# Patient Record
Sex: FEMALE | Race: WHITE | HISPANIC OR LATINO | Employment: FULL TIME | URBAN - METROPOLITAN AREA
[De-identification: names, ages, dates, MRNs, and addresses within clinical notes are randomized per-mention and may not be internally consistent; named-entity substitution may affect disease eponyms.]

---

## 2017-03-06 ENCOUNTER — THERAPY VISIT (OUTPATIENT)
Dept: PHYSICAL THERAPY | Facility: CLINIC | Age: 52
End: 2017-03-06
Payer: COMMERCIAL

## 2017-03-06 DIAGNOSIS — M54.50 ACUTE BILATERAL LOW BACK PAIN WITHOUT SCIATICA: Primary | ICD-10-CM

## 2017-03-06 PROCEDURE — 97110 THERAPEUTIC EXERCISES: CPT | Mod: GP | Performed by: PHYSICAL THERAPIST

## 2017-03-06 PROCEDURE — 97161 PT EVAL LOW COMPLEX 20 MIN: CPT | Mod: GP | Performed by: PHYSICAL THERAPIST

## 2017-03-06 PROCEDURE — 97014 ELECTRIC STIMULATION THERAPY: CPT | Mod: GP | Performed by: PHYSICAL THERAPIST

## 2017-03-06 PROCEDURE — 97010 HOT OR COLD PACKS THERAPY: CPT | Mod: GP | Performed by: PHYSICAL THERAPIST
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
## 2017-03-07 NOTE — PROGRESS NOTES
Subjective:                                       Pertinent medical history includes:  High blood pressure, depression and migraines.  Medical allergies: yes.  Other surgeries include:  None reported.  Current medications:  Hormone replacement therapy, pain medication, muscle relaxants and high blood pressure medication.  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  Primary job tasks include:  Prolonged standing.            Oswestry Score: 48 %                 Objective:    System    Physical Exam    General     ROS    Assessment/Plan:

## 2017-03-09 ENCOUNTER — THERAPY VISIT (OUTPATIENT)
Dept: PHYSICAL THERAPY | Facility: CLINIC | Age: 52
End: 2017-03-09
Payer: COMMERCIAL

## 2017-03-09 DIAGNOSIS — M54.2 CERVICALGIA: Primary | ICD-10-CM

## 2017-03-09 DIAGNOSIS — M54.50 ACUTE BILATERAL LOW BACK PAIN WITHOUT SCIATICA: ICD-10-CM

## 2017-03-09 PROCEDURE — 97014 ELECTRIC STIMULATION THERAPY: CPT | Mod: GP | Performed by: PHYSICAL THERAPIST

## 2017-03-09 PROCEDURE — 97161 PT EVAL LOW COMPLEX 20 MIN: CPT | Mod: GP | Performed by: PHYSICAL THERAPIST

## 2017-03-09 PROCEDURE — 97140 MANUAL THERAPY 1/> REGIONS: CPT | Mod: GP | Performed by: PHYSICAL THERAPIST

## 2017-03-09 PROCEDURE — 97110 THERAPEUTIC EXERCISES: CPT | Mod: GP | Performed by: PHYSICAL THERAPIST

## 2017-03-09 PROCEDURE — 97010 HOT OR COLD PACKS THERAPY: CPT | Mod: GP | Performed by: PHYSICAL THERAPIST
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
## 2017-03-14 ENCOUNTER — THERAPY VISIT (OUTPATIENT)
Dept: PHYSICAL THERAPY | Facility: CLINIC | Age: 52
End: 2017-03-14
Payer: COMMERCIAL

## 2017-03-14 DIAGNOSIS — M54.50 ACUTE BILATERAL LOW BACK PAIN WITHOUT SCIATICA: ICD-10-CM

## 2017-03-14 DIAGNOSIS — M54.2 CERVICALGIA: ICD-10-CM

## 2017-03-14 PROCEDURE — 97110 THERAPEUTIC EXERCISES: CPT | Mod: GP | Performed by: PHYSICAL THERAPIST

## 2017-03-14 PROCEDURE — 97140 MANUAL THERAPY 1/> REGIONS: CPT | Mod: GP | Performed by: PHYSICAL THERAPIST

## 2017-03-14 PROCEDURE — 97014 ELECTRIC STIMULATION THERAPY: CPT | Mod: GP | Performed by: PHYSICAL THERAPIST

## 2017-03-14 PROCEDURE — 97010 HOT OR COLD PACKS THERAPY: CPT | Mod: GP | Performed by: PHYSICAL THERAPIST

## 2017-03-14 PROCEDURE — 97035 APP MDLTY 1+ULTRASOUND EA 15: CPT | Mod: GP | Performed by: PHYSICAL THERAPIST

## 2017-03-18 ENCOUNTER — THERAPY VISIT (OUTPATIENT)
Dept: PHYSICAL THERAPY | Facility: CLINIC | Age: 52
End: 2017-03-18
Payer: COMMERCIAL

## 2017-03-18 DIAGNOSIS — M54.2 CERVICALGIA: ICD-10-CM

## 2017-03-18 DIAGNOSIS — M54.50 ACUTE BILATERAL LOW BACK PAIN WITHOUT SCIATICA: ICD-10-CM

## 2017-03-18 PROCEDURE — 97112 NEUROMUSCULAR REEDUCATION: CPT | Mod: GP | Performed by: PHYSICAL THERAPIST

## 2017-03-18 PROCEDURE — 97010 HOT OR COLD PACKS THERAPY: CPT | Mod: GP | Performed by: PHYSICAL THERAPIST

## 2017-03-18 PROCEDURE — 97140 MANUAL THERAPY 1/> REGIONS: CPT | Mod: GP | Performed by: PHYSICAL THERAPIST

## 2017-03-18 PROCEDURE — 97014 ELECTRIC STIMULATION THERAPY: CPT | Mod: GP | Performed by: PHYSICAL THERAPIST

## 2017-03-18 PROCEDURE — 97035 APP MDLTY 1+ULTRASOUND EA 15: CPT | Mod: GP | Performed by: PHYSICAL THERAPIST

## 2017-03-20 ENCOUNTER — THERAPY VISIT (OUTPATIENT)
Dept: PHYSICAL THERAPY | Facility: CLINIC | Age: 52
End: 2017-03-20
Payer: COMMERCIAL

## 2017-03-20 DIAGNOSIS — M54.50 ACUTE BILATERAL LOW BACK PAIN WITHOUT SCIATICA: ICD-10-CM

## 2017-03-20 DIAGNOSIS — M54.2 CERVICALGIA: ICD-10-CM

## 2017-03-20 PROCEDURE — 97112 NEUROMUSCULAR REEDUCATION: CPT | Mod: GP | Performed by: PHYSICAL THERAPIST

## 2017-03-20 PROCEDURE — 97010 HOT OR COLD PACKS THERAPY: CPT | Mod: GP | Performed by: PHYSICAL THERAPIST

## 2017-03-20 PROCEDURE — 97014 ELECTRIC STIMULATION THERAPY: CPT | Mod: GP | Performed by: PHYSICAL THERAPIST

## 2017-03-20 PROCEDURE — 97140 MANUAL THERAPY 1/> REGIONS: CPT | Mod: GP | Performed by: PHYSICAL THERAPIST

## 2017-03-20 PROCEDURE — 97035 APP MDLTY 1+ULTRASOUND EA 15: CPT | Mod: GP | Performed by: PHYSICAL THERAPIST

## 2017-04-01 ENCOUNTER — THERAPY VISIT (OUTPATIENT)
Dept: PHYSICAL THERAPY | Facility: CLINIC | Age: 52
End: 2017-04-01
Payer: COMMERCIAL

## 2017-04-01 DIAGNOSIS — M54.50 ACUTE BILATERAL LOW BACK PAIN WITHOUT SCIATICA: ICD-10-CM

## 2017-04-01 DIAGNOSIS — M54.2 CERVICALGIA: ICD-10-CM

## 2017-04-01 PROCEDURE — 97112 NEUROMUSCULAR REEDUCATION: CPT | Mod: GP | Performed by: PHYSICAL THERAPIST

## 2017-04-01 PROCEDURE — 97035 APP MDLTY 1+ULTRASOUND EA 15: CPT | Mod: GP | Performed by: PHYSICAL THERAPIST

## 2017-04-01 PROCEDURE — 97010 HOT OR COLD PACKS THERAPY: CPT | Mod: GP | Performed by: PHYSICAL THERAPIST

## 2017-04-01 PROCEDURE — 97014 ELECTRIC STIMULATION THERAPY: CPT | Mod: GP | Performed by: PHYSICAL THERAPIST

## 2017-04-01 PROCEDURE — 97140 MANUAL THERAPY 1/> REGIONS: CPT | Mod: GP | Performed by: PHYSICAL THERAPIST

## 2017-04-03 ENCOUNTER — THERAPY VISIT (OUTPATIENT)
Dept: PHYSICAL THERAPY | Facility: CLINIC | Age: 52
End: 2017-04-03
Payer: COMMERCIAL

## 2017-04-03 DIAGNOSIS — M54.2 CERVICALGIA: ICD-10-CM

## 2017-04-03 DIAGNOSIS — M54.50 ACUTE BILATERAL LOW BACK PAIN WITHOUT SCIATICA: ICD-10-CM

## 2017-04-03 PROCEDURE — 97035 APP MDLTY 1+ULTRASOUND EA 15: CPT | Mod: GP | Performed by: PHYSICAL THERAPIST

## 2017-04-03 PROCEDURE — 97014 ELECTRIC STIMULATION THERAPY: CPT | Mod: GP | Performed by: PHYSICAL THERAPIST

## 2017-04-03 PROCEDURE — 97140 MANUAL THERAPY 1/> REGIONS: CPT | Mod: GP | Performed by: PHYSICAL THERAPIST

## 2017-04-03 PROCEDURE — 97010 HOT OR COLD PACKS THERAPY: CPT | Mod: GP | Performed by: PHYSICAL THERAPIST

## 2017-04-03 PROCEDURE — 97110 THERAPEUTIC EXERCISES: CPT | Mod: GP | Performed by: PHYSICAL THERAPIST

## 2017-04-06 ENCOUNTER — THERAPY VISIT (OUTPATIENT)
Dept: PHYSICAL THERAPY | Facility: CLINIC | Age: 52
End: 2017-04-06
Payer: COMMERCIAL

## 2017-04-06 DIAGNOSIS — M54.50 ACUTE BILATERAL LOW BACK PAIN WITHOUT SCIATICA: ICD-10-CM

## 2017-04-06 DIAGNOSIS — M54.2 CERVICALGIA: ICD-10-CM

## 2017-04-06 PROCEDURE — 97035 APP MDLTY 1+ULTRASOUND EA 15: CPT | Mod: GP | Performed by: PHYSICAL THERAPIST

## 2017-04-06 PROCEDURE — 97010 HOT OR COLD PACKS THERAPY: CPT | Mod: GP | Performed by: PHYSICAL THERAPIST

## 2017-04-06 PROCEDURE — 97140 MANUAL THERAPY 1/> REGIONS: CPT | Mod: GP | Performed by: PHYSICAL THERAPIST

## 2017-04-06 PROCEDURE — 97110 THERAPEUTIC EXERCISES: CPT | Mod: GP | Performed by: PHYSICAL THERAPIST

## 2017-04-06 PROCEDURE — 97014 ELECTRIC STIMULATION THERAPY: CPT | Mod: GP | Performed by: PHYSICAL THERAPIST

## 2017-04-10 ENCOUNTER — THERAPY VISIT (OUTPATIENT)
Dept: PHYSICAL THERAPY | Facility: CLINIC | Age: 52
End: 2017-04-10
Payer: COMMERCIAL

## 2017-04-10 DIAGNOSIS — M54.50 ACUTE BILATERAL LOW BACK PAIN WITHOUT SCIATICA: ICD-10-CM

## 2017-04-10 DIAGNOSIS — M54.2 CERVICALGIA: ICD-10-CM

## 2017-04-10 PROCEDURE — 97110 THERAPEUTIC EXERCISES: CPT | Mod: GP | Performed by: PHYSICAL THERAPIST

## 2017-04-10 PROCEDURE — 97140 MANUAL THERAPY 1/> REGIONS: CPT | Mod: GP | Performed by: PHYSICAL THERAPIST

## 2017-04-10 PROCEDURE — 97035 APP MDLTY 1+ULTRASOUND EA 15: CPT | Mod: GP | Performed by: PHYSICAL THERAPIST

## 2017-04-10 PROCEDURE — 97014 ELECTRIC STIMULATION THERAPY: CPT | Mod: GP | Performed by: PHYSICAL THERAPIST

## 2017-04-10 PROCEDURE — 97010 HOT OR COLD PACKS THERAPY: CPT | Mod: GP | Performed by: PHYSICAL THERAPIST

## 2017-04-13 ENCOUNTER — THERAPY VISIT (OUTPATIENT)
Dept: PHYSICAL THERAPY | Facility: CLINIC | Age: 52
End: 2017-04-13
Payer: COMMERCIAL

## 2017-04-13 DIAGNOSIS — M54.2 CERVICALGIA: ICD-10-CM

## 2017-04-13 DIAGNOSIS — M54.50 ACUTE BILATERAL LOW BACK PAIN WITHOUT SCIATICA: ICD-10-CM

## 2017-04-13 PROCEDURE — 97530 THERAPEUTIC ACTIVITIES: CPT | Mod: GP | Performed by: PHYSICAL THERAPIST

## 2017-04-13 PROCEDURE — 97014 ELECTRIC STIMULATION THERAPY: CPT | Mod: GP | Performed by: PHYSICAL THERAPIST

## 2017-04-13 PROCEDURE — 97035 APP MDLTY 1+ULTRASOUND EA 15: CPT | Mod: GP | Performed by: PHYSICAL THERAPIST

## 2017-04-13 PROCEDURE — 97010 HOT OR COLD PACKS THERAPY: CPT | Mod: GP | Performed by: PHYSICAL THERAPIST

## 2017-04-13 PROCEDURE — 97112 NEUROMUSCULAR REEDUCATION: CPT | Mod: GP | Performed by: PHYSICAL THERAPIST

## 2017-04-15 ENCOUNTER — THERAPY VISIT (OUTPATIENT)
Dept: PHYSICAL THERAPY | Facility: CLINIC | Age: 52
End: 2017-04-15
Payer: COMMERCIAL

## 2017-04-15 DIAGNOSIS — M54.2 CERVICALGIA: ICD-10-CM

## 2017-04-15 DIAGNOSIS — M54.50 ACUTE BILATERAL LOW BACK PAIN WITHOUT SCIATICA: ICD-10-CM

## 2017-04-15 PROCEDURE — 97112 NEUROMUSCULAR REEDUCATION: CPT | Mod: GP | Performed by: PHYSICAL THERAPIST

## 2017-04-15 PROCEDURE — 97035 APP MDLTY 1+ULTRASOUND EA 15: CPT | Mod: GP | Performed by: PHYSICAL THERAPIST

## 2017-04-15 PROCEDURE — 97014 ELECTRIC STIMULATION THERAPY: CPT | Mod: GP | Performed by: PHYSICAL THERAPIST

## 2017-04-15 PROCEDURE — 97010 HOT OR COLD PACKS THERAPY: CPT | Mod: GP | Performed by: PHYSICAL THERAPIST

## 2017-04-15 PROCEDURE — 97530 THERAPEUTIC ACTIVITIES: CPT | Mod: GP | Performed by: PHYSICAL THERAPIST

## 2017-04-18 ENCOUNTER — THERAPY VISIT (OUTPATIENT)
Dept: PHYSICAL THERAPY | Facility: CLINIC | Age: 52
End: 2017-04-18
Payer: COMMERCIAL

## 2017-04-18 DIAGNOSIS — M54.50 ACUTE BILATERAL LOW BACK PAIN WITHOUT SCIATICA: ICD-10-CM

## 2017-04-18 DIAGNOSIS — M54.2 CERVICALGIA: ICD-10-CM

## 2017-04-18 PROCEDURE — 97010 HOT OR COLD PACKS THERAPY: CPT | Mod: GP | Performed by: PHYSICAL THERAPIST

## 2017-04-18 PROCEDURE — 97014 ELECTRIC STIMULATION THERAPY: CPT | Mod: GP | Performed by: PHYSICAL THERAPIST

## 2017-04-18 PROCEDURE — 97035 APP MDLTY 1+ULTRASOUND EA 15: CPT | Mod: GP | Performed by: PHYSICAL THERAPIST

## 2017-04-18 PROCEDURE — 97140 MANUAL THERAPY 1/> REGIONS: CPT | Mod: GP | Performed by: PHYSICAL THERAPIST
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
## 2017-04-20 ENCOUNTER — THERAPY VISIT (OUTPATIENT)
Dept: PHYSICAL THERAPY | Facility: CLINIC | Age: 52
End: 2017-04-20
Payer: COMMERCIAL

## 2017-04-20 DIAGNOSIS — M54.50 ACUTE BILATERAL LOW BACK PAIN WITHOUT SCIATICA: ICD-10-CM

## 2017-04-20 DIAGNOSIS — M54.2 CERVICALGIA: ICD-10-CM

## 2017-04-20 PROCEDURE — 97530 THERAPEUTIC ACTIVITIES: CPT | Mod: GP | Performed by: PHYSICAL THERAPIST

## 2017-04-20 PROCEDURE — 97035 APP MDLTY 1+ULTRASOUND EA 15: CPT | Mod: GP | Performed by: PHYSICAL THERAPIST

## 2017-04-20 PROCEDURE — 97014 ELECTRIC STIMULATION THERAPY: CPT | Mod: GP | Performed by: PHYSICAL THERAPIST

## 2017-04-20 PROCEDURE — 97010 HOT OR COLD PACKS THERAPY: CPT | Mod: GP | Performed by: PHYSICAL THERAPIST

## 2017-04-20 PROCEDURE — 97140 MANUAL THERAPY 1/> REGIONS: CPT | Mod: GP | Performed by: PHYSICAL THERAPIST

## 2017-04-25 ENCOUNTER — THERAPY VISIT (OUTPATIENT)
Dept: PHYSICAL THERAPY | Facility: CLINIC | Age: 52
End: 2017-04-25
Payer: COMMERCIAL

## 2017-04-25 DIAGNOSIS — M54.2 CERVICALGIA: ICD-10-CM

## 2017-04-25 DIAGNOSIS — M54.50 ACUTE BILATERAL LOW BACK PAIN WITHOUT SCIATICA: ICD-10-CM

## 2017-04-25 PROCEDURE — 97530 THERAPEUTIC ACTIVITIES: CPT | Mod: GP | Performed by: PHYSICAL THERAPIST

## 2017-04-25 PROCEDURE — 97112 NEUROMUSCULAR REEDUCATION: CPT | Mod: GP | Performed by: PHYSICAL THERAPIST

## 2017-04-25 PROCEDURE — 97014 ELECTRIC STIMULATION THERAPY: CPT | Mod: GP | Performed by: PHYSICAL THERAPIST

## 2017-04-25 PROCEDURE — 97010 HOT OR COLD PACKS THERAPY: CPT | Mod: GP | Performed by: PHYSICAL THERAPIST

## 2017-04-25 PROCEDURE — 97012 MECHANICAL TRACTION THERAPY: CPT | Mod: GP | Performed by: PHYSICAL THERAPIST

## 2017-04-27 ENCOUNTER — THERAPY VISIT (OUTPATIENT)
Dept: PHYSICAL THERAPY | Facility: CLINIC | Age: 52
End: 2017-04-27
Payer: COMMERCIAL

## 2017-04-27 DIAGNOSIS — M54.2 CERVICALGIA: ICD-10-CM

## 2017-04-27 DIAGNOSIS — M54.50 ACUTE BILATERAL LOW BACK PAIN WITHOUT SCIATICA: ICD-10-CM

## 2017-04-27 PROCEDURE — 97140 MANUAL THERAPY 1/> REGIONS: CPT | Mod: GP | Performed by: PHYSICAL THERAPIST

## 2017-04-27 PROCEDURE — 97014 ELECTRIC STIMULATION THERAPY: CPT | Mod: GP | Performed by: PHYSICAL THERAPIST

## 2017-04-27 PROCEDURE — 97112 NEUROMUSCULAR REEDUCATION: CPT | Mod: GP | Performed by: PHYSICAL THERAPIST

## 2017-04-27 PROCEDURE — 97010 HOT OR COLD PACKS THERAPY: CPT | Mod: GP | Performed by: PHYSICAL THERAPIST

## 2017-04-27 PROCEDURE — 97110 THERAPEUTIC EXERCISES: CPT | Mod: GP | Performed by: PHYSICAL THERAPIST

## 2017-05-01 ENCOUNTER — THERAPY VISIT (OUTPATIENT)
Dept: PHYSICAL THERAPY | Facility: CLINIC | Age: 52
End: 2017-05-01
Payer: COMMERCIAL

## 2017-05-01 DIAGNOSIS — M54.50 ACUTE BILATERAL LOW BACK PAIN WITHOUT SCIATICA: ICD-10-CM

## 2017-05-01 DIAGNOSIS — M54.2 CERVICALGIA: ICD-10-CM

## 2017-05-01 PROCEDURE — 97530 THERAPEUTIC ACTIVITIES: CPT | Mod: GP | Performed by: PHYSICAL THERAPIST

## 2017-05-01 PROCEDURE — 97140 MANUAL THERAPY 1/> REGIONS: CPT | Mod: GP | Performed by: PHYSICAL THERAPIST

## 2017-05-01 PROCEDURE — 97014 ELECTRIC STIMULATION THERAPY: CPT | Mod: GP | Performed by: PHYSICAL THERAPIST

## 2017-05-01 PROCEDURE — 97010 HOT OR COLD PACKS THERAPY: CPT | Mod: GP | Performed by: PHYSICAL THERAPIST

## 2017-05-01 PROCEDURE — 97112 NEUROMUSCULAR REEDUCATION: CPT | Mod: GP | Performed by: PHYSICAL THERAPIST

## 2017-05-04 ENCOUNTER — THERAPY VISIT (OUTPATIENT)
Dept: PHYSICAL THERAPY | Facility: CLINIC | Age: 52
End: 2017-05-04
Payer: COMMERCIAL

## 2017-05-04 DIAGNOSIS — M54.50 ACUTE BILATERAL LOW BACK PAIN WITHOUT SCIATICA: ICD-10-CM

## 2017-05-04 DIAGNOSIS — M54.2 CERVICALGIA: ICD-10-CM

## 2017-05-04 PROCEDURE — 97010 HOT OR COLD PACKS THERAPY: CPT | Mod: GP | Performed by: PHYSICAL THERAPIST

## 2017-05-04 PROCEDURE — 97014 ELECTRIC STIMULATION THERAPY: CPT | Mod: GP | Performed by: PHYSICAL THERAPIST

## 2017-05-04 PROCEDURE — 97530 THERAPEUTIC ACTIVITIES: CPT | Mod: GP | Performed by: PHYSICAL THERAPIST

## 2017-05-04 PROCEDURE — 97140 MANUAL THERAPY 1/> REGIONS: CPT | Mod: GP | Performed by: PHYSICAL THERAPIST

## 2017-05-04 PROCEDURE — 97112 NEUROMUSCULAR REEDUCATION: CPT | Mod: GP | Performed by: PHYSICAL THERAPIST

## 2017-05-08 ENCOUNTER — THERAPY VISIT (OUTPATIENT)
Dept: PHYSICAL THERAPY | Facility: CLINIC | Age: 52
End: 2017-05-08
Payer: COMMERCIAL

## 2017-05-08 DIAGNOSIS — M25.511 ACUTE PAIN OF RIGHT SHOULDER: Primary | ICD-10-CM

## 2017-05-08 DIAGNOSIS — M54.50 ACUTE BILATERAL LOW BACK PAIN WITHOUT SCIATICA: ICD-10-CM

## 2017-05-08 DIAGNOSIS — M54.2 CERVICALGIA: ICD-10-CM

## 2017-05-08 PROCEDURE — 97110 THERAPEUTIC EXERCISES: CPT | Mod: GP | Performed by: PHYSICAL THERAPIST

## 2017-05-08 PROCEDURE — 97140 MANUAL THERAPY 1/> REGIONS: CPT | Mod: GP | Performed by: PHYSICAL THERAPIST

## 2017-05-08 PROCEDURE — 97161 PT EVAL LOW COMPLEX 20 MIN: CPT | Mod: GP | Performed by: PHYSICAL THERAPIST

## 2017-05-08 PROCEDURE — 97035 APP MDLTY 1+ULTRASOUND EA 15: CPT | Mod: GP | Performed by: PHYSICAL THERAPIST

## 2017-05-08 PROCEDURE — 97014 ELECTRIC STIMULATION THERAPY: CPT | Mod: GP | Performed by: PHYSICAL THERAPIST

## 2017-05-08 PROCEDURE — 97010 HOT OR COLD PACKS THERAPY: CPT | Mod: GP | Performed by: PHYSICAL THERAPIST
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
## 2017-05-11 ENCOUNTER — THERAPY VISIT (OUTPATIENT)
Dept: PHYSICAL THERAPY | Facility: CLINIC | Age: 52
End: 2017-05-11
Payer: COMMERCIAL

## 2017-05-11 DIAGNOSIS — M54.50 ACUTE BILATERAL LOW BACK PAIN WITHOUT SCIATICA: ICD-10-CM

## 2017-05-11 DIAGNOSIS — M25.511 ACUTE PAIN OF RIGHT SHOULDER: ICD-10-CM

## 2017-05-11 DIAGNOSIS — M54.2 CERVICALGIA: ICD-10-CM

## 2017-05-11 PROCEDURE — 97014 ELECTRIC STIMULATION THERAPY: CPT | Mod: GP | Performed by: PHYSICAL THERAPIST

## 2017-05-11 PROCEDURE — 97112 NEUROMUSCULAR REEDUCATION: CPT | Mod: GP | Performed by: PHYSICAL THERAPIST

## 2017-05-11 PROCEDURE — 97110 THERAPEUTIC EXERCISES: CPT | Mod: GP | Performed by: PHYSICAL THERAPIST

## 2017-05-11 PROCEDURE — 97010 HOT OR COLD PACKS THERAPY: CPT | Mod: GP | Performed by: PHYSICAL THERAPIST

## 2017-05-15 ENCOUNTER — THERAPY VISIT (OUTPATIENT)
Dept: PHYSICAL THERAPY | Facility: CLINIC | Age: 52
End: 2017-05-15
Payer: COMMERCIAL

## 2017-05-15 DIAGNOSIS — M25.511 ACUTE PAIN OF RIGHT SHOULDER: ICD-10-CM

## 2017-05-15 DIAGNOSIS — M54.2 CERVICALGIA: ICD-10-CM

## 2017-05-15 DIAGNOSIS — M54.50 ACUTE BILATERAL LOW BACK PAIN WITHOUT SCIATICA: ICD-10-CM

## 2017-05-15 PROCEDURE — 97035 APP MDLTY 1+ULTRASOUND EA 15: CPT | Mod: GP | Performed by: PHYSICAL THERAPIST

## 2017-05-15 PROCEDURE — 97010 HOT OR COLD PACKS THERAPY: CPT | Mod: GP | Performed by: PHYSICAL THERAPIST

## 2017-05-15 PROCEDURE — 97140 MANUAL THERAPY 1/> REGIONS: CPT | Mod: GP | Performed by: PHYSICAL THERAPIST

## 2017-05-15 PROCEDURE — 97110 THERAPEUTIC EXERCISES: CPT | Mod: GP | Performed by: PHYSICAL THERAPIST

## 2017-05-15 PROCEDURE — 97014 ELECTRIC STIMULATION THERAPY: CPT | Mod: GP | Performed by: PHYSICAL THERAPIST

## 2017-05-18 ENCOUNTER — THERAPY VISIT (OUTPATIENT)
Dept: PHYSICAL THERAPY | Facility: CLINIC | Age: 52
End: 2017-05-18
Payer: COMMERCIAL

## 2017-05-18 DIAGNOSIS — M54.2 CERVICALGIA: ICD-10-CM

## 2017-05-18 DIAGNOSIS — M25.511 ACUTE PAIN OF RIGHT SHOULDER: ICD-10-CM

## 2017-05-18 DIAGNOSIS — M54.50 ACUTE BILATERAL LOW BACK PAIN WITHOUT SCIATICA: ICD-10-CM

## 2017-05-18 PROCEDURE — 97112 NEUROMUSCULAR REEDUCATION: CPT | Mod: GP | Performed by: PHYSICAL THERAPIST

## 2017-05-18 PROCEDURE — 97014 ELECTRIC STIMULATION THERAPY: CPT | Mod: GP | Performed by: PHYSICAL THERAPIST

## 2017-05-18 PROCEDURE — 97110 THERAPEUTIC EXERCISES: CPT | Mod: GP | Performed by: PHYSICAL THERAPIST

## 2017-05-18 PROCEDURE — 97010 HOT OR COLD PACKS THERAPY: CPT | Mod: GP | Performed by: PHYSICAL THERAPIST

## 2017-05-22 ENCOUNTER — THERAPY VISIT (OUTPATIENT)
Dept: PHYSICAL THERAPY | Facility: CLINIC | Age: 52
End: 2017-05-22
Payer: COMMERCIAL

## 2017-05-22 DIAGNOSIS — M54.50 ACUTE BILATERAL LOW BACK PAIN WITHOUT SCIATICA: ICD-10-CM

## 2017-05-22 DIAGNOSIS — M54.2 CERVICALGIA: ICD-10-CM

## 2017-05-22 DIAGNOSIS — M25.511 ACUTE PAIN OF RIGHT SHOULDER: ICD-10-CM

## 2017-05-22 PROCEDURE — 97014 ELECTRIC STIMULATION THERAPY: CPT | Mod: GP | Performed by: PHYSICAL THERAPIST

## 2017-05-22 PROCEDURE — 97140 MANUAL THERAPY 1/> REGIONS: CPT | Mod: GP | Performed by: PHYSICAL THERAPIST

## 2017-05-22 PROCEDURE — 97110 THERAPEUTIC EXERCISES: CPT | Mod: GP | Performed by: PHYSICAL THERAPIST

## 2017-05-22 PROCEDURE — 97010 HOT OR COLD PACKS THERAPY: CPT | Mod: GP | Performed by: PHYSICAL THERAPIST

## 2017-05-22 PROCEDURE — 97112 NEUROMUSCULAR REEDUCATION: CPT | Mod: GP | Performed by: PHYSICAL THERAPIST

## 2017-05-25 ENCOUNTER — THERAPY VISIT (OUTPATIENT)
Dept: PHYSICAL THERAPY | Facility: CLINIC | Age: 52
End: 2017-05-25
Payer: COMMERCIAL

## 2017-05-25 DIAGNOSIS — M25.511 ACUTE PAIN OF RIGHT SHOULDER: ICD-10-CM

## 2017-05-25 DIAGNOSIS — M54.2 CERVICALGIA: ICD-10-CM

## 2017-05-25 DIAGNOSIS — M54.50 ACUTE BILATERAL LOW BACK PAIN WITHOUT SCIATICA: ICD-10-CM

## 2017-05-25 PROCEDURE — 97110 THERAPEUTIC EXERCISES: CPT | Mod: GP | Performed by: PHYSICAL THERAPIST

## 2017-05-25 PROCEDURE — 97010 HOT OR COLD PACKS THERAPY: CPT | Mod: GP | Performed by: PHYSICAL THERAPIST

## 2017-05-25 PROCEDURE — 97140 MANUAL THERAPY 1/> REGIONS: CPT | Mod: GP | Performed by: PHYSICAL THERAPIST

## 2017-05-25 PROCEDURE — 97112 NEUROMUSCULAR REEDUCATION: CPT | Mod: GP | Performed by: PHYSICAL THERAPIST

## 2017-05-25 PROCEDURE — 97014 ELECTRIC STIMULATION THERAPY: CPT | Mod: GP | Performed by: PHYSICAL THERAPIST

## 2017-06-05 ENCOUNTER — THERAPY VISIT (OUTPATIENT)
Dept: PHYSICAL THERAPY | Facility: CLINIC | Age: 52
End: 2017-06-05
Payer: COMMERCIAL

## 2017-06-05 DIAGNOSIS — M54.50 ACUTE BILATERAL LOW BACK PAIN WITHOUT SCIATICA: ICD-10-CM

## 2017-06-05 DIAGNOSIS — M25.511 ACUTE PAIN OF RIGHT SHOULDER: ICD-10-CM

## 2017-06-05 DIAGNOSIS — M54.2 CERVICALGIA: ICD-10-CM

## 2017-06-05 PROCEDURE — 97010 HOT OR COLD PACKS THERAPY: CPT | Mod: GP | Performed by: PHYSICAL THERAPIST

## 2017-06-05 PROCEDURE — 97112 NEUROMUSCULAR REEDUCATION: CPT | Mod: GP | Performed by: PHYSICAL THERAPIST

## 2017-06-05 PROCEDURE — 97014 ELECTRIC STIMULATION THERAPY: CPT | Mod: GP | Performed by: PHYSICAL THERAPIST

## 2017-06-05 PROCEDURE — 97140 MANUAL THERAPY 1/> REGIONS: CPT | Mod: GP | Performed by: PHYSICAL THERAPIST

## 2017-06-12 ENCOUNTER — THERAPY VISIT (OUTPATIENT)
Dept: PHYSICAL THERAPY | Facility: CLINIC | Age: 52
End: 2017-06-12
Payer: COMMERCIAL

## 2017-06-12 DIAGNOSIS — M25.511 ACUTE PAIN OF RIGHT SHOULDER: ICD-10-CM

## 2017-06-12 DIAGNOSIS — M54.2 CERVICALGIA: ICD-10-CM

## 2017-06-12 DIAGNOSIS — M54.50 ACUTE BILATERAL LOW BACK PAIN WITHOUT SCIATICA: ICD-10-CM

## 2017-06-12 PROCEDURE — 97112 NEUROMUSCULAR REEDUCATION: CPT | Mod: GP | Performed by: PHYSICAL THERAPIST

## 2017-06-12 PROCEDURE — 97014 ELECTRIC STIMULATION THERAPY: CPT | Mod: GP | Performed by: PHYSICAL THERAPIST

## 2017-06-12 PROCEDURE — 97140 MANUAL THERAPY 1/> REGIONS: CPT | Mod: GP | Performed by: PHYSICAL THERAPIST

## 2017-06-12 PROCEDURE — 97010 HOT OR COLD PACKS THERAPY: CPT | Mod: GP | Performed by: PHYSICAL THERAPIST

## 2017-06-26 ENCOUNTER — THERAPY VISIT (OUTPATIENT)
Dept: PHYSICAL THERAPY | Facility: CLINIC | Age: 52
End: 2017-06-26
Payer: COMMERCIAL

## 2017-06-26 DIAGNOSIS — M25.511 ACUTE PAIN OF RIGHT SHOULDER: ICD-10-CM

## 2017-06-26 DIAGNOSIS — M54.50 ACUTE BILATERAL LOW BACK PAIN WITHOUT SCIATICA: ICD-10-CM

## 2017-06-26 DIAGNOSIS — M54.2 CERVICALGIA: ICD-10-CM

## 2017-06-26 PROCEDURE — 97014 ELECTRIC STIMULATION THERAPY: CPT | Mod: GP | Performed by: PHYSICAL THERAPIST

## 2017-06-26 PROCEDURE — 97164 PT RE-EVAL EST PLAN CARE: CPT | Mod: GP | Performed by: PHYSICAL THERAPIST

## 2017-06-26 PROCEDURE — 97010 HOT OR COLD PACKS THERAPY: CPT | Mod: GP | Performed by: PHYSICAL THERAPIST
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
## 2017-07-31 ENCOUNTER — OFFICE VISIT (OUTPATIENT)
Dept: OBGYN | Facility: CLINIC | Age: 52
End: 2017-07-31
Payer: COMMERCIAL

## 2017-07-31 ENCOUNTER — RADIANT APPOINTMENT (OUTPATIENT)
Dept: MAMMOGRAPHY | Facility: CLINIC | Age: 52
End: 2017-07-31
Attending: OBSTETRICS & GYNECOLOGY
Payer: COMMERCIAL

## 2017-07-31 VITALS
HEART RATE: 62 BPM | SYSTOLIC BLOOD PRESSURE: 129 MMHG | BODY MASS INDEX: 21.22 KG/M2 | DIASTOLIC BLOOD PRESSURE: 75 MMHG | OXYGEN SATURATION: 97 % | WEIGHT: 108.1 LBS | HEIGHT: 60 IN

## 2017-07-31 DIAGNOSIS — Z12.31 VISIT FOR SCREENING MAMMOGRAM: ICD-10-CM

## 2017-07-31 DIAGNOSIS — Z79.890 HORMONE REPLACEMENT THERAPY (HRT): Primary | ICD-10-CM

## 2017-07-31 DIAGNOSIS — Z13.1 SCREENING FOR DIABETES MELLITUS: ICD-10-CM

## 2017-07-31 DIAGNOSIS — Z13.220 LIPID SCREENING: ICD-10-CM

## 2017-07-31 DIAGNOSIS — A60.09 HERPES GENITALIS IN WOMEN: ICD-10-CM

## 2017-07-31 DIAGNOSIS — Z13.29 SCREENING FOR THYROID DISORDER: ICD-10-CM

## 2017-07-31 PROCEDURE — G0202 SCR MAMMO BI INCL CAD: HCPCS | Performed by: STUDENT IN AN ORGANIZED HEALTH CARE EDUCATION/TRAINING PROGRAM

## 2017-07-31 PROCEDURE — 99396 PREV VISIT EST AGE 40-64: CPT | Performed by: OBSTETRICS & GYNECOLOGY

## 2017-07-31 RX ORDER — AMLODIPINE BESYLATE 2.5 MG/1
TABLET ORAL
Refills: 0 | COMMUNITY
Start: 2017-05-22 | End: 2018-08-02

## 2017-07-31 RX ORDER — ACYCLOVIR 400 MG/1
400 TABLET ORAL 3 TIMES DAILY
Qty: 15 TABLET | Refills: 3 | Status: SHIPPED | OUTPATIENT
Start: 2017-07-31 | End: 2018-07-25

## 2017-07-31 RX ORDER — ESTRADIOL 1 MG/1
1 TABLET ORAL DAILY
Qty: 90 TABLET | Refills: 3 | Status: SHIPPED | OUTPATIENT
Start: 2017-07-31 | End: 2018-08-02

## 2017-07-31 ASSESSMENT — ANXIETY QUESTIONNAIRES
6. BECOMING EASILY ANNOYED OR IRRITABLE: NOT AT ALL
5. BEING SO RESTLESS THAT IT IS HARD TO SIT STILL: NOT AT ALL
7. FEELING AFRAID AS IF SOMETHING AWFUL MIGHT HAPPEN: NOT AT ALL
GAD7 TOTAL SCORE: 0
2. NOT BEING ABLE TO STOP OR CONTROL WORRYING: NOT AT ALL
3. WORRYING TOO MUCH ABOUT DIFFERENT THINGS: NOT AT ALL
1. FEELING NERVOUS, ANXIOUS, OR ON EDGE: NOT AT ALL

## 2017-07-31 ASSESSMENT — PATIENT HEALTH QUESTIONNAIRE - PHQ9: 5. POOR APPETITE OR OVEREATING: NOT AT ALL

## 2017-07-31 NOTE — MR AVS SNAPSHOT
After Visit Summary   7/31/2017    Maribel June    MRN: [id number]           Patient Information     Date Of Birth          1965        Visit Information        Provider Department      7/31/2017 1:30 PM Laurita Damon DO [facility]        Care Instructions                                                         If you have any questions regarding your visit, Please contact your care team.    [facility] CLINIC HOURS TELEPHONE NUMBER   Laurita DO Nelson.    LEONARDA Hayes -    DONALD Barrios RN       [facility], Wednesday[facility]  8:30a.m-5:00 p.m   [facility]  [address]  [place], [place] [address] [phone] ask for [facility]    Imaging [phone]       Urgent Care locations:    [facility] Saturday and Sunday   9 am - 5 pm    Monday-Friday   12 pm - 8 pm  Saturday and Sunday   9 am - 5 pm   [phone]     [facility] Labor and Delivery:  [phone]    If you need a medication refill, please contact your pharmacy. Please allow 3 business days for your refill to be completed.  As always, Thank you for trusting us with your healthcare needs!                Follow-ups after your visit        Your next 10 appointments already scheduled     Jul 31, 2017  3:00 PM CDT   MA SCREENING DIGITAL BILATERAL with MGMA1, MG MA TECH   [facility]    [address] [facility] [address] [phone]           Do not use any powder, lotion or deodorant under your arms or on your breast. If you do, we will ask you to remove it before your exam.  Wear comfortable, two-piece clothing.  If you have any allergies, tell your care team.  Bring any previous mammograms from other facilities or have them mailed to the breast center. Three-dimensional (3D) mammograms are available at  "[place] locations in [place], [place], [place], [place], [facility], and [place]. [facility] locations include [place] and [facility] in [place]. Benefits of 3D mammograms include: - Improved rate of cancer detection - Decreases your chance of having to go back for more tests, which means fewer: - \"False-positive\" results (This means that there is an abnormal area but it isn't cancer.) - Invasive testing procedures, such as a biopsy or surgery - Can provide clearer images of the breast if you have dense breast tissue. 3D mammography is an optional exam that anyone can have with a 2D mammogram. It doesn't replace or take the place of a 2D mammogram. 2D mammograms remain an effective screening test for all women.  Not all insurance companies cover the cost of a 3D mammogram. Check with your insurance.              Who to contact     If you have questions or need follow up information about today's clinic visit or your schedule please contact [facility] directly at [phone].  Normal or non-critical lab and imaging results will be communicated to you by [employer], letter or phone within 4 business days after the clinic has received the results. If you do not hear from us within 7 days, please contact the clinic through [employer] or phone. If you have a critical or abnormal lab result, we will notify you by phone as soon as possible.  Submit refill requests through [employer] or call your pharmacy and they will forward the refill request to us. Please allow 3 business days for your refill to be completed.          Additional Information About Your Visit        [employer] Information     [employer] gives you secure access to your electronic health record. If you see a primary care provider, you can also send messages to your care team and make appointments. If you have questions, please call your primary care clinic.  If you do not have a primary care provider, please call [phone] " and they will assist you.        Care EveryWhere ID     This is your Care EveryWhere ID. This could be used by other organizations to access your [place] medical records  [id number]        Your Vitals Were     Pulse Height Pulse Oximetry Breastfeeding? BMI (Body Mass Index)       62 1.524 m (5') 97% No 21.11 kg/m2        Blood Pressure from Last 3 Encounters:   07/31/17 129/75   08/16/16 120/76   08/01/16 123/74    Weight from Last 3 Encounters:   07/31/17 49 kg (108 lb 1.6 oz)   08/01/16 49.4 kg (108 lb 12.8 oz)   01/18/16 48 kg (105 lb 12.8 oz)              Today, you had the following     No orders found for display         Today's Medication Changes          These changes are accurate as of: 7/31/17  1:53 PM.  If you have any questions, ask your nurse or doctor.               These medicines have changed or have updated prescriptions.        Dose/Directions    acyclovir 400 MG tablet   Commonly known as:  ZOVIRAX   This may have changed:  Another medication with the same name was removed. Continue taking this medication, and follow the directions you see here.   Used for:  Recurrent genital herpes   Changed by:  Laurita Damon DO        Dose:  400 mg   Take 1 tablet (400 mg) by mouth 3 times daily   Quantity:  15 tablet   Refills:  1       estradiol 1 MG tablet   Commonly known as:  ESTRACE   This may have changed:  Another medication with the same name was removed. Continue taking this medication, and follow the directions you see here.   Used for:  Need for prophylactic hormone replacement therapy (postmenopausal)   Changed by:  Laurita Damon DO        Dose:  1 mg   Take 1 tablet by mouth daily.   Quantity:  90 tablet   Refills:  3                Primary Care Provider Office Phone #    [facility] [phone]       No address on file        Equal Access to Services     CARINE MACIEL AH: ramakrishna Robertson, padma reeder  john moniquelandy salomecarlos lasyedabecky ah. [facility] [phone].    ATENCIÓN: Si habla mary jane, tiene a puckett disposición servicios gratuitos de asistencia lingüística. Nitish whitmore [phone].    We comply with applicable federal civil rights laws and [place] laws. We do not discriminate on the basis of race, color, national origin, age, disability sex, sexual orientation or gender identity.            Thank you!     Thank you for choosing [facility]  for your care. Our goal is always to provide you with excellent care. Hearing back from our patients is one way we can continue to improve our services. Please take a few minutes to complete the written survey that you may receive in the mail after your visit with us. Thank you!             Your Updated Medication List - Protect others around you: Learn how to safely use, store and throw away your medicines at www.disposemymeds.org.          This list is accurate as of: 7/31/17  1:53 PM.  Always use your most recent med list.                   Brand Name Dispense Instructions for use Diagnosis    acyclovir 400 MG tablet    ZOVIRAX    15 tablet    Take 1 tablet (400 mg) by mouth 3 times daily    Recurrent genital herpes       amLODIPine 2.5 MG tablet    NORVASC     TK 1 T PO QD        estradiol 1 MG tablet    ESTRACE    90 tablet    Take 1 tablet by mouth daily.    Need for prophylactic hormone replacement therapy (postmenopausal)       tiZANidine 4 MG tablet    ZANAFLEX     TK ONE T PO IN THE EVENING PRN P AND SPASM.        VITAMIN B 12 PO      Take  by mouth.

## 2017-07-31 NOTE — PATIENT INSTRUCTIONS
If you have any questions regarding your visit, Please contact your care team.    [facility] CLINIC HOURS TELEPHONE NUMBER   Laurita DO Nelson.    LEONARDA Hayes -    DONALD Barrios RN       Monday, Wednesday, Thursday and Friday, [place]  8:30a.m-5:00 p.m   [facility]  [address] [place] [address]  [phone] ask for [facility]    Imaging [phone]       Urgent Care locations:    [facility] Saturday and Sunday   9 am - 5 pm    Monday-Friday   12 pm - 8 pm  Saturday and Sunday   9 am - 5 pm   [phone]     [facility] Labor and Delivery:  [phone]    If you need a medication refill, please contact your pharmacy. Please allow 3 business days for your refill to be completed.  As always, Thank you for trusting us with your healthcare needs!

## 2017-07-31 NOTE — PROGRESS NOTES
Maribel is a 52 year old female, , who is here for her annual exam and refills of Estrace and Acyclovir.  She is not in a fasting state this afternoon so plans to return for needed labwork.  Her mammogram is scheduled for 3 pm today.  She denies being due for colonoscopy this year but describes having benign polyps in the past.  She is s/p hysterectomy for the treatment of endometriosis but still retains both ovaries.    ROS: Ten point review of systems was reviewed and negative except the above.    Health Maintenance   Topic Date Due     INFLUENZA VACCINE (SYSTEM ASSIGNED)  2017     MAMMO Q2 YR  2018     TETANUS IMMUNIZATION (SYSTEM ASSIGNED)  2020     LIPID SCREEN Q5 YR FEMALE (SYSTEM ASSIGNED)  2021     COLON CANCER SCREEN (SYSTEM ASSIGNED)  2026      Last pap: 14 normal  Last Mammogram: today  Last Dexa: negative  Last Colonoscopy: baseline done - found polyps  Lab Results   Component Value Date    CHOL 244 2016     Lab Results   Component Value Date    HDL 68 2016     Lab Results   Component Value Date     2016     Lab Results   Component Value Date    TRIG 158 2016     Lab Results   Component Value Date    CHOLHDLRATIO 2.9 2013         OBHX:      PSH:   Past Surgical History:   Procedure Laterality Date     BREAST SURGERY      Right breast lump removal-non-cancerous      SECTION       COLONOSCOPY N/A 2016    Procedure: COMBINED COLONOSCOPY, SINGLE OR MULTIPLE BIOPSY/POLYPECTOMY BY BIOPSY;  Surgeon: Duane, William Charles, MD;  Location: [facility] OR     COLONOSCOPY WITH CO2 INSUFFLATION N/A 2016    Procedure: COLONOSCOPY WITH CO2 INSUFFLATION;  Surgeon: Duane, William Charles, MD;  Location: [facility] OR     CYSTOSCOPY  09    left stent placement (C-ARM)     GYN SURGERY      laparoscopy     GYN SURGERY      partial hysterectomy--still have ovaries         PMH: Her past medical, surgical, and obstetric histories were  reviewed and are documented in their appropriate chart areas.    ALL/Meds: Her medication and allergy histories were reviewed and are documented in their appropriate chart areas.    SH/FMH: Her social and family history was reviewed and documented in its appropriate chart area.    PE: /75  Pulse 62  Ht 1.524 m (5')  Wt 49 kg (108 lb 1.6 oz)  SpO2 97%  Breastfeeding? No  BMI 21.11 kg/m2  Body mass index is 21.11 kg/(m^2).    General Appearance:  healthy, alert, active, no distress  Cardiovascular:  Regular rate and Rhythm without murmur  Neck: Supple, no adenopathy and thyroid normal  Lungs:  Clear, without wheeze, rale or rhonchi  Breast: normal breast exam  Abdomen: Benign, Soft, flat, non-tender, No masses, organomegaly, No inguinal nodes and Bowel sounds normoactive.   Pelvic:       - Ext: Vulva and perineum are normal without lesion, mass or discharge        - Urethra: normal without discharge        - Urethral Meatus: normal appearance       - Bladder: no tenderness, no masses       - Vagina: Normal mucosa, no discharge        - Cervix: Surgically absent       - Uterus: Surgicall absent        - Adnexa: Normal without masses or tenderness       - Rectal: sphincter tone normal and no mass    A/P:  Well Woman     -  I discussed the new pap recommendations regarding screening.  Explained the rationale for increased intervals between paps.  Questions asked and answered.  She does agree to this regiment.  Pap was not obtained since her cervix is surgically absent   -  BC: post menopausal and s/p hysterectomy   -  No orders of the defined types were placed in this encounter.   -  Check labwork when in a fasting state   -  Refill Estrace and Acyclovir   -  Encouraged self-breast exam   -  Encouraged low fat diet, regular exercise, and adequate calcium intake.    Laurita Damon, DO  FACOG, FACS

## 2017-07-31 NOTE — NURSING NOTE
Chief Complaint   Patient presents with     Physical       Initial /75  Pulse 62  Ht 1.524 m (5')  Wt 49 kg (108 lb 1.6 oz)  SpO2 97%  Breastfeeding? No  BMI 21.11 kg/m2 Estimated body mass index is 21.11 kg/(m^2) as calculated from the following:    Height as of this encounter: 1.524 m (5').    Weight as of this encounter: 49 kg (108 lb 1.6 oz).  Medication Reconciliation:   Abigail Washingotn CMA  July 31, 2017 1:53 PM

## 2017-08-01 ASSESSMENT — ANXIETY QUESTIONNAIRES: GAD7 TOTAL SCORE: 0

## 2017-08-01 ASSESSMENT — PATIENT HEALTH QUESTIONNAIRE - PHQ9: SUM OF ALL RESPONSES TO PHQ QUESTIONS 1-9: 1

## 2017-09-19 DIAGNOSIS — Z13.1 SCREENING FOR DIABETES MELLITUS: ICD-10-CM

## 2017-09-19 DIAGNOSIS — Z13.220 LIPID SCREENING: ICD-10-CM

## 2017-09-19 DIAGNOSIS — Z13.29 SCREENING FOR THYROID DISORDER: ICD-10-CM

## 2017-09-19 LAB
CHOLEST SERPL-MCNC: 268 MG/DL
GLUCOSE SERPL-MCNC: 91 MG/DL (ref 70–99)
HDLC SERPL-MCNC: 63 MG/DL
LDLC SERPL CALC-MCNC: 165 MG/DL
NONHDLC SERPL-MCNC: 205 MG/DL
TRIGL SERPL-MCNC: 202 MG/DL
TSH SERPL DL<=0.005 MIU/L-ACNC: 2.68 MU/L (ref 0.4–4)

## 2017-09-19 PROCEDURE — 80061 LIPID PANEL: CPT | Performed by: OBSTETRICS & GYNECOLOGY

## 2017-09-19 PROCEDURE — 36415 COLL VENOUS BLD VENIPUNCTURE: CPT | Performed by: OBSTETRICS & GYNECOLOGY

## 2017-09-19 PROCEDURE — 84443 ASSAY THYROID STIM HORMONE: CPT | Performed by: OBSTETRICS & GYNECOLOGY

## 2017-09-19 PROCEDURE — 82947 ASSAY GLUCOSE BLOOD QUANT: CPT | Performed by: OBSTETRICS & GYNECOLOGY

## 2018-07-25 ENCOUNTER — TELEPHONE (OUTPATIENT)
Dept: OBGYN | Facility: CLINIC | Age: 53
End: 2018-07-25

## 2018-07-25 DIAGNOSIS — A60.09 HERPES GENITALIS IN WOMEN: ICD-10-CM

## 2018-07-25 DIAGNOSIS — Z79.899 ENCOUNTER FOR LONG-TERM (CURRENT) USE OF MEDICATIONS: Primary | ICD-10-CM

## 2018-07-25 RX ORDER — ACYCLOVIR 400 MG/1
400 TABLET ORAL 3 TIMES DAILY
Qty: 15 TABLET | Refills: 3 | Status: SHIPPED | OUTPATIENT
Start: 2018-07-25 | End: 2018-08-02

## 2018-07-26 NOTE — TELEPHONE ENCOUNTER
Please tell her that I refilled her Valtrex but she is due for a creatinine draw in lab and this order has been placed.  She will also be due for an annual exam after 7/31/18. (Routing comment)

## 2018-07-26 NOTE — TELEPHONE ENCOUNTER
Return call from patient- patient notified and understood.  Patient has an upcoming appointment on 8/2/18 with Dr. Damon- will have lab drawn at that time.

## 2018-07-30 ENCOUNTER — DOCUMENTATION ONLY (OUTPATIENT)
Dept: LAB | Facility: CLINIC | Age: 53
End: 2018-07-30

## 2018-07-30 DIAGNOSIS — Z13.1 SCREENING FOR DIABETES MELLITUS: ICD-10-CM

## 2018-07-30 DIAGNOSIS — Z13.220 LIPID SCREENING: Primary | ICD-10-CM

## 2018-07-30 DIAGNOSIS — Z13.29 SCREENING FOR THYROID DISORDER: ICD-10-CM

## 2018-07-30 NOTE — PROGRESS NOTES
Please let this pt know that her future lab orders have been placed for cholesterol, glucose, and thyroid.

## 2018-07-30 NOTE — PROGRESS NOTES
Patient has lab only appointment on 7/31/18.  Patient has upcoming AFE with Dr. Damon on 8/2/18.  Forwarded to provider to advise on lab orders.

## 2018-07-31 DIAGNOSIS — N20.0 KIDNEY STONE: ICD-10-CM

## 2018-07-31 DIAGNOSIS — Z13.29 SCREENING FOR THYROID DISORDER: ICD-10-CM

## 2018-07-31 DIAGNOSIS — Z13.1 SCREENING FOR DIABETES MELLITUS: ICD-10-CM

## 2018-07-31 DIAGNOSIS — Z13.220 LIPID SCREENING: ICD-10-CM

## 2018-07-31 DIAGNOSIS — N20.0 KIDNEY STONE: Primary | ICD-10-CM

## 2018-07-31 DIAGNOSIS — Z79.899 ENCOUNTER FOR LONG-TERM (CURRENT) USE OF MEDICATIONS: ICD-10-CM

## 2018-07-31 LAB
ANION GAP SERPL CALCULATED.3IONS-SCNC: 6 MMOL/L (ref 3–14)
BUN SERPL-MCNC: 16 MG/DL (ref 7–30)
CALCIUM SERPL-MCNC: 8.3 MG/DL (ref 8.5–10.1)
CALCIUM SERPL-MCNC: 8.4 MG/DL (ref 8.5–10.1)
CHLORIDE SERPL-SCNC: 106 MMOL/L (ref 94–109)
CHOLEST SERPL-MCNC: 237 MG/DL
CO2 SERPL-SCNC: 28 MMOL/L (ref 20–32)
CO2 SERPL-SCNC: 28 MMOL/L (ref 20–32)
CREAT SERPL-MCNC: 0.54 MG/DL (ref 0.52–1.04)
CREAT SERPL-MCNC: 0.55 MG/DL (ref 0.52–1.04)
GFR SERPL CREATININE-BSD FRML MDRD: >90 ML/MIN/1.7M2
GFR SERPL CREATININE-BSD FRML MDRD: >90 ML/MIN/1.7M2
GLUCOSE SERPL-MCNC: 92 MG/DL (ref 70–99)
HDLC SERPL-MCNC: 65 MG/DL
LDLC SERPL CALC-MCNC: 134 MG/DL
NONHDLC SERPL-MCNC: 172 MG/DL
PHOSPHATE SERPL-MCNC: 3.3 MG/DL (ref 2.5–4.5)
POTASSIUM SERPL-SCNC: 3.6 MMOL/L (ref 3.4–5.3)
PTH-INTACT SERPL-MCNC: 61 PG/ML (ref 18–80)
SODIUM SERPL-SCNC: 140 MMOL/L (ref 133–144)
TRIGL SERPL-MCNC: 188 MG/DL
TSH SERPL DL<=0.005 MIU/L-ACNC: 3.54 MU/L (ref 0.4–4)
URATE SERPL-MCNC: 3.6 MG/DL (ref 2.6–6)

## 2018-07-31 PROCEDURE — 84100 ASSAY OF PHOSPHORUS: CPT | Performed by: UROLOGY

## 2018-07-31 PROCEDURE — 84443 ASSAY THYROID STIM HORMONE: CPT | Performed by: UROLOGY

## 2018-07-31 PROCEDURE — 84550 ASSAY OF BLOOD/URIC ACID: CPT | Performed by: UROLOGY

## 2018-07-31 PROCEDURE — 80061 LIPID PANEL: CPT | Performed by: UROLOGY

## 2018-07-31 PROCEDURE — 80048 BASIC METABOLIC PNL TOTAL CA: CPT | Performed by: UROLOGY

## 2018-07-31 PROCEDURE — 82374 ASSAY BLOOD CARBON DIOXIDE: CPT | Performed by: UROLOGY

## 2018-07-31 PROCEDURE — 83970 ASSAY OF PARATHORMONE: CPT | Performed by: UROLOGY

## 2018-07-31 PROCEDURE — 82565 ASSAY OF CREATININE: CPT | Performed by: UROLOGY

## 2018-07-31 PROCEDURE — 36415 COLL VENOUS BLD VENIPUNCTURE: CPT | Performed by: UROLOGY

## 2018-07-31 PROCEDURE — 82310 ASSAY OF CALCIUM: CPT | Performed by: UROLOGY

## 2018-08-02 ENCOUNTER — OFFICE VISIT (OUTPATIENT)
Dept: OBGYN | Facility: CLINIC | Age: 53
End: 2018-08-02
Payer: COMMERCIAL

## 2018-08-02 ENCOUNTER — RADIANT APPOINTMENT (OUTPATIENT)
Dept: MAMMOGRAPHY | Facility: CLINIC | Age: 53
End: 2018-08-02
Attending: OBSTETRICS & GYNECOLOGY
Payer: COMMERCIAL

## 2018-08-02 VITALS
WEIGHT: 110.8 LBS | HEIGHT: 60 IN | BODY MASS INDEX: 21.75 KG/M2 | SYSTOLIC BLOOD PRESSURE: 105 MMHG | OXYGEN SATURATION: 96 % | HEART RATE: 60 BPM | DIASTOLIC BLOOD PRESSURE: 67 MMHG

## 2018-08-02 DIAGNOSIS — Z12.31 VISIT FOR SCREENING MAMMOGRAM: ICD-10-CM

## 2018-08-02 DIAGNOSIS — Z00.00 ROUTINE GENERAL MEDICAL EXAMINATION AT A HEALTH CARE FACILITY: Primary | ICD-10-CM

## 2018-08-02 DIAGNOSIS — M85.80 OSTEOPENIA, UNSPECIFIED LOCATION: ICD-10-CM

## 2018-08-02 DIAGNOSIS — Z79.890 HORMONE REPLACEMENT THERAPY (HRT): ICD-10-CM

## 2018-08-02 DIAGNOSIS — B00.9 HSV (HERPES SIMPLEX VIRUS) INFECTION: ICD-10-CM

## 2018-08-02 PROCEDURE — 77067 SCR MAMMO BI INCL CAD: CPT | Performed by: RADIOLOGY

## 2018-08-02 PROCEDURE — 99396 PREV VISIT EST AGE 40-64: CPT | Performed by: OBSTETRICS & GYNECOLOGY

## 2018-08-02 RX ORDER — IBUPROFEN 200 MG
400 TABLET ORAL
COMMUNITY
End: 2020-11-09

## 2018-08-02 RX ORDER — LANOLIN ALCOHOL/MO/W.PET/CERES
1 CREAM (GRAM) TOPICAL
COMMUNITY
End: 2020-12-02

## 2018-08-02 RX ORDER — AMLODIPINE BESYLATE 5 MG/1
1 TABLET ORAL
COMMUNITY
Start: 2018-05-12 | End: 2022-09-19

## 2018-08-02 RX ORDER — GABAPENTIN 100 MG/1
CAPSULE ORAL
Refills: 3 | COMMUNITY
Start: 2018-06-15 | End: 2019-08-05

## 2018-08-02 RX ORDER — ACYCLOVIR 400 MG/1
400 TABLET ORAL 3 TIMES DAILY
Qty: 15 TABLET | Refills: 5 | Status: SHIPPED | OUTPATIENT
Start: 2018-08-02 | End: 2019-04-09

## 2018-08-02 RX ORDER — GABAPENTIN 300 MG/1
1 CAPSULE ORAL
COMMUNITY
Start: 2018-05-12 | End: 2019-08-05

## 2018-08-02 RX ORDER — ESTRADIOL 1 MG/1
1 TABLET ORAL DAILY
Qty: 90 TABLET | Refills: 3 | Status: SHIPPED | OUTPATIENT
Start: 2018-08-02 | End: 2019-10-28

## 2018-08-02 ASSESSMENT — ANXIETY QUESTIONNAIRES
1. FEELING NERVOUS, ANXIOUS, OR ON EDGE: NOT AT ALL
GAD7 TOTAL SCORE: 0
2. NOT BEING ABLE TO STOP OR CONTROL WORRYING: NOT AT ALL
7. FEELING AFRAID AS IF SOMETHING AWFUL MIGHT HAPPEN: NOT AT ALL
6. BECOMING EASILY ANNOYED OR IRRITABLE: NOT AT ALL
5. BEING SO RESTLESS THAT IT IS HARD TO SIT STILL: NOT AT ALL
3. WORRYING TOO MUCH ABOUT DIFFERENT THINGS: NOT AT ALL

## 2018-08-02 ASSESSMENT — PATIENT HEALTH QUESTIONNAIRE - PHQ9: 5. POOR APPETITE OR OVEREATING: NOT AT ALL

## 2018-08-02 NOTE — PATIENT INSTRUCTIONS
If you have any questions regarding your visit, Please contact your care team.    [facility] CLINIC HOURS TELEPHONE NUMBER   Laurita Damon DO.    LEONARDA Hayes -    DONALD Zuluaga       Monday, Wednesday, Thursday and Friday, [place]  8:30a.m-5:00 p.m   [facility]  [address] [place] [address]  [phone] ask for [facility]    Imaging [phone]       Urgent Care locations:    [facility] Saturday and Sunday   9 am - 5 pm    Monday-Friday   12 pm - 8 pm  Saturday and Sunday   9 am - 5 pm   [phone]     [facility] Labor and Delivery:  [phone]    If you need a medication refill, please contact your pharmacy. Please allow 3 business days for your refill to be completed.  As always, Thank you for trusting us with your healthcare needs!

## 2018-08-02 NOTE — PROGRESS NOTES
Maribel is a 53 year old female, , who is here for her annual exam.  She requests refills of her Estrace and Acyclovir.  She just completed her mammogram this morning with results pending.  She is s/p hysterectomy but retains both ovaries.  She has a hx of osteopenia but her last DEXA scan was 10+ years ago so she is due for a recheck.  She is not able to take in adequate calcium due to a hx of kidney stones which are calcium-based.  She will f/u with Urology next month and decide the best way of getting in calcium.    ROS: Ten point review of systems was reviewed and negative except the above.    Health Maintenance   Topic Date Due     HIV SCREEN (SYSTEM ASSIGNED)  1984     PHQ-2 Q1 YR  2018     INFLUENZA VACCINE (1) 2018     MAMMO Q2 YR  2019     TETANUS IMMUNIZATION (SYSTEM ASSIGNED)  2020     LIPID SCREEN Q5 YR FEMALE (SYSTEM ASSIGNED)  2023     COLON CANCER SCREEN (SYSTEM ASSIGNED)  2026      Last pap: 14 normal  Last Mammogram: this morning with results pending  Last Dexa: over 10 years ago and at risk for osteoporosis so will order for now  Last Colonoscopy: 16 so not due  Lab Results   Component Value Date    CHOL 237 2018     Lab Results   Component Value Date    HDL 65 2018     Lab Results   Component Value Date     2018     Lab Results   Component Value Date    TRIG 188 2018     Lab Results   Component Value Date    CHOLHDLRATIO 2.9 2013         OBHX:      PSH:   Past Surgical History:   Procedure Laterality Date     BREAST SURGERY      Right breast lump removal-non-cancerous      SECTION       COLONOSCOPY N/A 2016    Procedure: COMBINED COLONOSCOPY, SINGLE OR MULTIPLE BIOPSY/POLYPECTOMY BY BIOPSY;  Surgeon: Duane, William Charles, MD;  Location: [facility] OR     COLONOSCOPY WITH CO2 INSUFFLATION N/A 2016    Procedure: COLONOSCOPY WITH CO2 INSUFFLATION;  Surgeon: Duane, William Charles, MD;   Location: [facility] OR     CYSTOSCOPY  11/13/09    left stent placement (C-ARM)     GENITOURINARY SURGERY      surgery for kidney stone     GYN SURGERY      laparoscopy     GYN SURGERY      partial hysterectomy--still have ovaries         PMH: Her past medical, surgical, and obstetric histories were reviewed and are documented in their appropriate chart areas.    ALL/Meds: Her medication and allergy histories were reviewed and are documented in their appropriate chart areas.    SH/FMH: Her social and family history was reviewed and documented in its appropriate chart area.    PE: /67  Pulse 60  Ht 5' (1.524 m)  Wt 110 lb 12.8 oz (50.3 kg)  SpO2 96%  Breastfeeding? No  BMI 21.64 kg/m2  Body mass index is 21.64 kg/(m^2).    General Appearance:  healthy, alert, active, no distress  Cardiovascular:  Regular rate and Rhythm without murmur  Neck: Supple, no adenopathy and thyroid normal  Lungs:  Clear, without wheeze, rale or rhonchi  Breast: normal breast exam  Abdomen: Benign, Soft, flat, non-tender, No masses, organomegaly, No inguinal nodes and Bowel sounds normoactive.   Pelvic:       - Ext: Vulva and perineum are normal without lesion, mass or discharge        - Urethra: normal without discharge        - Urethral Meatus: normal appearance       - Bladder: no tenderness, no masses       - Vagina: Normal mucosa, no discharge        - Cervix: surgically absent       - Uterus: surgically absent       - Adnexa: Normal without masses or tenderness       - Rectal: sphincter tone normal and no mass    A/P:  Well Woman Exam, HRT refill, Acyclovir refill, hx of osteopenia     -  I discussed the new pap recommendations regarding screening.  Explained the rationale for increased intervals between paps.  Questions asked and answered.  She does agree to this regiment.  Pap was not obtained since she is not a candidate   -  BC: s/p hysterectomy   -  Refill Estrace and Acyclovir per pt request   -  Schedule a DEXA scan due to  her hx of osteopenia and risk factors for osteoporosis   -  Mammogram results pending  Orders Placed This Encounter   Procedures     DX Hip/Pelvis/Spine      - Encouraged self-breast exam   - Encouraged low fat diet, regular exercise, and adequate calcium intake.    Laurita Damon DO  FACOG, FACS

## 2018-08-02 NOTE — MR AVS SNAPSHOT
After Visit Summary   8/2/2018    Maribel June    MRN: [id number]           Patient Information     Date Of Birth          1965        Visit Information        Provider Department      8/2/2018 10:00 AM Laurita Damon DO [facility]        Care Instructions                                                         If you have any questions regarding your visit, Please contact your care team.    [facility] CLINIC HOURS TELEPHONE NUMBER   Laurita Damon DO.    LEONARDA Hayes -    DONALD Zuluaga       Monday, Wednesday, [facility]  8:30a.m-5:00 p.m   [facility]  [address] [place] [address]  [phone] ask for [facility]    Imaging [phone]       Urgent Care locations:    [facility] Saturday and Sunday   9 am - 5 pm    Monday-Friday   12 pm - 8 pm  Saturday and Sunday   9 am - 5 pm   [phone]     [facility] Labor and Delivery:  [phone]    If you need a medication refill, please contact your pharmacy. Please allow 3 business days for your refill to be completed.  As always, Thank you for trusting us with your healthcare needs!                Follow-ups after your visit        Who to contact     If you have questions or need follow up information about today's clinic visit or your schedule please contact [facility] directly at [phone].  Normal or non-critical lab and imaging results will be communicated to you by MyChart, letter or phone within 4 business days after the clinic has received the results. If you do not hear from us within 7 days, please contact the clinic through MyChart or phone. If you have a critical or abnormal lab result, we will notify you by phone as soon as possible.  Submit refill requests through [email or web address] or call your pharmacy and they will forward the refill request to us.  Please allow 3 business days for your refill to be completed.          Additional Information About Your Visit        The [employer] Information     The [employer] gives you secure access to your electronic health record. If you see a primary care provider, you can also send messages to your care team and make appointments. If you have questions, please call your primary care clinic.  If you do not have a primary care provider, please call [phone] and they will assist you.        Care EveryWhere ID     This is your Care EveryWhere ID. This could be used by other organizations to access your [facility] medical records  [id number]        Your Vitals Were     Pulse Height Pulse Oximetry Breastfeeding? BMI (Body Mass Index)       60 5' (1.524 m) 96% No 21.64 kg/m2        Blood Pressure from Last 3 Encounters:   08/02/18 105/67   07/31/17 129/75   08/16/16 120/76    Weight from Last 3 Encounters:   08/02/18 110 lb 12.8 oz (50.3 kg)   07/31/17 108 lb 1.6 oz (49 kg)   08/01/16 108 lb 12.8 oz (49.4 kg)              Today, you had the following     No orders found for display         Today's Medication Changes          These changes are accurate as of 8/2/18 10:02 AM.  If you have any questions, ask your nurse or doctor.               These medicines have changed or have updated prescriptions.        Dose/Directions    acyclovir 400 MG tablet   Commonly known as:  ZOVIRAX   This may have changed:  Another medication with the same name was removed. Continue taking this medication, and follow the directions you see here.   Used for:  Recurrent genital herpes   Changed by:  Laurita Damon DO        Dose:  400 mg   Take 1 tablet (400 mg) by mouth 3 times daily   Quantity:  15 tablet   Refills:  1       amLODIPine 5 MG tablet   Commonly known as:  NORVASC   This may have changed:  Another medication with the same name was removed. Continue taking this medication, and follow the directions you see here.   Changed by:  Nelson  Laurita Padron DO        Dose:  1 tablet   Take 1 tablet by mouth   Refills:  0       cyanocobalamin 1000 MCG tablet   Commonly known as:  vitamin  B-12   This may have changed:  Another medication with the same name was removed. Continue taking this medication, and follow the directions you see here.   Changed by:  Laurita Damon DO        Dose:  1 tablet   Take 1 tablet by mouth   Refills:  0       estradiol 1 MG tablet   Commonly known as:  ESTRACE   This may have changed:  Another medication with the same name was removed. Continue taking this medication, and follow the directions you see here.   Used for:  Need for prophylactic hormone replacement therapy (postmenopausal)   Changed by:  Laurita Damon DO        Dose:  1 mg   Take 1 tablet by mouth daily.   Quantity:  90 tablet   Refills:  3         Stop taking these medicines if you haven't already. Please contact your care team if you have questions.     tiZANidine 4 MG tablet   Commonly known as:  ZANAFLEX   Stopped by:  Laurita Damon DO                    Primary Care Provider Office Phone # Fax #    [facility] [phone]       [address] [facility] [address]        Equal Access to Services     [facility]: Hadii jamie rivera hadasho Soomaali, waaxda luqadaha, qaybta kaalmada adeegyada, padma high . So [facility] [phone].    ATENCIÓN: Si habla español, tiene a puckett disposición servicios gratuitos de asistencia lingüística. Llame al [phone].    We comply with applicable federal civil rights laws and [place] laws. We do not discriminate on the basis of race, color, national origin, age, disability, sex, sexual orientation, or gender identity.            Thank you!     Thank you for choosing [facility]  for your [place]. Our goal is always to provide you with excellent care. Hearing back from our patients is one way we can continue to improve our  services. Please take a few minutes to complete the written survey that you may receive in the mail after your visit with us. Thank you!             Your Updated Medication List - Protect others around you: Learn how to safely use, store and throw away your medicines at www.disposemymeds.org.          This list is accurate as of 8/2/18 10:02 AM.  Always use your most recent med list.                   Brand Name Dispense Instructions for use Diagnosis    acyclovir 400 MG tablet    ZOVIRAX    15 tablet    Take 1 tablet (400 mg) by mouth 3 times daily    Recurrent genital herpes       amLODIPine 5 MG tablet    NORVASC     Take 1 tablet by mouth        cyanocobalamin 1000 MCG tablet    vitamin  B-12     Take 1 tablet by mouth        estradiol 1 MG tablet    ESTRACE    90 tablet    Take 1 tablet by mouth daily.    Need for prophylactic hormone replacement therapy (postmenopausal)       * gabapentin 300 MG capsule    NEURONTIN     Take 1 capsule by mouth        * gabapentin 100 MG capsule    NEURONTIN          ibuprofen 200 MG tablet    ADVIL/MOTRIN     Take 400 mg by mouth        * Notice:  This list has 2 medication(s) that are the same as other medications prescribed for you. Read the directions carefully, and ask your doctor or other care provider to review them with you.

## 2018-08-03 ENCOUNTER — RADIANT APPOINTMENT (OUTPATIENT)
Dept: BONE DENSITY | Facility: CLINIC | Age: 53
End: 2018-08-03
Attending: OBSTETRICS & GYNECOLOGY
Payer: COMMERCIAL

## 2018-08-03 DIAGNOSIS — M85.80 OSTEOPENIA, UNSPECIFIED LOCATION: ICD-10-CM

## 2018-08-03 PROCEDURE — 77080 DXA BONE DENSITY AXIAL: CPT | Performed by: RADIOLOGY

## 2018-08-03 ASSESSMENT — ANXIETY QUESTIONNAIRES: GAD7 TOTAL SCORE: 0

## 2018-08-03 ASSESSMENT — PATIENT HEALTH QUESTIONNAIRE - PHQ9: SUM OF ALL RESPONSES TO PHQ QUESTIONS 1-9: 0

## 2019-03-07 ENCOUNTER — OFFICE VISIT (OUTPATIENT)
Dept: DERMATOLOGY | Facility: CLINIC | Age: 54
End: 2019-03-07
Payer: COMMERCIAL

## 2019-03-07 DIAGNOSIS — D23.9 DERMATOFIBROMA: ICD-10-CM

## 2019-03-07 DIAGNOSIS — D48.9 NEOPLASM OF UNCERTAIN BEHAVIOR: Primary | ICD-10-CM

## 2019-03-07 DIAGNOSIS — D18.01 CHERRY ANGIOMA: ICD-10-CM

## 2019-03-07 DIAGNOSIS — L81.4 SOLAR LENTIGO: ICD-10-CM

## 2019-03-07 DIAGNOSIS — L82.0 INFLAMED SEBORRHEIC KERATOSIS: ICD-10-CM

## 2019-03-07 DIAGNOSIS — L82.1 SEBORRHEIC KERATOSIS: ICD-10-CM

## 2019-03-07 DIAGNOSIS — D22.9 MULTIPLE BENIGN NEVI: ICD-10-CM

## 2019-03-07 PROCEDURE — 88305 TISSUE EXAM BY PATHOLOGIST: CPT | Mod: TC | Performed by: DERMATOLOGY

## 2019-03-07 PROCEDURE — 17110 DESTRUCTION B9 LES UP TO 14: CPT | Performed by: DERMATOLOGY

## 2019-03-07 PROCEDURE — 11102 TANGNTL BX SKIN SINGLE LES: CPT | Mod: 59 | Performed by: DERMATOLOGY

## 2019-03-07 PROCEDURE — 99203 OFFICE O/P NEW LOW 30 MIN: CPT | Mod: 25 | Performed by: DERMATOLOGY

## 2019-03-07 ASSESSMENT — PAIN SCALES - GENERAL: PAINLEVEL: NO PAIN (0)

## 2019-03-07 NOTE — PROGRESS NOTES
[facility] Dermatology Note      Dermatology Problem List:  1. NUB, right zygomatic cheek, bx 3/7/19  2. Symptomatic SKs, cryo    Encounter Date: Mar 7, 2019    CC:  Chief Complaint   Patient presents with     Skin Check     lesions on neck, chest and face         History of Present Illness:  Ms. Maribel June is a 53 year old female who presents in self referral for a skin check. She has several areas of concern to address today. First, she points out her neck, saying that there is a rough spot which is itchy and very irritating. Next, there is a lesion the right cheek which the patient says has been changing recently. Finally, she would like to make sure we examine her chest, as there are a number of lesions in this area. Other than the lesion on the cheek, she has not noticed any new, bleeding, crusting, changing sites. No other specific concerns to address.     Past Medical History:   Patient Active Problem List   Diagnosis     Hyperlipidemia LDL goal <160     Genital herpes     GERD (gastroesophageal reflux disease)     Melasma     History of kidney stones     S/P hysterectomy     Flatulence, eructation, and gas pain     Recurrent genital herpes     Past Medical History:   Diagnosis Date     Depressive disorder      Endometriosis      Herpes genitalis in women      History of major depression     situational with first .      Kidney stone      Past Surgical History:   Procedure Laterality Date     BREAST SURGERY      Right breast lump removal-non-cancerous      SECTION       COLONOSCOPY N/A 2016    Procedure: COMBINED COLONOSCOPY, SINGLE OR MULTIPLE BIOPSY/POLYPECTOMY BY BIOPSY;  Surgeon: Duane, William Charles, MD;  Location: [facility] OR     COLONOSCOPY WITH CO2 INSUFFLATION N/A 2016    Procedure: COLONOSCOPY WITH CO2 INSUFFLATION;  Surgeon: Duane, William Charles, MD;  Location:  OR     CYSTOSCOPY  09    left stent placement (C-ARM)     GENITOURINARY  SURGERY      surgery for kidney stone     GYN SURGERY      laparoscopy     GYN SURGERY      partial hysterectomy--still have ovaries       Social History:  Patient reports that she has quit smoking. she has never used smokeless tobacco. She reports that she drinks alcohol. She reports that she does not use drugs. Patient grew up in [place] [facility]. Maribel's son works as a scribe in the dermatology department.     Family History:  Family History   Problem Relation Age of Onset     Hypertension Father      Cancer Maternal Grandfather         stomach     Heart Disease Paternal Grandfather      Hypertension Paternal Grandfather      Neurologic Disorder Brother         has seizures from Epilepsy     Hypertension Brother      Asthma Mother      Hypertension Mother        Medications:  Current Outpatient Medications   Medication Sig Dispense Refill     acyclovir (ZOVIRAX) 400 MG tablet Take 1 tablet (400 mg) by mouth 3 times daily 15 tablet 5     amLODIPine (NORVASC) 5 MG tablet Take 1 tablet by mouth       cyanocobalamin (VITAMIN  B-12) 1000 MCG tablet Take 1 tablet by mouth       estradiol (ESTRACE) 1 MG tablet Take 1 tablet (1 mg) by mouth daily 90 tablet 3     ibuprofen (ADVIL/MOTRIN) 200 MG tablet Take 400 mg by mouth       gabapentin (NEURONTIN) 100 MG capsule   3     gabapentin (NEURONTIN) 300 MG capsule Take 1 capsule by mouth         Allergies   Allergen Reactions     Flagyl [Metronidazole] Nausea and Vomiting     Sulfamethoxazole-Trimethoprim      Other reaction(s): Headache     Zithromax [Azithromycin Dihydrate] Rash       Review of Systems:  -Constitutional: Otherwise feeling well today, in usual state of health.  -Skin: As above in HPI. No additional skin concerns.    Physical exam:  Vitals: There were no vitals taken for this visit.  GEN: This is a well developed, well-nourished female in no acute distress, in a pleasant mood.    SKIN: Total skin excluding the undergarment areas was performed. The exam included  the head/face, neck, both arms, chest, back, abdomen, both legs, digits and/or nails.   -Alston phototype III  -Scattered brown macules on sun exposed areas.  -There are waxy stuck on tan to brown papules on the back, right neck  -There are dome shaped bright red papules on the trunk.   - Multiple regular brown pigmented macules and papules are identified on the trunk and extremities.   - 1.2 cm pink and brown plaque on the right zygomatic cheek. On dermatoscopy, half is pink structureless, while the other have has granular pigment throughout   - There is a firm tan/flesh colored papule that dimples with lateral pressure on the left buttock.  -No other lesions of concern on areas examined.             Impression/Plan:  1. 1.2 cm pink and brown plaque on the right zygomatic cheek. On dermatoscopy, half is pink structureless, while the other half has granulation throughout. Neoplasm of uncertain behavior. Differential includes BLK vs inflamed solar lentigo vs lentigo maligna. Biopsy taken from center that had both structureless area and granular pigmentation.     Shave biopsy:  After discussion of benefits and risks including but not limited to bleeding/bruising, pain/swelling, infection, scar, incomplete removal, nerve damage/numbness, recurrence, and non-diagnostic biopsy, written consent, verbal consent and photographs were obtained. Time-out was performed. The area was cleaned with isopropyl alcohol. 0.5mL of 1% lidocaine with epinephrine was injected to obtain adequate anesthesia of the lesion on the right zygomatic cheek. A shave biopsy was performed. Hemostasis was achieved with aluminium chloride. Vaseline and a sterile dressing were applied. The patient tolerated the procedure and no complications were noted. The patient was provided with verbal and written post care instructions.     2. Clinically benign findings including cherry angiomas, solar lentigines, seborrheic keratoses, dermatofibroma    No  further intervention needed.     3. Clinically benign nevi on the trunk and extremities.     No further intervention needed.     4. Seborrheic keratosis, right neck, symptomatic  Cryotherapy procedure note: After verbal consent and discussion of risks and benefits including but no limited to dyspigmentation/scar, blister, and pain, 1 was(were) treated with 1-2mm freeze border for 2 cycles with liquid nitrogen. Post cryotherapy instructions were provided.     Follow-up prn.       Staff Involved:  Scribe/Staff    Scribe Disclosure  I, Jose Max, am serving as a scribe to document services personally performed by Dr. Glendy White MD, based on data collection and the provider's statements to me.     Provider Disclosure:   The documentation recorded by the scribe accurately reflects the services I personally performed and the decisions made by me.    Glendy White MD    Department of Dermatology  [facility]: Phone: [phone], Fax:[phone]  [facility]: Phone: [phone], Fax: [phone]

## 2019-03-07 NOTE — LETTER
3/7/2019         RE: Maribel June  [address]  [place] [address]        Dear Colleague,    Thank you for referring your patient, Maribel June, to the [facility]. Please see a copy of my visit note below.    [facility] Dermatology Note      Dermatology Problem List:  1. NUB, right zygomatic cheek, bx 3/7/19  2. Symptomatic SKs, cryo    Encounter Date: Mar 7, 2019    CC:  Chief Complaint   Patient presents with     Skin Check     lesions on neck, chest and face         History of Present Illness:  Ms. Maribel June is a 53 year old female who presents in self referral for a skin check. She has several areas of concern to address today. First, she points out her neck, saying that there is a rough spot which is itchy and very irritating. Next, there is a lesion the right cheek which the patient says has been changing recently. Finally, she would like to make sure we examine her chest, as there are a number of lesions in this area. Other than the lesion on the cheek, she has not noticed any new, bleeding, crusting, changing sites. No other specific concerns to address.     Past Medical History:   Patient Active Problem List   Diagnosis     Hyperlipidemia LDL goal <160     Genital herpes     GERD (gastroesophageal reflux disease)     Melasma     History of kidney stones     S/P hysterectomy     Flatulence, eructation, and gas pain     Recurrent genital herpes     Past Medical History:   Diagnosis Date     Depressive disorder      Endometriosis      Herpes genitalis in women      History of major depression     situational with first .      Kidney stone      Past Surgical History:   Procedure Laterality Date     BREAST SURGERY      Right breast lump removal-non-cancerous      SECTION       COLONOSCOPY N/A 2016    Procedure: COMBINED COLONOSCOPY, SINGLE OR MULTIPLE BIOPSY/POLYPECTOMY BY BIOPSY;  Surgeon: Duane, William Charles, MD;   Location: [facility] OR     COLONOSCOPY WITH CO2 INSUFFLATION N/A 8/16/2016    Procedure: COLONOSCOPY WITH CO2 INSUFFLATION;  Surgeon: Duane, William Charles, MD;  Location: [facility] OR     CYSTOSCOPY  11/13/09    left stent placement (C-ARM)     GENITOURINARY SURGERY      surgery for kidney stone     GYN SURGERY      laparoscopy     GYN SURGERY      partial hysterectomy--still have ovaries       Social History:  Patient reports that she has quit smoking. she has never used smokeless tobacco. She reports that she drinks alcohol. She reports that she does not use drugs. Patient grew up in [facility]. Maribel's son works as a scribe in the dermatology department.     Family History:  Family History   Problem Relation Age of Onset     Hypertension Father      Cancer Maternal Grandfather         stomach     Heart Disease Paternal Grandfather      Hypertension Paternal Grandfather      Neurologic Disorder Brother         has seizures from Epilepsy     Hypertension Brother      Asthma Mother      Hypertension Mother        Medications:  Current Outpatient Medications   Medication Sig Dispense Refill     acyclovir (ZOVIRAX) 400 MG tablet Take 1 tablet (400 mg) by mouth 3 times daily 15 tablet 5     amLODIPine (NORVASC) 5 MG tablet Take 1 tablet by mouth       cyanocobalamin (VITAMIN  B-12) 1000 MCG tablet Take 1 tablet by mouth       estradiol (ESTRACE) 1 MG tablet Take 1 tablet (1 mg) by mouth daily 90 tablet 3     ibuprofen (ADVIL/MOTRIN) 200 MG tablet Take 400 mg by mouth       gabapentin (NEURONTIN) 100 MG capsule   3     gabapentin (NEURONTIN) 300 MG capsule Take 1 capsule by mouth         Allergies   Allergen Reactions     Flagyl [Metronidazole] Nausea and Vomiting     Sulfamethoxazole-Trimethoprim      Other reaction(s): Headache     Zithromax [Azithromycin Dihydrate] Rash       Review of Systems:  -Constitutional: Otherwise feeling well today, in usual state of health.  -Skin: As above in HPI. No additional skin  concerns.    Physical exam:  Vitals: There were no vitals taken for this visit.  GEN: This is a well developed, well-nourished female in no acute distress, in a pleasant mood.    SKIN: Total skin excluding the undergarment areas was performed. The exam included the head/face, neck, both arms, chest, back, abdomen, both legs, digits and/or nails.   -Alston phototype III  -Scattered brown macules on sun exposed areas.  -There are waxy stuck on tan to brown papules on the back, right neck  -There are dome shaped bright red papules on the trunk.   - Multiple regular brown pigmented macules and papules are identified on the trunk and extremities.   - 1.2 cm pink and brown plaque on the right zygomatic cheek. On dermatoscopy, half is pink structureless, while the other have has granular pigment throughout   - There is a firm tan/flesh colored papule that dimples with lateral pressure on the left buttock.  -No other lesions of concern on areas examined.             Impression/Plan:  1. 1.2 cm pink and brown plaque on the right zygomatic cheek. On dermatoscopy, half is pink structureless, while the other half has granulation throughout. Neoplasm of uncertain behavior. Differential includes BLK vs inflamed solar lentigo vs lentigo maligna. Biopsy taken from center that had both structureless area and granular pigmentation.     Shave biopsy:  After discussion of benefits and risks including but not limited to bleeding/bruising, pain/swelling, infection, scar, incomplete removal, nerve damage/numbness, recurrence, and non-diagnostic biopsy, written consent, verbal consent and photographs were obtained. Time-out was performed. The area was cleaned with isopropyl alcohol. 0.5mL of 1% lidocaine with epinephrine was injected to obtain adequate anesthesia of the lesion on the right zygomatic cheek. A shave biopsy was performed. Hemostasis was achieved with aluminium chloride. Vaseline and a sterile dressing were applied. The  patient tolerated the procedure and no complications were noted. The patient was provided with verbal and written post care instructions.     2. Clinically benign findings including cherry angiomas, solar lentigines, seborrheic keratoses, dermatofibroma    No further intervention needed.     3. Clinically benign nevi on the trunk and extremities.     No further intervention needed.     4. Seborrheic keratosis, right neck, symptomatic  Cryotherapy procedure note: After verbal consent and discussion of risks and benefits including but no limited to dyspigmentation/scar, blister, and pain, 1 was(were) treated with 1-2mm freeze border for 2 cycles with liquid nitrogen. Post cryotherapy instructions were provided.     Follow-up prn.       Staff Involved:  Scribe/Staff    Scribe Disclosure  I, Jose Max, am serving as a scribe to document services personally performed by Dr. Glendy White MD, based on data collection and the provider's statements to me.     Provider Disclosure:   The documentation recorded by the scribe accurately reflects the services I personally performed and the decisions made by me.    Glendy White MD    Department of Dermatology  [facility]: Phone: [phone], Fax:[phone]  [facility]: Phone: [phone], Fax: [phone]            Again, thank you for allowing me to participate in the care of your patient.        Sincerely,        Glendy White MD

## 2019-03-07 NOTE — NURSING NOTE
Maribel June's goals for this visit include:   Chief Complaint   Patient presents with     Skin Check     lesions on neck, chest and face       She requests these members of her care team be copied on today's visit information: NO    PCP: [facility]    Referring Provider:  No referring provider defined for this encounter.    There were no vitals taken for this visit.    Do you need any medication refills at today's visit? NO    Hina Blake CMA

## 2019-03-07 NOTE — PATIENT INSTRUCTIONS
Cryotherapy    What is it?    Use of a very cold liquid, such as liquid nitrogen, to freeze and destroy abnormal skin cells that need to be removed    What should I expect?    Tenderness and redness    A small blister that might grow and fill with dark purple blood. There may be crusting.    More than one treatment may be needed if the lesions do not go away.    How do I care for the treated area?    Gently wash the area with your hands when bathing.    Use a thin layer of Vaseline to help with healing. You may use a Band-Aid.     The area should heal within 7-10 days and may leave behind a pink or lighter color.     Do not use an antibiotic or Neosporin ointment.     You may take acetaminophen (Tylenol) for pain.     Call your Doctor if you have:    Severe pain    Signs of infection (warmth, redness, cloudy yellow drainage, and or a bad smell)    Questions or concerns    Who should I call with questions?       [facility]: [phone]       [facility]: [phone]       For urgent needs outside of business hours call the [facility] at [phone]        and ask for the dermatology resident on call    Wound Care After a Biopsy    What is a skin biopsy?  A skin biopsy allows the doctor to examine a very small piece of tissue under the microscope to determine the diagnosis and the best treatment for the skin condition. A local anesthetic (numbing medicine)  is injected with a very small needle into the skin area to be tested. A small piece of skin is taken from the area. Sometimes a suture (stitch) is used.     What are the risks of a skin biopsy?  I will experience scar, bleeding, swelling, pain, crusting and redness. I may experience incomplete removal or recurrence. Risks of this procedure are excessive bleeding, bruising, infection, nerve damage, numbness, thick (hypertrophic or keloidal) scar and non-diagnostic biopsy.    How should I care  for my wound for the first 24 hours?    Keep the wound dry and covered for 24 hours    If it bleeds, hold direct pressure on the area for 15 minutes. If bleeding does not stop then go to the emergency room    Avoid strenuous exercise the first 1-2 days or as your doctor instructs you    How should I care for the wound after 24 hours?    After 24 hours, remove the bandage    You may bathe or shower as normal    If you had a scalp biopsy, you can shampoo as usual and can use shower water to clean the biopsy site daily    Clean the wound twice a day with gentle soap and water    Do not scrub, be gentle    Apply white petroleum/Vaseline after cleaning the wound with a cotton swab or a clean finger, and keep the site covered with a Bandaid /bandage. Bandages are not necessary with a scalp biopsy    If you are unable to cover the site with a Bandaid /bandage, re-apply ointment 2-3 times a day to keep the site moist. Moisture will help with healing    Avoid strenuous activity for first 1-2 days    Avoid lakes, rivers, pools, and oceans until the stitches are removed or the site is healed    How do I clean my wound?    Wash hands thoroughly with soap or use hand  before all wound care    Clean the wound with gentle soap and water    Apply white petroleum/Vaseline  to wound after it is clean    Replace the Bandaid /bandage to keep the wound covered for the first few days or as instructed by your doctor    If you had a scalp biopsy, warm shower water to the area on a daily basis should suffice    What should I use to clean my wound?     Cotton-tipped applicators (Qtips )    White petroleum jelly (Vaseline ). Use a clean new container and use Q-tips to apply.    Bandaids   as needed    Gentle soap     How should I care for my wound long term?    Do not get your wound dirty    Keep up with wound care for one week or until the area is healed.    A small scab will form and fall off by itself when the area is completely  healed. The area will be red and will become pink in color as it heals. Sun protection is very important for how your scar will turn out. Sunscreen with an SPF 30 or greater is recommended once the area is healed.    You should have some soreness but it should be mild and slowly go away over several days. Talk to your doctor about using tylenol for pain,    When should I call my doctor?  If you have increased:     Pain or swelling    Pus or drainage (clear or slightly yellow drainage is ok)    Temperature over 100F    Spreading redness or warmth around wound    When will I hear about my results?  The biopsy results can take 2-3 weeks to come back. The clinic will call you with the results, send you a [employer] message, or have you schedule a follow-up clinic or phone time to discuss the results. Contact our clinics if you do not hear from us in 3 weeks.     Who should I call with questions?    [facility]: [phone]     [facility]: [phone]    For urgent needs outside of business hours call the [facility] at [phone] and ask for the dermatology resident on call

## 2019-03-09 PROBLEM — Z79.890 NEED FOR PROPHYLACTIC POSTMENOPAUSAL HORMONE REPLACEMENT THERAPY: Status: ACTIVE | Noted: 2019-03-09

## 2019-03-09 PROBLEM — M26.639 ARTICULAR DISC DISORDER OF TEMPOROMANDIBULAR JOINT: Status: ACTIVE | Noted: 2017-05-03
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
## 2019-03-09 PROBLEM — N20.0 KIDNEY STONES: Status: ACTIVE | Noted: 2018-06-08

## 2019-03-09 PROBLEM — M79.18 MYOFASCIAL PAIN: Status: ACTIVE | Noted: 2017-05-03

## 2019-03-11 LAB — COPATH REPORT: NORMAL

## 2019-03-13 ENCOUNTER — TELEPHONE (OUTPATIENT)
Dept: DERMATOLOGY | Facility: CLINIC | Age: 54
End: 2019-03-13

## 2019-03-13 NOTE — TELEPHONE ENCOUNTER
Notes recorded by Shelby Ramirez RN on 3/13/2019 at 11:48 AM CDT  Patient notified of result.  She verbalized understanding and agreed with the plan.  Appt scheduled.  Shelby Ramirez RN    ------    Notes recorded by Amanda Armendariz LPN on 3/13/2019 at 8:42 AM CDT  LVM for pt to call clinic at [phone] for results.    Amanda Armendariz LPN    ------    Notes recorded by Glendy Billy MD on 3/13/2019 at 7:23 AM CDT  Please call patient and let her know biopsy showed a precancerous lesion. Since I only biopsied a portion of it, I would like to see her back to freeze any remaining skin lesion. If left untreated, these can turn into skin cancers over time.    Glendy Billy MD    Department of Dermatology  [facility]: Phone: [phone], Fax:[phone]  [facility]: Phone: [phone], Fax: [phone]     Dermatological path order and indications   Order: [id number]   Status:  Final result   Visible to patient:  Yes (MyChart) Dx:  Neoplasm of uncertain behavior   Component 6d ago   Copath Report Patient Name: CHERIE SCHAFER   MR#: [id number]   Specimen #: [id number]   Collected: 3/7/2019   Received: 3/8/2019   Reported: 3/11/2019 15:00   Ordering Phy(s): GLENDY BILLY     For improved result formatting, select 'View Enhanced Report Format' under    Linked Documents section.     SPECIMEN(S):   Shave, right zygomatic cheek     FINAL DIAGNOSIS:   Shave, right zygomatic cheek:   - Pigmented actinic keratosis - (see comment)

## 2019-04-09 ENCOUNTER — OFFICE VISIT (OUTPATIENT)
Dept: DERMATOLOGY | Facility: CLINIC | Age: 54
End: 2019-04-09
Payer: COMMERCIAL

## 2019-04-09 DIAGNOSIS — L57.0 ACTINIC KERATOSIS: Primary | ICD-10-CM

## 2019-04-09 PROCEDURE — 17000 DESTRUCT PREMALG LESION: CPT | Performed by: DERMATOLOGY

## 2019-04-09 ASSESSMENT — PAIN SCALES - GENERAL: PAINLEVEL: NO PAIN (0)

## 2019-04-09 NOTE — NURSING NOTE
Maribel June's goals for this visit include:   Chief Complaint   Patient presents with     RECHECK     of spot on right cheek      She requests these members of her care team be copied on today's visit information:     PCP: Juwan Perry    Referring Provider:  No referring provider defined for this encounter.    There were no vitals taken for this visit.    Do you need any medication refills at today's visit? No    Amanda Armendariz LPN
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
## 2019-04-09 NOTE — PATIENT INSTRUCTIONS
Cryotherapy    What is it?    Use of a very cold liquid, such as liquid nitrogen, to freeze and destroy abnormal skin cells that need to be removed    What should I expect?    Tenderness and redness    A small blister that might grow and fill with dark purple blood. There may be crusting.    More than one treatment may be needed if the lesions do not go away.    How do I care for the treated area?    Gently wash the area with your hands when bathing.    Use a thin layer of Vaseline to help with healing. You may use a Band-Aid.     The area should heal within 7-10 days and may leave behind a pink or lighter color.     Do not use an antibiotic or Neosporin ointment.     You may take acetaminophen (Tylenol) for pain.     Call your Doctor if you have:    Severe pain    Signs of infection (warmth, redness, cloudy yellow drainage, and or a bad smell)    Questions or concerns    Who should I call with questions?       [facility]: [phone]       [facility]: [phone]       For urgent needs outside of business hours call the [facility] at [phone]        and ask for the dermatology resident on call

## 2019-08-05 ENCOUNTER — ANCILLARY PROCEDURE (OUTPATIENT)
Dept: MAMMOGRAPHY | Facility: CLINIC | Age: 54
End: 2019-08-05
Payer: COMMERCIAL

## 2019-08-05 ENCOUNTER — OFFICE VISIT (OUTPATIENT)
Dept: OBGYN | Facility: CLINIC | Age: 54
End: 2019-08-05
Payer: COMMERCIAL

## 2019-08-05 VITALS
OXYGEN SATURATION: 98 % | BODY MASS INDEX: 21.62 KG/M2 | SYSTOLIC BLOOD PRESSURE: 113 MMHG | HEART RATE: 55 BPM | WEIGHT: 110.1 LBS | HEIGHT: 60 IN | DIASTOLIC BLOOD PRESSURE: 75 MMHG

## 2019-08-05 DIAGNOSIS — Z13.29 SCREENING FOR THYROID DISORDER: ICD-10-CM

## 2019-08-05 DIAGNOSIS — Z13.1 SCREENING FOR DIABETES MELLITUS: Primary | ICD-10-CM

## 2019-08-05 DIAGNOSIS — Z79.890 HORMONE REPLACEMENT THERAPY (HRT): ICD-10-CM

## 2019-08-05 DIAGNOSIS — Z12.39 BREAST SCREENING, UNSPECIFIED: ICD-10-CM

## 2019-08-05 DIAGNOSIS — M85.80 OSTEOPENIA, UNSPECIFIED LOCATION: ICD-10-CM

## 2019-08-05 DIAGNOSIS — B00.9 HERPES SIMPLEX VIRUS INFECTION: ICD-10-CM

## 2019-08-05 DIAGNOSIS — Z13.220 LIPID SCREENING: ICD-10-CM

## 2019-08-05 LAB
CHOLEST SERPL-MCNC: 224 MG/DL
GLUCOSE SERPL-MCNC: 88 MG/DL (ref 70–99)
HDLC SERPL-MCNC: 66 MG/DL
LDLC SERPL CALC-MCNC: 121 MG/DL
NONHDLC SERPL-MCNC: 158 MG/DL
TRIGL SERPL-MCNC: 184 MG/DL
TSH SERPL DL<=0.005 MIU/L-ACNC: 1.75 MU/L (ref 0.4–4)

## 2019-08-05 PROCEDURE — 36415 COLL VENOUS BLD VENIPUNCTURE: CPT | Performed by: OBSTETRICS & GYNECOLOGY

## 2019-08-05 PROCEDURE — 84443 ASSAY THYROID STIM HORMONE: CPT | Performed by: OBSTETRICS & GYNECOLOGY

## 2019-08-05 PROCEDURE — 99396 PREV VISIT EST AGE 40-64: CPT | Performed by: OBSTETRICS & GYNECOLOGY

## 2019-08-05 PROCEDURE — 82947 ASSAY GLUCOSE BLOOD QUANT: CPT | Performed by: OBSTETRICS & GYNECOLOGY

## 2019-08-05 PROCEDURE — 80061 LIPID PANEL: CPT | Performed by: OBSTETRICS & GYNECOLOGY

## 2019-08-05 PROCEDURE — 77067 SCR MAMMO BI INCL CAD: CPT | Performed by: STUDENT IN AN ORGANIZED HEALTH CARE EDUCATION/TRAINING PROGRAM

## 2019-08-05 RX ORDER — ACYCLOVIR 400 MG/1
400 TABLET ORAL EVERY 8 HOURS
Qty: 15 TABLET | Refills: 3 | Status: SHIPPED | OUTPATIENT
Start: 2019-08-05 | End: 2019-10-28

## 2019-08-05 RX ORDER — ESTRADIOL 1 MG/1
1 TABLET ORAL DAILY
Qty: 90 TABLET | Refills: 3 | Status: SHIPPED | OUTPATIENT
Start: 2019-08-05 | End: 2020-08-06

## 2019-08-05 RX ORDER — ACYCLOVIR 400 MG/1
TABLET ORAL
Refills: 1 | COMMUNITY
Start: 2019-05-18 | End: 2020-10-16

## 2019-08-05 ASSESSMENT — MIFFLIN-ST. JEOR: SCORE: 1025.91

## 2019-08-05 ASSESSMENT — ANXIETY QUESTIONNAIRES
IF YOU CHECKED OFF ANY PROBLEMS ON THIS QUESTIONNAIRE, HOW DIFFICULT HAVE THESE PROBLEMS MADE IT FOR YOU TO DO YOUR WORK, TAKE CARE OF THINGS AT HOME, OR GET ALONG WITH OTHER PEOPLE: NOT DIFFICULT AT ALL
2. NOT BEING ABLE TO STOP OR CONTROL WORRYING: NOT AT ALL
6. BECOMING EASILY ANNOYED OR IRRITABLE: NOT AT ALL
1. FEELING NERVOUS, ANXIOUS, OR ON EDGE: SEVERAL DAYS
3. WORRYING TOO MUCH ABOUT DIFFERENT THINGS: NOT AT ALL
5. BEING SO RESTLESS THAT IT IS HARD TO SIT STILL: NOT AT ALL
GAD7 TOTAL SCORE: 1
7. FEELING AFRAID AS IF SOMETHING AWFUL MIGHT HAPPEN: NOT AT ALL

## 2019-08-05 ASSESSMENT — PATIENT HEALTH QUESTIONNAIRE - PHQ9
SUM OF ALL RESPONSES TO PHQ QUESTIONS 1-9: 2
5. POOR APPETITE OR OVEREATING: NOT AT ALL

## 2019-08-05 NOTE — PROGRESS NOTES
Maribel is a 54 year old female, , who is here for her annual exam.  She requests refills of her Estrace and Acyclovir.  Her last herpetic outbreak was 2 months ago but she tends to get these 6-7 times per year.  She is s/p hysterectomy for benign dx but retains both ovaries.  She is in a fasting state this morning so will check labs.  Her last DEXA scan on 8/3/18 demonstrated osteopenia so will refer to Endocrinology for further evaluation.  She just had her mammogram this morning with results pending.  She has a hx of kidney stones so is limited on her calcium intake.    ROS: Ten point review of systems was reviewed and negative except the above.    Health Maintenance   Topic Date Due     HIV SCREENING  1981     ZOSTER IMMUNIZATION (1 of 2) 2016     PHQ-2  2019     PREVENTIVE CARE VISIT  2019     INFLUENZA VACCINE (1) 2019     DTAP/TDAP/TD IMMUNIZATION (2 - Td) 2020     MAMMO SCREENING  2020     LIPID  2023     COLONOSCOPY  2026     IPV IMMUNIZATION  Aged Out     MENINGITIS IMMUNIZATION  Aged Out      Last pap: 14 and not due since she is s/p hysterectomy for benign dx  Last Mammogram: this morning with results pending  Last Dexa: 8/3/18 osteopenia  Last Colonoscopy: 2016 so due in  per the patient    Lab Results   Component Value Date    CHOL 237 2018     Lab Results   Component Value Date    HDL 65 2018     Lab Results   Component Value Date     2018     Lab Results   Component Value Date    TRIG 188 2018     Lab Results   Component Value Date    CHOLHDLRATIO 2.9 2013         OBHX:      PSH:   Past Surgical History:   Procedure Laterality Date     BREAST SURGERY      Right breast lump removal-non-cancerous      SECTION       COLONOSCOPY N/A 2016    Procedure: COMBINED COLONOSCOPY, SINGLE OR MULTIPLE BIOPSY/POLYPECTOMY BY BIOPSY;  Surgeon: Duane, William Charles, MD;  Location:  OR      COLONOSCOPY WITH CO2 INSUFFLATION N/A 8/16/2016    Procedure: COLONOSCOPY WITH CO2 INSUFFLATION;  Surgeon: Duane, William Charles, MD;  Location: [facility] OR     CYSTOSCOPY  11/13/09    left stent placement (C-ARM)     GENITOURINARY SURGERY      surgery for kidney stone     GYN SURGERY      laparoscopy     GYN SURGERY      partial hysterectomy--still have ovaries         PMH: Her past medical, surgical, and obstetric histories were reviewed and are documented in their appropriate chart areas.    ALL/Meds: Her medication and allergy histories were reviewed and are documented in their appropriate chart areas.    SH/FMH: Her social and family history was reviewed and documented in its appropriate chart area.    PE: /75   Pulse 55   Ht 1.524 m (5')   Wt 49.9 kg (110 lb 1.6 oz)   SpO2 98%   Breastfeeding? No   BMI 21.50 kg/m    Body mass index is 21.5 kg/m .    General Appearance:  healthy, alert, active, no distress  Cardiovascular:  Regular rate and Rhythm without murmur  Neck: Supple, no adenopathy and thyroid normal  Lungs:  Clear, without wheeze, rale or rhonchi  Breast: normal breast exam  Abdomen: Benign, Soft, flat, non-tender, No masses, organomegaly, No inguinal nodes and Bowel sounds normoactive.   Pelvic:       - Ext: Vulva and perineum are normal without lesion, mass or discharge        - Urethra: normal without discharge      - Urethral Meatus: normal appearance       - Bladder: no tenderness, no masses       - Vagina: Normal mucosa, no discharge       - Cervix: Surgically absent but vaginal cuff appears normal and without defect       - Uterus: Surgically absent       - Adnexa: Normal without masses or tenderness       - Rectal: sphincter tone normal and no mass    A/P:  Well Woman Exam, Osteopenia, HRT Refill, Hx of Herpes     -  I discussed the new pap recommendations regarding screening.  Explained the rationale for increased intervals between paps.  Questions asked and answered.  She does agree  to this regiment.  Pap was not obtained since so no longer due for this because of benign pathology report from hysterectomy   -  BC: not applicable   -  Check labs in a fasting state   -  Refer to Endocrinology due to hx of osteopenia for over 10 years   -  Refill Estrace and Acyclovir   -  Next colonoscopy due in 8/2026    Orders Placed This Encounter   Procedures     Glucose     TSH with free T4 reflex     Lipid Profile (Chol, Trig, HDL, LDL calc)     ENDOCRINOLOGY ADULT REFERRAL      -  Encouraged self-breast exam   -  Encouraged low fat diet, regular exercise, and adequate calcium intake (limited due to hx of kidney stones).    Laurita Damon DO  FACOG, FACS

## 2019-08-05 NOTE — PATIENT INSTRUCTIONS
If you have any questions regarding your visit, Please contact your care team.    [facility]: [phone]      [facility] HOURS TELEPHONE NUMBER   Laurita DO Nelson.    LEONARDA Hayes -    DONALD Amador, DONALD Pulido RN     Monday, Wednesday, Thursday and Friday, [place]  8:30a.m-5:00 p.m   [facility]  [address] [place] [address]  [phone] ask for [facility]    Imaging [phone]       Urgent Care locations:    [facility] Saturday and Sunday   9 am - 5 pm    Monday-Friday   12 pm - 8 pm  Saturday and Sunday   9 am - 5 pm   [phone]     [facility] Labor and Delivery:  [phone]    If you need a medication refill, please contact your pharmacy. Please allow 3 business days for your refill to be completed.  As always, Thank you for trusting us with your healthcare needs!

## 2019-08-06 ASSESSMENT — ANXIETY QUESTIONNAIRES: GAD7 TOTAL SCORE: 1

## 2019-10-28 ENCOUNTER — OFFICE VISIT (OUTPATIENT)
Dept: ENDOCRINOLOGY | Facility: CLINIC | Age: 54
End: 2019-10-28
Attending: OBSTETRICS & GYNECOLOGY
Payer: COMMERCIAL

## 2019-10-28 VITALS
OXYGEN SATURATION: 98 % | DIASTOLIC BLOOD PRESSURE: 72 MMHG | HEIGHT: 60 IN | SYSTOLIC BLOOD PRESSURE: 111 MMHG | WEIGHT: 111.4 LBS | BODY MASS INDEX: 21.87 KG/M2 | HEART RATE: 63 BPM

## 2019-10-28 DIAGNOSIS — M85.80 OSTEOPENIA, UNSPECIFIED LOCATION: Primary | ICD-10-CM

## 2019-10-28 DIAGNOSIS — N20.0 RECURRENT NEPHROLITHIASIS: ICD-10-CM

## 2019-10-28 LAB
ALBUMIN SERPL-MCNC: 3.9 G/DL (ref 3.4–5)
ALP SERPL-CCNC: 59 U/L (ref 40–150)
BUN SERPL-MCNC: 16 MG/DL (ref 7–30)
CALCIUM SERPL-MCNC: 9.1 MG/DL (ref 8.5–10.1)
CREAT SERPL-MCNC: 0.5 MG/DL (ref 0.52–1.04)
GFR SERPL CREATININE-BSD FRML MDRD: >90 ML/MIN/{1.73_M2}
PHOSPHATE SERPL-MCNC: 2.7 MG/DL (ref 2.5–4.5)
PTH-INTACT SERPL-MCNC: 46 PG/ML (ref 18–80)
TSH SERPL DL<=0.005 MIU/L-ACNC: 2.18 MU/L (ref 0.4–4)

## 2019-10-28 PROCEDURE — 83516 IMMUNOASSAY NONANTIBODY: CPT | Performed by: INTERNAL MEDICINE

## 2019-10-28 PROCEDURE — 82310 ASSAY OF CALCIUM: CPT | Performed by: INTERNAL MEDICINE

## 2019-10-28 PROCEDURE — 83970 ASSAY OF PARATHORMONE: CPT | Performed by: INTERNAL MEDICINE

## 2019-10-28 PROCEDURE — 82565 ASSAY OF CREATININE: CPT | Performed by: INTERNAL MEDICINE

## 2019-10-28 PROCEDURE — 99204 OFFICE O/P NEW MOD 45 MIN: CPT | Performed by: INTERNAL MEDICINE

## 2019-10-28 PROCEDURE — 82306 VITAMIN D 25 HYDROXY: CPT | Performed by: INTERNAL MEDICINE

## 2019-10-28 PROCEDURE — 36415 COLL VENOUS BLD VENIPUNCTURE: CPT | Performed by: INTERNAL MEDICINE

## 2019-10-28 PROCEDURE — 82040 ASSAY OF SERUM ALBUMIN: CPT | Performed by: INTERNAL MEDICINE

## 2019-10-28 PROCEDURE — 84520 ASSAY OF UREA NITROGEN: CPT | Performed by: INTERNAL MEDICINE

## 2019-10-28 PROCEDURE — 99000 SPECIMEN HANDLING OFFICE-LAB: CPT | Performed by: INTERNAL MEDICINE

## 2019-10-28 PROCEDURE — 84443 ASSAY THYROID STIM HORMONE: CPT | Performed by: INTERNAL MEDICINE

## 2019-10-28 PROCEDURE — 84100 ASSAY OF PHOSPHORUS: CPT | Performed by: INTERNAL MEDICINE

## 2019-10-28 PROCEDURE — 84075 ASSAY ALKALINE PHOSPHATASE: CPT | Performed by: INTERNAL MEDICINE

## 2019-10-28 PROCEDURE — 84080 ASSAY ALKALINE PHOSPHATASES: CPT | Mod: 90 | Performed by: INTERNAL MEDICINE

## 2019-10-28 ASSESSMENT — MIFFLIN-ST. JEOR: SCORE: 1028.06

## 2019-10-28 NOTE — PATIENT INSTRUCTIONS
The bone density test shows osteopenia. This is an intermediate category that is in between normal and osteoporosis.  People with osteopenia should work on taking in 1200 mg of calcium from diet in your case; with vitamin D 1000 international unit(s) daily.     I have listed below dietary sources and how much calcium each contain per serving. I also recommend weight bearing exercise (walking works)/muscle strengthing  exercise.   Dietary sources of calcium.   Milk                            8 oz            300 mg   Yogurt                          1 cup           400 mg   Hard cheese                     1.5 oz          300 mg   Cottage cheese                  2 cup           300 mg   Orange juice with Calcium       8 oz            300 mg   Low fat dairy sources are recommended     Vitamin d 1000 international unit(s)  Body mass index is 21.93 kg/m .

## 2019-10-28 NOTE — NURSING NOTE
"Maribel June's goals for this visit include:   Chief Complaint   Patient presents with     Endocrine Problem     Osteopenia     She requests these members of her care team be copied on today's visit information: Yes    PCP: Juwan Perry    Referring Provider:  Juwan Perry  [address]  [place] [address]    /72 (BP Location: Left arm, Patient Position: Sitting, Cuff Size: Adult Regular)   Pulse 63   Ht 1.518 m (4' 11.76\")   Wt 50.5 kg (111 lb 6.4 oz)   SpO2 98%   BMI 21.93 kg/m      Do you need any medication refills at today's visit? No    "

## 2019-10-28 NOTE — LETTER
10/28/2019         RE: Maribel June  [address]  [facility] [address]        Dear Colleague,    Thank you for referring your patient, Maribel June, to the [facility]. Please see a copy of my visit note below.    Endocrinology Clinic Visit    Chief Complaint: Endocrine Problem (Osteopenia)     Information obtained from:Patient    Subjective:         HPI: Maribel June is a 54 year old female with history of osteoporosis [facility]      Patient had a routine DEXA scan done on 8/30/2018 which is documented below which showed a negative T score of negative  1.7 at left femoral neck.  Per report she reports that she has had DEXA scan also at the age of 44 and that also showed osteopenia.  We do not have the record of the DEXA scan.  Discussed From August 2018 is copied below.     Lumbar spine T-score in region of L1-L4 excluding L3= -1.1      HIPS:  Mean total hip T-score: -1.0     Left femoral neck T-score = -1.7  Right femoral neck T-score= -1.6      Radius 33% T-score = NA     FRAX:  10 year probability of major osteoporotic fracture: 5.3%  10 year probability of hip fracture: 0.5%    Fracture risk and osteoporosis  No previous history of fracture after the age of 50.  No loss of height.  Body mass index is 21.93 kg/m .  Her weight has always been in the range of 109-110  No family history or parental history of hip fracture or osteoporosis.  She is a non-smoker.  No history of current or past use of glucocorticoid treatment  No history of excessive alcohol intake  She has had hysterectomy but she tells me that ovaries are presented.  No history of prolonged good immobility, transplant history, diabetes, hyperthyroidism, GI disease including inflammatory bowel disease or COPD.    She has recurrent history of nephrolithiasis therefore she is not taking calcium supplement.  In fact she is not even making sure that  she has enough calcium in her diet for fear of nephrolithiasis.  She is not currently taking vitamin D supplement.      In terms of exercise; she walks every day.  She is a  at the [employer] and her work involves actually walking the whole day and including stairs.  No dental procedures planned.      She is lactose intolerant and that is why she is not taking daily products on a daily basis.  She has a strong family history of kidney stone in her dad, brother, unclear.  She had kidney stones frequently over the last 10 years and she is closely following with urology.  Last kidney stone episode was about a year ago but on recent scan per report she has small kidney stones which are not currently causing any pain.  Kidney stones were calcium containing per report.    Allergies   Allergen Reactions     Flagyl [Metronidazole] Nausea and Vomiting     Sulfamethoxazole-Trimethoprim      Other reaction(s): Headache     Zithromax [Azithromycin Dihydrate] Rash       Current Outpatient Medications   Medication Sig Dispense Refill     acyclovir (ZOVIRAX) 400 MG tablet TK 1 T PO TID FOR 5 DAYS PRN  1     amLODIPine (NORVASC) 5 MG tablet Take 1 tablet by mouth       cyanocobalamin (VITAMIN  B-12) 1000 MCG tablet Take 1 tablet by mouth       estradiol (ESTRACE) 1 MG tablet Take 1 tablet (1 mg) by mouth daily 90 tablet 3     ibuprofen (ADVIL/MOTRIN) 200 MG tablet Take 400 mg by mouth         Review of Systems     11 point review system (Constitutional, HENT, Eyes, Respiratory, Cardiovascular, Gastrointestinal, Genitourinary, Musculoskeletal,Neurological, Psychiatric/Behavioural, Endocrine) is negative or is as per HPI above  Denies any back pain.    Past Medical History:   Diagnosis Date     Depressive disorder      Endometriosis      Herpes genitalis in women      History of major depression 2007    situational with first .      Kidney stone        Past Surgical History:   Procedure Laterality Date      BREAST SURGERY      Right breast lump removal-non-cancerous      SECTION       COLONOSCOPY N/A 2016    Procedure: COMBINED COLONOSCOPY, SINGLE OR MULTIPLE BIOPSY/POLYPECTOMY BY BIOPSY;  Surgeon: Duane, William Charles, MD;  Location: [facility] OR     COLONOSCOPY WITH CO2 INSUFFLATION N/A 2016    Procedure: COLONOSCOPY WITH CO2 INSUFFLATION;  Surgeon: Duane, William Charles, MD;  Location: [facility] OR     CYSTOSCOPY  09    left stent placement (C-ARM)     GENITOURINARY SURGERY      surgery for kidney stone     GYN SURGERY      laparoscopy     GYN SURGERY      partial hysterectomy--still have ovaries       Family History   Problem Relation Age of Onset     Hypertension Father      Kidney failure Father      Aortic aneurysm Father      Cancer Maternal Grandfather         stomach     Heart Disease Paternal Grandfather      Hypertension Paternal Grandfather      Neurologic Disorder Brother         has seizures from Epilepsy     Asthma Mother      Hypertension Mother      Arthritis Mother      Hypertension Brother        Social History     Socioeconomic History     Marital status: Unknown     Spouse name: None     Number of children: None     Years of education: None     Highest education level: None   Occupational History     None   Social Needs     Financial resource strain: None     Food insecurity:     Worry: None     Inability: None     Transportation needs:     Medical: None     Non-medical: None   Tobacco Use     Smoking status: Former Smoker     Smokeless tobacco: Never Used     Tobacco comment: in college only   Substance and Sexual Activity     Alcohol use: Yes     Comment: a social glass of wine on weekends     Drug use: No     Sexual activity: Yes     Partners: Male     Birth control/protection: Surgical     Comment: hysterectomy   Lifestyle     Physical activity:     Days per week: None     Minutes per session: None     Stress: None   Relationships     Social connections:     Talks on phone:  "None     Gets together: None     Attends Jewish service: None     Active member of club or organization: None     Attends meetings of clubs or organizations: None     Relationship status: None     Intimate partner violence:     Fear of current or ex partner: None     Emotionally abused: None     Physically abused: None     Forced sexual activity: None   Other Topics Concern     Parent/sibling w/ CABG, MI or angioplasty before 65F 55M? Not Asked   Social History Narrative    Patient is , and remarried. She has 2 children, age 21 and 18 (7/2013)       Objective:   /72 (BP Location: Left arm, Patient Position: Sitting, Cuff Size: Adult Regular)   Pulse 63   Ht 1.518 m (4' 11.76\")   Wt 50.5 kg (111 lb 6.4 oz)   SpO2 98%   BMI 21.93 kg/m     Constitutional: Pleasant no acute cardiopulmonary distress.   EYES: anicteric, normal extra-ocular movements, no lid lag or retraction, is equal and reactive to light bilaterally.  HEENT: Mouth/Throat: Mucous membrane is moist. Oropharynx is clear.  Thyroid examination: Normal in size and no nodules appreciated.  Cardiovascular: RRR, S1, S2 normal.   Pulmonary/Chest: CTAB. No wheezing or rales.   Abdominal: +BS. Non tender to palpation.  Stretch marks:   Neurological: Alert and oriented.  No tremor and reflexes are symmetrical bilaterally and within the normal limits. Muscle strength 5/5.   Extremities: No edema.  Psychological: appropriate mood and affect     In House Labs:     TSH   Date Value Ref Range Status   10/28/2019 2.18 0.40 - 4.00 mU/L Final   08/05/2019 1.75 0.40 - 4.00 mU/L Final   07/31/2018 3.54 0.40 - 4.00 mU/L Final   09/19/2017 2.68 0.40 - 4.00 mU/L Final   01/23/2016 2.70 0.40 - 4.00 mU/L Final       Creatinine   Date Value Ref Range Status   10/28/2019 0.50 (L) 0.52 - 1.04 mg/dL Final     ENDO CALCIUM LABS-UMP Latest Ref Rng & Units 10/28/2019   CALCIUM 8.5 - 10.1 mg/dL 9.1   PHOSPHOROUS 2.5 - 4.5 mg/dL 2.7   ALBUMIN 3.4 - 5.0 g/dL 3.9   BUN " 7 - 30 mg/dL 16   CREATININE 0.52 - 1.04 mg/dL 0.50 (L)   PARATHYROID HORMONE INTACT 18 - 80 pg/mL 46   ALKPHOS 40 - 150 U/L 59     Assessment/Treatment Plan:      Osteopenia; risk factors for osteoporosis probably inadequate intake of calcium and vitamin D.  No significant other risk factors noted clinically.  To identify secondary causes of osteopenia we are going to do a detailed work-up including calcium labs and parathyroid hormone, thyroid hormone, screening for celiac disease.  We will also plan on doing a 24-hour urine collection to assess hypercalciuria once she corrects her intake of calcium.  I have discussed in detail with the patient that even patients with kidney stone need a calcium of 1000 to 1200 mg daily.  And this is preferably needs to come from the diet.  We discussed in detail about lactose-free daily products which could be sources of calcium including Allmond milk, yogurt with no lactose.  We have a plan to at least meet dietary requirement of calcium up to 1000 mg daily.  I have suggested a vitamin D intake of 1000 international units daily.  Recheck vitamin D level and will adjust treatment based on the results.  Based on the current DEXA scan with a FRAX score of 5.3% for 10-year probability of major osteoporotic fracture and a FRAX score of 0.5 for a 10-year probability of hip fracture the bone specific therapy with bisphosphonate is not indicated at this point in time.  We will repeat DEXA scan in 18 months or so for close follow-up.     Recurrent nephrolithiasis: with strong family history she is currently closely following up with urology regarding this issue.    Patient Instructions   The bone density test shows osteopenia. This is an intermediate category that is in between normal and osteoporosis.  People with osteopenia should work on taking in 1200 mg of calcium from diet in your case; with vitamin D 1000 international unit(s) daily.     I have listed below dietary sources and how  much calcium each contain per serving. I also recommend weight bearing exercise (walking works)/muscle strengthing  exercise.   Dietary sources of calcium.   Milk                            8 oz            300 mg   Yogurt                          1 cup           400 mg   Hard cheese                     1.5 oz          300 mg   Cottage cheese                  2 cup           300 mg   Orange juice with Calcium       8 oz            300 mg   Low fat dairy sources are recommended     Vitamin d 1000 international unit(s)  Body mass index is 21.93 kg/m .          I will contact the patient with the test results.  Return to clinic in 12 months.    Test and/or medications prescribed today:  Orders Placed This Encounter   Procedures     Vitamin D Deficiency (D3 Only)     Albumin level     Alkaline phosphatase     Bone specific alk phosphatase     Calcium     Parathyroid Hormone Intact     Phosphorus     Creatinine     Urea nitrogen     Tissue transglutaminase shobha IgA and IgG     TSH with free T4 reflex         Cornel Briseno MD  Staff Endocrinologist    [phone]  Division of Endocrinology and Diabetes            Again, thank you for allowing me to participate in the care of your patient.        Sincerely,        Cornel Briseno MD

## 2019-10-29 LAB
DEPRECATED CALCIDIOL+CALCIFEROL SERPL-MC: 18 UG/L (ref 20–75)
TTG IGA SER-ACNC: 1 U/ML
TTG IGG SER-ACNC: <1 U/ML

## 2019-10-29 NOTE — PROGRESS NOTES
Endocrinology Clinic Visit    Chief Complaint: Endocrine Problem (Osteopenia)     Information obtained from:Patient    Subjective:         HPI: Maribel June is a 54 year old female with history of osteoporosis who is seen in consultation at Juwan Perry's request for the same.      Patient had a routine DEXA scan done on 8/30/2018 which is documented below which showed a negative T score of negative  1.7 at left femoral neck.  Per report she reports that she has had DEXA scan also at the age of 44 and that also showed osteopenia.  We do not have the record of the DEXA scan.  Discussed From August 2018 is copied below.     Lumbar spine T-score in region of L1-L4 excluding L3= -1.1      HIPS:  Mean total hip T-score: -1.0     Left femoral neck T-score = -1.7  Right femoral neck T-score= -1.6      Radius 33% T-score = NA     FRAX:  10 year probability of major osteoporotic fracture: 5.3%  10 year probability of hip fracture: 0.5%    Fracture risk and osteoporosis  No previous history of fracture after the age of 50.  No loss of height.  Body mass index is 21.93 kg/m .  Her weight has always been in the range of 109-110  No family history or parental history of hip fracture or osteoporosis.  She is a non-smoker.  No history of current or past use of glucocorticoid treatment  No history of excessive alcohol intake  She has had hysterectomy but she tells me that ovaries are presented.  No history of prolonged good immobility, transplant history, diabetes, hyperthyroidism, GI disease including inflammatory bowel disease or COPD.    She has recurrent history of nephrolithiasis therefore she is not taking calcium supplement.  In fact she is not even making sure that she has enough calcium in her diet for fear of nephrolithiasis.  She is not currently taking vitamin D supplement.      In terms of exercise; she walks every day.  She is a  at the [employer] and her work involves actually walking  the whole day and including stairs.  No dental procedures planned.      She is lactose intolerant and that is why she is not taking daily products on a daily basis.  She has a strong family history of kidney stone in her dad, brother, unclear.  She had kidney stones frequently over the last 10 years and she is closely following with urology.  Last kidney stone episode was about a year ago but on recent scan per report she has small kidney stones which are not currently causing any pain.  Kidney stones were calcium containing per report.    Allergies   Allergen Reactions     Flagyl [Metronidazole] Nausea and Vomiting     Sulfamethoxazole-Trimethoprim      Other reaction(s): Headache     Zithromax [Azithromycin Dihydrate] Rash       Current Outpatient Medications   Medication Sig Dispense Refill     acyclovir (ZOVIRAX) 400 MG tablet TK 1 T PO TID FOR 5 DAYS PRN  1     amLODIPine (NORVASC) 5 MG tablet Take 1 tablet by mouth       cyanocobalamin (VITAMIN  B-12) 1000 MCG tablet Take 1 tablet by mouth       estradiol (ESTRACE) 1 MG tablet Take 1 tablet (1 mg) by mouth daily 90 tablet 3     ibuprofen (ADVIL/MOTRIN) 200 MG tablet Take 400 mg by mouth         Review of Systems     11 point review system (Constitutional, HENT, Eyes, Respiratory, Cardiovascular, Gastrointestinal, Genitourinary, Musculoskeletal,Neurological, Psychiatric/Behavioural, Endocrine) is negative or is as per HPI above  Denies any back pain.    Past Medical History:   Diagnosis Date     Depressive disorder      Endometriosis      Herpes genitalis in women      History of major depression     situational with first .      Kidney stone        Past Surgical History:   Procedure Laterality Date     BREAST SURGERY      Right breast lump removal-non-cancerous      SECTION       COLONOSCOPY N/A 2016    Procedure: COMBINED COLONOSCOPY, SINGLE OR MULTIPLE BIOPSY/POLYPECTOMY BY BIOPSY;  Surgeon: Duane, William Charles, MD;  Location:   OR     COLONOSCOPY WITH CO2 INSUFFLATION N/A 8/16/2016    Procedure: COLONOSCOPY WITH CO2 INSUFFLATION;  Surgeon: Duane, William Charles, MD;  Location: [facility] OR     CYSTOSCOPY  11/13/09    left stent placement (C-ARM)     GENITOURINARY SURGERY      surgery for kidney stone     GYN SURGERY      laparoscopy     GYN SURGERY      partial hysterectomy--still have ovaries       Family History   Problem Relation Age of Onset     Hypertension Father      Kidney failure Father      Aortic aneurysm Father      Cancer Maternal Grandfather         stomach     Heart Disease Paternal Grandfather      Hypertension Paternal Grandfather      Neurologic Disorder Brother         has seizures from Epilepsy     Asthma Mother      Hypertension Mother      Arthritis Mother      Hypertension Brother        Social History     Socioeconomic History     Marital status: Unknown     Spouse name: None     Number of children: None     Years of education: None     Highest education level: None   Occupational History     None   Social Needs     Financial resource strain: None     Food insecurity:     Worry: None     Inability: None     Transportation needs:     Medical: None     Non-medical: None   Tobacco Use     Smoking status: Former Smoker     Smokeless tobacco: Never Used     Tobacco comment: in college only   Substance and Sexual Activity     Alcohol use: Yes     Comment: a social glass of wine on weekends     Drug use: No     Sexual activity: Yes     Partners: Male     Birth control/protection: Surgical     Comment: hysterectomy   Lifestyle     Physical activity:     Days per week: None     Minutes per session: None     Stress: None   Relationships     Social connections:     Talks on phone: None     Gets together: None     Attends Congregation service: None     Active member of club or organization: None     Attends meetings of clubs or organizations: None     Relationship status: None     Intimate partner violence:     Fear of current or ex  "partner: None     Emotionally abused: None     Physically abused: None     Forced sexual activity: None   Other Topics Concern     Parent/sibling w/ CABG, MI or angioplasty before 65F 55M? Not Asked   Social History Narrative    Patient is , and remarried. She has 2 children, age 21 and 18 (7/2013)       Objective:   /72 (BP Location: Left arm, Patient Position: Sitting, Cuff Size: Adult Regular)   Pulse 63   Ht 1.518 m (4' 11.76\")   Wt 50.5 kg (111 lb 6.4 oz)   SpO2 98%   BMI 21.93 kg/m    Constitutional: Pleasant no acute cardiopulmonary distress.   EYES: anicteric, normal extra-ocular movements, no lid lag or retraction, is equal and reactive to light bilaterally.  HEENT: Mouth/Throat: Mucous membrane is moist. Oropharynx is clear.  Thyroid examination: Normal in size and no nodules appreciated.  Cardiovascular: RRR, S1, S2 normal.   Pulmonary/Chest: CTAB. No wheezing or rales.   Abdominal: +BS. Non tender to palpation.  Stretch marks:   Neurological: Alert and oriented.  No tremor and reflexes are symmetrical bilaterally and within the normal limits. Muscle strength 5/5.   Extremities: No edema.  Psychological: appropriate mood and affect     In House Labs:     TSH   Date Value Ref Range Status   10/28/2019 2.18 0.40 - 4.00 mU/L Final   08/05/2019 1.75 0.40 - 4.00 mU/L Final   07/31/2018 3.54 0.40 - 4.00 mU/L Final   09/19/2017 2.68 0.40 - 4.00 mU/L Final   01/23/2016 2.70 0.40 - 4.00 mU/L Final       Creatinine   Date Value Ref Range Status   10/28/2019 0.50 (L) 0.52 - 1.04 mg/dL Final     ENDO CALCIUM LABS-UMP Latest Ref Rng & Units 10/28/2019   CALCIUM 8.5 - 10.1 mg/dL 9.1   PHOSPHOROUS 2.5 - 4.5 mg/dL 2.7   ALBUMIN 3.4 - 5.0 g/dL 3.9   BUN 7 - 30 mg/dL 16   CREATININE 0.52 - 1.04 mg/dL 0.50 (L)   PARATHYROID HORMONE INTACT 18 - 80 pg/mL 46   ALKPHOS 40 - 150 U/L 59     Assessment/Treatment Plan:      Osteopenia; risk factors for osteoporosis probably inadequate intake of calcium and " vitamin D.  No significant other risk factors noted clinically.  To identify secondary causes of osteopenia we are going to do a detailed work-up including calcium labs and parathyroid hormone, thyroid hormone, screening for celiac disease.  We will also plan on doing a 24-hour urine collection to assess hypercalciuria once she corrects her intake of calcium.  I have discussed in detail with the patient that even patients with kidney stone need a calcium of 1000 to 1200 mg daily.  And this is preferably needs to come from the diet.  We discussed in detail about lactose-free daily products which could be sources of calcium including Allmond milk, yogurt with no lactose.  We have a plan to at least meet dietary requirement of calcium up to 1000 mg daily.  I have suggested a vitamin D intake of 1000 international units daily.  Recheck vitamin D level and will adjust treatment based on the results.  Based on the current DEXA scan with a FRAX score of 5.3% for 10-year probability of major osteoporotic fracture and a FRAX score of 0.5 for a 10-year probability of hip fracture the bone specific therapy with bisphosphonate is not indicated at this point in time.  We will repeat DEXA scan in 18 months or so for close follow-up.     Recurrent nephrolithiasis: with strong family history she is currently closely following up with urology regarding this issue.    Patient Instructions   The bone density test shows osteopenia. This is an intermediate category that is in between normal and osteoporosis.  People with osteopenia should work on taking in 1200 mg of calcium from diet in your case; with vitamin D 1000 international unit(s) daily.     I have listed below dietary sources and how much calcium each contain per serving. I also recommend weight bearing exercise (walking works)/muscle strengthing  exercise.   Dietary sources of calcium.   Milk                            8 oz            300 mg   Yogurt                           1 cup           400 mg   Hard cheese                     1.5 oz          300 mg   Cottage cheese                  2 cup           300 mg   Orange juice with Calcium       8 oz            300 mg   Low fat dairy sources are recommended     Vitamin d 1000 international unit(s)  Body mass index is 21.93 kg/m .          I will contact the patient with the test results.  Return to clinic in 12 months.    Test and/or medications prescribed today:  Orders Placed This Encounter   Procedures     Vitamin D Deficiency (D3 Only)     Albumin level     Alkaline phosphatase     Bone specific alk phosphatase     Calcium     Parathyroid Hormone Intact     Phosphorus     Creatinine     Urea nitrogen     Tissue transglutaminase shobha IgA and IgG     TSH with free T4 reflex         Cornel Briseno MD  Staff Endocrinologist    [phone]  Division of Endocrinology and Diabetes

## 2019-10-30 LAB — ALP BONE SERPL-MCNC: 11.1 UG/L

## 2019-11-04 NOTE — RESULT ENCOUNTER NOTE
Maribel,     Janneth please find blood work results done at the time of your visit at endocrinology clinic. The blood work results are overall within the normal limits with the exception of low vitamin D level.  Regarding low vitamin D level; I recommend taking Vitamin D supplement at 1000 international unit(s) daily.  When we find vitamin D level as low as yours sometimes we start with high dose vitamin D replacement initially and might be reasonable to take 2000 international unit(s) daily for 8 weeks or so and then you can go back to maintenance dose of 1000 international unit(s) daily.     Please let me know if you have any questions regarding these results.     Cornel Briseno MD

## 2020-03-02 ENCOUNTER — HEALTH MAINTENANCE LETTER (OUTPATIENT)
Age: 55
End: 2020-03-02

## 2020-08-06 ENCOUNTER — ANCILLARY PROCEDURE (OUTPATIENT)
Dept: MAMMOGRAPHY | Facility: CLINIC | Age: 55
End: 2020-08-06
Payer: COMMERCIAL

## 2020-08-06 ENCOUNTER — OFFICE VISIT (OUTPATIENT)
Dept: OBGYN | Facility: CLINIC | Age: 55
End: 2020-08-06
Payer: COMMERCIAL

## 2020-08-06 VITALS
DIASTOLIC BLOOD PRESSURE: 75 MMHG | WEIGHT: 110.1 LBS | BODY MASS INDEX: 21.62 KG/M2 | OXYGEN SATURATION: 98 % | SYSTOLIC BLOOD PRESSURE: 117 MMHG | HEART RATE: 60 BPM | HEIGHT: 60 IN

## 2020-08-06 DIAGNOSIS — R68.82 DECREASED LIBIDO: ICD-10-CM

## 2020-08-06 DIAGNOSIS — Z13.1 SCREENING FOR DIABETES MELLITUS: ICD-10-CM

## 2020-08-06 DIAGNOSIS — N63.10 BREAST MASS, RIGHT: ICD-10-CM

## 2020-08-06 DIAGNOSIS — M85.80 OSTEOPENIA, UNSPECIFIED LOCATION: ICD-10-CM

## 2020-08-06 DIAGNOSIS — B00.9 HSV (HERPES SIMPLEX VIRUS) INFECTION: ICD-10-CM

## 2020-08-06 DIAGNOSIS — Z12.39 SCREENING FOR BREAST CANCER: ICD-10-CM

## 2020-08-06 DIAGNOSIS — Z13.220 LIPID SCREENING: Primary | ICD-10-CM

## 2020-08-06 PROCEDURE — 77067 SCR MAMMO BI INCL CAD: CPT

## 2020-08-06 PROCEDURE — 99396 PREV VISIT EST AGE 40-64: CPT | Performed by: OBSTETRICS & GYNECOLOGY

## 2020-08-06 RX ORDER — ESTERIFIED ESTROGEN AND METHYLTESTOSTERONE 1.25; 2.5 MG/1; MG/1
1 TABLET ORAL DAILY
Qty: 90 TABLET | Refills: 3 | Status: SHIPPED | OUTPATIENT
Start: 2020-08-06 | End: 2020-08-27

## 2020-08-06 RX ORDER — ACYCLOVIR 400 MG/1
400 TABLET ORAL EVERY 8 HOURS
Qty: 15 TABLET | Refills: 0 | Status: SHIPPED | OUTPATIENT
Start: 2020-08-06 | End: 2020-10-16

## 2020-08-06 ASSESSMENT — MIFFLIN-ST. JEOR: SCORE: 1023.29

## 2020-08-06 NOTE — PROGRESS NOTES
Maribel is a 55 year old female, , who is here for her annual exam.  She is s/p hysterectomy for benign pathology but retains both ovaries.  She c/o decreased libido so would like to discuss her treatment options for this issue.  She is currently taking Estrace daily po but does not feel that this is helping her sex drive.  She would like a refill of Acyclovir since she had 5 herpetic outbreaks last year.  She was diagnosed with osteopenia so needs a recheck with a DEXA scan.  She is due for labs but is not in a fasting state so future orders were placed.  Her next colonoscopy is due in 2026.  She had a mammogram this morning with results pending.    ROS: Ten point review of systems was reviewed and negative except the above.    Health Maintenance   Topic Date Due     HIV SCREENING  1981     ZOSTER IMMUNIZATION (1 of 2) 2016     PHQ-2  2020     DTAP/TDAP/TD IMMUNIZATION (2 - Td) 2020     PREVENTIVE CARE VISIT  2020     INFLUENZA VACCINE (1) 2020     MAMMO SCREENING  2021     LIPID  2024     COLORECTAL CANCER SCREENING  2026     IPV IMMUNIZATION  Aged Out     MENINGITIS IMMUNIZATION  Aged Out     HPV TEST  Discontinued     PAP  Discontinued      Last pap: not applicable since she is s/p hysterectomy  Last Mammogram: completed today prior to this appt.  Last Dexa: 8/3/18 so due for recheck because of her hx of osteopenia  Last Colonoscopy: due 2026  Lab Results   Component Value Date    CHOL 224 2019     Lab Results   Component Value Date    HDL 66 2019     Lab Results   Component Value Date     2019     Lab Results   Component Value Date    TRIG 184 2019     Lab Results   Component Value Date    CHOLHDLRATIO 2.9 2013         OBHX:      PSH:   Past Surgical History:   Procedure Laterality Date     BREAST SURGERY      Right breast lump removal-non-cancerous      SECTION       COLONOSCOPY N/A 2016     "Procedure: COMBINED COLONOSCOPY, SINGLE OR MULTIPLE BIOPSY/POLYPECTOMY BY BIOPSY;  Surgeon: Duane, William Charles, MD;  Location: [facility] OR     COLONOSCOPY WITH CO2 INSUFFLATION N/A 8/16/2016    Procedure: COLONOSCOPY WITH CO2 INSUFFLATION;  Surgeon: Duane, William Charles, MD;  Location: [facility] OR     CYSTOSCOPY  11/13/09    left stent placement (C-ARM)     GENITOURINARY SURGERY      surgery for kidney stone     GYN SURGERY      laparoscopy     GYN SURGERY      partial hysterectomy--still have ovaries         PMH: Her past medical, surgical, and obstetric histories were reviewed and are documented in their appropriate chart areas.    ALL/Meds: Her medication and allergy histories were reviewed and are documented in their appropriate chart areas.    SH/FMH: Her social and family history was reviewed and documented in its appropriate chart area.    PE: /75 (BP Location: Right arm, Cuff Size: Adult Regular)   Pulse 60   Ht 1.528 m (5' 0.15\")   Wt 49.9 kg (110 lb 1.6 oz)   SpO2 98%   BMI 21.40 kg/m    Body mass index is 21.4 kg/m .    General Appearance:  healthy, alert, active, no distress  Cardiovascular:  Regular rate and Rhythm without murmur  Neck: Supple, no adenopathy and thyroid normal  Lungs:  Clear, without wheeze, rale or rhonchi  Breast: fibrocystic changes bilaterally but a palpable mass was noted in the outer aspect of her right breast which was tender with palpation.  Abdomen: Benign, Soft, flat, non-tender, No masses, organomegaly, No inguinal nodes and Bowel sounds normoactive.   Pelvic:       - Ext: Vulva and perineum are normal without lesion, mass or discharge        - Urethra: normal without discharge        - Urethral Meatus: normal appearance       - Bladder: no tenderness, no masses       - Vagina: Normal mucosa, no discharge        - Cervix: Surgically absent but vaginal cuff appeared normal without defect       - Uterus: Surgically absent       - Adnexa: Non palpable       - Rectal: " sphincter tone normal and no mass    A/P:  Well Woman Exam, Right Breast Mass with Tenderness to Palpation, Decreased Libido, Hx of Herpetic Outbreaks, Osteopenia     -  I discussed the new pap recommendations regarding screening.  Explained the rationale for increased intervals between paps.  Questions asked and answered.  She does agree to this regiment.  Pap was not obtained   -  BC: postmenopausal and s/p hysterectomy   -  Due to decreased libido issue, will switch her from Estrace to Estratest and instructions were provided for use.   -  Order right breast US due to palpable mass on today's exam   -  Check fasting labs in future including the lipid profile and glucose   -  Recheck a DEXA scan and refer to Endocrine if results are abnormal  Orders Placed This Encounter   Procedures     Lipid Profile (Chol, Trig, HDL, LDL calc)     Glucose      -  Encouraged self-breast exam   -  Encouraged low fat diet, regular exercise, and adequate calcium intake.    Laurita Damon, DO   FACOG, FACS

## 2020-08-12 ENCOUNTER — TELEPHONE (OUTPATIENT)
Dept: OBGYN | Facility: CLINIC | Age: 55
End: 2020-08-12

## 2020-08-12 DIAGNOSIS — Z13.220 LIPID SCREENING: ICD-10-CM

## 2020-08-12 DIAGNOSIS — Z13.1 SCREENING FOR DIABETES MELLITUS: ICD-10-CM

## 2020-08-12 LAB
CHOLEST SERPL-MCNC: 266 MG/DL
GLUCOSE SERPL-MCNC: 89 MG/DL (ref 70–99)
HDLC SERPL-MCNC: 72 MG/DL
LDLC SERPL CALC-MCNC: 153 MG/DL
NONHDLC SERPL-MCNC: 194 MG/DL
TRIGL SERPL-MCNC: 205 MG/DL

## 2020-08-12 PROCEDURE — 82947 ASSAY GLUCOSE BLOOD QUANT: CPT | Performed by: OBSTETRICS & GYNECOLOGY

## 2020-08-12 PROCEDURE — 80061 LIPID PANEL: CPT | Performed by: OBSTETRICS & GYNECOLOGY

## 2020-08-12 PROCEDURE — 36415 COLL VENOUS BLD VENIPUNCTURE: CPT | Performed by: OBSTETRICS & GYNECOLOGY

## 2020-08-12 NOTE — TELEPHONE ENCOUNTER
Patient called today.    Patient has been speaking with Juan Damon MD at  OB[facility]N Clinic about medications that her insurance would cover and alternatives.    Patient did call her insurance and was told that her insurance can do medications Oxandrolone; Danazol; and Testosterone.    Please contact patient.    Thank you.    Central Scheduling  Flakita VARGHESE

## 2020-08-13 NOTE — TELEPHONE ENCOUNTER
I returned her call and addressed her questions.  She is not comfortable with any alternative to Estratest so will fill this script in the future.  She was reminded to schedule her breast mammogram (diagnostic) and US.

## 2020-08-17 ENCOUNTER — ANCILLARY PROCEDURE (OUTPATIENT)
Dept: MAMMOGRAPHY | Facility: CLINIC | Age: 55
End: 2020-08-17
Attending: OBSTETRICS & GYNECOLOGY
Payer: COMMERCIAL

## 2020-08-17 ENCOUNTER — ANCILLARY PROCEDURE (OUTPATIENT)
Dept: ULTRASOUND IMAGING | Facility: CLINIC | Age: 55
End: 2020-08-17
Attending: OBSTETRICS & GYNECOLOGY
Payer: COMMERCIAL

## 2020-08-17 ENCOUNTER — ANCILLARY PROCEDURE (OUTPATIENT)
Dept: BONE DENSITY | Facility: CLINIC | Age: 55
End: 2020-08-17
Attending: OBSTETRICS & GYNECOLOGY
Payer: COMMERCIAL

## 2020-08-17 DIAGNOSIS — Z11.59 ENCOUNTER FOR SCREENING FOR OTHER VIRAL DISEASES: Primary | ICD-10-CM

## 2020-08-17 DIAGNOSIS — N63.10 BREAST MASS, RIGHT: ICD-10-CM

## 2020-08-17 DIAGNOSIS — M85.80 OSTEOPENIA, UNSPECIFIED LOCATION: ICD-10-CM

## 2020-08-17 PROCEDURE — G0279 TOMOSYNTHESIS, MAMMO: HCPCS | Performed by: STUDENT IN AN ORGANIZED HEALTH CARE EDUCATION/TRAINING PROGRAM

## 2020-08-17 PROCEDURE — 77065 DX MAMMO INCL CAD UNI: CPT | Performed by: STUDENT IN AN ORGANIZED HEALTH CARE EDUCATION/TRAINING PROGRAM

## 2020-08-17 PROCEDURE — 77080 DXA BONE DENSITY AXIAL: CPT | Performed by: INTERNAL MEDICINE

## 2020-08-17 PROCEDURE — 76642 ULTRASOUND BREAST LIMITED: CPT | Mod: RT | Performed by: STUDENT IN AN ORGANIZED HEALTH CARE EDUCATION/TRAINING PROGRAM

## 2020-08-19 DIAGNOSIS — M85.80 OSTEOPENIA, UNSPECIFIED LOCATION: Primary | ICD-10-CM

## 2020-08-23 DIAGNOSIS — Z11.59 ENCOUNTER FOR SCREENING FOR OTHER VIRAL DISEASES: ICD-10-CM

## 2020-08-23 PROCEDURE — U0003 INFECTIOUS AGENT DETECTION BY NUCLEIC ACID (DNA OR RNA); SEVERE ACUTE RESPIRATORY SYNDROME CORONAVIRUS 2 (SARS-COV-2) (CORONAVIRUS DISEASE [COVID-19]), AMPLIFIED PROBE TECHNIQUE, MAKING USE OF HIGH THROUGHPUT TECHNOLOGIES AS DESCRIBED BY CMS-2020-01-R: HCPCS | Performed by: OBSTETRICS & GYNECOLOGY

## 2020-08-24 LAB
SARS-COV-2 RNA SPEC QL NAA+PROBE: NOT DETECTED
SPECIMEN SOURCE: NORMAL

## 2020-08-27 ENCOUNTER — TELEPHONE (OUTPATIENT)
Dept: OBGYN | Facility: CLINIC | Age: 55
End: 2020-08-27

## 2020-08-27 ENCOUNTER — ANCILLARY PROCEDURE (OUTPATIENT)
Dept: CT IMAGING | Facility: CLINIC | Age: 55
End: 2020-08-27
Attending: OBSTETRICS & GYNECOLOGY
Payer: COMMERCIAL

## 2020-08-27 ENCOUNTER — ANCILLARY PROCEDURE (OUTPATIENT)
Dept: ULTRASOUND IMAGING | Facility: CLINIC | Age: 55
End: 2020-08-27
Attending: OBSTETRICS & GYNECOLOGY
Payer: COMMERCIAL

## 2020-08-27 ENCOUNTER — ANCILLARY PROCEDURE (OUTPATIENT)
Dept: MAMMOGRAPHY | Facility: CLINIC | Age: 55
End: 2020-08-27
Attending: OBSTETRICS & GYNECOLOGY
Payer: COMMERCIAL

## 2020-08-27 DIAGNOSIS — N63.10 BREAST MASS, RIGHT: ICD-10-CM

## 2020-08-27 DIAGNOSIS — N95.1 SYMPTOMATIC MENOPAUSAL OR FEMALE CLIMACTERIC STATES: Primary | ICD-10-CM

## 2020-08-27 PROCEDURE — 77066 DX MAMMO INCL CAD BI: CPT | Performed by: STUDENT IN AN ORGANIZED HEALTH CARE EDUCATION/TRAINING PROGRAM

## 2020-08-27 PROCEDURE — 00000159 ZZHCL STATISTIC H-SEND OUTS PREP: Performed by: STUDENT IN AN ORGANIZED HEALTH CARE EDUCATION/TRAINING PROGRAM

## 2020-08-27 PROCEDURE — 19083 BX BREAST 1ST LESION US IMAG: CPT | Mod: RT | Performed by: STUDENT IN AN ORGANIZED HEALTH CARE EDUCATION/TRAINING PROGRAM

## 2020-08-27 PROCEDURE — 00000158 ZZHCL STATISTIC H-FISH PROCESS B/S: Performed by: STUDENT IN AN ORGANIZED HEALTH CARE EDUCATION/TRAINING PROGRAM

## 2020-08-27 PROCEDURE — 88360 TUMOR IMMUNOHISTOCHEM/MANUAL: CPT | Performed by: STUDENT IN AN ORGANIZED HEALTH CARE EDUCATION/TRAINING PROGRAM

## 2020-08-27 PROCEDURE — 88305 TISSUE EXAM BY PATHOLOGIST: CPT | Performed by: STUDENT IN AN ORGANIZED HEALTH CARE EDUCATION/TRAINING PROGRAM

## 2020-08-27 PROCEDURE — 88377 M/PHMTRC ALYS ISHQUANT/SEMIQ: CPT | Performed by: STUDENT IN AN ORGANIZED HEALTH CARE EDUCATION/TRAINING PROGRAM

## 2020-08-27 RX ORDER — ESTRADIOL 1 MG/1
1 TABLET ORAL DAILY
Qty: 90 TABLET | Refills: 3 | Status: SHIPPED | OUTPATIENT
Start: 2020-08-27 | End: 2020-09-17

## 2020-08-27 RX ORDER — IOPAMIDOL 755 MG/ML
68 INJECTION, SOLUTION INTRAVASCULAR ONCE
Status: COMPLETED | OUTPATIENT
Start: 2020-08-27 | End: 2020-08-27

## 2020-08-27 RX ORDER — LIDOCAINE HYDROCHLORIDE 10 MG/ML
9 INJECTION, SOLUTION EPIDURAL; INFILTRATION; INTRACAUDAL; PERINEURAL ONCE
Status: COMPLETED | OUTPATIENT
Start: 2020-08-27 | End: 2020-08-27

## 2020-08-27 RX ADMIN — IOPAMIDOL 68 ML: 755 INJECTION, SOLUTION INTRAVASCULAR at 14:22

## 2020-08-27 RX ADMIN — LIDOCAINE HYDROCHLORIDE 9 ML: 10 INJECTION, SOLUTION EPIDURAL; INFILTRATION; INTRACAUDAL; PERINEURAL at 15:40

## 2020-08-27 NOTE — TELEPHONE ENCOUNTER
[facility] Call Center    Phone Message    May a detailed message be left on voicemail: yes     Reason for Call: Medication Refill Request    Has the patient contacted the pharmacy for the refill? Yes   Name of medication being requested: estrogens-methylTESTOSTERone (ESTRATEST) 1.25-2.5 MG per tablet  Provider who prescribed the medication: Dr. Damon  Pharmacy:[id number] [address] AT [facility] & [facility]   Date medication is needed: As soon as possible         Action Taken: Message routed to:  [facility] p [phone]    Travel Screening: Not Applicable

## 2020-08-27 NOTE — TELEPHONE ENCOUNTER
Patient returned call to clinic. She cannot do the ESTRATEST as her insurance doesn't cover this. She would like to go back on the ESTRADIOL.     She is requesting a full year prescription to be sent to the pharmacy.     RN will route to provider for review.     Jacqui Manuel RN on 8/27/2020 at 4:34 PM

## 2020-08-27 NOTE — TELEPHONE ENCOUNTER
Refills remain at patient's pharmacy. Refill not appropriate at this time, because there are additional refills remaining on current presciption    Refilled on: 8/6/2020, 90 tablets with 3 additional refills.     Per 8/12/2020 encounter pt is not comfortable with any alternative other than Estratest. RN spoke with pharmacy and refills remain.    RN called and LM saying refills remain or if pt wanted estradiol alternative instead to let us know as it is less expensive.    Angeles Gilbert RN on 8/27/2020 at 4:23 PM

## 2020-08-28 NOTE — TELEPHONE ENCOUNTER
Laurita Damon DO Bakker, Amber M, RN 15 hours ago (5:28 PM)       Please let this pt know that her refill of Estrace has been sent to her pharmacy - 3 months with 3 refills so will last one year.    Message text        RN called pt and relayed providers advisement.    Patient verbalized understanding and agreed to plan.   Patient was given the opportunity to ask additional questions and have them answered. Patient had no further questions.   Angeles Gilbert RN on 8/28/2020 at 8:52 AM

## 2020-08-31 LAB — COPATH REPORT: NORMAL

## 2020-09-01 ENCOUNTER — PATIENT OUTREACH (OUTPATIENT)
Dept: SURGERY | Facility: CLINIC | Age: 55
End: 2020-09-01

## 2020-09-01 ENCOUNTER — TELEPHONE (OUTPATIENT)
Dept: GENERAL RADIOLOGY | Facility: CLINIC | Age: 55
End: 2020-09-01

## 2020-09-01 ENCOUNTER — PRE VISIT (OUTPATIENT)
Dept: ONCOLOGY | Facility: CLINIC | Age: 55
End: 2020-09-01

## 2020-09-01 LAB — COPATH REPORT: NORMAL

## 2020-09-01 NOTE — TELEPHONE ENCOUNTER
RECORDS STATUS - BREAST    RECORDS REQUESTED FROM: [facility] - Internal Referral   DATE REQUESTED: 9/1/20   NOTES DETAILS STATUS   OFFICE NOTE from referring provider [facility] Dr. Damon   OFFICE NOTE from medical oncologist     OFFICE NOTE from surgeon     OFFICE NOTE from radiation oncologist     DISCHARGE SUMMARY from hospital     DISCHARGE REPORT from the      OPERATIVE REPORT     MEDICATION LIST     CLINICAL TRIAL TREATMENTS TO DATE     LABS     PATHOLOGY REPORTS  (Tissue diagnosis, Stage, ER/MS percentage positive and intensity of staining, HER2 IHC, FISH, and all biopsies from breast and any distant metastasis)                 [facility] 8/27/20: Surg Path & Her2   GENONOMIC TESTING     TYPE:   (Next Generation Sequencing, including Foundation One testing, and Oncotype score)     IMAGING (NEED IMAGES & REPORT)     CT SCANS     MRI     MAMMO PACS    ULTRASOUND PACS    PET     BONE SCAN PACS    BRAIN MRI

## 2020-09-01 NOTE — TELEPHONE ENCOUNTER
New Patient Oncology Nurse Navigator Note     Referring provider: Dr. Damon     Referring Clinic/Organization: [facility]     Referred to: Surgical Oncology - Breast Surgery    Requested provider (if applicable): First available provider    Referral Received: 09/01/20       Evaluation for : Breast cancer     Records Location: Psychiatric     Records Needed:     - None.     Additional testing needed prior to consult:     - None.     Referral updates and Plan:       Consult with Surgical Oncology      09/01/2020 12:56 PM - Called and left a message for patient regarding referral for new dx of breast cancer. Informed her that we are calling to schedule surgical consult to discus dx. Left my direct number and scheduling line for patient to call back.     Scheduling instructions updated in referral Q.           Rufina Lim, RN, BSN   Surgical Oncology New Patient Nurse Navigator  [facility]  [phone]

## 2020-09-01 NOTE — TELEPHONE ENCOUNTER
Spoke with Maribel regarding the results from her recent breast biopsy which indicates: Right Breast Invasive Mammary Carcinoma. Discussed the radiologists recommendation for surgical and oncologic consultation. Referral placed. Pt verbalized understanding and all questions were addressed at this time. Pt awaiting a call from oncology .

## 2020-09-01 NOTE — TELEPHONE ENCOUNTER
ONCOLOGY INTAKE: Records Information      APPT INFORMATION:  Referring provider:  Dr. Damon  Referring provider s clinic:  [facility]  Reason for visit/diagnosis:  breast cancer  Has patient been notified of appointment date and time?: Yes    RECORDS INFORMATION:  Were the records received with the referral (via [employer]fax)? no    Has patient been seen for any external appt for this diagnosis? no    If yes, where? n/a    Has patient had any imaging or procedures outside of [place] for this condition? no      If Yes, where? n/a    ADDITIONAL INFORMATION:  none

## 2020-09-02 ASSESSMENT — ENCOUNTER SYMPTOMS
MYALGIAS: 1
BREAST MASS: 1
BREAST PAIN: 1
BACK PAIN: 1
EYE REDNESS: 1
EYE IRRITATION: 1

## 2020-09-03 ENCOUNTER — TELEPHONE (OUTPATIENT)
Dept: ONCOLOGY | Facility: CLINIC | Age: 55
End: 2020-09-03

## 2020-09-03 ENCOUNTER — ONCOLOGY VISIT (OUTPATIENT)
Dept: ONCOLOGY | Facility: CLINIC | Age: 55
End: 2020-09-03
Attending: OBSTETRICS & GYNECOLOGY
Payer: COMMERCIAL

## 2020-09-03 VITALS
DIASTOLIC BLOOD PRESSURE: 76 MMHG | OXYGEN SATURATION: 98 % | TEMPERATURE: 98.5 F | SYSTOLIC BLOOD PRESSURE: 122 MMHG | BODY MASS INDEX: 21.69 KG/M2 | WEIGHT: 111.6 LBS | HEART RATE: 67 BPM

## 2020-09-03 DIAGNOSIS — C50.919 BREAST CANCER (H): ICD-10-CM

## 2020-09-03 DIAGNOSIS — Z11.59 ENCOUNTER FOR SCREENING FOR OTHER VIRAL DISEASES: Primary | ICD-10-CM

## 2020-09-03 PROCEDURE — 40000803 ZZHCL STATISTIC DNA ISOL HIGH PURITY: Performed by: SURGERY

## 2020-09-03 PROCEDURE — 36415 COLL VENOUS BLD VENIPUNCTURE: CPT | Performed by: SURGERY

## 2020-09-03 PROCEDURE — G0463 HOSPITAL OUTPT CLINIC VISIT: HCPCS | Mod: ZF

## 2020-09-03 RX ORDER — CLONAZEPAM 0.5 MG/1
TABLET ORAL
COMMUNITY
End: 2020-12-16

## 2020-09-03 RX ORDER — METHYLPREDNISOLONE 4 MG
TABLET, DOSE PACK ORAL
COMMUNITY
Start: 2020-08-26 | End: 2020-10-16

## 2020-09-03 ASSESSMENT — PAIN SCALES - GENERAL: PAINLEVEL: NO PAIN (0)

## 2020-09-03 NOTE — PATIENT INSTRUCTIONS
Surgical Oncology RN Care Coordination Note:     - Genetic testing today.     - planned Right breast lumpectomy with sentinel node biopsy, our scheduling team will reach out to schedule you for this procedure     - you will need a pre op history and physical within 30 days of the procedure.     - [facility] paperwork can be faxed to [phone] to be completed.     Rufina Lim RN, BSN  Care Coordinator   [phone]

## 2020-09-03 NOTE — NURSING NOTE
"Oncology Rooming Note    September 3, 2020 2:16 PM   Maribel June is a 55 year old female who presents for:    Chief Complaint   Patient presents with     New Patient     AEH Breast Cancer     Initial Vitals: /76   Pulse 67   Temp 98.5  F (36.9  C)   Wt 50.6 kg (111 lb 9.6 oz)   SpO2 98%   BMI 21.69 kg/m   Estimated body mass index is 21.69 kg/m  as calculated from the following:    Height as of 8/6/20: 1.528 m (5' 0.15\").    Weight as of this encounter: 50.6 kg (111 lb 9.6 oz). Body surface area is 1.47 meters squared.  No Pain (0) Comment: Data Unavailable   No LMP recorded. Patient has had a hysterectomy.  Allergies reviewed: Yes  Medications reviewed: Yes    Medications: Medication refills not needed today.  Pharmacy name entered into Mediamind:    [facility] [address] [place], [place] - [address] [facility] [address] [facility].  [id number] [address] AT [facility] & [facility]    Clinical concerns: New patient today. Dr. Watson was NOT notified.      Gael Bravo MA            "

## 2020-09-03 NOTE — TELEPHONE ENCOUNTER
Surgery is scheduled with Dr. Watson on 9/29 at [facility].  Scheduled per MD.    H&P: to be completed by PCP.  POST-OP: 10/16  NM being delivered to [facility] OR at 10:15 AM    The surgery packet was provided via [employer].        Pt is aware that they will be contacted to schedule a COVID-19 test before surgery.    They are aware that they will receive a call  ~2 days prior to the scheduled procedure and will be given an exact arrival/start time.    I contacted the patient via phone and spoke with her to confirm the scheduled dates. [employer] also sent.

## 2020-09-03 NOTE — LETTER
9/3/2020     RE: Maribel June  [address]  [facility] [address]    Dear Colleague,    Thank you for referring your patient, Maribel June, to the [facility]. Please see a copy of my visit note below.    HISTORY OF PRESENT ILLNESS:  Maribel June is a 55-year-old woman who presents with a new diagnosis of right breast cancer.  She had a screening mammogram on 08/06 which was normal.  A physician then felt a mass on physical examination of the right breast and she underwent a diagnostic mammogram and ultrasound.  I could hardly see the mass on mammogram, but on ultrasound, this measured 2.2 cm in size.  She had a contrast-enhanced mammography, which demonstrated the mass to be 2.4 cm in size.  She underwent a needle biopsy, which demonstrated invasive ductal cancer, grade 1, ER positive, [place] positive, HER2/claire negative.  She is now here to talk about treatment options.  In 2005, she had a fibroadenoma removed from her same breast.  Otherwise, she has had no other problems.      PAST MEDICAL HISTORY:  Significant for hyperlipidemia.  She has had a hysterectomy in the past, but her ovaries are in place.  She does have osteopenia.      FAMILY HISTORY:  Negative for breast cancer.  Her father had prostate cancer.      PHYSICAL EXAMINATION:   GENERAL:  She is a well-appearing woman in no apparent distress.   HEENT:  Head and neck examination reveals no cervical, supraclavicular or infraclavicular lymphadenopathy.   BREASTS:  Right breast examination reveals a palpable 1.5 cm mass in her right breast.  It is mobile.  She has no lymphadenopathy.  Left breast examination reveals no dominant masses, skin changes, nipple changes or axillary lymphadenopathy.      IMPRESSION:  Stage II, ER-positive breast cancer.      PLAN:  I talked to her about the various treatment options, including a mastectomy with or without reconstruction versus lumpectomy plus radiation therapy.  She is interested in  breast conservation.  I did recommend a sentinel lymph node biopsy.  I told her that endocrine therapy would be recommended.  A decision regarding chemotherapy would not be made until after her surgery.  I did recommend she have genetic testing and that is going to be done today.  We will tentatively schedule her for a right lumpectomy and a sentinel lymph node biopsy.      TT:  60 minutes, CT:  50 minutes.     Again, thank you for allowing me to participate in the care of your patient.      Sincerely,      Jose Watson MD

## 2020-09-03 NOTE — PROGRESS NOTES
HISTORY OF PRESENT ILLNESS:  Maribel June is a 55-year-old woman who presents with a new diagnosis of right breast cancer.  She had a screening mammogram on 08/06 which was normal.  A physician then felt a mass on physical examination of the right breast and she underwent a diagnostic mammogram and ultrasound.  I could hardly see the mass on mammogram, but on ultrasound, this measured 2.2 cm in size.  She had a contrast-enhanced mammography, which demonstrated the mass to be 2.4 cm in size.  She underwent a needle biopsy, which demonstrated invasive ductal cancer, grade 1, ER positive, [place] positive, HER2/claire negative.  She is now here to talk about treatment options.  In 2005, she had a fibroadenoma removed from her same breast.  Otherwise, she has had no other problems.      PAST MEDICAL HISTORY:  Significant for hyperlipidemia.  She has had a hysterectomy in the past, but her ovaries are in place.  She does have osteopenia.      FAMILY HISTORY:  Negative for breast cancer.  Her father had prostate cancer.      PHYSICAL EXAMINATION:   GENERAL:  She is a well-appearing woman in no apparent distress.   HEENT:  Head and neck examination reveals no cervical, supraclavicular or infraclavicular lymphadenopathy.   BREASTS:  Right breast examination reveals a palpable 1.5 cm mass in her right breast.  It is mobile.  She has no lymphadenopathy.  Left breast examination reveals no dominant masses, skin changes, nipple changes or axillary lymphadenopathy.      IMPRESSION:  Stage II, ER-positive breast cancer.      PLAN:  I talked to her about the various treatment options, including a mastectomy with or without reconstruction versus lumpectomy plus radiation therapy.  She is interested in breast conservation.  I did recommend a sentinel lymph node biopsy.  I told her that endocrine therapy would be recommended.  A decision regarding chemotherapy would not be made until after her surgery.  I did recommend she have genetic  Updated notes sent to Ashly LOZADA for review.    testing and that is going to be done today.  We will tentatively schedule her for a right lumpectomy and a sentinel lymph node biopsy.      TT:  60 minutes, CT:  50 minutes.

## 2020-09-04 LAB — COPATH REPORT: NORMAL

## 2020-09-15 ENCOUNTER — TELEPHONE (OUTPATIENT)
Dept: ONCOLOGY | Facility: CLINIC | Age: 55
End: 2020-09-15

## 2020-09-17 ENCOUNTER — OFFICE VISIT (OUTPATIENT)
Dept: DERMATOLOGY | Facility: CLINIC | Age: 55
End: 2020-09-17
Payer: COMMERCIAL

## 2020-09-17 ENCOUNTER — TELEPHONE (OUTPATIENT)
Dept: ONCOLOGY | Facility: CLINIC | Age: 55
End: 2020-09-17

## 2020-09-17 DIAGNOSIS — L82.1 SEBORRHEIC KERATOSIS: ICD-10-CM

## 2020-09-17 DIAGNOSIS — L81.4 SOLAR LENTIGO: ICD-10-CM

## 2020-09-17 DIAGNOSIS — L57.8 SUN-DAMAGED SKIN: Primary | ICD-10-CM

## 2020-09-17 PROCEDURE — 99213 OFFICE O/P EST LOW 20 MIN: CPT | Performed by: DERMATOLOGY

## 2020-09-17 NOTE — NURSING NOTE
Maribel June's goals for this visit include:   Chief Complaint   Patient presents with     Derm Problem     recheck spot on right cheek       She requests these members of her care team be copied on today's visit information: no      PCP: Juwan Perry    Referring Provider:  No referring provider defined for this encounter.    There were no vitals taken for this visit.    Do you need any medication refills at today's visit? No    Gabi Fishman LPN
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
## 2020-09-18 NOTE — TELEPHONE ENCOUNTER
I called Crispin a 2nd time to schedule a Genetic Counseling appt with Valeria Toledo as requested by Valeria via [employer]. Crispin again did not answer, so a 2nd voicemail was left requesting a return call.

## 2020-09-18 NOTE — TELEPHONE ENCOUNTER
I called Crispin to schedule a Genetic Counseling appt with Valeria Toledo as requested by Valeria via [employer]. Crispin did not answer, so a voicemail was left requesting a return call.

## 2020-09-18 NOTE — TELEPHONE ENCOUNTER
ONCOLOGY INTAKE: Records Information      APPT INFORMATION: 9/24/20 - Valeria - Video  Referring provider:  BRIAN Damon   Referring provider s clinic:  [facility]  Reason for visit/diagnosis:  breast cancer  Has patient been notified of appointment date and time?: Yes    RECORDS INFORMATION:  Were the records received with the referral (via [employer])? Internal referral    Has patient been seen for any external appt for this diagnosis? No    ADDITIONAL INFORMATION:  Scheduled via IB from Valeria/Myesha  I called Crispin & confirmed a video visit for 9/24/20 at 8am with Valeria  Virtual visit instructions sent via [employer]  Progeny link sent via [employer]

## 2020-09-24 ENCOUNTER — VIRTUAL VISIT (OUTPATIENT)
Dept: ONCOLOGY | Facility: CLINIC | Age: 55
End: 2020-09-24
Attending: GENETIC COUNSELOR, MS
Payer: COMMERCIAL

## 2020-09-24 ENCOUNTER — PRE VISIT (OUTPATIENT)
Dept: ONCOLOGY | Facility: CLINIC | Age: 55
End: 2020-09-24

## 2020-09-24 DIAGNOSIS — C50.911 MALIGNANT NEOPLASM OF RIGHT BREAST IN FEMALE, ESTROGEN RECEPTOR POSITIVE, UNSPECIFIED SITE OF BREAST (H): Primary | ICD-10-CM

## 2020-09-24 DIAGNOSIS — Z80.42 FAMILY HISTORY OF PROSTATE CANCER: ICD-10-CM

## 2020-09-24 DIAGNOSIS — Z17.0 MALIGNANT NEOPLASM OF RIGHT BREAST IN FEMALE, ESTROGEN RECEPTOR POSITIVE, UNSPECIFIED SITE OF BREAST (H): Primary | ICD-10-CM

## 2020-09-24 DIAGNOSIS — Z80.0 FAMILY HISTORY OF STOMACH CANCER: ICD-10-CM

## 2020-09-24 DIAGNOSIS — Z80.3 FAMILY HISTORY OF MALIGNANT NEOPLASM OF BREAST: ICD-10-CM

## 2020-09-24 PROCEDURE — 96040 ZZH GENETIC COUNSELING, EACH 30 MINUTES: CPT | Mod: 95,ZF | Performed by: GENETIC COUNSELOR, MS

## 2020-09-24 NOTE — LETTER
"    Cancer Risk Management  Program Locations    [facility]  [facility]  [facility]  Mailing Address  Cancer Risk Management Program  [facility]  [address]  [place] [address]    New patient appointments  [phone]  September 24, 2020    Maribel June  [address] [facility] [address]      Dear Maribel,    It was a pleasure meeting with you on September 24, 2020. Here is a copy of the progress note from your recent genetic counseling visit to the Cancer Risk Management Program. If you have any additional questions, please feel free to call.    9/24/2020    Maribel June is a 55 year old female who is being evaluated via a billable video visit.      The patient has been notified of following:     \"This video visit will be conducted via a call between you and your provider. We have found that certain health care needs can be provided without the need for an in-person physical exam. This service lets us provide the care you need with a video conversation. If lab work is needed we can place an order for that and you can then stop by our lab to have the test done at a later time.    Video visits are billed at different rates depending on your insurance coverage. Please reach out to your insurance provider with any questions.    If during the course of the call the provider feels a video visit is not appropriate, you will not be charged for this service.\"    Patient has given verbal consent for Video visit? Yes  How would you like to obtain your AVS? MyChart  If you are dropped from the video visit, the video invite should be resent to: Text to cell phone: [phone]  Will anyone else be joining your video visit? Yes: genetic counseling intern Denisse was present for the visit    Referring Provider: Jose Watson, " "MD    Presenting Information:   Given concerns regarding the potential for COVID-19 exposure during a clinic visit, Maribel elected for a video genetic counseling visit through the Cancer Risk Management Program to discuss her personal and family history of breast and other cancers. We reviewed this history, cancer screening recommendations, and available genetic testing options.    Personal History:  Maribel is a 55 year old female. She was diagnosed with invasive ductal carcinoma of her right breast at age 55; the tumor is ER/TX+ and Her2-. Treatment is currently being planned, but will most likely include a lumpectomy on 9/29/2020.     Maribel has her ovaries in place. She previously had a hysterectomy in 1996/1997 to address gynecologic problems. Her most recent mammogram in August 2020 identified this cancer. Her most recent colonoscopy in August 2016 identified one tubular adenoma (additional polyp was benign colon tissue); follow-up was recommended in five years. She does not regularly do any other cancer screening at this time.    Family History: (Please see scanned pedigree for detailed family history information)    One maternal uncle was diagnosed with lymphoma and passed away in his 60/70's.    One maternal uncle was diagnosed with and passed away from cholangiocarcinoma in his 60/70's; he did not have a history of smoking or significant alcohol use.    Her maternal grandfather was diagnosed with stomach cancer and passed away at a \"younger\" age; he did not have a history of smoking or significant alcohol use.    Her father was diagnosed with prostate cancer in his 70's and passed away at age 78.    One paternal aunt is in her 80's and was diagnosed with breast cancer in her 70/80's.    Her daughter was diagnosed with breast cancer in her 60's.    Her maternal and paternal ethnicity is [place]. There is no known Ashkenazi Nondenominational ancestry on either side of her family.    Discussion:    Maribel's " personal and family history of breast, prostate, and gastrointestinal cancers is suggestive of a hereditary cancer syndrome.    We reviewed the features of sporadic, familial, and hereditary cancers. In looking at Maribel's family history, it is possible that a cancer susceptibility gene is present as Maribel and a few close relatives on both sides of her family have been diagnosed with potentially related cancers (breast, cholangiocarcinoma, stomach, prostate); her grandfather may have also been diagnosed under age 50. That being said, it is reassuring that Maribel and the majority of her close relatives have not been diagnosed under age 50 and she has several relatives that have never been diagnosed with a cancer.    We first discussed the genetic testing previously ordered by Dr. Watson. In order to make upcoming treatment decisions, Maribel had her blood drawn for the Breast Actionable Panel, offered by the [facility]. This panel includes 11 genes associated with high/moderate breast cancer risk and published surgery and/or screening guidelines (MELANIE, BRCA1, BRCA2, CDH1, CHEK2, NBN, NF1, PALB2, PTEN, STK11, TP53).    For example, this panel includes the BRCA1 and BRCA2 genes, which are the most common cause of hereditary breast cancer. Individuals with BRCA1 and BRCA2 gene mutations are at increased risk for several different cancers, including breast, ovarian, male breast, prostate, melanoma, and pancreatic cancer. If a mutation is identified, a double mastectomy may be recommended versus a lumpectomy.    We discussed that some of the other genes are also associated with specific hereditary cancer syndromes: Hereditary Diffuse Gastric Cancer (CDH1), Peutz-Jeghers syndrome (STK11), Neurofibromatosis type 1 (NF1), Cowden syndrome (PTEN), and Li Fraumeni syndrome (TP53).    The remaining genes (MELANIE, CHEK2, NBN, and PALB2) are associated with moderate breast cancer risk  and have published screening guidelines, as well.    Based on her personal and family history, Maribel meets current National Comprehensive Cancer Network (NCCN) criteria for genetic testing of the BRCA1, BRCA2, and other high penetrance genes (i.e. PALB2, CDH1, PTEN, TP53, etc.).     A detailed handout regarding these genes/syndromes and the information we discussed will be provided to Maribel via [employer] and can be found in the after visit summary. Topics included: inheritance pattern, cancer risks, cancer screening recommendations, and also risks, benefits and limitations of testing.    We briefly discussed that there are additional genes that could cause increased risk for the cancers in Maribel's family. As many of these genes present with overlapping features in a family and accurate cancer risk cannot always be established based upon the pedigree analysis alone, it would be reasonable for Maribel to consider expanded genetic testing to analyze additional genes beyond the 11 genes included on the Breast Actionable Panel.    Maribel explained that she may be interested in additional testing, but would prefer to focus on the Breast Actionable Panel and upcoming surgery. We discussed that this expanded testing can be readdressed with Maribel after this initial Breast Actionable Panel is completed, if interested. Maribel verbalized agreement with this plan.     The results of the test will ideally be available before her surgery on 9/29/2020, but this cannot be guaranteed. I encouraged Maribel to reach out to Dr. Watson regarding the test results and any impact they may or may not have on her upcoming treatment decisions. I also encouraged Maribel to contact me after she receives her results from Dr. Watson, so that we can discuss the impact of the results, follow-up recommendations, and expanded testing options in more detail. She verbalized understanding.      Medical Management: For Maribel, we  reviewed that the information from genetic testing may determine:    surgery to treat Maribel's active cancer diagnosis (i.e. lumpectomy versus bilateral mastectomy, etc.),    additional cancer screening for which Maribel may qualify (i.e. mammogram and breast MRI, more frequent colonoscopies, more frequent dermatologic exams, etc.),    options for risk reducing surgeries Maribel could consider (i.e.surgery to remove her ovaries, etc.),      and targeted chemotherapies for Maribel's active cancer, or if she were to develop certain cancers in the future (i.e. PARP inhibitors, etc.).     These recommendations and possible targeted chemotherapies will be discussed in detail once genetic testing is completed.     Plan:  1) Today Maribel elected to proceed with the Breast Actionable Panel, offered by the [facility], as previously ordered by Dr. Watson.  2) The results may be available prior to her surgery on 9/29/2020 and she is encouraged to reach out to Dr. Watson regarding the results and impact on surgery decisions.  3) Maribel will contact me after she receives her results from Dr. Watson's care team, so that we can discuss the results and their implications in more detail.    Valeria Toledo MS, [facility]  Licensed Genetic Counselor  Office: [phone]  Pager: [phone]    Video-Visit Details  Type of service:  Video Visit  Video Start Time: 7:54am  Video End Time: 8:43am  Originating Location (pt. Location): Home  Distant Location (provider location):  [facility]   Platform used for Video Visit: [employer]

## 2020-09-24 NOTE — PATIENT INSTRUCTIONS
BREAST ACTIONABLE PANEL    The Breast Actionable cancer panel is a genetic test which analyzes 11 genes known to be associated with an increased lifetime risk of breast and other cancers. Published medical guidelines exist for detection, prevention, risk reduction, and/or treatment for individuals with mutations in these genes.     Of note, the Breast Actionable cancer panel is not considered to be comprehensive. Individuals with a family history of other cancers should be seen by a genetic counselor to discuss the family history and the possibility of expanded genetic testing.     GENES (11) ASSOCIATED CANCER(S) ASSOCIATED CANCER SYNDROME(S)   MELANIE breast, pancreas    BRCA1 breast, ovary, pancreas, melanoma, prostate, male breast Hereditary breast /ovarian cancer syndrome (HBOC)   BRCA2 breast, ovary, pancreas, melanoma, prostate, male breast Hereditary breast /ovarian cancer syndrome (HBOC)   CDH1 breast, stomach, colon Hereditary diffuse gastric cancer   CHEK2 breast, colon, prostate    NBN breast, ovary, prostate    NF1 breast, brain, peripheral nervous system Neurofibromatosis 1   PALB2 breast, pancreas    PTEN breast, thyroid, uterus, colon, kidney Cowden syndrome, Gsrfpjyb-Xafbn-Gakuyscol syndrome (BRRS), PTEN-related Proteus syndrome (PS), Proteus-like syndrome   STK11 breast, ovary, colon, pancreas, stomach, uterus, cervix Peutz-Jeghers syndrome   TP53 breast, bone, brain, soft tissues, adrenal cortex Li-Fraumeni syndrome     Meeting with a Genetic Counselor  After you receive the results of the Breast Actionable Panel testing, providers will often refer you to see a genetic counselor. This is because patients can have a family history of cancer other than breast cancer and may benefit from a discussion about comprehensive, expanded genetic testing.     Additionally, you and the genetic counselor will discuss the impact of genetic testing on you and your family members, go over your family health  history, and talk about potential screening and interventions.     Assessing Cancer Risk  Only about 5-10% of cancers are thought to be due to an inherited cancer susceptibility gene.  These families often have:    Several people with the same or related types of cancer    Cancers diagnosed at a young age (before age 50)    Individuals with more than one primary cancer    Multiple generations of the family affected with cancer    Some people may be candidates for genetic testing of more than one gene.  For these families, genetic testing using a multi-gene cancer panel may be offered.  These panels may test genes known to increase the risk for breast (and other) cancers: MELANIE, BRCA1, BRCA2, CDH1, CHEK2, PALB2, PTEN, and TP53.  The purpose of this handout is to serve as a brief summary of the high/moderate-risk breast cancer genes which have published clinical management guidelines for individuals who are found to carry a mutation.    BRCA1 and BRCA2-Hereditary Breast and Ovarian Cancer Syndrome (HBOC)  A single mutation in one of the copies of BRCA1 or BRCA2 increases the risk for breast and ovarian cancer, among others.  The risk for pancreatic cancer and melanoma may also be slightly increased in some families.  The tables below list the chance that someone with a BRCA mutation would develop cancer in his or her lifetime1,2,3,4.          Women   Men     General Population BRCA+    General Population BRCA+   Breast  12% 40-80%  Breast <1% ~7%   Ovarian  1-2% 10-40%  Prostate 16% 20%       A person s ethnic background is also important to consider, as individuals of Ashkenazi Gnosticist ancestry have a higher chance of having a BRCA gene mutation.  There are three BRCA mutations that occur more frequently in this population.     Individuals who inherit two BRCA2 mutations--one from their mother and one from their father--have a condition called Fanconi Anemia.  This rare autosomal recessive condition is associated with  short stature, developmental delay, bone marrow failure, and increased risk for childhood cancers.    TP53-Li-Fraumeni Syndrome (LFS)  LFS is a cancer predisposition syndrome. Individuals with LFS are at an increased risk for developing cancer at a young age. The general lifetime risk for development of cancer is 50% by age 30 and 90% by age 60.  LFS is caused by a mutation in the TP53 gene.  A single mutation in one of the copies of TP53 increases the risk for multiple cancers.     Core Cancers: Sarcomas, Breast, Brain, Lung, Leukemias/Lymphomas, Adrenocortical carcinomas  Other Cancers: Gastrointestinal, Thyroid, Skin, Genitourinary    PTEN-Cowden Syndrome  Cowden syndrome is a hereditary condition that increases the risk for breast, thyroid, endometrial, and kidney cancer.  Cowden syndrome is caused by a mutation in the PTEN gene.  A single mutation in one of the copies of PTEN causes Cowden syndrome and increases cancer risk.  The table below shows the chance that someone with a PTEN mutation would develop cancer in their lifetime5,6.  Other benign features seen in some individuals with Cowden syndrome include benign skin lesions (facial papules, keratoses, lipomas), learning disability, autism, thyroid nodules, colon polyps, and larger head size.      Lifetime Cancer Risk   Cancer Type General Population Cowden Syndrome   Breast  12% 25-50%*   Thyroid  1% 35%   Renal 1-2% 35%   Endometrial  2-3% 28%   Colon 5% 9%   Melanoma 2-3% >5%     *One recent study found breast cancer risk to be increased to 85%    CDH1-Hereditary Diffuse Gastric Cancer (HDGC)  Currently, one gene is known to cause hereditary diffuse gastric cancer: CDH1.  Individuals with HDGC are at increased risk for diffuse gastric cancer and lobular breast cancer. Of people diagnosed with HDGC, 30-50% have a mutation in the CDH1 gene.  This suggests there are likely other genes that may cause HDGC that have not been identified yet.      Lifetime  Cancer Risks    General Pop Man [facility]    Diffuse Gastric  <1% ~80%   Breast 12% 39-52%       Additional Genes Associated with Increased Breast Cancer Risk  PALB2  Mutations in PALB2 have been shown to increase the risk of breast cancer up to 33-58% in some families; where individuals fall within this risk range is dependent upon family history.9 PALB2 mutations have also been associated with increased risk for pancreatic cancer, although this risk has not been quantified yet.  Individuals who inherit two PALB2 mutations--one from their mother and one from their father--have a condition called Fanconi Anemia.  This rare autosomal recessive condition is associated with short stature, developmental delay, bone marrow failure, and increased risk for childhood cancers.    MELANIE  MELANIE is a moderate-risk breast cancer gene. Women who have a mutation in MELANIE can have between a 2-4 fold increased risk for breast cancer compared to the general population.10 MELANIE mutations have also been associated with increased risk for pancreatic cancer, however an estimate of this cancer risk is not well understood.11  Individuals who inherit two MELANIE mutations have a condition called ataxia-telangiectasia (AT).  This rare autosomal recessive condition affects the nervous system and immune system, and is associated with progressive cerebellar ataxia beginning in childhood.  Individuals with ataxia-telangiectasia often have a weakened immune system and have an increased risk for childhood cancers.         CHEK2   CHEK2 is a moderate-risk breast cancer gene.  Women who have a mutation in CHEK2 have around a 2-fold increased risk for breast cancer compared to the general population, and this risk may be higher depending upon family history.12,13,14 Mutations in CHEK2 have also been shown to increase the risk of a number of other cancers, including colon and prostate, however these cancer risks are currently not well understood.      Inheritance   All of  the genes reviewed above are inherited in an autosomal dominant pattern. This means that if a parent has a mutation, each of his or her children will have a 50% chance of inheriting that same mutation.  Therefore, each child--male or female--would have a 50% chance of being at increased risk for developing cancer.        Image obtained from Genetics Home Reference, 2013     In rare cases, there can be mutations in both copies of these genes (one from each parent), leading to different autosomal recessive conditions.  Mutations in some genes can occur de keny, which means that a person s mutation occurred for the first time in them and was not inherited from a parent.  Now that they have the mutation, however, it can be passed on to future generations.     Genetic Testing  Genetic testing involves a blood test and will look for any harmful mutations within genes that are associated with increased cancer risk.  If possible, it is recommended that the person(s) who has had cancer be tested before other family members.  That person will give us the most useful information about whether or not a specific gene is associated with the cancer in the family.    Results  There are three possible results of genetic testing:    Positive--a harmful mutation was identified in one or more of the genes    Negative--no mutation was identified in any of the genes on this panel    Variant of unknown significance--a variation in one of the genes was identified, but it is unclear how this impacts cancer risk in the family    Advantages and Disadvantages  There are advantages and disadvantages to genetic testing.  Advantages    May clarify your cancer risk    Can help you make medical decisions    May explain the cancers in your family    May give useful information to your family members (if you share your results)    Disadvantages    Possible negative emotional impact of learning about inherited cancer risk    Uncertainty in  interpreting a negative test result in some situations    Possible genetic discrimination concerns (see below)    Genetic Information Nondiscrimination Act (JANE)  JANE is a federal law that protects individuals from health insurance or employment discrimination based on a genetic test result alone.  Although rare, there are currently no legal discrimination protections in terms of life insurance, long term care, or disability insurances. Visit the National Human Genome Research [place] genome.gov/21784786 to learn more.    Reducing Cancer Risk  Each of the genes listed within this handout have nationally recognized cancer screening guidelines that would be recommended for individuals who test positive.  In addition to increased cancer screening, surgeries may be offered or recommended to reduce cancer risk.  Recommendations are based upon an individual s genetic test result as well as their personal and family history of cancer.    Questions to Think About Regarding Genetic Testing    What effect will the test result have on me and my relationship with my family members if I have an inherited gene mutation?  If I don t have a gene mutation?    Should I share my test results, and how will my family react to this news, which may also affect them?    Are my children ready to learn new information that may one day affect their own health?    Resources  FORCE: Facing Our Risk of Cancer Empowered facingourrisk.org   Bright Pink bebrightpink.org   Li-Fraumeni Syndrome Association lfsassociation.org   PTEN World PTENworld.Advanced [employer]utrition   No stomach for cancer, Inc. nostomachforcancer.org   Stomach cancer relief network scrnet.org   Collaborative Group of the Americas on Inherited Colorectal Cancer (CGA) cgaicc.com    Cancer Care cancercare.org   American Cancer Society (ACS) cancer.org   National Cancer [place] (NCI) cancer.gov     Cancer Risk Management Program [phone]-CANCER ([phone])  ? Can Cunningham MS,  [facility]  [phone]  ? Ratna Leblanc, MS, [facility]  [phone]  ? Renetta Ohara, MS, [facility]  [phone]  ? Bren Padron, MS, [facility]  [phone]  ? Ashanti Uribe, MS, [facility]  [phone]  ? Valeria Toledo, MS, [facility]  [phone]  ? Katharina Davies, MS, [facility]  [phone]    References  1. Dimitri CANTU, Kristopher PDP, Narod S, Risch PENA, Antonio JE, Christopher JL, Mickey N, Merced H, Geraldo O, Catalino A, Gabriella B, Edmund P, Mira S, Farideh DM, Simon N, Deondre E, Robert H, Mario E, Reina J, Blake J, Edwina B, Tulinius H, Thorlacius S, Eerola H, Gail H, Hollis K, Che OP. Average risks of breast and ovarian cancer associated with BRCA1 or BRCA2 mutations detected in case series unselected for family history: a combined analysis of 222 studies. Am J Hum Norma. 2003;72:1117-30.  2. Rosario N, Shama M, Fish G.  BRCA1 and BRCA2 Hereditary Breast and Ovarian Cancer. Gene Reviews online. 2013.  3. Kyle YC, Doris S, Clayton G, Wheat S. Breast cancer risk among male BRCA1 and BRCA2 mutation carriers. J Natl Cancer Inst. 2007;99:1811-4.  4. Tashi HUGHES, Jyoti I, Deonte J, Isis E, Aureliano ER, Dinesh F. Risk of breast cancer in male BRCA2 carriers. J Med Norma. 2010;47:710-1.  5. Khanh MH, Nicci J, Marleni J, Emani MUNOZ, Segun MS, Eng C. Lifetime cancer risks in individuals with germline PTEN mutations. Clin Cancer Res. 2012;18:400-7.  6. Kathrynki R. Cowden Syndrome: A Critical Review of the Clinical Literature. J Norma . 2009:18:13-27.  7. National Comprehensive Cancer Network. Clinical practice guidelines in oncology, colorectal cancer screening. Available online (registration required). 2013.  8. National Cancer [place]. SEER Cancer Stat Fact Sheets.  December 2013.  9. Dimitri NOLASCO et al. Breast-Cancer Risk in Families with Mutations in PALB2. NEJM. 2014; 371(6):497-506.  10. Jessica CANTU, Zaan D, Zuleyka S, Tracie P, Lyndon T, Janice M, Beny B, Leydi H, Luis Enrique R, Roxanne K, John Paul MERAZ, Tashi HUGHES, Farideh  D, Ahsan DF, Nell MR, The Breast Cancer Susceptibility Collaboration ([place]) & Carin RAMIREZ. MELANIE mutations that cause ataxia-telangiectasia are breast cancer susceptibility alleles. Nature Genetics. 2006;38:873-875  11. Magnus N , Spike Y, Yuliana J, Rogers L, Brii GM , Mayra ML, Savannah S, Hubbard AG, Syngal S, Antonella ML, Samia J , Karie R, Seth SZ, Eladia JR, Bette VE, Chase M, Volynne B, Zahira N, Lukas RH, Dennise KW, and Ayaan AP. MELANIE mutations in patients with hereditary pancreatic cancer. Cancer Discover. 2012;2:41-46  12. CHEK2 Breast Cancer Case-Control Consortium. CHEK2*1100delC and susceptibility to breast cancer: A collaborative analysis involving 10,860 breast cancer cases and 9,065 controls from 10 studies. Am J Hum Norma, 74 (2004), pp. 9262-1691  13. Amaury T, Mat S, Jennifer K, et al. Spectrum of Mutations in BRCA1, BRCA2, CHEK2, and TP53 in Families at High Risk of Breast Cancer. RAVEN, 2006;295(12):7305-5246.   14. Angeles C, Sara D, Tong A, et al. Risk of breast cancer in women with a CHEK2 mutation with and without a family history of breast cancer. JClin Oncol. 2011;29:9216-2293.      TURNAROUND TIME  4 - 6 weeks    INSURANCE COVERAGE   A prior authorization will be submitted when your provider submits the order for this panel. Testing will be held until prior authorization is obtained. You will be contacted once prior authorization is completed. For details regarding coverage and out-of-pocket estimates, please contact a  at the [facility] at [phone].    About the [facility]  In collaborative effort with the [facility] and the [place] [employer], the [facility] offers a full range of diagnostic testing services, with a strong focus on quality, accuracy, and timeliness.

## 2020-09-24 NOTE — PROGRESS NOTES
"9/24/2020    Maribel June is a 55 year old female who is being evaluated via a billable video visit.      The patient has been notified of following:     \"This video visit will be conducted via a call between you and your provider. We have found that certain health care needs can be provided without the need for an in-person physical exam. This service lets us provide the care you need with a video conversation. If lab work is needed we can place an order for that and you can then stop by our lab to have the test done at a later time.    Video visits are billed at different rates depending on your insurance coverage. Please reach out to your insurance provider with any questions.    If during the course of the call the provider feels a video visit is not appropriate, you will not be charged for this service.\"    Patient has given verbal consent for Video visit? Yes  How would you like to obtain your AVS? MyChart  If you are dropped from the video visit, the video invite should be resent to: Text to cell phone: [phone]  Will anyone else be joining your video visit? Yes: genetic counseling intern Denisse was present for the visit    Referring Provider: Jose Watson MD    Presenting Information:   Given concerns regarding the potential for COVID-19 exposure during a clinic visit, Maribel elected for a video genetic counseling visit through the [facility] to discuss her personal and family history of breast and other cancers. We reviewed this history, cancer screening recommendations, and available genetic testing options.    Personal History:  Maribel is a 55 year old female. She was diagnosed with invasive ductal carcinoma of her right breast at age 55; the tumor is ER/MA+ and Her2-. Treatment is currently being planned, but will most likely include a lumpectomy on 9/29/2020.     Maribel has her ovaries in place. She previously had a hysterectomy in 1996/1997 to address gynecologic " "problems. Her most recent mammogram in August 2020 identified this cancer. Her most recent colonoscopy in August 2016 identified one tubular adenoma (additional polyp was benign colon tissue); follow-up was recommended in five years. She does not regularly do any other cancer screening at this time.    Family History: (Please see scanned pedigree for detailed family history information)    One maternal uncle was diagnosed with lymphoma and passed away in his 60/70's.    One maternal uncle was diagnosed with and passed away from cholangiocarcinoma in his 60/70's; he did not have a history of smoking or significant alcohol use.    Her maternal grandfather was diagnosed with stomach cancer and passed away at a \"younger\" age; he did not have a history of smoking or significant alcohol use.    Her father was diagnosed with prostate cancer in his 70's and passed away at age 78.    One paternal aunt is in her 80's and was diagnosed with breast cancer in her 70/80's.    Her daughter was diagnosed with breast cancer in her 60's.    Her maternal and paternal ethnicity is [place]. There is no known Ashkenazi Mormon ancestry on either side of her family.    Discussion:    Maribel's personal and family history of breast, prostate, and gastrointestinal cancers is suggestive of a hereditary cancer syndrome.    We reviewed the features of sporadic, familial, and hereditary cancers. In looking at Maribel's family history, it is possible that a cancer susceptibility gene is present as Maribel and a few close relatives on both sides of her family have been diagnosed with potentially related cancers (breast, cholangiocarcinoma, stomach, prostate); her grandfather may have also been diagnosed under age 50. That being said, it is reassuring that Maribel and the majority of her close relatives have not been diagnosed under age 50 and she has several relatives that have never been diagnosed with a cancer.    We first discussed the " genetic testing previously ordered by Dr. Watson. In order to make upcoming treatment decisions, Maribel had her blood drawn for the Breast Actionable Panel, offered by the [facility]. This panel includes 11 genes associated with high/moderate breast cancer risk and published surgery and/or screening guidelines (MELANIE, BRCA1, BRCA2, CDH1, CHEK2, NBN, NF1, PALB2, PTEN, STK11, TP53).    For example, this panel includes the BRCA1 and BRCA2 genes, which are the most common cause of hereditary breast cancer. Individuals with BRCA1 and BRCA2 gene mutations are at increased risk for several different cancers, including breast, ovarian, male breast, prostate, melanoma, and pancreatic cancer. If a mutation is identified, a double mastectomy may be recommended versus a lumpectomy.    We discussed that some of the other genes are also associated with specific hereditary cancer syndromes: Hereditary Diffuse Gastric Cancer (CDH1), Peutz-Jeghers syndrome (STK11), Neurofibromatosis type 1 (NF1), Cowden syndrome (PTEN), and Li Fraumeni syndrome (TP53).    The remaining genes (MELANIE, CHEK2, NBN, and PALB2) are associated with moderate breast cancer risk and have published screening guidelines, as well.    Based on her personal and family history, Maribel meets current National Comprehensive Cancer Network (NCCN) criteria for genetic testing of the BRCA1, BRCA2, and other high penetrance genes (i.e. PALB2, CDH1, PTEN, TP53, etc.).     A detailed handout regarding these genes/syndromes and the information we discussed will be provided to Maribel via Parse and can be found in the after visit summary. Topics included: inheritance pattern, cancer risks, cancer screening recommendations, and also risks, benefits and limitations of testing.    We briefly discussed that there are additional genes that could cause increased risk for the cancers in Maribel's family. As many of these genes present with  overlapping features in a family and accurate cancer risk cannot always be established based upon the pedigree analysis alone, it would be reasonable for Maribel to consider expanded genetic testing to analyze additional genes beyond the 11 genes included on the Breast Actionable Panel.    Maribel explained that she may be interested in additional testing, but would prefer to focus on the Breast Actionable Panel and upcoming surgery. We discussed that this expanded testing can be readdressed with Maribel after this initial Breast Actionable Panel is completed, if interested. Maribel verbalized agreement with this plan.     The results of the test will ideally be available before her surgery on 9/29/2020, but this cannot be guaranteed. I encouraged Maribel to reach out to Dr. Watson regarding the test results and any impact they may or may not have on her upcoming treatment decisions. I also encouraged Maribel to contact me after she receives her results from Dr. Watson, so that we can discuss the impact of the results, follow-up recommendations, and expanded testing options in more detail. She verbalized understanding.      Medical Management: For Maribel, we reviewed that the information from genetic testing may determine:    surgery to treat Maribel's active cancer diagnosis (i.e. lumpectomy versus bilateral mastectomy, etc.),    additional cancer screening for which Maribel may qualify (i.e. mammogram and breast MRI, more frequent colonoscopies, more frequent dermatologic exams, etc.),    options for risk reducing surgeries Maribel could consider (i.e.surgery to remove her ovaries, etc.),      and targeted chemotherapies for Maribel's active cancer, or if she were to develop certain cancers in the future (i.e. PARP inhibitors, etc.).     These recommendations and possible targeted chemotherapies will be discussed in detail once genetic testing is completed.     Plan:  1) Today Maribel elected to  proceed with the Breast Actionable Panel, offered by the [facility], as previously ordered by Dr. Watson.  2) The results may be available prior to her surgery on 9/29/2020 and she is encouraged to reach out to Dr. Watson regarding the results and impact on surgery decisions.  3) Maribel will contact me after she receives her results from Dr. Watson's care team, so that we can discuss the results and their implications in more detail.    Valeria Toledo MS, [facility]  Licensed Genetic Counselor  Office: [phone]  Pager: [phone]    Video-Visit Details  Type of service:  Video Visit  Video Start Time: 7:54am  Video End Time: 8:43am  Originating Location (pt. Location): Home  Distant Location (provider location):  [facility]   Platform used for Video Visit: [employer]

## 2020-09-25 DIAGNOSIS — C50.919 BREAST CANCER (H): Primary | ICD-10-CM

## 2020-09-25 DIAGNOSIS — Z11.59 ENCOUNTER FOR SCREENING FOR OTHER VIRAL DISEASES: ICD-10-CM

## 2020-09-25 LAB
SARS-COV-2 RNA SPEC QL NAA+PROBE: NOT DETECTED
SPECIMEN SOURCE: NORMAL

## 2020-09-25 PROCEDURE — 81405 MOPATH PROCEDURE LEVEL 6: CPT | Performed by: SURGERY

## 2020-09-25 PROCEDURE — 81323 PTEN GENE DUP/DELET VARIANT: CPT | Performed by: SURGERY

## 2020-09-25 PROCEDURE — 81408 MOPATH PROCEDURE LEVEL 9: CPT | Performed by: SURGERY

## 2020-09-25 PROCEDURE — 81479 UNLISTED MOLECULAR PATHOLOGY: CPT | Performed by: SURGERY

## 2020-09-25 PROCEDURE — U0003 INFECTIOUS AGENT DETECTION BY NUCLEIC ACID (DNA OR RNA); SEVERE ACUTE RESPIRATORY SYNDROME CORONAVIRUS 2 (SARS-COV-2) (CORONAVIRUS DISEASE [COVID-19]), AMPLIFIED PROBE TECHNIQUE, MAKING USE OF HIGH THROUGHPUT TECHNOLOGIES AS DESCRIBED BY CMS-2020-01-R: HCPCS | Performed by: SURGERY

## 2020-09-25 PROCEDURE — 81406 MOPATH PROCEDURE LEVEL 7: CPT | Performed by: SURGERY

## 2020-09-25 PROCEDURE — 81321 PTEN GENE FULL SEQUENCE: CPT | Performed by: SURGERY

## 2020-09-25 PROCEDURE — 81307 PALB2 GENE FULL GENE SEQ: CPT | Performed by: SURGERY

## 2020-09-25 PROCEDURE — 81162 BRCA1&2 GEN FULL SEQ DUP/DEL: CPT | Performed by: SURGERY

## 2020-09-28 ENCOUNTER — ANESTHESIA EVENT (OUTPATIENT)
Dept: SURGERY | Facility: AMBULATORY SURGERY CENTER | Age: 55
End: 2020-09-28

## 2020-09-28 LAB — COPATH REPORT: NORMAL

## 2020-09-29 ENCOUNTER — HOSPITAL ENCOUNTER (OUTPATIENT)
Facility: AMBULATORY SURGERY CENTER | Age: 55
End: 2020-09-29
Attending: SURGERY
Payer: COMMERCIAL

## 2020-09-29 ENCOUNTER — HOSPITAL ENCOUNTER (OUTPATIENT)
Dept: NUCLEAR MEDICINE | Facility: CLINIC | Age: 55
Setting detail: NUCLEAR MEDICINE
Discharge: HOME | End: 2020-09-29
Attending: SURGERY | Admitting: SURGERY
Payer: COMMERCIAL

## 2020-09-29 ENCOUNTER — TRANSFERRED RECORDS (OUTPATIENT)
Dept: HEALTH INFORMATION MANAGEMENT | Facility: CLINIC | Age: 55
End: 2020-09-29

## 2020-09-29 ENCOUNTER — ANESTHESIA (OUTPATIENT)
Dept: SURGERY | Facility: AMBULATORY SURGERY CENTER | Age: 55
End: 2020-09-29

## 2020-09-29 VITALS
TEMPERATURE: 98.1 F | HEIGHT: 60 IN | WEIGHT: 110 LBS | BODY MASS INDEX: 21.6 KG/M2 | RESPIRATION RATE: 14 BRPM | HEART RATE: 65 BPM | DIASTOLIC BLOOD PRESSURE: 72 MMHG | SYSTOLIC BLOOD PRESSURE: 116 MMHG | OXYGEN SATURATION: 94 %

## 2020-09-29 DIAGNOSIS — C50.919 BREAST CANCER (H): ICD-10-CM

## 2020-09-29 PROCEDURE — 38792 RA TRACER ID OF SENTINL NODE: CPT

## 2020-09-29 RX ORDER — OXYCODONE HYDROCHLORIDE 5 MG/1
5 TABLET ORAL EVERY 4 HOURS PRN
Status: DISCONTINUED | OUTPATIENT
Start: 2020-09-29 | End: 2020-09-30 | Stop reason: HOSPADM

## 2020-09-29 RX ORDER — PROPOFOL 10 MG/ML
INJECTION, EMULSION INTRAVENOUS PRN
Status: DISCONTINUED | OUTPATIENT
Start: 2020-09-29 | End: 2020-09-29

## 2020-09-29 RX ORDER — LIDOCAINE HYDROCHLORIDE 20 MG/ML
INJECTION, SOLUTION INFILTRATION; PERINEURAL PRN
Status: DISCONTINUED | OUTPATIENT
Start: 2020-09-29 | End: 2020-09-29

## 2020-09-29 RX ORDER — SODIUM CHLORIDE, SODIUM LACTATE, POTASSIUM CHLORIDE, CALCIUM CHLORIDE 600; 310; 30; 20 MG/100ML; MG/100ML; MG/100ML; MG/100ML
INJECTION, SOLUTION INTRAVENOUS CONTINUOUS
Status: DISCONTINUED | OUTPATIENT
Start: 2020-09-29 | End: 2020-09-29 | Stop reason: HOSPADM

## 2020-09-29 RX ORDER — GABAPENTIN 300 MG/1
300 CAPSULE ORAL ONCE
Status: COMPLETED | OUTPATIENT
Start: 2020-09-29 | End: 2020-09-29

## 2020-09-29 RX ORDER — LIDOCAINE 40 MG/G
CREAM TOPICAL
Status: DISCONTINUED | OUTPATIENT
Start: 2020-09-29 | End: 2020-09-29 | Stop reason: HOSPADM

## 2020-09-29 RX ORDER — ACETAMINOPHEN 325 MG/1
975 TABLET ORAL ONCE
Status: COMPLETED | OUTPATIENT
Start: 2020-09-29 | End: 2020-09-29

## 2020-09-29 RX ORDER — HYDROMORPHONE HYDROCHLORIDE 1 MG/ML
.3-.5 INJECTION, SOLUTION INTRAMUSCULAR; INTRAVENOUS; SUBCUTANEOUS EVERY 10 MIN PRN
Status: DISCONTINUED | OUTPATIENT
Start: 2020-09-29 | End: 2020-09-30 | Stop reason: HOSPADM

## 2020-09-29 RX ORDER — DEXAMETHASONE SODIUM PHOSPHATE 4 MG/ML
INJECTION, SOLUTION INTRA-ARTICULAR; INTRALESIONAL; INTRAMUSCULAR; INTRAVENOUS; SOFT TISSUE PRN
Status: DISCONTINUED | OUTPATIENT
Start: 2020-09-29 | End: 2020-09-29

## 2020-09-29 RX ORDER — NALOXONE HYDROCHLORIDE 0.4 MG/ML
.1-.4 INJECTION, SOLUTION INTRAMUSCULAR; INTRAVENOUS; SUBCUTANEOUS
Status: DISCONTINUED | OUTPATIENT
Start: 2020-09-29 | End: 2020-09-30 | Stop reason: HOSPADM

## 2020-09-29 RX ORDER — FENTANYL CITRATE 50 UG/ML
INJECTION, SOLUTION INTRAMUSCULAR; INTRAVENOUS PRN
Status: DISCONTINUED | OUTPATIENT
Start: 2020-09-29 | End: 2020-09-29

## 2020-09-29 RX ORDER — SODIUM CHLORIDE, SODIUM LACTATE, POTASSIUM CHLORIDE, CALCIUM CHLORIDE 600; 310; 30; 20 MG/100ML; MG/100ML; MG/100ML; MG/100ML
INJECTION, SOLUTION INTRAVENOUS CONTINUOUS
Status: DISCONTINUED | OUTPATIENT
Start: 2020-09-29 | End: 2020-09-30 | Stop reason: HOSPADM

## 2020-09-29 RX ORDER — MEPERIDINE HYDROCHLORIDE 25 MG/ML
12.5 INJECTION INTRAMUSCULAR; INTRAVENOUS; SUBCUTANEOUS
Status: DISCONTINUED | OUTPATIENT
Start: 2020-09-29 | End: 2020-09-30 | Stop reason: HOSPADM

## 2020-09-29 RX ORDER — PROPOFOL 10 MG/ML
INJECTION, EMULSION INTRAVENOUS CONTINUOUS PRN
Status: DISCONTINUED | OUTPATIENT
Start: 2020-09-29 | End: 2020-09-29

## 2020-09-29 RX ORDER — CEFAZOLIN SODIUM 1 G/50ML
1 SOLUTION INTRAVENOUS
Status: DISCONTINUED | OUTPATIENT
Start: 2020-09-29 | End: 2020-09-29 | Stop reason: HOSPADM

## 2020-09-29 RX ORDER — FENTANYL CITRATE 50 UG/ML
25-50 INJECTION, SOLUTION INTRAMUSCULAR; INTRAVENOUS
Status: DISCONTINUED | OUTPATIENT
Start: 2020-09-29 | End: 2020-09-29 | Stop reason: HOSPADM

## 2020-09-29 RX ORDER — KETOROLAC TROMETHAMINE 30 MG/ML
INJECTION, SOLUTION INTRAMUSCULAR; INTRAVENOUS PRN
Status: DISCONTINUED | OUTPATIENT
Start: 2020-09-29 | End: 2020-09-29

## 2020-09-29 RX ORDER — CEFAZOLIN SODIUM 1 G/50ML
1 SOLUTION INTRAVENOUS SEE ADMIN INSTRUCTIONS
Status: DISCONTINUED | OUTPATIENT
Start: 2020-09-29 | End: 2020-09-29 | Stop reason: HOSPADM

## 2020-09-29 RX ORDER — KETOROLAC TROMETHAMINE 30 MG/ML
30 INJECTION, SOLUTION INTRAMUSCULAR; INTRAVENOUS EVERY 6 HOURS PRN
Status: DISCONTINUED | OUTPATIENT
Start: 2020-09-29 | End: 2020-09-30 | Stop reason: HOSPADM

## 2020-09-29 RX ORDER — ISOSULFAN BLUE 50 MG/5ML
INJECTION, SOLUTION SUBCUTANEOUS PRN
Status: DISCONTINUED | OUTPATIENT
Start: 2020-09-29 | End: 2020-09-29 | Stop reason: HOSPADM

## 2020-09-29 RX ADMIN — PROPOFOL 150 MCG/KG/MIN: 10 INJECTION, EMULSION INTRAVENOUS at 11:20

## 2020-09-29 RX ADMIN — LIDOCAINE HYDROCHLORIDE 60 MG: 20 INJECTION, SOLUTION INFILTRATION; PERINEURAL at 11:20

## 2020-09-29 RX ADMIN — OXYCODONE HYDROCHLORIDE 5 MG: 5 TABLET ORAL at 12:42

## 2020-09-29 RX ADMIN — PROPOFOL 150 MG: 10 INJECTION, EMULSION INTRAVENOUS at 11:20

## 2020-09-29 RX ADMIN — SODIUM CHLORIDE, SODIUM LACTATE, POTASSIUM CHLORIDE, CALCIUM CHLORIDE: 600; 310; 30; 20 INJECTION, SOLUTION INTRAVENOUS at 09:59

## 2020-09-29 RX ADMIN — FENTANYL CITRATE 50 MCG: 50 INJECTION, SOLUTION INTRAMUSCULAR; INTRAVENOUS at 12:08

## 2020-09-29 RX ADMIN — GABAPENTIN 300 MG: 300 CAPSULE ORAL at 09:59

## 2020-09-29 RX ADMIN — ACETAMINOPHEN 975 MG: 325 TABLET ORAL at 09:59

## 2020-09-29 RX ADMIN — DEXAMETHASONE SODIUM PHOSPHATE 4 MG: 4 INJECTION, SOLUTION INTRA-ARTICULAR; INTRALESIONAL; INTRAMUSCULAR; INTRAVENOUS; SOFT TISSUE at 11:20

## 2020-09-29 RX ADMIN — CEFAZOLIN SODIUM 1 G: 1 SOLUTION INTRAVENOUS at 11:27

## 2020-09-29 RX ADMIN — PROPOFOL 50 MG: 10 INJECTION, EMULSION INTRAVENOUS at 11:58

## 2020-09-29 RX ADMIN — FENTANYL CITRATE 50 MCG: 50 INJECTION, SOLUTION INTRAMUSCULAR; INTRAVENOUS at 11:28

## 2020-09-29 RX ADMIN — KETOROLAC TROMETHAMINE 30 MG: 30 INJECTION, SOLUTION INTRAMUSCULAR; INTRAVENOUS at 11:20

## 2020-09-29 ASSESSMENT — MIFFLIN-ST. JEOR: SCORE: 1020.46

## 2020-09-29 ASSESSMENT — LIFESTYLE VARIABLES: TOBACCO_USE: 0

## 2020-09-29 NOTE — ANESTHESIA POSTPROCEDURE EVALUATION
Anesthesia POST Procedure Evaluation    Patient: Maribel June   MRN:     [id number] Gender:   female   Age:    55 year old :      1965        Preoperative Diagnosis: Breast cancer (H) [C50.919]   Procedure(s):  Right breast lumpectomy with Shorter node biopsy   Postop Comments: No value filed.     Anesthesia Type: General       Disposition: Outpatient   Postop Pain Control: Uneventful            Sign Out: Well controlled pain   PONV: No   Neuro/Psych: Uneventful            Sign Out: Acceptable/Baseline neuro status   Airway/Respiratory: Uneventful            Sign Out: Acceptable/Baseline resp. status   CV/Hemodynamics: Uneventful            Sign Out: Acceptable CV status   Other NRE: NONE   DID A NON-ROUTINE EVENT OCCUR? No         Last Anesthesia Record Vitals:  CRNA VITALS  2020 1157 - 2020 1257      2020             Pulse:  80    SpO2:  96 %    Resp Rate (observed):  (!) 7    Resp Rate (set):  10          Last PACU Vitals:  Vitals Value Taken Time   /70 2020 12:45 PM   Temp 36.4  C (97.5  F) 2020 12:30 PM   Pulse 71 2020 12:47 PM   Resp 16 2020 12:45 PM   SpO2 91 % 2020 12:47 PM   Temp src     NIBP     Pulse     SpO2     Resp     Temp     Ht Rate     Temp 2     Vitals shown include unvalidated device data.      Electronically Signed By: Jovanny Kelly DO, 2020, 12:59 PM

## 2020-09-29 NOTE — ANESTHESIA CARE TRANSFER NOTE
Patient: Maribel June    Procedure(s):  Right breast lumpectomy with Dana node biopsy    Diagnosis: Breast cancer (H) [C50.919]  Diagnosis Additional Information: No value filed.    Anesthesia Type:   General     Note:  Airway :Room Air  Patient transferred to:PACU  Comments: Floyd Report: Identifed the Patient, Identified the Reponsible Provider, Reviewed the pertinent medical history, Discussed the surgical course, Reviewed Intra-OP anesthesia mangement and issues during anesthesia, Set expectations for post-procedure period and Allowed opportunity for questions and acknowledgement of understanding      Vitals: (Last set prior to Anesthesia Care Transfer)    CRNA VITALS  9/29/2020 1157 - 9/29/2020 1227      9/29/2020             Pulse:  80    SpO2:  96 %    Resp Rate (observed):  (!) 7    Resp Rate (set):  10                Electronically Signed By: ANTHONY Meyers CRNA  September 29, 2020  12:27 PM

## 2020-09-29 NOTE — ANESTHESIA PREPROCEDURE EVALUATION
Anesthesia Pre-Procedure Evaluation    Patient: Maribel June   MRN:     [id number] Gender:   female   Age:    55 year old :      1965        Preoperative Diagnosis: Breast cancer (H) [C50.919]   Procedure(s):  Right breast lumpectomy with Edcouch node biopsy     LABS:  CBC:   Lab Results   Component Value Date    WBC 7.0 2014    HGB 11.1 (L) 2014    HCT 32.7 (L) 2014     2014     BMP:   Lab Results   Component Value Date     2018     2014    POTASSIUM 3.6 2018    POTASSIUM 4.2 2014    CHLORIDE 106 2018    CHLORIDE 100 2014    CO2 28 2018    CO2 28 2018    BUN 16 10/28/2019    BUN 16 2018    CR 0.50 (L) 10/28/2019    CR 0.54 2018    CR 0.55 2018    GLC 89 2020    GLC 88 2019     COAGS: No results found for: PTT, INR, FIBR  POC: No results found for: BGM, HCG, HCGS  OTHER:   Lab Results   Component Value Date    KVNG 9.1 10/28/2019    PHOS 2.7 10/28/2019    ALBUMIN 3.9 10/28/2019    PROTTOTAL 7.5 2014    ALT 29 2014    AST 20 2014    ALKPHOS 59 10/28/2019    BILITOTAL 0.6 2014    TSH 2.18 10/28/2019        Preop Vitals    BP Readings from Last 3 Encounters:   20 125/73   20 122/76   20 117/75    Pulse Readings from Last 3 Encounters:   20 66   20 67   20 60      Resp Readings from Last 3 Encounters:   20 18   16 16   14 17    SpO2 Readings from Last 3 Encounters:   20 97%   20 98%   20 98%      Temp Readings from Last 1 Encounters:   20 36.6  C (97.9  F) (Oral)    Ht Readings from Last 1 Encounters:   20 1.524 m (5')      Wt Readings from Last 1 Encounters:   20 49.9 kg (110 lb)    Estimated body mass index is 21.48 kg/m  as calculated from the following:    Height as of this encounter: 1.524 m (5').    Weight as of this encounter: 49.9 kg (110 lb).     LDA:        Past  Medical History:   Diagnosis Date     Depressive disorder      Endometriosis      Herpes genitalis in women      History of major depression     situational with first .      Kidney stone       Past Surgical History:   Procedure Laterality Date     BREAST SURGERY      Right breast lump removal-non-cancerous      SECTION       COLONOSCOPY N/A 2016    Procedure: COMBINED COLONOSCOPY, SINGLE OR MULTIPLE BIOPSY/POLYPECTOMY BY BIOPSY;  Surgeon: Duane, William Charles, MD;  Location: [facility] OR     COLONOSCOPY WITH CO2 INSUFFLATION N/A 2016    Procedure: COLONOSCOPY WITH CO2 INSUFFLATION;  Surgeon: Duane, William Charles, MD;  Location: [facility] OR     CYSTOSCOPY  09    left stent placement (C-ARM)     GENITOURINARY SURGERY      surgery for kidney stone     GYN SURGERY      laparoscopy     GYN SURGERY      partial hysterectomy--still have ovaries      Allergies   Allergen Reactions     Flagyl [Metronidazole] Nausea and Vomiting     Lisinopril      Other reaction(s): Headaches     Ondansetron      Other reaction(s): nausea     Oxycodone-Acetaminophen Nausea and Vomiting     Sulfamethoxazole-Trimethoprim      Other reaction(s): Headache     Zithromax [Azithromycin Dihydrate] Rash        Anesthesia Evaluation     . Pt has had prior anesthetic.     No history of anesthetic complications          ROS/MED HX    ENT/Pulmonary:  - neg pulmonary ROS    (-) tobacco use   Neurologic:  - neg neurologic ROS     Cardiovascular:  - neg cardiovascular ROS   (+) ----. : . . . :. . No previous cardiac testing       METS/Exercise Tolerance:  >4 METS   Hematologic:  - neg hematologic  ROS       Musculoskeletal:  - neg musculoskeletal ROS       GI/Hepatic:     (+) GERD       Renal/Genitourinary:     (+) Nephrolithiasis ,       Endo:  - neg endo ROS       Psychiatric:     (+) psychiatric history depression      Infectious Disease:  - neg infectious disease ROS       Malignancy:   (+) Malignancy History of Breast           Other:    - neg other ROS                     PHYSICAL EXAM:   Mental Status/Neuro: A/A/O   Airway: Facies: Feasible  Mallampati: I  Mouth/Opening: Full  TM distance: > 6 cm  Neck ROM: Full   Respiratory: Auscultation: CTAB     Resp. Rate: Normal     Resp. Effort: Normal      CV: Rhythm: Regular  Rate: Age appropriate  Heart: Normal Sounds  Edema: None   Comments:      Dental: Normal Dentition                Assessment:   ASA SCORE: 2    H&P: History and physical reviewed and following examination; no interval change.   Smoking Status:  Non-Smoker/Unknown   NPO Status: NPO Appropriate     Plan:   Anes. Type:  General   Pre-Medication: Acetaminophen; Gabapentin   Induction:  IV (Standard)   Airway: LMA   Access/Monitoring: PIV   Maintenance: Balanced     Postop Plan:   Postop Pain: Opioids  Postop Sedation/Airway: Not planned  Disposition: Outpatient     PONV Management:   Adult Risk Factors: Female, Non-Smoker, Postop Opioids   Prevention: Ondansetron, Dexamethasone     CONSENT: Direct conversation   Plan and risks discussed with: Patient   Blood Products: N/a                   Jovanny Kelly DO

## 2020-09-29 NOTE — DISCHARGE INSTRUCTIONS
[facility]  Home Care Following Anesthesia  For 24 hours after surgery:  1. Get plenty of rest.  A responsible adult must stay with you for at least 24 hours after you leave the surgery center.  2. Do not drive or use heavy equipment.  If you have weakness or tingling, don't drive or use heavy equipment until this feeling goes away.   3. Do not drink alcohol.   4. Avoid strenuous or risky activities.  Ask for help when climbing stairs.  5. You may feel lightheaded.  IF so, sit for a few minutes before standing.  Have someone help you get up.   6. If you have nausea (feel sick to your stomach): Drink only clear liquids such as apple juice, ginger ale, broth or 7-Up.  Rest may also help.  Be sure to drink enough fluids.  Move to a regular diet as you feel able.   7. You may have a slight fever.  Call the doctor if your fever is over 100 F (37.7 C) (taken under the tongue) or lasts longer than 24 hours.  8. You may have a dry mouth, a sore throat, muscle aches or trouble sleeping. These should go away after 24 hours.  9. Do not make important or legal decisions.               Tips for taking pain medications  To get the best pain relief possible, remember these points:    Take pain medications as directed, before pain becomes severe.    Pain medication can upset your stomach: taking it with food may help.    Constipation is a common side effect of pain medication. Drink plenty of  fluids.    Eat foods high in fiber. Take a stool softener if recommended by your doctor or pharmacist.    Do not drink alcohol, drive or operate machinery while taking pain medications.    Ask about other ways to control pain, such as with heat, ice or relaxation.    Tylenol/Acetaminophen Consumption  To help encourage the safe use of acetaminophen, the makers of TYLENOL  have lowered the maximum daily dose for single-ingredient Extra Strength TYLENOL  (acetaminophen) products sold in the [place] from 8  pills per day (4,000 mg) to 6 pills per day (3,000 mg). The dosing interval has also changed from 2 pills every 4-6 hours to 2 pills every 6 hours.    If you feel your pain relief is insufficient, you may take Tylenol/Acetaminophen in addition to your narcotic pain medication.     Be careful not to exceed 3,000 mg of Tylenol/Acetaminophen in a 24 hour period from all sources.    If you are taking extra strength Tylenol/acetaminophen (500 mg), the maximum dose is 6 tablets in 24 hours.    If you are taking regular strength acetaminophen (325 mg), the maximum dose is 9 tablets in 24 hours.    Call a doctor for any of the followin. Signs of infection (fever, growing tenderness at the surgery site, a large amount of drainage or bleeding, severe pain, foul-smelling drainage, redness, swelling).  2. It has been over 8 to 10 hours since surgery and you are still not able to urinate (pass water).  3. Headache for over 24 hours.  4. Numbness, tingling or weakness the day after surgery (if you had spinal anesthesia).  5. Signs of Covid-19 infection (temperature over 100 degrees, shortness of breath, cough, loss of taste/smell, generalized body aches, persistent headache, chills, sore throat, nausea/vomiting/diarrhea)  Your doctor is:       Dr. Jose Watson, [facility]: [phone]               Or dial [phone] and ask for the resident on call for:  [facility]  For emergency care, call the:  [facility]:  [phone] (TTY for hearing impaired: [phone])

## 2020-09-29 NOTE — OP NOTE
Procedure Date: 09/29/2020      PREOPERATIVE DIAGNOSIS:  Right breast cancer.      POSTOPERATIVE DIAGNOSIS:  Right breast cancer.      PROCEDURE:  Lymphatic mapping, right lumpectomy and right axillary sentinel lymph node biopsy.      ATTENDING SURGEON:  Jose Watson MD      ANESTHESIA:  General with endotracheal tube intubation.      INDICATIONS FOR SURGERY:  The patient is a 55-year-old woman who presents with a palpable stage II breast cancer.  She has elected to undergo breast-conserving surgery.      PROCEDURE IN DETAIL:  After informed consent, the patient was brought to the operating room, given a general anesthetic, and an LMA was placed.  I injected technetium sulfur colloid and isosulfan blue into her right breast.  She was prepped and draped in the usual fashion.  After injecting a local anesthetic, I made a radial incision at the 8 o'clock position of her right breast.  The Bovie cautery was used to incise the subcutaneous tissues.  I dissected circumferentially around the palpable mass.  Dissection went down to the underlying fascia.  The specimen was removed, oriented and sent to Pathology.  Strict hemostasis was obtained with the Bovie cautery.  Surgical clips were placed in all 4 quadrants of the resection bed to facilitate radiation therapy.  Next, we identified a transcutaneous hot spot in the right axilla.  A local anesthetic was administered.  A transverse axillary incision was made with a scalpel.  The Bovie cautery was used to incise the subcutaneous tissues, and dissection went through the clavipectoral fascia.  I identified a blue-stained radioactive lymph node.  Britton lymph node #1 was removed and had ex vivo counts of 250 counts per second.  After removal of this lymph node, there were no additional blue, radioactive or palpable lymph nodes.  Both incisions were closed with an interrupted 3-0 Vicryl suture for the dermal layer and a running 4-0 PDS subcuticular stitch for the skin.   Dermabond was placed, and the patient was taken to the recovery room in stable condition.         CAROLYN VALENTINE MD             D: 2020   T: 2020   MT: shannan      Name:     CHERIE SCHAFER   MRN:      [id number]        Account:        [id number]   :      1965           Procedure Date: 2020      Document: [id number]

## 2020-10-06 ASSESSMENT — ENCOUNTER SYMPTOMS
FATIGUE: 0
DECREASED APPETITE: 0
FEVER: 0
CHILLS: 0
POLYDIPSIA: 0
WEIGHT LOSS: 0
WEIGHT GAIN: 0
ALTERED TEMPERATURE REGULATION: 0
INCREASED ENERGY: 1
NIGHT SWEATS: 0
HALLUCINATIONS: 0
POLYPHAGIA: 0

## 2020-10-08 LAB — COPATH REPORT: NORMAL

## 2020-10-16 ENCOUNTER — ONCOLOGY VISIT (OUTPATIENT)
Dept: ONCOLOGY | Facility: CLINIC | Age: 55
End: 2020-10-16
Attending: SURGERY
Payer: COMMERCIAL

## 2020-10-16 VITALS
HEART RATE: 64 BPM | OXYGEN SATURATION: 97 % | DIASTOLIC BLOOD PRESSURE: 79 MMHG | RESPIRATION RATE: 14 BRPM | BODY MASS INDEX: 22.16 KG/M2 | SYSTOLIC BLOOD PRESSURE: 120 MMHG | HEIGHT: 60 IN | WEIGHT: 112.9 LBS

## 2020-10-16 DIAGNOSIS — C50.911 MALIGNANT NEOPLASM OF RIGHT BREAST IN FEMALE, ESTROGEN RECEPTOR POSITIVE, UNSPECIFIED SITE OF BREAST (H): Primary | ICD-10-CM

## 2020-10-16 DIAGNOSIS — Z17.0 MALIGNANT NEOPLASM OF RIGHT BREAST IN FEMALE, ESTROGEN RECEPTOR POSITIVE, UNSPECIFIED SITE OF BREAST (H): Primary | ICD-10-CM

## 2020-10-16 PROCEDURE — 99207 PR NO CHARGE LOS: CPT | Performed by: SURGERY

## 2020-10-16 PROCEDURE — G0463 HOSPITAL OUTPT CLINIC VISIT: HCPCS

## 2020-10-16 ASSESSMENT — PAIN SCALES - GENERAL: PAINLEVEL: NO PAIN (0)

## 2020-10-16 ASSESSMENT — MIFFLIN-ST. JEOR: SCORE: 1033.61

## 2020-10-16 NOTE — PROGRESS NOTES
HISTORY OF PRESENT ILLNESS:  Crispin June is here for a postoperative visit after undergoing a right lumpectomy and a sentinel lymph node biopsy.  Her final pathology revealed a 1.6 cm, grade 1, ER-positive, AR-positive, HER-2 negative breast cancer.  Her sentinel node was negative.  Her genetic testing was negative.  She has been doing well except she has some pain radiating down her right arm.  It seems like axillary web syndrome.  Both her incisions are otherwise healing well.      IMPRESSION:  Postop check.      PLAN:  I am going to have her follow up with Medical Oncology, and I am going to have her follow up with Physical Therapy.

## 2020-10-16 NOTE — NURSING NOTE
Oncology Rooming Note    October 16, 2020 10:15 AM   Maribel June is a 55 year old female who presents for:    Chief Complaint   Patient presents with     Oncology Clinic Visit     BREAST CANCER      Initial Vitals: /79   Pulse 64   Resp 14   Ht 1.524 m (5')   Wt 51.2 kg (112 lb 14.4 oz)   SpO2 97%   BMI 22.05 kg/m   Estimated body mass index is 22.05 kg/m  as calculated from the following:    Height as of this encounter: 1.524 m (5').    Weight as of this encounter: 51.2 kg (112 lb 14.4 oz). Body surface area is 1.47 meters squared.  No Pain (0) Comment: Data Unavailable   No LMP recorded. Patient has had a hysterectomy.  Allergies reviewed: Yes  Medications reviewed: Yes    Medications: Medication refills not needed today.  Pharmacy name entered into Spod[employer]:    [id number] - [place], [place] - [address] [facility] [place], [place] - [address] [id number] [place], [place] [facility] [address] [facility] [address] [facility]    Clinical concerns: Patient states that her breast feel bruised. Her right arm feels like it's burning to the touch from her elbow to the shoulder.  Dr Watson  was notified.      Lucinda Ireland

## 2020-10-16 NOTE — LETTER
10/16/2020         RE: Maribel June  [address]  [facility] [address]        Dear Colleague,    Thank you for referring your patient, Maribel June, to the [facility]. Please see a copy of my visit note below.    HISTORY OF PRESENT ILLNESS:  Crispin June is here for a postoperative visit after undergoing a right lumpectomy and a sentinel lymph node biopsy.  Her final pathology revealed a 1.6 cm, grade 1, ER-positive, IL-positive, HER-2 negative breast cancer.  Her sentinel node was negative.  Her genetic testing was negative.  She has been doing well except she has some pain radiating down her right arm.  It seems like axillary web syndrome.  Both her incisions are otherwise healing well.      IMPRESSION:  Postop check.      PLAN:  I am going to have her follow up with Medical Oncology, and I am going to have her follow up with Physical Therapy.           Again, thank you for allowing me to participate in the care of your patient.        Sincerely,        Jose Watson MD

## 2020-10-20 ENCOUNTER — HOSPITAL ENCOUNTER (OUTPATIENT)
Dept: PHYSICAL THERAPY | Facility: CLINIC | Age: 55
Setting detail: THERAPIES SERIES
End: 2020-10-20
Attending: SURGERY
Payer: COMMERCIAL

## 2020-10-20 DIAGNOSIS — C50.911 MALIGNANT NEOPLASM OF RIGHT BREAST IN FEMALE, ESTROGEN RECEPTOR POSITIVE, UNSPECIFIED SITE OF BREAST (H): ICD-10-CM

## 2020-10-20 DIAGNOSIS — Z17.0 MALIGNANT NEOPLASM OF RIGHT BREAST IN FEMALE, ESTROGEN RECEPTOR POSITIVE, UNSPECIFIED SITE OF BREAST (H): ICD-10-CM

## 2020-10-20 PROCEDURE — 97161 PT EVAL LOW COMPLEX 20 MIN: CPT | Mod: GP | Performed by: PHYSICAL THERAPIST

## 2020-10-20 PROCEDURE — 97110 THERAPEUTIC EXERCISES: CPT | Mod: GP | Performed by: PHYSICAL THERAPIST

## 2020-10-20 PROCEDURE — 97140 MANUAL THERAPY 1/> REGIONS: CPT | Mod: GP,59 | Performed by: PHYSICAL THERAPIST

## 2020-10-20 NOTE — TELEPHONE ENCOUNTER
RECORDS STATUS - BREAST    RECORDS REQUESTED FROM: [facility] - Internal Referral   DATE REQUESTED: 10/20/20   NOTES DETAILS STATUS   OFFICE NOTE from referring provider Epic Dr. Jose Watson    Also Seen:  Genetics - Tre: 9/24/20   OFFICE NOTE from medical oncologist [facility] Dr. Watson: 10/16/20   OFFICE NOTE from surgeon     OFFICE NOTE from radiation oncologist     DISCHARGE SUMMARY from hospital NA    DISCHARGE REPORT from the ER NA    OPERATIVE REPORT Epic 9/29/20: Right Lumpectomy   MEDICATION LIST     CLINICAL TRIAL TREATMENTS TO DATE     LABS     PATHOLOGY REPORTS  (Tissue diagnosis, Stage, ER/MD percentage positive and intensity of staining, HER2 IHC, FISH, and all biopsies from breast and any distant metastasis)                 Epic 9/29/20, 8/27/20: Surg Path    8/27/20: HER 2/DIOGENES Fish   GENONOMIC TESTING     TYPE:   (Next Generation Sequencing, including Foundation One testing, and Oncotype score) Eic 9/25/20   IMAGING (NEED IMAGES & REPORT)     CT SCANS     MRI     MAMMO     ULTRASOUND     PET     BONE SCAN     BRAIN MRI

## 2020-10-20 NOTE — PROGRESS NOTES
10/20/20 1100   Quick Adds   Type of Visit Initial OP PT Evaluation   General Information   Start of Care Date 10/20/20   Referring Physician Jose Watson MD   Orders Evaluate and Treat as Indicated   Additional Orders Axillary Web syndrome   Order Date 10/16/20   Medical Diagnosis Malignant neoplasm of right breast in female, estrogen receptor positive, unspecified site of breast    Onset of illness/injury or Date of Surgery 09/29/20   Surgical/Medical history reviewed Yes   Pertinent history of current problem (include personal factors and/or comorbidities that impact the POC) Crispin is s/p Right breast lumpectomy and will begin radiation treatment next week.  She is experiencing axillary web syndrom following procedure with limited right shoulder ROM and point tenderness.   Patient role/Employment history Employed  ([employer])   Living environment House/townhome   Home/Community Accessibility Comments Lives with    Patient/Family Goals Statement Gain full mobility of right UE with no pain   General Information Comments She is right hand dominant   Fall Risk Screen   Fall screen completed by PT   Have you fallen 2 or more times in the past year? No   Have you fallen and had an injury in the past year? No   Is patient a fall risk? No   System Outcome Measures   Outcome Measures Cancer Rehab   FACIT Fatigue Subscale (score out of 52). The higher the score, the better the QOL. 44   Pain   Patient currently in pain Yes   Pain location Right arm axillary area   Pain rating 6/10   Pain description Burning   Cognitive Status Examination   Follows Commands and Answers Questions 100% of the time;able to follow multistep instructions   Integumentary   Integumentary Other   Integumentary Comments Scar is across right axillary area with edema and redness   Posture   Posture Normal   Palpation   Palpation Right axilla presents with edema and tenderness   Range of Motion (ROM)   ROM Quick Adds Shoulder, Right    Right Shoulder Flexion ROM   Right Shoulder Flexion AROM - degrees 110   Right Shoulder Flexion PROM - degrees 165   Right Shoulder ABduction ROM   Right Shoulder ABduction AROM - degrees 90   Right Shoulder ABduction PROM - degrees 100   Right Shoulder External Rotation ROM   Right Shoulder External Rotation AROM - degrees WFLs   Right Shoulder External Rotation PROM - degrees WFLs   Right Shoulder Internal Rotation ROM   Right Shoulder Internal Rotation AROM - degrees WFLs   Right Shoulder Internal Rotation PROM - degrees WFLs   Strength   Strength Comments Right shoulder flexion=4/5 limited by pain   Bed Mobility   Bed Mobility Comments She cannot lay on her right shoulder at night and requires a small pillow in between her trunk and right UE to avoid pressure through axilla due to pain   Transfer Skills   Transfer Comments Independent   Gait   Gait Comments She is unable to swing her right arm due to pain and needs to keep abducted from turnk to avoid pressure through axilla   Balance   Balance no deficits were identified   Sensory Examination   Sensory Perception other (describe)   Sensory Perception Comments She reports mild numbness over scar area at right axilla   Planned Therapy Interventions   Planned Therapy Interventions ROM;strengthening;stretching;manual therapy   Clinical Impression   Criteria for Skilled Therapeutic Interventions Met yes, treatment indicated   PT Diagnosis Imparied Right shoulder ROM, weakness   Influenced by the following impairments Decreased right shoulder ROM, Right shoulder weakness, Right shoulder/axillary pain   Functional limitations due to impairments Unable to swing arm with gait, difficulty reaching overhead for daily tasks, unable to lay on right side for sleeping, ability to bear limited weight through right UE    Clinical Presentation Evolving/Changing   Clinical Presentation Rationale Symptom report, s/p surgical procedure to area with healing, clinical judgement    Clinical Decision Making (Complexity) Low complexity   Therapy Frequency 1 time/week   Predicted Duration of Therapy Intervention (days/wks) 8 weeks   Risk & Benefits of therapy have been explained Yes   Patient, Family & other staff in agreement with plan of care Yes   Clinical Impression Comments Crispin presents with right UE decreased ROM, weakness and decreased fucntional use limiting activities of daily living and self cares.  She will benefit from skilled PT to address impairments and return to full functioal of right UE for all functional activity.   GOALS   PT Eval Goals 1;2;3;4   Goal 1   Goal Identifier Overhead reach   Goal Description Crispin will demonstrate the ability to complete overhead reach in 5/5 trials demonstrating functional reach for putting dishes in her cupboards on a daily basis.   Target Date 12/20/20   Goal 2   Goal Identifier Donning/doffing a coat   Goal Description Crispin will demonstrate the ability to don and doff her coat with no restriction and report no pain on a consistent basis.   Target Date 12/20/20   Goal 3   Goal Identifier Sleep   Goal Description Crispin will report the ability to sleep on her right side on a nightly basis with tolerable pain.   Target Date 12/20/20   Goal 4   Goal Identifier Gait   Goal Description Crispin will demonstrate reciprocal arm swing during gait with no pain on a consistent basis.   Target Date 12/20/20   Total Evaluation Time   PT Eval, Low Complexity Minutes (32205) 30

## 2020-10-24 ENCOUNTER — PRE VISIT (OUTPATIENT)
Dept: ONCOLOGY | Facility: CLINIC | Age: 55
End: 2020-10-24
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
## 2020-10-27 ENCOUNTER — ONCOLOGY VISIT (OUTPATIENT)
Dept: ONCOLOGY | Facility: CLINIC | Age: 55
End: 2020-10-27
Attending: SURGERY
Payer: COMMERCIAL

## 2020-10-27 VITALS
WEIGHT: 113.2 LBS | DIASTOLIC BLOOD PRESSURE: 77 MMHG | HEART RATE: 70 BPM | RESPIRATION RATE: 16 BRPM | OXYGEN SATURATION: 100 % | BODY MASS INDEX: 22.11 KG/M2 | TEMPERATURE: 98.4 F | SYSTOLIC BLOOD PRESSURE: 129 MMHG

## 2020-10-27 DIAGNOSIS — Z17.0 MALIGNANT NEOPLASM OF RIGHT BREAST IN FEMALE, ESTROGEN RECEPTOR POSITIVE, UNSPECIFIED SITE OF BREAST (H): ICD-10-CM

## 2020-10-27 DIAGNOSIS — E55.9 VITAMIN D DEFICIENCY: ICD-10-CM

## 2020-10-27 DIAGNOSIS — C50.911 MALIGNANT NEOPLASM OF RIGHT BREAST IN FEMALE, ESTROGEN RECEPTOR POSITIVE, UNSPECIFIED SITE OF BREAST (H): ICD-10-CM

## 2020-10-27 DIAGNOSIS — N91.2 AMENORRHEA: ICD-10-CM

## 2020-10-27 DIAGNOSIS — C50.511 MALIGNANT NEOPLASM OF LOWER-OUTER QUADRANT OF RIGHT BREAST OF FEMALE, ESTROGEN RECEPTOR POSITIVE (H): Primary | ICD-10-CM

## 2020-10-27 DIAGNOSIS — Z17.0 MALIGNANT NEOPLASM OF LOWER-OUTER QUADRANT OF RIGHT BREAST OF FEMALE, ESTROGEN RECEPTOR POSITIVE (H): Primary | ICD-10-CM

## 2020-10-27 PROCEDURE — G0463 HOSPITAL OUTPT CLINIC VISIT: HCPCS

## 2020-10-27 PROCEDURE — 99205 OFFICE O/P NEW HI 60 MIN: CPT | Performed by: INTERNAL MEDICINE

## 2020-10-27 ASSESSMENT — PAIN SCALES - GENERAL: PAINLEVEL: NO PAIN (0)
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
## 2020-10-27 NOTE — NURSING NOTE
Oncology Rooming Note    October 27, 2020 11:15 AM   Maribel June is a 55 year old female who presents for:    Chief Complaint   Patient presents with     New Patient     Malignant neoplasm of lower-outer quadrant of right breast of female, estrogen receptor positive (H)     Initial Vitals: /77   Pulse 70   Temp 98.4  F (36.9  C)   Resp 16   Wt 51.3 kg (113 lb 3.2 oz)   SpO2 100%   BMI 22.11 kg/m   Estimated body mass index is 22.11 kg/m  as calculated from the following:    Height as of 10/16/20: 1.524 m (5').    Weight as of this encounter: 51.3 kg (113 lb 3.2 oz). Body surface area is 1.47 meters squared.  No Pain (0) Comment: Data Unavailable   No LMP recorded. Patient has had a hysterectomy.  Allergies reviewed: Yes  Medications reviewed: Yes    Medications: Medication refills not needed today.  Pharmacy name entered into GOV[employer]:    [facility] [address] [place], [place] - [address] [facility] [address] [place] [id number] [id number] [address] [facility] [address] [facility]    Clinical concerns: New patient today. Dr. Eli was NOT notified.      Gael Bravo MA

## 2020-10-28 ENCOUNTER — HOSPITAL ENCOUNTER (OUTPATIENT)
Dept: PHYSICAL THERAPY | Facility: CLINIC | Age: 55
Setting detail: THERAPIES SERIES
End: 2020-10-28
Attending: SURGERY
Payer: COMMERCIAL

## 2020-10-28 PROCEDURE — 97140 MANUAL THERAPY 1/> REGIONS: CPT | Mod: GP | Performed by: PHYSICAL THERAPIST

## 2020-10-28 PROCEDURE — 97110 THERAPEUTIC EXERCISES: CPT | Mod: GP | Performed by: PHYSICAL THERAPIST

## 2020-10-29 ENCOUNTER — PATIENT OUTREACH (OUTPATIENT)
Dept: ONCOLOGY | Facility: CLINIC | Age: 55
End: 2020-10-29

## 2020-10-29 ENCOUNTER — TELEPHONE (OUTPATIENT)
Dept: ONCOLOGY | Facility: CLINIC | Age: 55
End: 2020-10-29

## 2020-10-29 DIAGNOSIS — C50.511 MALIGNANT NEOPLASM OF LOWER-OUTER QUADRANT OF RIGHT BREAST OF FEMALE, ESTROGEN RECEPTOR POSITIVE (H): Primary | ICD-10-CM

## 2020-10-29 DIAGNOSIS — Z17.0 MALIGNANT NEOPLASM OF LOWER-OUTER QUADRANT OF RIGHT BREAST OF FEMALE, ESTROGEN RECEPTOR POSITIVE (H): Primary | ICD-10-CM

## 2020-10-29 NOTE — TELEPHONE ENCOUNTER
Requested information faxed, per request below.    Virginia Jo CMA ([facility])      ----- Message from Evita Calderón RN sent at 10/29/2020 11:55 AM CDT -----  Regarding: fax  Hello,    Please print the following items and fax to [facility] [phone]    #Face Sheet  #Copy of insurance card  #Pathology report dated:9/29/20    Please let me know if you have any questions.     Thank you,  Rosy

## 2020-11-04 ENCOUNTER — HOSPITAL ENCOUNTER (OUTPATIENT)
Dept: PHYSICAL THERAPY | Facility: CLINIC | Age: 55
Setting detail: THERAPIES SERIES
End: 2020-11-04
Attending: SURGERY
Payer: COMMERCIAL

## 2020-11-04 PROCEDURE — 97110 THERAPEUTIC EXERCISES: CPT | Mod: GP | Performed by: PHYSICAL THERAPIST

## 2020-11-04 PROCEDURE — 97140 MANUAL THERAPY 1/> REGIONS: CPT | Mod: GP | Performed by: PHYSICAL THERAPIST

## 2020-11-06 NOTE — PROGRESS NOTES
Dear Colleagues,  Today Maribel June was seen in consultation.  IDENTIFICATION: This is a 55 year old woman with right LOQ breast G1 IDCA with G2 DCIS status post lumpectomy and SLNBx, revealing [id number], ER+/NH+/H2N- disease referred for adjuvant radiation therapy. She will not be receiving chemotherapy.  HISTORY OF PRESENT ILLNESS: Maribel June was in her usual state of health this past year when a right breast mass was palpated by her physician.She has a history of prior right breast biopsy ~15 years ago that was benign per the pt. On August 6, 2020 lateral screening mammogram showed no significant change.  August 17, 2020 right diagnostic mammogram showed architectural distortion in the region of palpable concern.  The irregular heterogeneous hypoechoic parenchyma was measured at 2.2 cm in greatest dimension by same-day ultrasound.  There is no abnormal right axillary lymph nodes.  On August 27 2020 contrast-enhanced mammogram also showed that there was enhancement within the left breast in addition to the right breast.  Same-day ultrasound-guided biopsy of the right breast lesion at the 8 o'clock position was consistent with grade 1 IDCA.  She had Genetics through [facility] on September 24, 2020.  On September 29, 2020 Dr. Watson took her to the OR and she had a right lumpectomy and pathology revealed 1.6cm of G1 IDCA with G2 DCIS -LVSI. Negative invasive margins and adequate noninvasive margins. There was 0/1 lymph node. [id number], ER+/NH+/Her2-. Specimen radiograph was not completed.  Her postoperative course has been unenventful.  She was recently seen by Dr. Eli.  Her Oncotype score is 16.   Since her surgery, she has continued to heal well and denies any significant pain in her breast.  However in her right forearm she has tightness and burning.  She has good ROM.  She denies any other new masses, skin changes, shortness of breath, chest or bony pain, or new neurologic symptoms. She is being  seen here today for consideration of postoperative radiotherapy.  REVIEW OF SYSTEMS: As per HPI, a 14-point review of system is otherwise negative.  PAST RADIATION THERAPY:  Denies.  PAST CTD/PACEMAKER: Denies  BREAST RISK FACTORS:  History of prior right breast biopsy  (benign per the pt). No family history of ovarian cancer. Breast cancer paternal aunt, paternal first cousin.  She started her menses at age 14.  Hysterectomy age 29.  with her first delivery at age 26. She  for a total of ~1 month. Had HRT for 26 years. Had OCP use for 2-3 years.    Past Medical History:   Diagnosis Date     Depressive disorder      Endometriosis      Herpes genitalis in women      History of major depression     situational with first .      Kidney stone      Malignant neoplasm of right breast in female, estrogen receptor positive (H) 2020       Past Surgical History:   Procedure Laterality Date     BREAST BIOPSY, RT/LT Right 2020     BREAST SURGERY Right     Right Breast - Benign      SECTION      x1     COLONOSCOPY N/A 2016    Procedure: COMBINED COLONOSCOPY, SINGLE OR MULTIPLE BIOPSY/POLYPECTOMY BY BIOPSY;  Surgeon: Duane, William Charles, MD;  Location: [facility] OR     COLONOSCOPY WITH CO2 INSUFFLATION N/A 2016    Procedure: COLONOSCOPY WITH CO2 INSUFFLATION;  Surgeon: Duane, William Charles, MD;  Location: [facility] OR     CYSTOSCOPY  2009    left stent placement (C-ARM)     GENITOURINARY SURGERY      surgery for kidney stone     GYN SURGERY      Partial Hysterectomy at age 29     LUMPECTOMY BREAST WITH SENTINEL NODE, COMBINED Right 2020    Procedure: Right breast lumpectomy with [place] node biopsy;  Surgeon: Jose Watson MD;  Location: [facility] OR       Family History   Problem Relation Age of Onset     Hypertension Father      Kidney failure Father      Aortic aneurysm Father      Prostate Cancer Father      Stomach Cancer Maternal Grandfather      Heart Disease  Paternal Grandfather      Hypertension Paternal Grandfather      Neurologic Disorder Brother         has seizures from Epilepsy     Asthma Mother      Hypertension Mother      Arthritis Mother      Hypertension Brother      Lymphoma Maternal Uncle      Cancer Maternal Uncle         Cholangiocarcinoma     Breast Cancer Paternal Aunt      Breast Cancer Cousin         Paternal 1st Female Cousin (daughter of paternal aunt with breast cancer)       Social History     Tobacco Use     Smoking status: Former Smoker     Smokeless tobacco: Never Used     Tobacco comment: social use in college   Substance Use Topics     Alcohol use: Yes     Comment: 1-2 glasses of wine per week     Current Outpatient Medications   Medication     amLODIPine (NORVASC) 5 MG tablet     bimatoprost (LUMIGAN) 0.01 % SOLN     cyanocobalamin (VITAMIN  B-12) 1000 MCG tablet     Omega-3 Fatty Acids (OMEGA-3 FISH OIL PO)     UNABLE TO FIND     VITAMIN D PO     acyclovir (ZOVIRAX) 400 MG tablet     clonazePAM (KLONOPIN) 0.5 MG tablet     No current facility-administered medications for this visit.         Allergies   Allergen Reactions     Flagyl [Metronidazole] Nausea and Vomiting     Lisinopril      Other reaction(s): Headaches     Ondansetron      Other reaction(s): nausea     Oxycodone-Acetaminophen Nausea and Vomiting     States Oxycodone is fine     Sulfamethoxazole-Trimethoprim      Other reaction(s): Headache     Zithromax [Azithromycin Dihydrate] Rash     PHYSICAL EXAMINATION:  /78 (BP Location: Left arm, Patient Position: Chair, Cuff Size: Adult Regular)   Pulse 65   Temp 98  F (36.7  C) (Oral)   Resp 16   Ht 1.524 m (5')   Wt 51 kg (112 lb 6.4 oz)   SpO2 99%   BMI 21.95 kg/m    GENERAL Well-appearing woman in no acute distress.  HEENT Normocephalic, atraumatic.  Sclerae anicteric.  CVR  Regular rate and rhythm.  No murmurs, rubs, or gallops.  LUNGS Clear to auscultation bilaterally.  BREASTS Breasts are examined in the supine and  upright position.  The breasts are symmetric.  The left breast is unremarkable, as there is no erythema, ulceration or suspicious nodularity within it.  Within the right breast and axilla there are two well healed incisions.  There is no suspicious erythema, ulceration or nodularity within the right breast.  There is no erythema, retraction, desquamation or discharge appreciated within the right or left nipple areolar complex.    LYMPH No supraclavicular, infraclavicular, or axillary lymphadenopathy appreciated bilaterally.  ABDOMEN Soft.    EXT  No clubbing or cyanosis or edema.    NEURO No focal deficits.  MSK  Good ROM.   SKIN  Warm and well perfused.    PSYCH  Alert and oriented x 3    ECOG PERFORMANCE STATUS: 1    IMPRESSION/PLAN: Maribel June is a 55 year old woman with right breast G1 IDCA with G2 DCIS status post lumpectomy and SLNBx, revealing [id number], ER+/[place]+/H2N- disease referred for adjuvant radiation therapy. Oncotype score was 16 and she will not be receiving chemotherapy.  I recommend adjuvant XRT to improve local control and overall survival.  Plan is to treat the affected breast based on multiple randomized prospective data which show decrease risk of local recurrence by ~50% with the addition of XRT to BCS and the EBCTCG metaanalysis published in Lancet 2011 which shows that for every 4 recurrences avoided by year 10 one breast cancer death is avoided by year 15.    The risks, benefits, treatment rationale and regimen of radiation therapy to the breast were discussed in great detail today with the patient.  Risks include but are not limited to skin erythema, desquamation, hyperpigmentation, breast and lymphedema, fibrosis, telengectasia, pneumonitis, cardiac toxicity, rib fracture and secondary malignancy. The patient consented to therapy will be scheduled for a CT simulation.  Additional problem list to be addressed in the following manner:  1. Systemic Treatment in Postmenopausal Woman:  Small tumor size and grade 1, Oncotype 16, so no systemic chemo recommended.  Will f/u with Med Onc 1-2 weeks after XRT completed to discuss adjuvant endocrine therapy.   2. In her right forearm she has postsurgical tightness and burning. Seeing PT Q week.  3. Enhancement within the left breast noted on contrast enhanced MMG. Management per Surgery.  There was ample time for questions and all were answered to the patient's satisfaction. Thank you for allowing me to participate in the care of this pleasant patient. If you have any questions, please do not hesitate to contact my office.    Sincerely,  Robby Weaver MD

## 2020-11-09 ENCOUNTER — PATIENT OUTREACH (OUTPATIENT)
Dept: RADIATION ONCOLOGY | Facility: CLINIC | Age: 55
End: 2020-11-09

## 2020-11-09 ENCOUNTER — OFFICE VISIT (OUTPATIENT)
Dept: RADIATION ONCOLOGY | Facility: CLINIC | Age: 55
End: 2020-11-09
Attending: INTERNAL MEDICINE
Payer: COMMERCIAL

## 2020-11-09 VITALS
BODY MASS INDEX: 22.07 KG/M2 | RESPIRATION RATE: 16 BRPM | SYSTOLIC BLOOD PRESSURE: 133 MMHG | HEART RATE: 65 BPM | DIASTOLIC BLOOD PRESSURE: 78 MMHG | WEIGHT: 112.4 LBS | HEIGHT: 60 IN | OXYGEN SATURATION: 99 % | TEMPERATURE: 98 F

## 2020-11-09 DIAGNOSIS — C50.511 MALIGNANT NEOPLASM OF LOWER-OUTER QUADRANT OF RIGHT BREAST OF FEMALE, ESTROGEN RECEPTOR POSITIVE (H): Primary | ICD-10-CM

## 2020-11-09 DIAGNOSIS — Z17.0 MALIGNANT NEOPLASM OF LOWER-OUTER QUADRANT OF RIGHT BREAST OF FEMALE, ESTROGEN RECEPTOR POSITIVE (H): Primary | ICD-10-CM

## 2020-11-09 LAB — LAB SCANNED RESULT: NORMAL

## 2020-11-09 PROCEDURE — 77263 THER RADIOLOGY TX PLNG CPLX: CPT | Performed by: RADIOLOGY

## 2020-11-09 PROCEDURE — 99205 OFFICE O/P NEW HI 60 MIN: CPT | Performed by: RADIOLOGY

## 2020-11-09 SDOH — HEALTH STABILITY: MENTAL HEALTH: HOW OFTEN DO YOU HAVE A DRINK CONTAINING ALCOHOL?: NOT ASKED

## 2020-11-09 SDOH — HEALTH STABILITY: MENTAL HEALTH: HOW MANY STANDARD DRINKS CONTAINING ALCOHOL DO YOU HAVE ON A TYPICAL DAY?: NOT ASKED

## 2020-11-09 SDOH — HEALTH STABILITY: MENTAL HEALTH: HOW OFTEN DO YOU HAVE 6 OR MORE DRINKS ON ONE OCCASION?: NOT ASKED

## 2020-11-09 ASSESSMENT — PAIN SCALES - GENERAL: PAINLEVEL: MODERATE PAIN (4)

## 2020-11-09 ASSESSMENT — MIFFLIN-ST. JEOR: SCORE: 1031.34

## 2020-11-09 NOTE — PATIENT INSTRUCTIONS
What to expect at your Simulation visit:    You will meet with a Radiation Therapist and other team members who will be doing a planning session called a  simulation  with you. This process will determine your daily treatment.    ~ You will lie on a flat table and have a treatment planning CT scan.  It is important during the scan to hold very still and breathe normally.    ~ Your therapist may construct a body mold to help you hold still for your treatments.    ~ If you are having treatment to the head or neck area you will be fitted with a plastic mesh mask that fits very snugly over your face and neck.     ~ Your therapist will be taking some digital photos that will go in your treatment chart.      ~Your therapist will make marks on your skin and take measurements. Your therapist may ask you about making small tattoos (a permanent small dot) over these marks.  These marks are used to position you daily for your radiation therapy treatments. Please do not wash off any marks until all of your radiation therapy treatments are complete unless you are instructed to do so by your therapist.    ~ Once the simulation is completed it can take from 3 to 10 business days before you start radiation therapy treatments.    ~ You may meet with a nurse who will go over management of treatment side effects and self care during your treatments. The nurse will help to plan care with other departments and physicians if needed.      Please contact [facility] RN with questions or concerns following today's appointment: [phone].    Thank you!

## 2020-11-09 NOTE — LETTER
11/9/2020         RE: Maribel June  [address]  [facility] [address]        Dear Colleague,    Thank you for referring your patient, Maribel June, to the [facility]COLOG[facility] [place]. Please see a copy of my visit note below.    Dear Colleagues,  Today Maribel June was seen in consultation.  IDENTIFICATION: This is a 55 year old woman with right LOQ breast G1 IDCA with G2 DCIS status post lumpectomy and SLNBx, revealing [id number], ER+/MN+/H2N- disease referred for adjuvant radiation therapy. She will not be receiving chemotherapy.  HISTORY OF PRESENT ILLNESS: Maribel June was in her usual state of health this past year when a right breast mass was palpated by her physician.She has a history of prior right breast biopsy ~15 years ago that was benign per the pt. On August 6, 2020 lateral screening mammogram showed no significant change.  August 17, 2020 right diagnostic mammogram showed architectural distortion in the region of palpable concern.  The irregular heterogeneous hypoechoic parenchyma was measured at 2.2 cm in greatest dimension by same-day ultrasound.  There is no abnormal right axillary lymph nodes.  On August 27 2020 contrast-enhanced mammogram also showed that there was enhancement within the left breast in addition to the right breast.  Same-day ultrasound-guided biopsy of the right breast lesion at the 8 o'clock position was consistent with grade 1 IDCA.  She had Genetics through [facility] on September 24, 2020.  On September 29, 2020 Dr. Watson took her to the OR and she had a right lumpectomy and pathology revealed 1.6cm of G1 IDCA with G2 DCIS -LVSI. Negative invasive margins and adequate noninvasive margins. There was 0/1 lymph node. [id number], ER+/MN+/Her2-. Specimen radiograph was not completed.  Her postoperative course has been unenventful.  She was recently seen by Dr. Eli.  Her Oncotype score is 16.   Since her surgery, she has continued  to heal well and denies any significant pain in her breast.  However in her right forearm she has tightness and burning.  She has good ROM.  She denies any other new masses, skin changes, shortness of breath, chest or bony pain, or new neurologic symptoms. She is being seen here today for consideration of postoperative radiotherapy.  REVIEW OF SYSTEMS: As per HPI, a 14-point review of system is otherwise negative.  PAST RADIATION THERAPY:  Denies.  PAST CTD/PACEMAKER: Denies  BREAST RISK FACTORS:  History of prior right breast biopsy  (benign per the pt). No family history of ovarian cancer. Breast cancer paternal aunt, paternal first cousin.  She started her menses at age 14.  Hysterectomy age 29.  with her first delivery at age 26. She  for a total of ~1 month. Had HRT for 26 years. Had OCP use for 2-3 years.    Past Medical History:   Diagnosis Date     Depressive disorder      Endometriosis      Herpes genitalis in women      History of major depression     situational with first .      Kidney stone      Malignant neoplasm of right breast in female, estrogen receptor positive (H) 2020       Past Surgical History:   Procedure Laterality Date     BREAST BIOPSY, RT/LT Right 2020     BREAST SURGERY Right     Right Breast - Benign      SECTION      x1     COLONOSCOPY N/A 2016    Procedure: COMBINED COLONOSCOPY, SINGLE OR MULTIPLE BIOPSY/POLYPECTOMY BY BIOPSY;  Surgeon: Duane, William Charles, MD;  Location: [facility] OR     COLONOSCOPY WITH CO2 INSUFFLATION N/A 2016    Procedure: COLONOSCOPY WITH CO2 INSUFFLATION;  Surgeon: Duane, William Charles, MD;  Location: [facility] OR     CYSTOSCOPY  2009    left stent placement (C-ARM)     GENITOURINARY SURGERY      surgery for kidney stone     GYN SURGERY      Partial Hysterectomy at age 29     LUMPECTOMY BREAST WITH SENTINEL NODE, COMBINED Right 2020    Procedure: Right breast lumpectomy with [place] node  biopsy;  Surgeon: Jose Watson MD;  Location:  OR       Family History   Problem Relation Age of Onset     Hypertension Father      Kidney failure Father      Aortic aneurysm Father      Prostate Cancer Father      Stomach Cancer Maternal Grandfather      Heart Disease Paternal Grandfather      Hypertension Paternal Grandfather      Neurologic Disorder Brother         has seizures from Epilepsy     Asthma Mother      Hypertension Mother      Arthritis Mother      Hypertension Brother      Lymphoma Maternal Uncle      Cancer Maternal Uncle         Cholangiocarcinoma     Breast Cancer Paternal Aunt      Breast Cancer Cousin         Paternal 1st Female Cousin (daughter of paternal aunt with breast cancer)       Social History     Tobacco Use     Smoking status: Former Smoker     Smokeless tobacco: Never Used     Tobacco comment: social use in [employer]   Substance Use Topics     Alcohol use: Yes     Comment: 1-2 glasses of wine per week     Current Outpatient Medications   Medication     amLODIPine (NORVASC) 5 MG tablet     bimatoprost (LUMIGAN) 0.01 % SOLN     cyanocobalamin (VITAMIN  B-12) 1000 MCG tablet     Omega-3 Fatty Acids (OMEGA-3 FISH OIL PO)     UNABLE TO FIND     VITAMIN D PO     acyclovir (ZOVIRAX) 400 MG tablet     clonazePAM (KLONOPIN) 0.5 MG tablet     No current facility-administered medications for this visit.         Allergies   Allergen Reactions     Flagyl [Metronidazole] Nausea and Vomiting     Lisinopril      Other reaction(s): Headaches     Ondansetron      Other reaction(s): nausea     Oxycodone-Acetaminophen Nausea and Vomiting     States Oxycodone is fine     Sulfamethoxazole-Trimethoprim      Other reaction(s): Headache     Zithromax [Azithromycin Dihydrate] Rash     PHYSICAL EXAMINATION:  /78 (BP Location: Left arm, Patient Position: Chair, Cuff Size: Adult Regular)   Pulse 65   Temp 98  F (36.7  C) (Oral)   Resp 16   Ht 1.524 m (5')   Wt 51 kg (112 lb 6.4 oz)   SpO2 99%    BMI 21.95 kg/m    GENERAL Well-appearing woman in no acute distress.  HEENT Normocephalic, atraumatic.  Sclerae anicteric.  CVR  Regular rate and rhythm.  No murmurs, rubs, or gallops.  LUNGS Clear to auscultation bilaterally.  BREASTS Breasts are examined in the supine and upright position.  The breasts are symmetric.  The left breast is unremarkable, as there is no erythema, ulceration or suspicious nodularity within it.  Within the right breast and axilla there are two well healed incisions.  There is no suspicious erythema, ulceration or nodularity within the right breast.  There is no erythema, retraction, desquamation or discharge appreciated within the right or left nipple areolar complex.    LYMPH No supraclavicular, infraclavicular, or axillary lymphadenopathy appreciated bilaterally.  ABDOMEN Soft.    EXT  No clubbing or cyanosis or edema.    NEURO No focal deficits.  MSK  Good ROM.   SKIN  Warm and well perfused.    PSYCH  Alert and oriented x 3    ECOG PERFORMANCE STATUS: 1    IMPRESSION/PLAN: Maribel June is a 55 year old woman with right breast G1 IDCA with G2 DCIS status post lumpectomy and SLNBx, revealing [id number], ER+/VA+/H2N- disease referred for adjuvant radiation therapy. Oncotype score was 16 and she will not be receiving chemotherapy.  I recommend adjuvant XRT to improve local control and overall survival.  Plan is to treat the affected breast based on multiple randomized prospective data which show decrease risk of local recurrence by ~50% with the addition of XRT to BCS and the EBCTCG metaanalysis published in Lancet 2011 which shows that for every 4 recurrences avoided by year 10 one breast cancer death is avoided by year 15.    The risks, benefits, treatment rationale and regimen of radiation therapy to the breast were discussed in great detail today with the patient.  Risks include but are not limited to skin erythema, desquamation, hyperpigmentation, breast and lymphedema,  fibrosis, telengectasia, pneumonitis, cardiac toxicity, rib fracture and secondary malignancy. The patient consented to therapy will be scheduled for a CT simulation.  Additional problem list to be addressed in the following manner:  1. Systemic Treatment in Postmenopausal Woman: Small tumor size and grade 1, Oncotype 16, so no systemic chemo recommended.  Will f/u with Med Onc 1-2 weeks after XRT completed to discuss adjuvant endocrine therapy.   2. In her right forearm she has postsurgical tightness and burning. Seeing PT Q week.  3. Enhancement within the left breast noted on contrast enhanced MMG. Management per Surgery.  There was ample time for questions and all were answered to the patient's satisfaction. Thank you for allowing me to participate in the care of this pleasant patient. If you have any questions, please do not hesitate to contact my office.    Sincerely,  Robby Weaver MD            Again, thank you for allowing me to participate in the care of your patient.        Sincerely,        Robby Weaver MD

## 2020-11-09 NOTE — NURSING NOTE
INITIAL PATIENT ASSESSMENT      Diagnosis: Breast cancer (right breast)    Prior radiation therapy: None    Prior chemotherapy: None    Prior hormonal therapy: patient reports history of oral birth control use for approximately two to three years, reports history of HRT use with estrogen for approximately 26 years, discontinued at time of breast cancer diagnosis.    Pain Eval:  Current history of pain associated with this visit:   Intensity: 4/10  Current: burning and tightness  Location: patient reports tightness of right axilla radiating to right wrist, patient reports burning of right upper arm, following with physical therapy one time weekly.  Treatment: Ibuprofen po as needed    Psychosocial  Living arrangements: Lives at home in [place] with spouse Marlon, patient is a  and reports she is currently taking leave this semester.  Fall Risk: independent  [facility] Falls Risk Screening Completed: Yes Result: Negative   referral needs: Not needed    Advanced Directive: Yes - Location: patient/PCP  Implantable Cardiac Device: No  Authorization To Share Protected Health Information: YES - Date: 11/9/2020    Onset of menarche: age 14  Onset of menopause: Patient reports hysterectomy in 2005, believes ovaries are intact.  Abnormal vaginal bleeding/discharge: No  Are you pregnant? No  Reproductive note: patient reports history of two pregnancies with two live births, first at age 26, reports history of breastfeeding for approximately one month in total.  Urine Pregnancy Testing Needed: No     Review of Systems     Constitutional: Negative.    HENT: Negative.    Eyes: Negative.    Respiratory: Negative.    Cardiovascular: Negative.    Gastrointestinal: Negative.    Genitourinary: Negative.    Musculoskeletal: Negative.    Skin: Negative.    Neurological: Negative.    Endo/Heme/Allergies: Negative.    Psychiatric/Behavioral: Negative for depression, hallucinations, memory loss, substance abuse and  suicidal ideas. The patient is nervous/anxious. The patient does not have insomnia.        Nurse face-to-face time: Level 5:  over 15 min face to face time.    Georgia James RN BSN OCN CBCN

## 2020-11-09 NOTE — TELEPHONE ENCOUNTER
Dr. Weaver received communication from Dr. Eli that no recommendations for chemotherapy will be made based off of finalized Onco-type results, request received to schedule patient for CT simulation.  This RN contacted patient and patient scheduled for CT simulation for radiation treatment planning tomorrow, Tuesday, 11/10/2020 at 1330 at [facility].  Patient verbalized understanding and confirmed appointment date and time.  Patient had no questions at this time.    Georgia James RN BSN OCN CBCN

## 2020-11-10 ENCOUNTER — APPOINTMENT (OUTPATIENT)
Dept: RADIATION ONCOLOGY | Facility: CLINIC | Age: 55
End: 2020-11-10
Payer: COMMERCIAL

## 2020-11-10 PROCEDURE — 77290 THER RAD SIMULAJ FIELD CPLX: CPT | Performed by: RADIOLOGY

## 2020-11-18 ENCOUNTER — HOSPITAL ENCOUNTER (OUTPATIENT)
Dept: PHYSICAL THERAPY | Facility: CLINIC | Age: 55
Setting detail: THERAPIES SERIES
End: 2020-11-18
Attending: SURGERY
Payer: COMMERCIAL

## 2020-11-18 ENCOUNTER — APPOINTMENT (OUTPATIENT)
Dept: RADIATION ONCOLOGY | Facility: CLINIC | Age: 55
End: 2020-11-18
Payer: COMMERCIAL

## 2020-11-18 PROCEDURE — 97140 MANUAL THERAPY 1/> REGIONS: CPT | Mod: GP | Performed by: PHYSICAL THERAPIST

## 2020-11-18 PROCEDURE — 97110 THERAPEUTIC EXERCISES: CPT | Mod: GP | Performed by: PHYSICAL THERAPIST

## 2020-11-18 PROCEDURE — 77300 RADIATION THERAPY DOSE PLAN: CPT | Performed by: RADIOLOGY

## 2020-11-18 PROCEDURE — 77334 RADIATION TREATMENT AID(S): CPT | Performed by: RADIOLOGY

## 2020-11-18 PROCEDURE — 77295 3-D RADIOTHERAPY PLAN: CPT | Performed by: RADIOLOGY

## 2020-11-19 ENCOUNTER — APPOINTMENT (OUTPATIENT)
Dept: RADIATION ONCOLOGY | Facility: CLINIC | Age: 55
End: 2020-11-19
Payer: COMMERCIAL

## 2020-11-19 DIAGNOSIS — I89.0 LYMPHEDEMA: Primary | ICD-10-CM

## 2020-11-19 PROCEDURE — 77280 THER RAD SIMULAJ FIELD SMPL: CPT | Performed by: RADIOLOGY

## 2020-11-20 NOTE — PROGRESS NOTES
[facility]        OUTPATIENT PHYSICAL THERAPY FUNCTIONAL EVALUATION  PLAN OF TREATMENT FOR OUTPATIENT REHABILITATION  (COMPLETE FOR INITIAL CLAIMS ONLY)  Patient's Last Name, First Name, M.I.  YOB: 1965  Maribel June     Provider's Name   [facility]   Medical Record No.  [id number]     Start of Care Date:  10/20/20   Onset Date:  09/29/20   Type:     _X__PT   ____OT  ____SLP Medical Diagnosis:        PT Diagnosis:  Imparied Right shoulder ROM, weakness Visits from SOC:  1                              __________________________________________________________________________________  Plan of Treatment/Functional Goals:  ROM, strengthening, stretching, manual therapy           GOALS  Overhead reach  Crispin will demonstrate the ability to complete overhead reach in 5/5 trials demonstrating functional reach for putting dishes in her cupboards on a daily basis.  12/20/20    Donning/doffing a coat  Crispin will demonstrate the ability to don and doff her coat with no restriction and report no pain on a consistent basis.  12/20/20    Sleep  Crispin will report the ability to sleep on her right side on a nightly basis with tolerable pain.  12/20/20    Gait  Cripsin will demonstrate reciprocal arm swing during gait with no pain on a consistent basis.  12/20/20                                                Therapy Frequency:  1 time/week   Predicted Duration of Therapy Intervention:  8 weeks    Suma Henry PT                                    I CERTIFY THE NEED FOR THESE SERVICES FURNISHED UNDER        THIS PLAN OF TREATMENT AND WHILE UNDER MY CARE     (Physician co-signature of this document indicates review and certification of the therapy plan).                Certification Date From:    10/20/20  Certification Date To:   12/8/2020    Referring Provider:  Jose Watson,  MD    Initial Assessment  See Epic Evaluation- Start of Care Date: 10/20/20

## 2020-11-23 ENCOUNTER — OFFICE VISIT (OUTPATIENT)
Dept: RADIATION ONCOLOGY | Facility: CLINIC | Age: 55
End: 2020-11-23
Payer: COMMERCIAL

## 2020-11-23 ENCOUNTER — ANCILLARY PROCEDURE (OUTPATIENT)
Dept: GENERAL RADIOLOGY | Facility: CLINIC | Age: 55
End: 2020-11-23
Attending: INTERNAL MEDICINE
Payer: COMMERCIAL

## 2020-11-23 ENCOUNTER — APPOINTMENT (OUTPATIENT)
Dept: RADIATION ONCOLOGY | Facility: CLINIC | Age: 55
End: 2020-11-23
Payer: COMMERCIAL

## 2020-11-23 ENCOUNTER — OFFICE VISIT (OUTPATIENT)
Dept: ENDOCRINOLOGY | Facility: CLINIC | Age: 55
End: 2020-11-23
Payer: COMMERCIAL

## 2020-11-23 VITALS
HEART RATE: 57 BPM | BODY MASS INDEX: 22.03 KG/M2 | TEMPERATURE: 97.9 F | OXYGEN SATURATION: 98 % | SYSTOLIC BLOOD PRESSURE: 119 MMHG | RESPIRATION RATE: 16 BRPM | WEIGHT: 112.8 LBS | DIASTOLIC BLOOD PRESSURE: 74 MMHG

## 2020-11-23 VITALS
DIASTOLIC BLOOD PRESSURE: 78 MMHG | OXYGEN SATURATION: 98 % | SYSTOLIC BLOOD PRESSURE: 130 MMHG | WEIGHT: 114.5 LBS | HEART RATE: 65 BPM | BODY MASS INDEX: 22.36 KG/M2

## 2020-11-23 DIAGNOSIS — Z17.0 MALIGNANT NEOPLASM OF LOWER-OUTER QUADRANT OF RIGHT BREAST OF FEMALE, ESTROGEN RECEPTOR POSITIVE (H): Primary | ICD-10-CM

## 2020-11-23 DIAGNOSIS — M79.644 PAIN OF FINGER OF RIGHT HAND: ICD-10-CM

## 2020-11-23 DIAGNOSIS — C50.511 MALIGNANT NEOPLASM OF LOWER-OUTER QUADRANT OF RIGHT BREAST OF FEMALE, ESTROGEN RECEPTOR POSITIVE (H): Primary | ICD-10-CM

## 2020-11-23 DIAGNOSIS — M85.80 OSTEOPENIA, UNSPECIFIED LOCATION: Primary | ICD-10-CM

## 2020-11-23 PROCEDURE — 77412 RADIATION TX DELIVERY LVL 3: CPT | Performed by: RADIOLOGY

## 2020-11-23 PROCEDURE — 99214 OFFICE O/P EST MOD 30 MIN: CPT | Performed by: INTERNAL MEDICINE

## 2020-11-23 PROCEDURE — 73140 X-RAY EXAM OF FINGER(S): CPT | Mod: RT | Performed by: RADIOLOGY

## 2020-11-23 PROCEDURE — 99207 PR DROP WITH A PROCEDURE: CPT | Performed by: RADIOLOGY

## 2020-11-23 ASSESSMENT — PAIN SCALES - GENERAL: PAINLEVEL: NO PAIN (0)

## 2020-11-23 NOTE — LETTER
2020         RE: Maribel June  [address]  [facility] [address]        Dear Colleague,    Thank you for referring your patient, Maribel June, to the [facility]COL[facility] [place]. Please see a copy of my visit note below.    [facility]YS[facility]  SPECIALIZING IN BREAKTHROUGHS  Radiation Oncology    On Treatment Visit Note      Maribel June      Date: 2020   MRN: [id number]   : 1965  Diagnosis: breast cancer      Reason for Visit:  On Radiation Treatment Visit     Treatment Summary to Date  Treatment Site: right breast Current Dose: 266/5256 cGy Fractions:       Chemotherapy  Chemo concurrent with radx?: No(Dr. Eli)    Subjective:     Early in treatment, doing well with no complaints.    Nursing ROS:   Nutrition Alteration  Diet Type: Patient's Preference  Skin  Skin Reaction: 0 - No changes  Skin Intervention: skin changes and skin cares reviewed, patient will begin using Aquaphor two times daily starting today        Cardiovascular  Respiratory effort: 1 - Normal - without distress        Psychosocial  Mood - Anxiety: 1 - Mild mood alteration  Mood - Depression: 0 - Normal  Pyschosocial Note: energy level at baseline  Pain Assessment  0-10 Pain Scale: 0      Objective:   /74 (BP Location: Left arm, Patient Position: Chair, Cuff Size: Adult Regular)   Pulse 57   Temp 97.9  F (36.6  C) (Oral)   Resp 16   Wt 51.2 kg (112 lb 12.8 oz)   SpO2 98%   BMI 22.03 kg/m    Gen: Appears well, in no acute distress  Skin: No erythema    Labs:  CBC RESULTS:   Recent Labs   Lab Test 14  1638   WBC 7.0   RBC 3.69*   HGB 11.1*   HCT 32.7*   MCV 89   MCH 30.1   MCHC 33.9   RDW 12.5        ELECTROLYTES:  Recent Labs   Lab Test 20  0818 10/28/19  1642 18  0844 18  0844   NA  --   --   --  140   POTASSIUM  --   --   --  3.6   CHLORIDE  --   --   --  106   KVNG  --  9.1  --  8.3*  8.4*   CO2  --   --   --  28   28   BUN  --  16  --  16   CR  --  0.50*  --  0.54  0.55   GLC 89  --    < > 92    < > = values in this interval not displayed.       Assessment:    Tolerating radiation therapy well.  All questions and concerns addressed.    Plan:   1. Continue current therapy.    2. Skin care per above.      Mosaiq chart and setup information reviewed  Ports checked    Medication Review  Med list reviewed with patient?: Yes  Med list printed and given: Offered and declined    Educational Topic Discussed  Education Instructions: radiation therapy side effects: fatigue, skin changes and skin cares        Robby Weaver MD    Please do not send letter to referring physician.            Again, thank you for allowing me to participate in the care of your patient.        Sincerely,        Robby Weaver MD

## 2020-11-23 NOTE — PATIENT INSTRUCTIONS
Please contact [facility] RN with questions or concerns following today's appointment: [phone].    Thank you!

## 2020-11-23 NOTE — LETTER
11/23/2020         RE: Maribel June  [address]  [facility] [address]        Dear Colleague,    Thank you for referring your patient, Maribel June, to the [facility]. Please see a copy of my visit note below.    Endocrinology Clinic Visit    Chief Complaint: RECHECK (osteopenia)     Information obtained from:Patient    Subjective:         HPI: Maribel June is a 55 year old female with history of osteoporosis who is for a follow up.      This is a patient with history of osteopenia.  Recently diagnosed with breast cancer and currently undergoing radiation therapy.  Here for further discussion of osteopenia.  No fractures since we saw her last.     Fracture risk and osteoporosis  No previous history of fracture after the age of 50.  No loss of height.  Body mass index is 22.36 kg/m .  Her weight has always been in the range of 109-110  No family history or parental history of hip fracture or osteoporosis.  She is a non-smoker.  No history of current or past use of glucocorticoid treatment  No history of excessive alcohol intake  She has had hysterectomy but she tells me that ovaries are present.  No history of prolonged immobility, transplant history, diabetes, hyperthyroidism, GI disease including inflammatory bowel disease or COPD.    She has recurrent history of nephrolithiasis.  Previously had not been taking any calcium supplement for fear of nephrolithiasis.  She has been taking vitamin D 5000 IU international units daily.  She is planning to start multivitamin which probably contains some calcium.  Does not take any additional calcium supplement.    In terms of exercise; she walks every day.  She used to be a  at the [employer].  No dental procedures planned.      She is lactose intolerant.  She has a strong family history of kidney stone in her dad, brother, uncle.  She had kidney stones frequently over the last 10 years and she is  closely following with urology.  Kidney stones were calcium containing per report.    Allergies   Allergen Reactions     Flagyl [Metronidazole] Nausea and Vomiting     Lisinopril      Other reaction(s): Headaches     Ondansetron      Other reaction(s): nausea     Oxycodone-Acetaminophen Nausea and Vomiting     States Oxycodone is fine     Sulfamethoxazole-Trimethoprim      Other reaction(s): Headache     Zithromax [Azithromycin Dihydrate] Rash       Current Outpatient Medications   Medication Sig Dispense Refill     acyclovir (ZOVIRAX) 400 MG tablet Take 1 tablet (400 mg) by mouth every 8 hours for 5 days 15 tablet 11     amLODIPine (NORVASC) 5 MG tablet Take 1 tablet by mouth       bimatoprost (LUMIGAN) 0.01 % SOLN        clonazePAM (KLONOPIN) 0.5 MG tablet clonazepam 0.5 mg tablet   TAKE 1 TABLET BY MOUTH before bed       cyanocobalamin (VITAMIN  B-12) 1000 MCG tablet Take 1 tablet by mouth       Multiple Vitamins-Minerals (EMERGEN-C IMMUNE PO) Take by mouth daily       Omega-3 Fatty Acids (OMEGA-3 FISH OIL PO) Take by mouth daily       UNABLE TO FIND daily MEDICATION NAME: Tumeric         Review of Systems     5 point review of system is negative or is as per HPI above  Denies any back pain.  Pain involving the index finger of the right hand.  Past Medical History:   Diagnosis Date     Depressive disorder      Endometriosis      Herpes genitalis in women      History of major depression     situational with first .      Kidney stone      Malignant neoplasm of right breast in female, estrogen receptor positive (H) 2020       Past Surgical History:   Procedure Laterality Date     BREAST BIOPSY, RT/LT Right 2020     BREAST SURGERY Right     Right Breast - Benign      SECTION      x1     COLONOSCOPY N/A 2016    Procedure: COMBINED COLONOSCOPY, SINGLE OR MULTIPLE BIOPSY/POLYPECTOMY BY BIOPSY;  Surgeon: Duane, William Charles, MD;  Location: [facility] OR     COLONOSCOPY WITH CO2  INSUFFLATION N/A 08/16/2016    Procedure: COLONOSCOPY WITH CO2 INSUFFLATION;  Surgeon: Duane, William Charles, MD;  Location: [facility] OR     CYSTOSCOPY  11/13/2009    left stent placement (C-ARM)     GENITOURINARY SURGERY      surgery for kidney stone     GYN SURGERY      Partial Hysterectomy at age 29     LUMPECTOMY BREAST WITH SENTINEL NODE, COMBINED Right 09/29/2020    Procedure: Right breast lumpectomy with [place] node biopsy;  Surgeon: Jose Watson MD;  Location: [facility] OR       Family History   Problem Relation Age of Onset     Hypertension Father      Kidney failure Father      Aortic aneurysm Father      Prostate Cancer Father      Stomach Cancer Maternal Grandfather      Heart Disease Paternal Grandfather      Hypertension Paternal Grandfather      Neurologic Disorder Brother         has seizures from Epilepsy     Asthma Mother      Hypertension Mother      Arthritis Mother      Hypertension Brother      Lymphoma Maternal Uncle      Cancer Maternal Uncle         Cholangiocarcinoma     Breast Cancer Paternal Aunt      Breast Cancer Cousin         Paternal 1st Female Cousin (daughter of paternal aunt with breast cancer)     Former smoker      Objective:   /78 (BP Location: Left arm, Patient Position: Sitting, Cuff Size: Adult Regular)   Pulse 65   Wt 51.9 kg (114 lb 8 oz)   SpO2 98%   BMI 22.36 kg/m    Constitutional: Pleasant no acute cardiopulmonary distress.   EYES: anicteric, normal extra-ocular movements.  Neurological: Alert and oriented.    Extremities: There is joint thickening at the metacarpophalangeal joint of the right index finger.  No erythema or tenderness to palpation.  Psychological: appropriate mood and affect     In House Labs:     TSH   Date Value Ref Range Status   10/28/2019 2.18 0.40 - 4.00 mU/L Final   08/05/2019 1.75 0.40 - 4.00 mU/L Final   07/31/2018 3.54 0.40 - 4.00 mU/L Final   09/19/2017 2.68 0.40 - 4.00 mU/L Final   01/23/2016 2.70 0.40 - 4.00 mU/L Final       Creatinine    Date Value Ref Range Status   10/28/2019 0.50 (L) 0.52 - 1.04 mg/dL Final     ENDO CALCIUM LABS-UMP Latest Ref Rng & Units 10/28/2019   CALCIUM 8.5 - 10.1 mg/dL 9.1   PHOSPHOROUS 2.5 - 4.5 mg/dL 2.7   ALBUMIN 3.4 - 5.0 g/dL 3.9   BUN 7 - 30 mg/dL 16   CREATININE 0.52 - 1.04 mg/dL 0.50 (L)   PARATHYROID HORMONE INTACT 18 - 80 pg/mL 46   ALKPHOS 40 - 150 U/L 59   VITAMIN D DEFICIENCY SCREENING 20 - 75 ug/L 18 (L)     BONE DENSITOMETRY  [address] [address]  8/17/2020   FINDINGS:               Lumbar Spine (L1-L4)      T-score:  -1.2, degenerative changes present               Left Femoral Neck            T-score:  -1.8               Right Femoral Neck          T-score:  -2.1                         Lumbar (L1-L4) BMD: 1.033     Previous: 1.074                     Total Hip Mean BMD: 0.842      Previous: 0.876     Comparison is made to another DXA performed on a different [facility] machine on 8/3/2018.      IMPRESSION  Osteopenia., Degenerative changes of the lumbar spine which may falsely elevate results.     Comparisons from different scanners that have not been cross calibrated, are not necessarily valid. Such a comparison has been performed here; one should interpret with caution.   There has been probably no significant change in bone density of the lumbar spine. There has been probably no significant change in bone density of the hip(s).       Assessment/Treatment Plan:      Osteopenia; I have personally reviewed her latest DEXA scan from 8/2020.  Risk factors for osteoporosis include inadequate intake of calcium for fear of kidney stones previously.  Taking adequate calcium through diet does not increase risk of kidney stones infarct it contributes to preventing kidney stones.  Recommend a total of calcium 1200 mg daily from all sources.   To identify other possible secondary causes of osteopenia screening for primary hyperparathyroidism and celiac disease were done  and results were negative or normal.  Current vitamin D intake is higher than recommended.  Vitamin D 1000 international units daily is a recommendation for maintenance purposes.  Stop vitamin D for 4 weeks while undergoing radiation therapy and resume at maintenance dose of 1000 IUs daily after 4 weeks.  We will also check vitamin D level for further clarification.     Based on the current DEXA scan - FRAX score of 7.2% for 10-year probability of major osteoporotic fracture and a FRAX score of 1 for a 10-year probability of hip fracture; bone specific therapy with bisphosphonate is not indicated at this point in time.  Recommend adequate calcium and vitamin D as detailed above.  Recommend strengthening exercises.  Once treatment is initiated with antiaromatase therapy from breast cancer standpoint; there might be acceleration of bone loss therefore we will closely follow-up.  We will repeat bone density 1 year from the last one for further assessment.              Right index finger pain- there is joint thickening at the metacarpophalangeal joint of the right index finger.  No erythema or tenderness to palpation.  Plain x-ray was done at the time of her visit and results were unremarkable.  Supportive therapy recommended.      Patient Instructions   [place],    The bone density test shows osteopenia. This is an intermediate category that is in between normal and osteoporosis.  People with osteopenia should work on taking in 1200 mg of calcium from all sources; with vitamin D 1000 international unit(s) daily.     I have listed below dietary sources and how much calcium each contain per serving. I also recommend weight bearing exercise (walking works)/muscle strengthing  exercise.   Dietary sources of calcium.   Milk                            8 oz            300 mg   Yogurt                          1 cup           400 mg   Hard cheese                     1.5 oz          300 mg   Cottage cheese                  2 cup            300 mg   Orange juice with Calcium       8 oz            300 mg   Low fat dairy sources are recommended               [phone] to schedule DEXA        I will contact the patient with the test results.  Return to clinic in 12 months.    Test and/or medications prescribed today:  Orders Placed This Encounter   Procedures     Dexa hip/pelvis/spine     XR Finger Right G/E 2 Views     Albumin level     Calcium     Vitamin D Deficiency (D3 Only)     TSH with free T4 reflex         Cornel Briseno MD  Staff Endocrinologist    [phone]  Division of Endocrinology and Diabetes            Again, thank you for allowing me to participate in the care of your patient.        Sincerely,        Cornel Briseno MD

## 2020-11-23 NOTE — PROGRESS NOTES
[facility] PHYSICIANS  SPECIALIZING IN BREAKTHROUGHS  Radiation Oncology    On Treatment Visit Note      Maribel June      Date: 2020   MRN: [id number]   : 1965  Diagnosis: breast cancer      Reason for Visit:  On Radiation Treatment Visit     Treatment Summary to Date  Treatment Site: right breast Current Dose: 266/5256 cGy Fractions:       Chemotherapy  Chemo concurrent with radx?: No(Dr. Eli)    Subjective:     Early in treatment, doing well with no complaints.    Nursing ROS:   Nutrition Alteration  Diet Type: Patient's Preference  Skin  Skin Reaction: 0 - No changes  Skin Intervention: skin changes and skin cares reviewed, patient will begin using Aquaphor two times daily starting today        Cardiovascular  Respiratory effort: 1 - Normal - without distress        Psychosocial  Mood - Anxiety: 1 - Mild mood alteration  Mood - Depression: 0 - Normal  Pyschosocial Note: energy level at baseline  Pain Assessment  0-10 Pain Scale: 0      Objective:   /74 (BP Location: Left arm, Patient Position: Chair, Cuff Size: Adult Regular)   Pulse 57   Temp 97.9  F (36.6  C) (Oral)   Resp 16   Wt 51.2 kg (112 lb 12.8 oz)   SpO2 98%   BMI 22.03 kg/m    Gen: Appears well, in no acute distress  Skin: No erythema    Labs:  CBC RESULTS:   Recent Labs   Lab Test 14  1638   WBC 7.0   RBC 3.69*   HGB 11.1*   HCT 32.7*   MCV 89   MCH 30.1   MCHC 33.9   RDW 12.5        ELECTROLYTES:  Recent Labs   Lab Test 20  0818 10/28/19  1642 18  0844 18  0844   NA  --   --   --  140   POTASSIUM  --   --   --  3.6   CHLORIDE  --   --   --  106   KVNG  --  9.1  --  8.3*  8.4*   CO2  --   --   --  28  28   BUN  --  16  --  16   CR  --  0.50*  --  0.54  0.55   GLC 89  --    < > 92    < > = values in this interval not displayed.       Assessment:    Tolerating radiation therapy well.  All questions and concerns addressed.    Plan:   1. Continue current therapy.     2. Skin care per above.      Mosaiq chart and setup information reviewed  Ports checked    Medication Review  Med list reviewed with patient?: Yes  Med list printed and given: Offered and declined    Educational Topic Discussed  Education Instructions: radiation therapy side effects: fatigue, skin changes and skin cares        Robby Weaver MD    Please do not send letter to referring physician.

## 2020-11-23 NOTE — PROGRESS NOTES
Endocrinology Clinic Visit    Chief Complaint: RECHECK (osteopenia)     Information obtained from:Patient    Subjective:         HPI: Maribel June is a 55 year old female with history of osteoporosis who is for a follow up.      This is a patient with history of osteopenia.  Recently diagnosed with breast cancer and currently undergoing radiation therapy.  Here for further discussion of osteopenia.  No fractures since we saw her last.     Fracture risk and osteoporosis  No previous history of fracture after the age of 50.  No loss of height.  Body mass index is 22.36 kg/m .  Her weight has always been in the range of 109-110  No family history or parental history of hip fracture or osteoporosis.  She is a non-smoker.  No history of current or past use of glucocorticoid treatment  No history of excessive alcohol intake  She has had hysterectomy but she tells me that ovaries are present.  No history of prolonged immobility, transplant history, diabetes, hyperthyroidism, GI disease including inflammatory bowel disease or COPD.    She has recurrent history of nephrolithiasis.  Previously had not been taking any calcium supplement for fear of nephrolithiasis.  She has been taking vitamin D 5000 IU international units daily.  She is planning to start multivitamin which probably contains some calcium.  Does not take any additional calcium supplement.    In terms of exercise; she walks every day.  She used to be a  at the [employer].  No dental procedures planned.      She is lactose intolerant.  She has a strong family history of kidney stone in her dad, brother, uncle.  She had kidney stones frequently over the last 10 years and she is closely following with urology.  Kidney stones were calcium containing per report.    Allergies   Allergen Reactions     Flagyl [Metronidazole] Nausea and Vomiting     Lisinopril      Other reaction(s): Headaches     Ondansetron      Other reaction(s): nausea      Oxycodone-Acetaminophen Nausea and Vomiting     States Oxycodone is fine     Sulfamethoxazole-Trimethoprim      Other reaction(s): Headache     Zithromax [Azithromycin Dihydrate] Rash       Current Outpatient Medications   Medication Sig Dispense Refill     acyclovir (ZOVIRAX) 400 MG tablet Take 1 tablet (400 mg) by mouth every 8 hours for 5 days 15 tablet 11     amLODIPine (NORVASC) 5 MG tablet Take 1 tablet by mouth       bimatoprost (LUMIGAN) 0.01 % SOLN        clonazePAM (KLONOPIN) 0.5 MG tablet clonazepam 0.5 mg tablet   TAKE 1 TABLET BY MOUTH before bed       cyanocobalamin (VITAMIN  B-12) 1000 MCG tablet Take 1 tablet by mouth       Multiple Vitamins-Minerals (EMERGEN-C IMMUNE PO) Take by mouth daily       Omega-3 Fatty Acids (OMEGA-3 FISH OIL PO) Take by mouth daily       UNABLE TO FIND daily MEDICATION NAME: Tumeric         Review of Systems     5 point review of system is negative or is as per HPI above  Denies any back pain.  Pain involving the index finger of the right hand.  Past Medical History:   Diagnosis Date     Depressive disorder      Endometriosis      Herpes genitalis in women      History of major depression     situational with first .      Kidney stone      Malignant neoplasm of right breast in female, estrogen receptor positive (H) 2020       Past Surgical History:   Procedure Laterality Date     BREAST BIOPSY, RT/LT Right 2020     BREAST SURGERY Right     Right Breast - Benign      SECTION      x1     COLONOSCOPY N/A 2016    Procedure: COMBINED COLONOSCOPY, SINGLE OR MULTIPLE BIOPSY/POLYPECTOMY BY BIOPSY;  Surgeon: Duane, William Charles, MD;  Location: [facility] OR     COLONOSCOPY WITH CO2 INSUFFLATION N/A 2016    Procedure: COLONOSCOPY WITH CO2 INSUFFLATION;  Surgeon: Duane, William Charles, MD;  Location: [facility] OR     CYSTOSCOPY  2009    left stent placement (C-ARM)     GENITOURINARY SURGERY      surgery for kidney stone     GYN SURGERY       Partial Hysterectomy at age 29     LUMPECTOMY BREAST WITH SENTINEL NODE, COMBINED Right 09/29/2020    Procedure: Right breast lumpectomy with [place] node biopsy;  Surgeon: Jose Watson MD;  Location:  OR       Family History   Problem Relation Age of Onset     Hypertension Father      Kidney failure Father      Aortic aneurysm Father      Prostate Cancer Father      Stomach Cancer Maternal Grandfather      Heart Disease Paternal Grandfather      Hypertension Paternal Grandfather      Neurologic Disorder Brother         has seizures from Epilepsy     Asthma Mother      Hypertension Mother      Arthritis Mother      Hypertension Brother      Lymphoma Maternal Uncle      Cancer Maternal Uncle         Cholangiocarcinoma     Breast Cancer Paternal Aunt      Breast Cancer Cousin         Paternal 1st Female Cousin (daughter of paternal aunt with breast cancer)     Former smoker      Objective:   /78 (BP Location: Left arm, Patient Position: Sitting, Cuff Size: Adult Regular)   Pulse 65   Wt 51.9 kg (114 lb 8 oz)   SpO2 98%   BMI 22.36 kg/m    Constitutional: Pleasant no acute cardiopulmonary distress.   EYES: anicteric, normal extra-ocular movements.  Neurological: Alert and oriented.    Extremities: There is joint thickening at the metacarpophalangeal joint of the right index finger.  No erythema or tenderness to palpation.  Psychological: appropriate mood and affect     In House Labs:     TSH   Date Value Ref Range Status   10/28/2019 2.18 0.40 - 4.00 mU/L Final   08/05/2019 1.75 0.40 - 4.00 mU/L Final   07/31/2018 3.54 0.40 - 4.00 mU/L Final   09/19/2017 2.68 0.40 - 4.00 mU/L Final   01/23/2016 2.70 0.40 - 4.00 mU/L Final       Creatinine   Date Value Ref Range Status   10/28/2019 0.50 (L) 0.52 - 1.04 mg/dL Final     ENDO CALCIUM LABS-UMP Latest Ref Rng & Units 10/28/2019   CALCIUM 8.5 - 10.1 mg/dL 9.1   PHOSPHOROUS 2.5 - 4.5 mg/dL 2.7   ALBUMIN 3.4 - 5.0 g/dL 3.9   BUN 7 - 30 mg/dL 16   CREATININE  0.52 - 1.04 mg/dL 0.50 (L)   PARATHYROID HORMONE INTACT 18 - 80 pg/mL 46   ALKPHOS 40 - 150 U/L 59   VITAMIN D DEFICIENCY SCREENING 20 - 75 ug/L 18 (L)     BONE DENSITOMETRY  [facility]  [address]  [place] [address]  8/17/2020   FINDINGS:               Lumbar Spine (L1-L4)      T-score:  -1.2, degenerative changes present               Left Femoral Neck            T-score:  -1.8               Right Femoral Neck          T-score:  -2.1                         Lumbar (L1-L4) BMD: 1.033     Previous: 1.074                     Total Hip Mean BMD: 0.842      Previous: 0.876     Comparison is made to another DXA performed on a different [employer] [facility] machine on 8/3/2018.      IMPRESSION  Osteopenia., Degenerative changes of the lumbar spine which may falsely elevate results.     Comparisons from different scanners that have not been cross calibrated, are not necessarily valid. Such a comparison has been performed here; one should interpret with caution.   There has been probably no significant change in bone density of the lumbar spine. There has been probably no significant change in bone density of the hip(s).       Assessment/Treatment Plan:      Osteopenia; I have personally reviewed her latest DEXA scan from 8/2020.  Risk factors for osteoporosis include inadequate intake of calcium for fear of kidney stones previously.  Taking adequate calcium through diet does not increase risk of kidney stones infarct it contributes to preventing kidney stones.  Recommend a total of calcium 1200 mg daily from all sources.   To identify other possible secondary causes of osteopenia screening for primary hyperparathyroidism and celiac disease were done and results were negative or normal.  Current vitamin D intake is higher than recommended.  Vitamin D 1000 international units daily is a recommendation for maintenance purposes.  Stop vitamin D for 4 weeks while undergoing radiation therapy and resume at  maintenance dose of 1000 IUs daily after 4 weeks.  We will also check vitamin D level for further clarification.     Based on the current DEXA scan - FRAX score of 7.2% for 10-year probability of major osteoporotic fracture and a FRAX score of 1 for a 10-year probability of hip fracture; bone specific therapy with bisphosphonate is not indicated at this point in time.  Recommend adequate calcium and vitamin D as detailed above.  Recommend strengthening exercises.  Once treatment is initiated with antiaromatase therapy from breast cancer standpoint; there might be acceleration of bone loss therefore we will closely follow-up.  We will repeat bone density 1 year from the last one for further assessment.              Right index finger pain- there is joint thickening at the metacarpophalangeal joint of the right index finger.  No erythema or tenderness to palpation.  Plain x-ray was done at the time of her visit and results were unremarkable.  Supportive therapy recommended.      Patient Instructions   [place],    The bone density test shows osteopenia. This is an intermediate category that is in between normal and osteoporosis.  People with osteopenia should work on taking in 1200 mg of calcium from all sources; with vitamin D 1000 international unit(s) daily.     I have listed below dietary sources and how much calcium each contain per serving. I also recommend weight bearing exercise (walking works)/muscle strengthing  exercise.   Dietary sources of calcium.   Milk                            8 oz            300 mg   Yogurt                          1 cup           400 mg   Hard cheese                     1.5 oz          300 mg   Cottage cheese                  2 cup           300 mg   Orange juice with Calcium       8 oz            300 mg   Low fat dairy sources are recommended               [phone] to schedule DEXA        I will contact the patient with the test results.  Return to clinic in 12 months.    Test  and/or medications prescribed today:  Orders Placed This Encounter   Procedures     Dexa hip/pelvis/spine     XR Finger Right G/E 2 Views     Albumin level     Calcium     Vitamin D Deficiency (D3 Only)     TSH with free T4 reflex         Cornel Briseno MD  Staff Endocrinologist    [phone]  Division of Endocrinology and Diabetes

## 2020-11-23 NOTE — NURSING NOTE
Teaching Flowsheet   Relevant Diagnosis: breast cancer  Teaching Topic: radiation therapy     Person(s) involved in teaching:   Patient     Motivation Level:  Asks Questions: Yes  Eager to Learn: Yes  Cooperative: Yes  Receptive (willing/able to accept information): Yes  Any cultural factors/Lutheran beliefs that may influence understanding or compliance? No       Patient demonstrates understanding of the following:  Reason for the appointment, diagnosis and treatment plan: Yes  Knowledge of proper use of medications and conditions for which they are ordered (with special attention to potential side effects or drug interactions): Yes  Which situations necessitate calling provider and whom to contact: Yes       Teaching Concerns Addressed:   Comments: Radiation therapy side effects: fatigue, skin changes and skin cares     Proper use and care of  (medical equip, care aids, etc.): NA  Nutritional needs and diet plan: Yes  Pain management techniques: Yes  Wound Care: Yes  How and/when to access community resources: Yes     Instructional Materials Used/Given: Radiation Therapy educational handouts: fatigue, skin changes and skin cares     Time spent with patient: 15 minutes.    Georgia James RN BSN OCN CBCN

## 2020-11-23 NOTE — PATIENT INSTRUCTIONS
[place],    The bone density test shows osteopenia. This is an intermediate category that is in between normal and osteoporosis.  People with osteopenia should work on taking in 1200 mg of calcium from all sources; with vitamin D 1000 international unit(s) daily.     I have listed below dietary sources and how much calcium each contain per serving. I also recommend weight bearing exercise (walking works)/muscle strengthing  exercise.   Dietary sources of calcium.   Milk                            8 oz            300 mg   Yogurt                          1 cup           400 mg   Hard cheese                     1.5 oz          300 mg   Cottage cheese                  2 cup           300 mg   Orange juice with Calcium       8 oz            300 mg   Low fat dairy sources are recommended               [phone] to schedule DEXA

## 2020-11-24 ENCOUNTER — PATIENT OUTREACH (OUTPATIENT)
Dept: ONCOLOGY | Facility: CLINIC | Age: 55
End: 2020-11-24

## 2020-11-24 ENCOUNTER — APPOINTMENT (OUTPATIENT)
Dept: RADIATION ONCOLOGY | Facility: CLINIC | Age: 55
End: 2020-11-24
Payer: COMMERCIAL

## 2020-11-24 PROCEDURE — 77412 RADIATION TX DELIVERY LVL 3: CPT | Performed by: RADIOLOGY

## 2020-11-24 NOTE — PROGRESS NOTES
Writer placed outgoing fax to [employer] for 10/27/2020 clinic visit note with Dr Saige Eli per the request of AP Colon. Screen shot of fax confirmation below.

## 2020-11-25 ENCOUNTER — HOSPITAL ENCOUNTER (OUTPATIENT)
Dept: OCCUPATIONAL THERAPY | Facility: CLINIC | Age: 55
Setting detail: THERAPIES SERIES
End: 2020-11-25
Attending: FAMILY MEDICINE
Payer: COMMERCIAL

## 2020-11-25 ENCOUNTER — APPOINTMENT (OUTPATIENT)
Dept: RADIATION ONCOLOGY | Facility: CLINIC | Age: 55
End: 2020-11-25
Payer: COMMERCIAL

## 2020-11-25 DIAGNOSIS — I89.0 LYMPHEDEMA: ICD-10-CM

## 2020-11-25 PROCEDURE — 77412 RADIATION TX DELIVERY LVL 3: CPT | Performed by: RADIOLOGY

## 2020-11-25 PROCEDURE — 97140 MANUAL THERAPY 1/> REGIONS: CPT | Mod: GO | Performed by: OCCUPATIONAL THERAPIST

## 2020-11-25 PROCEDURE — 97166 OT EVAL MOD COMPLEX 45 MIN: CPT | Mod: GO | Performed by: OCCUPATIONAL THERAPIST

## 2020-11-25 NOTE — PROGRESS NOTES
[facility]        OUTPATIENT OCCUPATIONAL THERAPY EDEMA EVALUATION  PLAN OF TREATMENT FOR OUTPATIENT REHABILITATION  (COMPLETE FOR INITIAL CLAIMS ONLY)  Patient's Last Name, First Name, Maribel Xiong                           Provider s Name:   [facility] Medical Record No.  [id number]     Start of Care Date:  11/25/20   Onset Date:  11/01/20(when skin sensitivity and tightness started)   Type:  OT   Medical Diagnosis:  RUE Axillary Web Syndrome/cording   Therapy Diagnosis:  CASSIE of RUE Visits from SOC:  1                                     __________________________________________________________________________________   Plan of Treatment/Functional Goals:    Manual lymph drainage, Gradient compression bandaging, Fit for compression garment, Exercises, Precautions to prevent infection / exacerbation, Education, Manual therapy, ADL training, Skin care / precautions, Home management program development        GOALS  1. Goal description: Pt will report compliance with home program 7/7 days including wear of compression to RUE during wakeful hours, self MLD and HEP to reduce cording and edema and to improve ROM of RUE for improved functional use of RUE.       Target date: 01/20/21  2. Goal description: Pt will increase 2/2 RUE AROM values by at least 10* due to less pain in RUE with movement and to improve functional use of RUE.       Target date: 01/20/21  3. Goal description: Pt will reduce volume of RUE by at least 50mL to reduce pain/discomfort in RUE.       Target date: 01/20/21  4. Goal description: Pt will be IND with donning compression garment(s) and verbalizing wear/wash/replace schedule for edema management at discharge.        Target date: 01/20/21  5.            6.               7.             8.              Treatment Frequency: 1x/week    Treatment duration: x5 weeks but within 8 weeks    Ameena Colunga OT                                    I CERTIFY THE NEED FOR THESE SERVICES FURNISHED UNDER        THIS PLAN OF TREATMENT AND WHILE UNDER MY CARE     (Physician co-signature of this document indicates review and certification of the therapy plan).                   Certification date from: 11/25/20       Certification date to: 01/20/21           Referring physician: Leta Eli MD   Initial Assessment  See Epic Evaluation- Start of care: 11/25/20

## 2020-11-25 NOTE — PROGRESS NOTES
"   11/25/20 1127   Quick Adds   Quick Adds Certification   Rehab Discipline   Discipline OT   Type of Visit   Type of visit Initial Edema Evaluation       present No   General Information   Start of care 11/25/20   Referring physician Leta Eli MD   Orders Evaluate and treat as indicated   Order date 11/19/20   Medical diagnosis H/o right breast cancer with lumpectomy and SLNB 9/29/2020, started radiation to RUQ 11/23/2020.  Pt presents with discomfort of RUE and RUQ, tightness throughout RUE with movement of RUE over shoulder height.   Onset of illness / date of surgery 09/29/20   Edema onset 11/01/20  (when skin sensitivity and tightness started)   Affected body parts RUE;Trunk  (RUQ)   Edema etiology Cancer with lymph node dissection;Radiation   Location - Cancer with lymph node dissection right axillA   Location - Radiation RUQ   Radiation comments started 11/23/2020   Pertinent history of current problem (PT: include personal factors and/or comorbidities that impact the POC; OT: include additional occupational profile info) Pt was working with PT for 3 sessions but exercises were increasing discomfort in RUE.  Pt describes both \"tightness\" in RUE with movement of arm as well as skin pain with palpation as well as \"shocks\" of pain.   Surgical / medical history reviewed Yes   Prior level of functional mobility IND   Community support Family / friend caregiver   Patient role / employment history   (taking a leave at this time)   Living environment comments able to perform all I/ADLs, at times discomfort if she has to move RUE overhead, difficulty finding comfortable position to sleep   Fall Risk Screen   Fall screen comments screen by PT-no fall risk   Subjective Report   Patient report of symptoms tightness in RUE, skin discomfort with palpation   Patient / Family Goals   Patient / family goals statement to reduce tightness, improve ROM   Pain   Patient currently in pain Yes   Pain " location RUE   Pain description Heaviness   Pain description comment at rest pain is 2/10, arm is tired, 5/10 with movement of RUE over shoulder height   Cognitive Status   Orientation Orientation to person, place and time   Level of consciousness Alert   Follows commands and answers questions 100% of the time   Edema Exam / Assessment   Skin condition comments NO edema of right hand, minimal edema of medial forearm and elbow,mild edema of upper arm, soft, minimal edema in axilla at area of seroma.    Scar Yes   Location lumpectomy   Scar comments flat, healed, no concerns   Girth Measurements   Girth Measurements Refer to separate girth measurement flowsheet   Range of Motion   ROM comments right GABY flexion: 0-139*, right GABY abduction 0-140*   Posture   Posture Normal   Activities of Daily Living   Activities of Daily Living IND   Bed Mobility   Bed mobility IND   Transfers   Transfers IND   Gait / Locomotion   Gait / Locomotion IND   Planned Edema Interventions   Planned edema interventions Manual lymph drainage;Gradient compression bandaging;Fit for compression garment;Exercises;Precautions to prevent infection / exacerbation;Education;Manual therapy;ADL training;Skin care / precautions;Home management program development   Clinical Impression   Criteria for skilled therapeutic intervention met Yes   Therapy diagnosis CASSIE of RUE   Assessment of Occupational Performance 3-5 Performance Deficits   Identified Performance Deficits pain with movement of RUE over GABY height, unable to perform PT exercises, pain wtih positioning needed for radiation, difficulty sleeping   Clinical Decision Making (Complexity) Moderate complexity   Treatment Frequency 1x/week   Treatment duration x5 weeks but within 8 weeks   Patient / family and/or staff in agreement with plan of care Yes   Risks and benefits of therapy have been explained Yes   Clinical impression comments Pt presents with CASSIE and edema around the cording of RUE  limiting ability to reach over GABY height and causing discomfort with sleeping.  Pt will benefit from continued skilled OT intervention to address RUE cording, ROM and edema to improve functional use and comfort with movement of RUE.    Goals   Edema Eval Goals 1;2;3;4   Goal 1   Goal identifier home program   Goal description Pt will report compliance with home program 7/7 days including wear of compression to RUE during wakeful hours, self MLD and HEP to reduce cording and edema and to improve ROM of RUE for improved functional use of RUE.   Target date 01/20/21   Goal 2   Goal identifier ROM   Goal description Pt will increase 2/2 RUE AROM values by at least 10* due to less pain in RUE with movement and to improve functional use of RUE.   Target date 01/20/21   Goal 3   Goal identifier volume   Goal description Pt will reduce volume of RUE by at least 50mL to reduce pain/discomfort in RUE.   Target date 01/20/21   Goal 4   Goal identifier garment   Goal description Pt will be IND with donning compression garment(s) and verbalizing wear/wash/replace schedule for edema management at discharge.    Target date 01/20/21   Total Evaluation Time   OT Jorge, Moderate Complexity Minutes (75997) 25   Certification   Certification date from 11/25/20   Certification date to 01/20/21   Medical Diagnosis RUE Axillary Web Syndrome/cording

## 2020-11-27 ENCOUNTER — APPOINTMENT (OUTPATIENT)
Dept: RADIATION ONCOLOGY | Facility: CLINIC | Age: 55
End: 2020-11-27
Payer: COMMERCIAL

## 2020-11-27 PROCEDURE — 77412 RADIATION TX DELIVERY LVL 3: CPT | Performed by: RADIOLOGY

## 2020-11-30 ENCOUNTER — APPOINTMENT (OUTPATIENT)
Dept: RADIATION ONCOLOGY | Facility: CLINIC | Age: 55
End: 2020-11-30
Payer: COMMERCIAL

## 2020-11-30 PROCEDURE — 77417 THER RADIOLOGY PORT IMAGE(S): CPT | Performed by: RADIOLOGY

## 2020-11-30 PROCEDURE — 77427 RADIATION TX MANAGEMENT X5: CPT | Performed by: RADIOLOGY

## 2020-11-30 PROCEDURE — 77336 RADIATION PHYSICS CONSULT: CPT | Performed by: RADIOLOGY

## 2020-11-30 PROCEDURE — 77412 RADIATION TX DELIVERY LVL 3: CPT | Performed by: RADIOLOGY

## 2020-12-01 ENCOUNTER — APPOINTMENT (OUTPATIENT)
Dept: RADIATION ONCOLOGY | Facility: CLINIC | Age: 55
End: 2020-12-01
Payer: COMMERCIAL

## 2020-12-01 PROCEDURE — 77412 RADIATION TX DELIVERY LVL 3: CPT | Performed by: RADIOLOGY

## 2020-12-02 ENCOUNTER — OFFICE VISIT (OUTPATIENT)
Dept: RADIATION ONCOLOGY | Facility: CLINIC | Age: 55
End: 2020-12-02
Payer: COMMERCIAL

## 2020-12-02 ENCOUNTER — APPOINTMENT (OUTPATIENT)
Dept: RADIATION ONCOLOGY | Facility: CLINIC | Age: 55
End: 2020-12-02
Payer: COMMERCIAL

## 2020-12-02 VITALS
WEIGHT: 112.5 LBS | OXYGEN SATURATION: 98 % | DIASTOLIC BLOOD PRESSURE: 80 MMHG | TEMPERATURE: 98.1 F | SYSTOLIC BLOOD PRESSURE: 124 MMHG | BODY MASS INDEX: 21.97 KG/M2 | RESPIRATION RATE: 16 BRPM | HEART RATE: 65 BPM

## 2020-12-02 DIAGNOSIS — Z17.0 MALIGNANT NEOPLASM OF LOWER-OUTER QUADRANT OF RIGHT BREAST OF FEMALE, ESTROGEN RECEPTOR POSITIVE (H): Primary | ICD-10-CM

## 2020-12-02 DIAGNOSIS — C50.511 MALIGNANT NEOPLASM OF LOWER-OUTER QUADRANT OF RIGHT BREAST OF FEMALE, ESTROGEN RECEPTOR POSITIVE (H): Primary | ICD-10-CM

## 2020-12-02 PROCEDURE — 77412 RADIATION TX DELIVERY LVL 3: CPT | Performed by: RADIOLOGY

## 2020-12-02 PROCEDURE — 99207 PR DROP WITH A PROCEDURE: CPT | Performed by: RADIOLOGY

## 2020-12-02 ASSESSMENT — PAIN SCALES - GENERAL: PAINLEVEL: NO PAIN (0)

## 2020-12-02 NOTE — PATIENT INSTRUCTIONS
Please contact [facility] RN with questions or concerns following today's appointment: [phone].    Thank you!

## 2020-12-02 NOTE — PROGRESS NOTES
[facility] PHYSICIANS  SPECIALIZING IN BREAKTHROUGHS  Radiation Oncology    On Treatment Visit Note      Maribel June      Date: 2020   MRN: [id number]   : 1965  Diagnosis: breast cancer      Reason for Visit:  On Radiation Treatment Visit     Treatment Summary to Date  Treatment Site: right breast Current Dose: 1862/5256 cGy Fractions:       Chemotherapy  Chemo concurrent with radx?: No(Dr. Eli)    Subjective:     Has some cutaneous discomfort in the right lateral treatment field.  Also has increased fatigue and skin changes.    Nursing ROS:   Nutrition Alteration  Diet Type: Patient's Preference  Skin  Skin Reaction: 1 - Faint erythema or dry desquamation(very faint, no desquamation)  Skin Intervention: Patient using Aquaphor BID  Skin Note: Patient reports lymphedema to R arm since surgery, reports continuing weekly OT for treatment of lymphedema         Cardiovascular  Respiratory effort: 1 - Normal - without distress        Psychosocial  Mood - Anxiety: 1 - Mild mood alteration  Mood - Depression: 0 - Normal  Pyschosocial Note: Patient reports some fatigue  Pain Assessment  0-10 Pain Scale: 0      Objective:   /80 (BP Location: Left arm, Patient Position: Sitting, Cuff Size: Adult Regular)   Pulse 65   Temp 98.1  F (36.7  C) (Oral)   Resp 16   Wt 51 kg (112 lb 8 oz)   SpO2 98%   BMI 21.97 kg/m    Gen: Appears well, in no acute distress  Skin: Minimal diffuse erythema over treatment field    Labs:  CBC RESULTS:   Recent Labs   Lab Test 14  1638   WBC 7.0   RBC 3.69*   HGB 11.1*   HCT 32.7*   MCV 89   MCH 30.1   MCHC 33.9   RDW 12.5        ELECTROLYTES:  Recent Labs   Lab Test 20  0818 10/28/19  1642 18  0844 18  0844   NA  --   --   --  140   POTASSIUM  --   --   --  3.6   CHLORIDE  --   --   --  106   KVNG  --  9.1  --  8.3*  8.4*   CO2  --   --   --  28  28   BUN  --  16  --  16   CR  --  0.50*  --  0.54  0.55   GLC 89  --     < > 92    < > = values in this interval not displayed.       Assessment:    Tolerating radiation therapy well.  All questions and concerns addressed.    Toxicities:  Fatigue: Grade 1: Fatigue relieved by rest  Pain: Grade 1: Mild pain  Dermatitis: Grade 1: Faint erythema or dry desquamation    Plan:   1. Continue current therapy.    2. Skin care per above.  Can also use hydrocortisone 1% cream to lateral treatment field twice daily  3. Increase hydration to help with fatigue  4. Continue OT for lymphedema as previously directed      Mosaiq chart and setup information reviewed  Ports checked    Medication Review  Med list reviewed with patient?: Yes  Med list printed and given: Offered and declined    Educational Topic Discussed  Education Instructions: radiation therapy side effects: fatigue, skin changes and skin cares        Robby Weaver MD    Please do not send letter to referring physician.

## 2020-12-02 NOTE — LETTER
2020         RE: Maribel June  [address]  [place] [facility] [place] [address]        Dear Colleague,    Thank you for referring your patient, Maribel June, to the [facility] RADIATION [facility]COL[facility] [place]. Please see a copy of my visit note below.    [facility] PHYSICIANS  SPECIALIZING IN BREAKTHROUGHS  Radiation Oncology    On Treatment Visit Note      Maribel June      Date: 2020   MRN: [id number]   : 1965  Diagnosis: breast cancer      Reason for Visit:  On Radiation Treatment Visit     Treatment Summary to Date  Treatment Site: right breast Current Dose: 1862/5256 cGy Fractions:       Chemotherapy  Chemo concurrent with radx?: No(Dr. Eli)    Subjective:     Has some cutaneous discomfort in the right lateral treatment field.  Also has increased fatigue and skin changes.    Nursing ROS:   Nutrition Alteration  Diet Type: Patient's Preference  Skin  Skin Reaction: 1 - Faint erythema or dry desquamation(very faint, no desquamation)  Skin Intervention: Patient using Aquaphor BID  Skin Note: Patient reports lymphedema to R arm since surgery, reports continuing weekly OT for treatment of lymphedema         Cardiovascular  Respiratory effort: 1 - Normal - without distress        Psychosocial  Mood - Anxiety: 1 - Mild mood alteration  Mood - Depression: 0 - Normal  Pyschosocial Note: Patient reports some fatigue  Pain Assessment  0-10 Pain Scale: 0      Objective:   /80 (BP Location: Left arm, Patient Position: Sitting, Cuff Size: Adult Regular)   Pulse 65   Temp 98.1  F (36.7  C) (Oral)   Resp 16   Wt 51 kg (112 lb 8 oz)   SpO2 98%   BMI 21.97 kg/m    Gen: Appears well, in no acute distress  Skin: Minimal diffuse erythema over treatment field    Labs:  CBC RESULTS:   Recent Labs   Lab Test 14  1638   WBC 7.0   RBC 3.69*   HGB 11.1*   HCT 32.7*   MCV 89   MCH 30.1   MCHC 33.9   RDW 12.5        ELECTROLYTES:  Recent Labs   Lab Test  08/12/20  0818 10/28/19  1642 07/31/18  0844 07/31/18  0844   NA  --   --   --  140   POTASSIUM  --   --   --  3.6   CHLORIDE  --   --   --  106   KVNG  --  9.1  --  8.3*  8.4*   CO2  --   --   --  28  28   BUN  --  16  --  16   CR  --  0.50*  --  0.54  0.55   GLC 89  --    < > 92    < > = values in this interval not displayed.       Assessment:    Tolerating radiation therapy well.  All questions and concerns addressed.    Toxicities:  Fatigue: Grade 1: Fatigue relieved by rest  Pain: Grade 1: Mild pain  Dermatitis: Grade 1: Faint erythema or dry desquamation    Plan:   1. Continue current therapy.    2. Skin care per above.  Can also use hydrocortisone 1% cream to lateral treatment field twice daily  3. Increase hydration to help with fatigue  4. Continue OT for lymphedema as previously directed      Mosaiq chart and setup information reviewed  Ports checked    Medication Review  Med list reviewed with patient?: Yes  Med list printed and given: Offered and declined    Educational Topic Discussed  Education Instructions: radiation therapy side effects: fatigue, skin changes and skin cares        Robby Weaver MD    Please do not send letter to referring physician.          Again, thank you for allowing me to participate in the care of your patient.        Sincerely,        Robby Weaver MD

## 2020-12-03 ENCOUNTER — VIRTUAL VISIT (OUTPATIENT)
Dept: FAMILY MEDICINE | Facility: OTHER | Age: 55
End: 2020-12-03
Payer: COMMERCIAL

## 2020-12-03 ENCOUNTER — APPOINTMENT (OUTPATIENT)
Dept: RADIATION ONCOLOGY | Facility: CLINIC | Age: 55
End: 2020-12-03
Payer: COMMERCIAL

## 2020-12-03 ENCOUNTER — PATIENT OUTREACH (OUTPATIENT)
Dept: RADIATION ONCOLOGY | Facility: CLINIC | Age: 55
End: 2020-12-03

## 2020-12-03 DIAGNOSIS — Z20.822 SUSPECTED COVID-19 VIRUS INFECTION: ICD-10-CM

## 2020-12-03 DIAGNOSIS — Z20.822 SUSPECTED COVID-19 VIRUS INFECTION: Primary | ICD-10-CM

## 2020-12-03 PROCEDURE — 77412 RADIATION TX DELIVERY LVL 3: CPT | Performed by: RADIOLOGY

## 2020-12-03 PROCEDURE — U0003 INFECTIOUS AGENT DETECTION BY NUCLEIC ACID (DNA OR RNA); SEVERE ACUTE RESPIRATORY SYNDROME CORONAVIRUS 2 (SARS-COV-2) (CORONAVIRUS DISEASE [COVID-19]), AMPLIFIED PROBE TECHNIQUE, MAKING USE OF HIGH THROUGHPUT TECHNOLOGIES AS DESCRIBED BY CMS-2020-01-R: HCPCS | Performed by: FAMILY MEDICINE

## 2020-12-03 PROCEDURE — 99421 OL DIG E/M SVC 5-10 MIN: CPT | Performed by: FAMILY MEDICINE

## 2020-12-03 NOTE — PROGRESS NOTES
"Date: 2020 09:51:29  Clinician: Alverto Brown  Clinician NPI: [id number]  Patient: Maribel June  Patient : 1965  Patient Address[place] [address] [place] [address]  Patient Phone: [phone]  Visit Protocol: URI  Patient Summary:  Maribel is a 55 year old ( : 1965 ) female who initiated a [facility] Visit for COVID-19 (Coronavirus) evaluation and screening. When asked the question \"Please sign me up to receive news, health information and promotions from [facility].\", Maribel responded \"Yes\".    When asked when her symptoms started, Maribel reported that she does not have any symptoms.   She denies taking antibiotic medication in the past month and having recent facial or sinus surgery in the past 60 days.    Pertinent COVID-19 (Coronavirus) information  Maribel does not work or volunteer as healthcare worker or a . In the past 14 days, Maribel has not worked or volunteered at a healthcare facility or group living setting.   In the past 14 days, she also has not lived in a congregate living setting.   Maribel has had a close contact with a laboratory-confirmed COVID-19 patient in the last 14 days. She was not exposed at her work. Date Maribel was exposed to the laboratory-confirmed COVID-19 patient: 2020   Additional information about contact with COVID-19 (Coronavirus) patient as reported by the patient (free text): My chiropractor tested positive yesterday. I had an appointment with him on Monday.   Maribel is not living in the same household with the COVID-19 positive patient. She was in an enclosed space for greater than 15 minutes with the COVID-19 patient.   During the encounter, neither were wearing masks.   Since 2019, Maribel has been tested for COVID-19 and has not had upper respiratory infection or influenza-like illness.      Result of COVID-19 test: Negative     Date of her COVID-19 test: 2020      Pertinent medical history  She " has not been told by her provider to avoid NSAIDs.   Maribel does not get yeast infections when she takes antibiotics.   Maribel does not have diabetes. She denies having immunosuppressive conditions (e.g., chemotherapy, HIV, organ transplant, long-term use of steroids or other immunosuppressive medications, splenectomy). She does not have severe COPD and congestive heart failure. She does not have asthma.   Maribel does not need a return to work/school note.   Weight: 112 lbs   Maribel does not smoke or use smokeless tobacco.   Additional information as reported by the patient (free text): I am receiving radiation therapy due to breast cancer   Weight: 112 lbs    MEDICATIONS: amlodipine-benazepril oral, ALLERGIES: NKDA  Clinician Response:  Dear Maribel,   Based on your exposure to COVID-19 (coronavirus), we would like to test you for this virus.  1. Please call [phone] to schedule your visit. Explain that you were referred by [facility] to have a COVID-19 test. Be ready to share your [facility] visit ID number.  * If you need to schedule in [facility] please call [phone] or for [employer] employees please call [phone].   * If you need to schedule in the [place] area please call [phone]. [place] employees call [phone].   The following will serve as your written order for this COVID Test, ordered by me, for the indication of suspected COVID [Z20.828]: The test will be ordered in [facility], our electronic health record, after you are scheduled. It will show as ordered and authorized by Reji Padron MD.  Order: COVID-19 (coronavirus) PCR for ASYMPTOMATIC EXPOSURE testing from [facility].   If you know you have had close contact with someone who tested positive, you should be quarantined for 14 days after this exposure. You should stay in quarantine for the14 days even if the covid test is negative, the optimal time to test after exposure is 5-7 days from the exposure  Quarantine means   What should I do?   For safety, it's very important to follow these rules. Do this for 14 days after the date you were last exposed to the virus..  Stay home and away from others. Don't go to school or anywhere else. Generally quarantine means staying home from work but there are some exceptions to this. Please contact your workplace.   No hugging, kissing or shaking hands.  Don't let anyone visit.  Cover your mouth and nose with a mask, tissue or washcloth to avoid spreading germs.  Wash your hands and face often. Use soap and water.  What are the symptoms of COVID-19?  The most common symptoms are cough, fever and trouble breathing. Less common symptoms include headache, body aches, fatigue (feeling very tired), chills, sore throat, stuffy or runny nose, diarrhea (loose poop), loss of taste or smell, belly pain, and nausea or vomiting (feeling sick to your stomach or throwing up).  After 14 days, if you have still don't have symptoms, you likely don't have this virus.  If you develop symptoms, follow these guidelines.  If you're normally healthy: Please start another [facility] visit to report your symptoms. Go to [email or web address].  If you have a serious health problem (like cancer, heart failure, an organ transplant or kidney disease): Call your specialty clinic. Let them know that you might have COVID-19.  2. When it's time for your COVID test:  Stay at least 6 feet away from others. (If someone will drive you to your test, stay in the backseat, as far away from the  as you can.)  Cover your mouth and nose with a mask, tissue or washcloth.  Go straight to the testing site. Don't make any stops on the way there or back.  Please note  Caregivers in these groups are at risk for severe illness due to COVID-19:  o People 65 years and older  o People who live in a nursing home or long-term care facility  o People with chronic disease (lung, heart, cancer, diabetes, kidney, liver, immunologic)  o People who have a weakened immune system,  including those who:  Are in cancer treatment  Take medicine that weakens the immune system, such as corticosteroids  Had a bone marrow or organ transplant  Have an immune deficiency  Have poorly controlled HIV or AIDS  Are obese (body mass index of 40 or higher)  Smoke regularly  Where can I get more information?  [facility] -- About COVID-19: [email or web address]  CDC -- What to Do If You're Sick: www.cdc.gov/coronavirus/2019-ncov/about/steps-when-sick.html  CDC -- Ending Home Isolation: www.cdc.gov/coronavirus/2019-ncov/hcp/disposition-in-home-patients.html  [facility] -- Caring for Someone: www.cdc.gov/coronavirus/2019-ncov/if-you-are-sick/care-for-someone.html  [facility] -- Interim Guidance for Hospital Discharge to Home: www.health.[facility].[place]./diseases/coronavirus/hcp/hospdischarge.pdf  [facility] clinical trials (COVID-19 research studies): clinicalaffairs.[facility]-clinical-trials  Below are the COVID-19 hotlines at the [place] Department of Health ([facility]). Interpreters are available.  For health questions: Call 755-024-4088 or 1-648.182.1393 (7 a.m. to 7 p.m.)  For questions about schools and childcare: Call 602-039-3094 or 1-294.485.5981 (7 a.m. to 7 p.m.)    Diagnosis: Contact with and (suspected) exposure to other viral communicable diseases  Diagnosis ICD: Z20.828

## 2020-12-03 NOTE — TELEPHONE ENCOUNTER
Received telephone call from patient reporting that she received a call this morning that she was notified this morning of positive exposure to COVID-19 on Monday, 11/30/2020.  Patient questions what her next steps entail.  This RN reviewed with radiation therapy supervisor, reviewed most recent screening and logic grid.  Patient who is asymptomatic, which patient confirms she is and has positive exposure is a negative screening, therefore patient must wear a mask and staff to follow standard rooming process.  This RN notified patient that if she starts to show symptoms for COVID-19, ie: Fever of 100.0  F (37.8 C) or over, Chills, Cough, Shortness of Breath, Loss of taste or smell, Generalized body aches, Persistent headache, Sore throat (or trouble eating or drinking in young children?), Nausea, Vomiting, or diarrhea (loose stools) patient is to notify radiation clinic prior to coming in for daily treatment.  Also reviewed, patient should connect with [email or web address] to review if testing for COVID-19 is recommended based on positive exposure.  Patient verbalized understanding of all information and had no additional questions at this time.    Radiation therapists notified.    Georgia James, RN BSN [facility]

## 2020-12-04 ENCOUNTER — APPOINTMENT (OUTPATIENT)
Dept: RADIATION ONCOLOGY | Facility: CLINIC | Age: 55
End: 2020-12-04
Payer: COMMERCIAL

## 2020-12-04 LAB
SARS-COV-2 RNA SPEC QL NAA+PROBE: NOT DETECTED
SPECIMEN SOURCE: NORMAL

## 2020-12-04 PROCEDURE — 77412 RADIATION TX DELIVERY LVL 3: CPT | Performed by: RADIOLOGY

## 2020-12-04 NOTE — PROGRESS NOTES
Outpatient Physical Therapy Discharge Note     Patient: Maribel June  : 1965    Beginning/End Dates of Reporting Period:  10/20/20 to 2020 Seen for 4 visits    Referring Provider: Jose Watson MD    Therapy Diagnosis: Impaired shoulder ROM, pain, weakness     Client Self Report: She is still having pain and tenderness to touch through nerve distribution down right arm with slight areas of edema.  She feels her ROM is continuing to improve, but the pain can limit when reaching in the cupboard.      Goals: Goal suspended due to early discharge and referral made to OT for edema therapy.  Goal Identifier Overhead reach   Goal Description Crispin will demonstrate the ability to complete overhead reach in 5/5 trials demonstrating functional reach for putting dishes in her cupboards on a daily basis.   Target Date 20   Date Met   20   Progress:Can perform but with pain.     Goal Identifier Donning/doffing a coat   Goal Description Crispin will demonstrate the ability to don and doff her coat with no restriction and report no pain on a consistent basis.   Target Date 20   Date Met   20   Progress:Can perform but with pain     Goal Identifier Sleep   Goal Description Crispin will report the ability to sleep on her right side on a nightly basis with tolerable pain.   Target Date 20   Date Met      Progress:Goal not met     Goal Identifier Gait   Goal Description Crispin will demonstrate reciprocal arm swing during gait with no pain on a consistent basis.   Target Date 20   Date Met   20   Progress:Goal met     Progress is limited:   Progress limited due to ongoing right shoulder and breast area pain.  Refer to OT for edema treatment.    Plan:  Discharge from therapy.    Discharge:    Reason for Discharge: No further expectation of progress due to pain and will defer to OT for edema treatment.    Equipment Issued: None    Discharge Plan: Patient to continue home program.

## 2020-12-07 ENCOUNTER — APPOINTMENT (OUTPATIENT)
Dept: RADIATION ONCOLOGY | Facility: CLINIC | Age: 55
End: 2020-12-07
Payer: COMMERCIAL

## 2020-12-07 PROCEDURE — 77336 RADIATION PHYSICS CONSULT: CPT | Mod: 59 | Performed by: RADIOLOGY

## 2020-12-07 PROCEDURE — 77307 TELETHX ISODOSE PLAN CPLX: CPT | Performed by: RADIOLOGY

## 2020-12-07 PROCEDURE — 77412 RADIATION TX DELIVERY LVL 3: CPT | Performed by: RADIOLOGY

## 2020-12-07 PROCEDURE — 77427 RADIATION TX MANAGEMENT X5: CPT | Performed by: RADIOLOGY

## 2020-12-07 PROCEDURE — 77417 THER RADIOLOGY PORT IMAGE(S): CPT | Performed by: RADIOLOGY

## 2020-12-07 PROCEDURE — 77334 RADIATION TREATMENT AID(S): CPT | Performed by: RADIOLOGY

## 2020-12-08 ENCOUNTER — APPOINTMENT (OUTPATIENT)
Dept: RADIATION ONCOLOGY | Facility: CLINIC | Age: 55
End: 2020-12-08
Payer: COMMERCIAL

## 2020-12-08 PROCEDURE — 77280 THER RAD SIMULAJ FIELD SMPL: CPT | Performed by: RADIOLOGY

## 2020-12-08 PROCEDURE — 77412 RADIATION TX DELIVERY LVL 3: CPT | Performed by: RADIOLOGY

## 2020-12-09 ENCOUNTER — APPOINTMENT (OUTPATIENT)
Dept: RADIATION ONCOLOGY | Facility: CLINIC | Age: 55
End: 2020-12-09
Payer: COMMERCIAL

## 2020-12-09 ENCOUNTER — OFFICE VISIT (OUTPATIENT)
Dept: RADIATION ONCOLOGY | Facility: CLINIC | Age: 55
End: 2020-12-09
Payer: COMMERCIAL

## 2020-12-09 VITALS
BODY MASS INDEX: 22.28 KG/M2 | TEMPERATURE: 97.3 F | SYSTOLIC BLOOD PRESSURE: 121 MMHG | RESPIRATION RATE: 16 BRPM | DIASTOLIC BLOOD PRESSURE: 72 MMHG | OXYGEN SATURATION: 97 % | WEIGHT: 114.1 LBS | HEART RATE: 67 BPM

## 2020-12-09 DIAGNOSIS — C50.511 MALIGNANT NEOPLASM OF LOWER-OUTER QUADRANT OF RIGHT BREAST OF FEMALE, ESTROGEN RECEPTOR POSITIVE (H): Primary | ICD-10-CM

## 2020-12-09 DIAGNOSIS — Z17.0 MALIGNANT NEOPLASM OF LOWER-OUTER QUADRANT OF RIGHT BREAST OF FEMALE, ESTROGEN RECEPTOR POSITIVE (H): Primary | ICD-10-CM

## 2020-12-09 DIAGNOSIS — Z17.0 MALIGNANT NEOPLASM OF LOWER-OUTER QUADRANT OF RIGHT BREAST OF FEMALE, ESTROGEN RECEPTOR POSITIVE (H): ICD-10-CM

## 2020-12-09 DIAGNOSIS — C50.511 MALIGNANT NEOPLASM OF LOWER-OUTER QUADRANT OF RIGHT BREAST OF FEMALE, ESTROGEN RECEPTOR POSITIVE (H): ICD-10-CM

## 2020-12-09 PROCEDURE — 99207 PR DROP WITH A PROCEDURE: CPT | Performed by: RADIOLOGY

## 2020-12-09 PROCEDURE — 77412 RADIATION TX DELIVERY LVL 3: CPT | Performed by: RADIOLOGY

## 2020-12-09 PROCEDURE — U0003 INFECTIOUS AGENT DETECTION BY NUCLEIC ACID (DNA OR RNA); SEVERE ACUTE RESPIRATORY SYNDROME CORONAVIRUS 2 (SARS-COV-2) (CORONAVIRUS DISEASE [COVID-19]), AMPLIFIED PROBE TECHNIQUE, MAKING USE OF HIGH THROUGHPUT TECHNOLOGIES AS DESCRIBED BY CMS-2020-01-R: HCPCS | Performed by: RADIOLOGY

## 2020-12-09 ASSESSMENT — PAIN SCALES - GENERAL: PAINLEVEL: NO PAIN (0)

## 2020-12-09 NOTE — LETTER
2020         RE: Maribel June  [address]  [place] [place] [address]        Dear Colleague,    Thank you for referring your patient, Maribel June, to the [facility] [place]. Please see a copy of my visit note below.    [facility] PHYSICIANS  SPECIALIZING IN BREAKTHROUGHS  Radiation Oncology    On Treatment Visit Note      Maribel June      Date: 2020   MRN: [id number]   : 1965  Diagnosis: breast cancer      Reason for Visit:  On Radiation Treatment Visit     Treatment Summary to Date  Treatment Site: right breast Current Dose: 3192/5256 cGy Fractions:       Chemotherapy  Chemo concurrent with radx?: No(Dr. Eli)    Subjective:     Had stuffy nose last week, known exposure to COVID positive individual and ongoing myalgias this week.  Skin changes are still subtle    Nursing ROS:   Nutrition Alteration  Diet Type: Patient's Preference  Skin  Skin Reaction: 1 - Faint erythema or dry desquamation(no desquamation)  Skin Intervention: patient reports using Hydrocortisone cream BID and Aquaphor BID        Cardiovascular  Respiratory effort: 1 - Normal - without distress        Psychosocial  Mood - Anxiety: 1 - Mild mood alteration  Mood - Depression: 0 - Normal  Pyschosocial Note: patient reports fatigue and joint aches  Pain Assessment  0-10 Pain Scale: 0      Objective:   /72 (BP Location: Left arm, Patient Position: Chair, Cuff Size: Adult Regular)   Pulse 67   Temp 97.3  F (36.3  C) (Oral)   Resp 16   Wt 51.8 kg (114 lb 1.6 oz)   SpO2 97%   BMI 22.28 kg/m    Gen: Appears well, in no acute distress  Skin: Minimal diffuse erythema over treatment field    Labs:  CBC RESULTS:   Recent Labs   Lab Test 14  1638   WBC 7.0   RBC 3.69*   HGB 11.1*   HCT 32.7*   MCV 89   MCH 30.1   MCHC 33.9   RDW 12.5        ELECTROLYTES:  Recent Labs   Lab Test 20  0818 10/28/19  1642 18  0844 18  0844   NA   --   --   --  140   POTASSIUM  --   --   --  3.6   CHLORIDE  --   --   --  106   KVNG  --  9.1  --  8.3*  8.4*   CO2  --   --   --  28  28   BUN  --  16  --  16   CR  --  0.50*  --  0.54  0.55   GLC 89  --    < > 92    < > = values in this interval not displayed.       Assessment:    Tolerating radiation therapy well.  All questions and concerns addressed.    Toxicities:  Fatigue: Grade 1: Fatigue relieved by rest  Pain: Grade 1: Mild pain  Dermatitis: Grade 1: Faint erythema or dry desquamation    Plan:   1. Continue current therapy.    2. otc pain medication for myalgias  3. Will order COVID test to be completed ASAP  4. Skin care per above.      Mosaiq chart and setup information reviewed  Ports checked    Medication Review  Med list reviewed with patient?: Yes  Med list printed and given: Offered and declined    Educational Topic Discussed  Additional Instructions: At closure of visit patient reports she has questions regarding fatigue and severe body aches that she has been experiencing over the past one week, patient also reports sinus congestion, patient with previous exposure to individual that tested positive for COVID, patient went through [email or web address] and tested negative on 12/3/2020 and was asymptomatic at that time, now patient reports symptoms to nursing and questions if related to fatigue.  Dr. Weaver notified and order for COVID testing placed.  Education Instructions: reviewed continued skin cares        Robby Weaver MD    Please do not send letter to referring physician.          Again, thank you for allowing me to participate in the care of your patient.        Sincerely,        Robby Weaver MD

## 2020-12-09 NOTE — PATIENT INSTRUCTIONS
Please contact [facility] RN with questions or concerns following today's appointment: [phone].    Thank you!

## 2020-12-09 NOTE — PROGRESS NOTES
[facility] PHYSICIANS  SPECIALIZING IN BREAKTHROUGHS  Radiation Oncology    On Treatment Visit Note      Maribel June      Date: 2020   MRN: [id number]   : 1965  Diagnosis: breast cancer      Reason for Visit:  On Radiation Treatment Visit     Treatment Summary to Date  Treatment Site: right breast Current Dose: 3192/5256 cGy Fractions:       Chemotherapy  Chemo concurrent with radx?: No(Dr. Eli)    Subjective:     Had stuffy nose last week, known exposure to COVID positive individual and ongoing myalgias this week.  Skin changes are still subtle    Nursing ROS:   Nutrition Alteration  Diet Type: Patient's Preference  Skin  Skin Reaction: 1 - Faint erythema or dry desquamation(no desquamation)  Skin Intervention: patient reports using Hydrocortisone cream BID and Aquaphor BID        Cardiovascular  Respiratory effort: 1 - Normal - without distress        Psychosocial  Mood - Anxiety: 1 - Mild mood alteration  Mood - Depression: 0 - Normal  Pyschosocial Note: patient reports fatigue and joint aches  Pain Assessment  0-10 Pain Scale: 0      Objective:   /72 (BP Location: Left arm, Patient Position: Chair, Cuff Size: Adult Regular)   Pulse 67   Temp 97.3  F (36.3  C) (Oral)   Resp 16   Wt 51.8 kg (114 lb 1.6 oz)   SpO2 97%   BMI 22.28 kg/m    Gen: Appears well, in no acute distress  Skin: Minimal diffuse erythema over treatment field    Labs:  CBC RESULTS:   Recent Labs   Lab Test 14  1638   WBC 7.0   RBC 3.69*   HGB 11.1*   HCT 32.7*   MCV 89   MCH 30.1   MCHC 33.9   RDW 12.5        ELECTROLYTES:  Recent Labs   Lab Test 20  0818 10/28/19  1642 18  0844 18  0844   NA  --   --   --  140   POTASSIUM  --   --   --  3.6   CHLORIDE  --   --   --  106   KVNG  --  9.1  --  8.3*  8.4*   CO2  --   --   --  28  28   BUN  --  16  --  16   CR  --  0.50*  --  0.54  0.55   GLC 89  --    < > 92    < > = values in this interval not displayed.        Assessment:    Tolerating radiation therapy well.  All questions and concerns addressed.    Toxicities:  Fatigue: Grade 1: Fatigue relieved by rest  Pain: Grade 1: Mild pain  Dermatitis: Grade 1: Faint erythema or dry desquamation    Plan:   1. Continue current therapy.    2. otc pain medication for myalgias  3. Will order COVID test to be completed ASAP  4. Skin care per above.      Mosaiq chart and setup information reviewed  Ports checked    Medication Review  Med list reviewed with patient?: Yes  Med list printed and given: Offered and declined    Educational Topic Discussed  Additional Instructions: At closure of visit patient reports she has questions regarding fatigue and severe body aches that she has been experiencing over the past one week, patient also reports sinus congestion, patient with previous exposure to individual that tested positive for COVID, patient went through [email or web address] and tested negative on 12/3/2020 and was asymptomatic at that time, now patient reports symptoms to nursing and questions if related to fatigue.  Dr. Weaver notified and order for COVID testing placed.  Education Instructions: reviewed continued skin cares        Robby Weaver MD    Please do not send letter to referring physician.

## 2020-12-10 ENCOUNTER — APPOINTMENT (OUTPATIENT)
Dept: RADIATION ONCOLOGY | Facility: CLINIC | Age: 55
End: 2020-12-10
Payer: COMMERCIAL

## 2020-12-10 LAB
SARS-COV-2 RNA SPEC QL NAA+PROBE: NOT DETECTED
SPECIMEN SOURCE: NORMAL

## 2020-12-10 PROCEDURE — 77412 RADIATION TX DELIVERY LVL 3: CPT | Performed by: RADIOLOGY

## 2020-12-11 ENCOUNTER — HOSPITAL ENCOUNTER (OUTPATIENT)
Dept: OCCUPATIONAL THERAPY | Facility: CLINIC | Age: 55
Setting detail: THERAPIES SERIES
End: 2020-12-11
Attending: FAMILY MEDICINE
Payer: COMMERCIAL

## 2020-12-11 ENCOUNTER — APPOINTMENT (OUTPATIENT)
Dept: RADIATION ONCOLOGY | Facility: CLINIC | Age: 55
End: 2020-12-11
Payer: COMMERCIAL

## 2020-12-11 PROCEDURE — 97140 MANUAL THERAPY 1/> REGIONS: CPT | Mod: GO | Performed by: OCCUPATIONAL THERAPIST

## 2020-12-11 PROCEDURE — 77412 RADIATION TX DELIVERY LVL 3: CPT | Performed by: RADIOLOGY

## 2020-12-14 ENCOUNTER — APPOINTMENT (OUTPATIENT)
Dept: RADIATION ONCOLOGY | Facility: CLINIC | Age: 55
End: 2020-12-14
Payer: COMMERCIAL

## 2020-12-14 ENCOUNTER — PATIENT OUTREACH (OUTPATIENT)
Dept: ONCOLOGY | Facility: CLINIC | Age: 55
End: 2020-12-14

## 2020-12-14 DIAGNOSIS — E55.9 VITAMIN D DEFICIENCY: ICD-10-CM

## 2020-12-14 DIAGNOSIS — N91.2 AMENORRHEA: ICD-10-CM

## 2020-12-14 DIAGNOSIS — M85.80 OSTEOPENIA, UNSPECIFIED LOCATION: ICD-10-CM

## 2020-12-14 LAB
ALBUMIN SERPL-MCNC: 4.2 G/DL (ref 3.4–5)
CALCIUM SERPL-MCNC: 9.7 MG/DL (ref 8.5–10.1)
ESTRADIOL SERPL-MCNC: 12 PG/ML
FSH SERPL-ACNC: 79 IU/L
TSH SERPL DL<=0.005 MIU/L-ACNC: 1.88 MU/L (ref 0.4–4)

## 2020-12-14 PROCEDURE — 77336 RADIATION PHYSICS CONSULT: CPT | Performed by: RADIOLOGY

## 2020-12-14 PROCEDURE — 36415 COLL VENOUS BLD VENIPUNCTURE: CPT | Performed by: INTERNAL MEDICINE

## 2020-12-14 PROCEDURE — 83001 ASSAY OF GONADOTROPIN (FSH): CPT | Performed by: INTERNAL MEDICINE

## 2020-12-14 PROCEDURE — 77427 RADIATION TX MANAGEMENT X5: CPT | Performed by: RADIOLOGY

## 2020-12-14 PROCEDURE — 82670 ASSAY OF TOTAL ESTRADIOL: CPT | Performed by: INTERNAL MEDICINE

## 2020-12-14 PROCEDURE — 82306 VITAMIN D 25 HYDROXY: CPT | Performed by: INTERNAL MEDICINE

## 2020-12-14 PROCEDURE — 82310 ASSAY OF CALCIUM: CPT | Performed by: INTERNAL MEDICINE

## 2020-12-14 PROCEDURE — 84443 ASSAY THYROID STIM HORMONE: CPT | Performed by: INTERNAL MEDICINE

## 2020-12-14 PROCEDURE — 77412 RADIATION TX DELIVERY LVL 3: CPT | Performed by: RADIOLOGY

## 2020-12-14 PROCEDURE — 82040 ASSAY OF SERUM ALBUMIN: CPT | Performed by: INTERNAL MEDICINE

## 2020-12-14 NOTE — PROGRESS NOTES
RN CARE COORDINATION NOTE      Outgoing:  Called Crispin to ask her to have labs drawn today when she is at the  clinic for radiation.   She is in agreement with the plan and will call  to schedule the lab appointment.     Rosy Calderón MSN, RN, OCN  RN Care Coordinator  [facility]  [phone]

## 2020-12-14 NOTE — PROGRESS NOTES
"Maribel June is a 55 year old female who is being evaluated via a billable video visit.      The patient has been notified of following:     \"This video visit will be conducted via a call between you and your physician/provider. We have found that certain health care needs can be provided without the need for an in-person physical exam.  This service lets us provide the care you need with a video conversation.  If a prescription is necessary we can send it directly to your pharmacy.  If lab work is needed we can place an order for that and you can then stop by our lab to have the test done at a later time.    Video visits are billed at different rates depending on your insurance coverage.  Please reach out to your insurance provider with any questions.    If during the course of the call the physician/provider feels a video visit is not appropriate, you will not be charged for this service.\"    Patient has given verbal consent for Video visit? Yes  How would you like to obtain your AVS? MyChart  If you are dropped from the video visit, the video invite should be resent to: Text to cell phone: [phone]  Will anyone else be joining your video visit? No       PT STATES SHE IS DEALING WITH LYMPHEDEMA RIGHT NOW    YAIR Costello        Video-Visit Details    Type of service:  Video Visit    Video Start Time: 8:50 AM  Video End Time: 9:39 AM    Originating Location (pt. Location): Home    Distant Location (provider location):  [facility]     Platform used for Video Visit: Sesar Berry MD            Oncology Visit:  Date on this visit: 12/15/2020    Diagnosis: Stage Ia, [id number], grade 1, ER positive, MT positive, HER-2 negative invasive carcinoma of the right breast. Oncotype dx recurrence score = 16.    Primary Physician: Juwan Perry     History Of Present Illness:  Ms. Jnue is a 55 year old female with right breast cancer.  Routine screening mammogram on " 8/6/2020 showed heterogeneously dense breasts but no concerning findings.  A physician then palpated a mass in the right breast.  Diagnostic mammogram and ultrasound showed a 2.2 cm mass at 8:00, 5 cm from the nipple.  Right breast biopsy showed a grade 1 invasive mammary carcinoma, ER strong in 100%, MS strong in 100%, HER2 negative.  She had a right breast lumpectomy and sentinel lymph node procedure with Dr. Watson on 9/29/2020.  Pathology showed a grade 1 invasive mammary carcinoma measuring 1.6 cm.  There was associated intermediate grade DCIS.  Surgical margins were negative.  A single lymph node was benign.  Oncotype dx recurrence score was 16.  She is currently receiving radiation to the right breast, a total of 5256 cGy in 20 fractions is planned.  She is scheduled to complete radiation on 12/21/2020.    Interval History:  Crispin reports she will have her last radiation treatment on 12/21. She has felt quite fatigued with radiation and has been napping during the day. Has redness but no skin peeling secondary to radiation. Has been using topical creams that radiation oncology has recommended. Also working with lymphedema therapist and has a light compression sleeve on.     Crispin notes she has had joint/muscle pain in the tops of her legs, bottom of her feet and in her hands which is most noticeable when she moves after being sedentary. Started about 2 weeks ago Gets better the more she moves. Continues to exercise and tries to walk 1 mile daily.  She has no joint swelling or redness. Her radiation doctor thought this might be related to COVID as she had an exposure but she has had 2 negative tests since that exposure.     She is also having hot flashes at night which started when she stopped her estrogen therapy at the time of surgery. We discussed that her hormone levels show she is postmenopausal. She also notes issues with insomnia. Goes to bed at 11pm but often is still awake at 2-3 am. Has been taking 1/2  pill of clonazepam which she has left over from a previous prescription. Tried melatonin a few years ago but not recently.  The remainder of a complete 12 point review of systems was reviewed with the patient and was negative with the exception of that mentioned above.    Plans to go back to work mid January.       Past Medical/Surgical History:  Past Medical History:   Diagnosis Date     Depressive disorder      Endometriosis      Herpes genitalis in women      History of major depression     situational with first .      Kidney stone      Malignant neoplasm of right breast in female, estrogen receptor positive (H) 2020   - Hyperlipidemia.  - Osteopenia    Past Surgical History:   Procedure Laterality Date     BREAST BIOPSY, RT/LT Right 2020     BREAST SURGERY Right     Right Breast - Benign      SECTION      x1     COLONOSCOPY N/A 2016    Procedure: COMBINED COLONOSCOPY, SINGLE OR MULTIPLE BIOPSY/POLYPECTOMY BY BIOPSY;  Surgeon: Duane, William Charles, MD;  Location: [facility] OR     COLONOSCOPY WITH CO2 INSUFFLATION N/A 2016    Procedure: COLONOSCOPY WITH CO2 INSUFFLATION;  Surgeon: Duane, William Charles, MD;  Location: [facility] OR     CYSTOSCOPY  2009    left stent placement ([facility])     GENITOURINARY SURGERY      surgery for kidney stone     GYN SURGERY      Partial Hysterectomy at age 29     LUMPECTOMY BREAST WITH SENTINEL NODE, COMBINED Right 2020    Procedure: Right breast lumpectomy with [place] node biopsy;  Surgeon: Jose Watson MD;  Location: [facility] OR     Allergies:  Allergies as of 12/15/2020 - Reviewed 2020   Allergen Reaction Noted     Flagyl [metronidazole] Nausea and Vomiting 2015     Lisinopril  2019     Ondansetron  2019     Oxycodone-acetaminophen Nausea and Vomiting 2019     Sulfamethoxazole-trimethoprim  2011     Zithromax [azithromycin dihydrate] Rash 2013     Current Medications:  Current Outpatient  Medications   Medication Sig Dispense Refill     acyclovir (ZOVIRAX) 400 MG tablet Take 1 tablet (400 mg) by mouth every 8 hours for 5 days 15 tablet 11     amLODIPine (NORVASC) 5 MG tablet Take 1 tablet by mouth       bimatoprost (LUMIGAN) 0.01 % SOLN        clonazePAM (KLONOPIN) 0.5 MG tablet clonazepam 0.5 mg tablet   TAKE 1 TABLET BY MOUTH before bed       Multiple Vitamin (MULTIVITAMIN PO) Take by mouth daily        Family and Social History:  Please see initial Oncology consultation dated 10/27/2020 for details.  9/25/2020 Breast Actionable Panel was negative.    Physical Exam:  General:  Well appearing adult female in NAD.  Eyes:  No erythema or discharge.  Respiratory:  Breathing comfortably on room air.  No audible wheezing.   Skin:  No visible concerning skin rashes or lesions  Neuro:  No notable tremor and dyskinetic movements.  Psych:  Mood and affect appear normal.    The rest of a comprehensive physical examination is deferred due to PHE (public health emergency) video visit restrictions.    Laboratory/Imaging Studies  12/14/2020 Labs:  Estradiol level is low at 12, FSH level is high at 79, c/w postmenopausal status.  TSH is wnl.  Vitamin D deficiency level is in process.    ASSESSMENT/PLAN:  Ms. June is a 54 yo female with a stage Ia, [id number], grade 1, ER positive, IL positive, HER2 negative invasive ductal carcinoma of the right breast.  She is s/p treatment with right breast lumpectomy and sentinel lymph node procedure.  Currently receiving right breast radiation (will be completed 12/21).     1.  Right breast cancer:    Upon completion of radiation, plan is to initiate treatment with endocrine therapy.  A 10 year course of endocrine therapy can reduce the future risk of breast cancer recurrence by half.  She has a history of hysterectomy therefore menopausal status was not clear however hormone levels today indicate she is postmenopausal (estradiol 12, FSH 79). We discussed starting letrozole in  January after she has had some time to recover from radiation treatment. We briefly reviewed the potential side effects of endocrine therapy including arthralgias, hot flashes, vaginal dryness, mood disorder, and effects on bone density.     We will continue to screen her breasts annually with a mammogram.  In addition, given increased breast density, we will also plan to screen with annual breast MRI, spacing the two studies so that she is having some form of breast imaging once every 6 months.  Will start with a mammogram 6 months after completion of radiation (June 2021).    2.  Hot flashes:    Started since discontinuing her hormone replacement. Hormone levels indicate she is postmenopausal. Discussed starting Cymbalta to help with hot flashes as well as joint/muscle pain secondary to menopause. She does not feel that symptoms are at a point where she wants to start taking another medication.      3.  Bone health:  DEXA in 08/2020 was with a lowest T-score of -2.1 which is c/w osteopenia and encroaching on osteoporosis.  Recommendation at this time is that she continue daily vitamin D supplementation and walking. She is postmenopausal and we discussed recommendations to start a bisphosphonate.  We specifically discussed that Zometa in this setting has been shown to both reduce bone loss on aromatase inhibitor and prevent breast cancer bone metastases.  We both agree that we would like to see how she is tolerating endocrine therapy before starting Zometa.  We will therefore rediscuss this at our next visit.     4. Insomnia  Difficulty falling asleep with some component of anxiety. Has been using previous prescription of clonazepam sparingly and found benefit from this. We discussed that clonazepam is not a long term insomnia treatment due to addictive potential. Recommended OTC melatonin starting at 3mg and can go up to 5mg if needed.     5. Joint/muscle pain   Likely a side effect of menopause. No joint swelling  or redness. We discussed potential treatments for menopause associated join and muscle pain. She is already taking vitamin D supplement and a Vit D level is pending today. Acupuncture has also been shown to be beneficial. Unfortunately access to this is limited during the pandemic but can be an option in the coming months. Discussed starting Cymbalta to help with pain and hot flashes, especially since we anticipate symptoms may worsen with letrozole. She feels that she can tolerate symptoms for now without new medication and will contact clinic if things change.     6. Follow Up:    Follow up visit with Dr. Eli 2 months after starting letrozole (~early March).     Patient seen and discussed with attending, Dr. Alcira eBrry MD  [id number]     This video visit was conducted with the patient, Dr. Berry, and myself present.  I have edited the above note to reflect our joint assessment and plan.  The patient had multiple questions which were answered to her satisfaction.  A total of 49 minutes was spent in video visit with this patient today.    Saige Eli MD

## 2020-12-15 ENCOUNTER — VIRTUAL VISIT (OUTPATIENT)
Dept: ONCOLOGY | Facility: CLINIC | Age: 55
End: 2020-12-15
Attending: INTERNAL MEDICINE
Payer: COMMERCIAL

## 2020-12-15 ENCOUNTER — APPOINTMENT (OUTPATIENT)
Dept: RADIATION ONCOLOGY | Facility: CLINIC | Age: 55
End: 2020-12-15
Payer: COMMERCIAL

## 2020-12-15 DIAGNOSIS — G47.00 PERSISTENT INSOMNIA: ICD-10-CM

## 2020-12-15 DIAGNOSIS — Z17.0 MALIGNANT NEOPLASM OF LOWER-OUTER QUADRANT OF RIGHT BREAST OF FEMALE, ESTROGEN RECEPTOR POSITIVE (H): Primary | ICD-10-CM

## 2020-12-15 DIAGNOSIS — C50.511 MALIGNANT NEOPLASM OF LOWER-OUTER QUADRANT OF RIGHT BREAST OF FEMALE, ESTROGEN RECEPTOR POSITIVE (H): Primary | ICD-10-CM

## 2020-12-15 PROCEDURE — 99215 OFFICE O/P EST HI 40 MIN: CPT | Mod: GT | Performed by: INTERNAL MEDICINE

## 2020-12-15 PROCEDURE — 77412 RADIATION TX DELIVERY LVL 3: CPT | Performed by: RADIOLOGY

## 2020-12-15 RX ORDER — LETROZOLE 2.5 MG/1
2.5 TABLET, FILM COATED ORAL DAILY
Qty: 90 TABLET | Refills: 3 | Status: SHIPPED | OUTPATIENT
Start: 2020-12-15 | End: 2021-04-12

## 2020-12-15 RX ORDER — CLONAZEPAM 0.5 MG/1
0.5 TABLET ORAL
Qty: 20 TABLET | Refills: 0 | Status: SHIPPED | OUTPATIENT
Start: 2020-12-15 | End: 2021-08-10

## 2020-12-15 NOTE — LETTER
"    12/15/2020         RE: Maribel June  [address]  [facility] [address]        Dear Colleague,    Thank you for referring your patient, Maribel June, to the [facility]. Please see a copy of my visit note below.    Maribel June is a 55 year old female who is being evaluated via a billable video visit.      The patient has been notified of following:     \"This video visit will be conducted via a call between you and your physician/provider. We have found that certain health care needs can be provided without the need for an in-person physical exam.  This service lets us provide the care you need with a video conversation.  If a prescription is necessary we can send it directly to your pharmacy.  If lab work is needed we can place an order for that and you can then stop by our lab to have the test done at a later time.    Video visits are billed at different rates depending on your insurance coverage.  Please reach out to your insurance provider with any questions.    If during the course of the call the physician/provider feels a video visit is not appropriate, you will not be charged for this service.\"    Patient has given verbal consent for Video visit? Yes  How would you like to obtain your AVS? MyChart  If you are dropped from the video visit, the video invite should be resent to: Text to cell phone: [phone]  Will anyone else be joining your video visit? No       PT STATES SHE IS DEALING WITH LYMPHEDEMA RIGHT NOW    YAIR Costello        Video-Visit Details    Type of service:  Video Visit    Video Start Time: 8:50 AM  Video End Time: 9:39 AM    Originating Location (pt. Location): Home    Distant Location (provider location):  [facility]     Platform used for Video Visit: Sesar Berry MD            Oncology Visit:  Date on this visit: 12/15/2020    Diagnosis: Stage Ia, [id number], grade 1, ER positive, ID " positive, HER-2 negative invasive carcinoma of the right breast. Oncotype dx recurrence score = 16.    Primary Physician: Juwan Perry     History Of Present Illness:  Ms. June is a 55 year old female with right breast cancer.  Routine screening mammogram on 8/6/2020 showed heterogeneously dense breasts but no concerning findings.  A physician then palpated a mass in the right breast.  Diagnostic mammogram and ultrasound showed a 2.2 cm mass at 8:00, 5 cm from the nipple.  Right breast biopsy showed a grade 1 invasive mammary carcinoma, ER strong in 100%, NE strong in 100%, HER2 negative.  She had a right breast lumpectomy and sentinel lymph node procedure with Dr. Watson on 9/29/2020.  Pathology showed a grade 1 invasive mammary carcinoma measuring 1.6 cm.  There was associated intermediate grade DCIS.  Surgical margins were negative.  A single lymph node was benign.  Oncotype dx recurrence score was 16.  She is currently receiving radiation to the right breast, a total of 5256 cGy in 20 fractions is planned.  She is scheduled to complete radiation on 12/21/2020.    Interval History:  Crispin reports she will have her last radiation treatment on 12/21. She has felt quite fatigued with radiation and has been napping during the day. Has redness but no skin peeling secondary to radiation. Has been using topical creams that radiation oncology has recommended. Also working with lymphedema therapist and has a light compression sleeve on.     Crispin notes she has had joint/muscle pain in the tops of her legs, bottom of her feet and in her hands which is most noticeable when she moves after being sedentary. Started about 2 weeks ago Gets better the more she moves. Continues to exercise and tries to walk 1 mile daily.  She has no joint swelling or redness. Her radiation doctor thought this might be related to COVID as she had an exposure but she has had 2 negative tests since that exposure.     She is also having hot  flashes at night which started when she stopped her estrogen therapy at the time of surgery. We discussed that her hormone levels show she is postmenopausal. She also notes issues with insomnia. Goes to bed at 11pm but often is still awake at 2-3 am. Has been taking 1/2 pill of clonazepam which she has left over from a previous prescription. Tried melatonin a few years ago but not recently.  The remainder of a complete 12 point review of systems was reviewed with the patient and was negative with the exception of that mentioned above.    Plans to go back to work mid January.       Past Medical/Surgical History:  Past Medical History:   Diagnosis Date     Depressive disorder      Endometriosis      Herpes genitalis in women      History of major depression     situational with first .      Kidney stone      Malignant neoplasm of right breast in female, estrogen receptor positive (H) 2020   - Hyperlipidemia.  - Osteopenia    Past Surgical History:   Procedure Laterality Date     BREAST BIOPSY, RT/LT Right 2020     BREAST SURGERY Right     Right Breast - Benign      SECTION      x1     COLONOSCOPY N/A 2016    Procedure: COMBINED COLONOSCOPY, SINGLE OR MULTIPLE BIOPSY/POLYPECTOMY BY BIOPSY;  Surgeon: Duane, William Charles, MD;  Location: [facility] OR     COLONOSCOPY WITH CO2 INSUFFLATION N/A 2016    Procedure: COLONOSCOPY WITH CO2 INSUFFLATION;  Surgeon: Duane, William Charles, MD;  Location: [facility] OR     CYSTOSCOPY  2009    left stent placement ([facility])     GENITOURINARY SURGERY      surgery for kidney stone     GYN SURGERY      Partial Hysterectomy at age 29     LUMPECTOMY BREAST WITH SENTINEL NODE, COMBINED Right 2020    Procedure: Right breast lumpectomy with [place] node biopsy;  Surgeon: Jose Watson MD;  Location: [facility] OR     Allergies:  Allergies as of 12/15/2020 - Reviewed 2020   Allergen Reaction Noted     Flagyl [metronidazole] Nausea and Vomiting  07/30/2015     Lisinopril  06/13/2019     Ondansetron  06/13/2019     Oxycodone-acetaminophen Nausea and Vomiting 06/13/2019     Sulfamethoxazole-trimethoprim  12/29/2011     Zithromax [azithromycin dihydrate] Rash 07/03/2013     Current Medications:  Current Outpatient Medications   Medication Sig Dispense Refill     acyclovir (ZOVIRAX) 400 MG tablet Take 1 tablet (400 mg) by mouth every 8 hours for 5 days 15 tablet 11     amLODIPine (NORVASC) 5 MG tablet Take 1 tablet by mouth       bimatoprost (LUMIGAN) 0.01 % SOLN        clonazePAM (KLONOPIN) 0.5 MG tablet clonazepam 0.5 mg tablet   TAKE 1 TABLET BY MOUTH before bed       Multiple Vitamin (MULTIVITAMIN PO) Take by mouth daily        Family and Social History:  Please see initial Oncology consultation dated 10/27/2020 for details.  9/25/2020 Breast Actionable Panel was negative.    Physical Exam:  General:  Well appearing adult female in NAD.  Eyes:  No erythema or discharge.  Respiratory:  Breathing comfortably on room air.  No audible wheezing.   Skin:  No visible concerning skin rashes or lesions  Neuro:  No notable tremor and dyskinetic movements.  Psych:  Mood and affect appear normal.    The rest of a comprehensive physical examination is deferred due to PHE (public health emergency) video visit restrictions.    Laboratory/Imaging Studies  12/14/2020 Labs:  Estradiol level is low at 12, FSH level is high at 79, c/w postmenopausal status.  TSH is wnl.  Vitamin D deficiency level is in process.    ASSESSMENT/PLAN:  Ms. June is a 54 yo female with a stage Ia, [id number], grade 1, ER positive, MS positive, HER2 negative invasive ductal carcinoma of the right breast.  She is s/p treatment with right breast lumpectomy and sentinel lymph node procedure.  Currently receiving right breast radiation (will be completed 12/21).     1.  Right breast cancer:    Upon completion of radiation, plan is to initiate treatment with endocrine therapy.  A 10 year course of  endocrine therapy can reduce the future risk of breast cancer recurrence by half.  She has a history of hysterectomy therefore menopausal status was not clear however hormone levels today indicate she is postmenopausal (estradiol 12, FSH 79). We discussed starting letrozole in January after she has had some time to recover from radiation treatment. We briefly reviewed the potential side effects of endocrine therapy including arthralgias, hot flashes, vaginal dryness, mood disorder, and effects on bone density.     We will continue to screen her breasts annually with a mammogram.  In addition, given increased breast density, we will also plan to screen with annual breast MRI, spacing the two studies so that she is having some form of breast imaging once every 6 months.  Will start with a mammogram 6 months after completion of radiation (June 2021).    2.  Hot flashes:    Started since discontinuing her hormone replacement. Hormone levels indicate she is postmenopausal. Discussed starting Cymbalta to help with hot flashes as well as joint/muscle pain secondary to menopause. She does not feel that symptoms are at a point where she wants to start taking another medication.      3.  Bone health:  DEXA in 08/2020 was with a lowest T-score of -2.1 which is c/w osteopenia and encroaching on osteoporosis.  Recommendation at this time is that she continue daily vitamin D supplementation and walking. She is postmenopausal and we discussed recommendations to start a bisphosphonate.  We specifically discussed that Zometa in this setting has been shown to both reduce bone loss on aromatase inhibitor and prevent breast cancer bone metastases.  We both agree that we would like to see how she is tolerating endocrine therapy before starting Zometa.  We will therefore rediscuss this at our next visit.     4. Insomnia  Difficulty falling asleep with some component of anxiety. Has been using previous prescription of clonazepam sparingly  and found benefit from this. We discussed that clonazepam is not a long term insomnia treatment due to addictive potential. Recommended OTC melatonin starting at 3mg and can go up to 5mg if needed.     5. Joint/muscle pain   Likely a side effect of menopause. No joint swelling or redness. We discussed potential treatments for menopause associated join and muscle pain. She is already taking vitamin D supplement and a Vit D level is pending today. Acupuncture has also been shown to be beneficial. Unfortunately access to this is limited during the pandemic but can be an option in the coming months. Discussed starting Cymbalta to help with pain and hot flashes, especially since we anticipate symptoms may worsen with letrozole. She feels that she can tolerate symptoms for now without new medication and will contact clinic if things change.     6. Follow Up:    Follow up visit with Dr. Eli 2 months after starting letrozole (~early March).     Patient seen and discussed with attending, Dr. Alcira Berry MD  [id number]     This video visit was conducted with the patient, Dr. Berry, and myself present.  I have edited the above note to reflect our joint assessment and plan.  The patient had multiple questions which were answered to her satisfaction.  A total of 49 minutes was spent in video visit with this patient today.    Saige Eli MD

## 2020-12-16 ENCOUNTER — HOSPITAL ENCOUNTER (OUTPATIENT)
Dept: OCCUPATIONAL THERAPY | Facility: CLINIC | Age: 55
Setting detail: THERAPIES SERIES
End: 2020-12-16
Attending: FAMILY MEDICINE
Payer: COMMERCIAL

## 2020-12-16 ENCOUNTER — OFFICE VISIT (OUTPATIENT)
Dept: RADIATION ONCOLOGY | Facility: CLINIC | Age: 55
End: 2020-12-16
Payer: COMMERCIAL

## 2020-12-16 ENCOUNTER — APPOINTMENT (OUTPATIENT)
Dept: RADIATION ONCOLOGY | Facility: CLINIC | Age: 55
End: 2020-12-16
Payer: COMMERCIAL

## 2020-12-16 VITALS
WEIGHT: 114 LBS | SYSTOLIC BLOOD PRESSURE: 111 MMHG | RESPIRATION RATE: 16 BRPM | HEART RATE: 66 BPM | DIASTOLIC BLOOD PRESSURE: 69 MMHG | TEMPERATURE: 98.3 F | BODY MASS INDEX: 22.26 KG/M2 | OXYGEN SATURATION: 97 %

## 2020-12-16 DIAGNOSIS — Z17.0 MALIGNANT NEOPLASM OF LOWER-OUTER QUADRANT OF RIGHT BREAST OF FEMALE, ESTROGEN RECEPTOR POSITIVE (H): Primary | ICD-10-CM

## 2020-12-16 DIAGNOSIS — C50.511 MALIGNANT NEOPLASM OF LOWER-OUTER QUADRANT OF RIGHT BREAST OF FEMALE, ESTROGEN RECEPTOR POSITIVE (H): Primary | ICD-10-CM

## 2020-12-16 LAB — DEPRECATED CALCIDIOL+CALCIFEROL SERPL-MC: 52 UG/L (ref 20–75)

## 2020-12-16 PROCEDURE — 97140 MANUAL THERAPY 1/> REGIONS: CPT | Mod: GO | Performed by: OCCUPATIONAL THERAPIST

## 2020-12-16 PROCEDURE — 77412 RADIATION TX DELIVERY LVL 3: CPT | Performed by: RADIOLOGY

## 2020-12-16 PROCEDURE — 77417 THER RADIOLOGY PORT IMAGE(S): CPT | Performed by: RADIOLOGY

## 2020-12-16 PROCEDURE — 99207 PR DROP WITH A PROCEDURE: CPT | Performed by: RADIOLOGY

## 2020-12-16 ASSESSMENT — PAIN SCALES - GENERAL: PAINLEVEL: MILD PAIN (3)

## 2020-12-16 NOTE — LETTER
2020         RE: Maribel June  [address]  [facility] [address]        Dear Colleague,    Thank you for referring your patient, Maribel June, to the [facility]IATION [facility]OG[facility] [place]. Please see a copy of my visit note below.    [facility] PHYSICIANS  SPECIALIZING IN BREAKTHROUGHS  Radiation Oncology    On Treatment Visit Note      Maribel June      Date: 2020   MRN: [id number]   : 1965  Diagnosis: breast cancer      Reason for Visit:  On Radiation Treatment Visit     Treatment Summary to Date  Treatment Site: right breast Current Dose: 4506/5256 cGy Fractions:       Chemotherapy  Chemo concurrent with radx?: No(Dr. Eli)    Subjective:     Stewart skin redness this week    Nursing ROS:   Nutrition Alteration  Diet Type: Patient's Preference  Skin  Skin Reaction: 1 - Faint erythema or dry desquamation(no desquamation)  Skin Intervention: patient reports using Hydrocortisone cream BID and Aquaphor BID  Skin Note: Patient to increase Aquaphor use to TID, use Hydrocortisone ointment instead of cream for itching        Cardiovascular  Respiratory effort: 1 - Normal - without distress        Psychosocial  Mood - Anxiety: 1 - Mild mood alteration  Mood - Depression: 0 - Normal  Pyschosocial Note: patient reports fatigue and joint aches  Pain Assessment  0-10 Pain Scale: 3  Pain Note: body aches- patient reports from low estrogen      Objective:   /69 (BP Location: Left arm, Patient Position: Sitting, Cuff Size: Adult Regular)   Pulse 66   Temp 98.3  F (36.8  C) (Oral)   Resp 16   Wt 51.7 kg (114 lb)   SpO2 97%   BMI 22.26 kg/m    Gen: Appears well, in no acute distress  Skin: Mild diffuse erythema over treatment field    Labs:  CBC RESULTS:   Recent Labs   Lab Test 14  1638   WBC 7.0   RBC 3.69*   HGB 11.1*   HCT 32.7*   MCV 89   MCH 30.1   MCHC 33.9   RDW 12.5        ELECTROLYTES:  Recent Labs   Lab Test  12/14/20  1043 08/12/20  0818 10/28/19  1642 07/31/18  0844 07/31/18  0844   NA  --   --   --   --  140   POTASSIUM  --   --   --   --  3.6   CHLORIDE  --   --   --   --  106   KVNG 9.7  --  9.1  --  8.3*  8.4*   CO2  --   --   --   --  28  28   BUN  --   --  16  --  16   CR  --   --  0.50*  --  0.54  0.55   GLC  --  89  --    < > 92    < > = values in this interval not displayed.       Assessment:    Tolerating radiation therapy well.  All questions and concerns addressed.    Toxicities:  Fatigue: Grade 1: Fatigue relieved by rest  Dermatitis: Grade 1: Faint erythema or dry desquamation    Plan:   1. Continue current therapy.    2. Skin care per above      Mosaiq chart and setup information reviewed  Ports checked    Medication Review  Med list reviewed with patient?: Yes  Med list printed and given: Offered and declined    Educational Topic Discussed  Additional Instructions: 1 month telephone appointment with RN scheduled for 1/11/2021 at 2:00 pm   Education Instructions: reviewed continued skin cares        Robby Weaver MD    Please do not send letter to referring physician.          Again, thank you for allowing me to participate in the care of your patient.        Sincerely,        Robby Weaver MD

## 2020-12-16 NOTE — PATIENT INSTRUCTIONS
Please contact [facility] RN with questions or concerns following today's appointment: [phone].    Thank you!

## 2020-12-16 NOTE — PROGRESS NOTES
[facility] PHYSICIANS  SPECIALIZING IN BREAKTHROUGHS  Radiation Oncology    On Treatment Visit Note      Maribel June      Date: 2020   MRN: [id number]   : 1965  Diagnosis: breast cancer      Reason for Visit:  On Radiation Treatment Visit     Treatment Summary to Date  Treatment Site: right breast Current Dose: 4506/5256 cGy Fractions:       Chemotherapy  Chemo concurrent with radx?: No(Dr. Eli)    Subjective:     Stewart skin redness this week    Nursing ROS:   Nutrition Alteration  Diet Type: Patient's Preference  Skin  Skin Reaction: 1 - Faint erythema or dry desquamation(no desquamation)  Skin Intervention: patient reports using Hydrocortisone cream BID and Aquaphor BID  Skin Note: Patient to increase Aquaphor use to TID, use Hydrocortisone ointment instead of cream for itching        Cardiovascular  Respiratory effort: 1 - Normal - without distress        Psychosocial  Mood - Anxiety: 1 - Mild mood alteration  Mood - Depression: 0 - Normal  Pyschosocial Note: patient reports fatigue and joint aches  Pain Assessment  0-10 Pain Scale: 3  Pain Note: body aches- patient reports from low estrogen      Objective:   /69 (BP Location: Left arm, Patient Position: Sitting, Cuff Size: Adult Regular)   Pulse 66   Temp 98.3  F (36.8  C) (Oral)   Resp 16   Wt 51.7 kg (114 lb)   SpO2 97%   BMI 22.26 kg/m    Gen: Appears well, in no acute distress  Skin: Mild diffuse erythema over treatment field    Labs:  CBC RESULTS:   Recent Labs   Lab Test 14  1638   WBC 7.0   RBC 3.69*   HGB 11.1*   HCT 32.7*   MCV 89   MCH 30.1   MCHC 33.9   RDW 12.5        ELECTROLYTES:  Recent Labs   Lab Test 20  1043 20  0818 10/28/19  1642 18  0844 18  0844   NA  --   --   --   --  140   POTASSIUM  --   --   --   --  3.6   CHLORIDE  --   --   --   --  106   KVNG 9.7  --  9.1  --  8.3*  8.4*   CO2  --   --   --   --  28  28   BUN  --   --  16  --  16   CR  --   --   0.50*  --  0.54  0.55   GLC  --  89  --    < > 92    < > = values in this interval not displayed.       Assessment:    Tolerating radiation therapy well.  All questions and concerns addressed.    Toxicities:  Fatigue: Grade 1: Fatigue relieved by rest  Dermatitis: Grade 1: Faint erythema or dry desquamation    Plan:   1. Continue current therapy.    2. Skin care per above      Mosaiq chart and setup information reviewed  Ports checked    Medication Review  Med list reviewed with patient?: Yes  Med list printed and given: Offered and declined    Educational Topic Discussed  Additional Instructions: 1 month telephone appointment with RN scheduled for 1/11/2021 at 2:00 pm   Education Instructions: reviewed continued skin cares        Robby Weaver MD    Please do not send letter to referring physician.

## 2020-12-17 ENCOUNTER — APPOINTMENT (OUTPATIENT)
Dept: RADIATION ONCOLOGY | Facility: CLINIC | Age: 55
End: 2020-12-17
Payer: COMMERCIAL

## 2020-12-17 PROCEDURE — 77412 RADIATION TX DELIVERY LVL 3: CPT | Performed by: RADIOLOGY

## 2020-12-17 NOTE — RESULT ENCOUNTER NOTE
Crispin,    Calcium and thyroid blood work results are within the normal limits.  Please let me know if any questions regarding these results.  Cornel Briseno MD

## 2020-12-18 ENCOUNTER — APPOINTMENT (OUTPATIENT)
Dept: RADIATION ONCOLOGY | Facility: CLINIC | Age: 55
End: 2020-12-18
Payer: COMMERCIAL

## 2020-12-18 PROCEDURE — 77412 RADIATION TX DELIVERY LVL 3: CPT | Performed by: RADIOLOGY

## 2020-12-20 ENCOUNTER — HEALTH MAINTENANCE LETTER (OUTPATIENT)
Age: 55
End: 2020-12-20

## 2020-12-21 ENCOUNTER — APPOINTMENT (OUTPATIENT)
Dept: RADIATION ONCOLOGY | Facility: CLINIC | Age: 55
End: 2020-12-21
Payer: COMMERCIAL

## 2020-12-21 PROCEDURE — 77412 RADIATION TX DELIVERY LVL 3: CPT | Performed by: RADIOLOGY

## 2020-12-21 PROCEDURE — 77336 RADIATION PHYSICS CONSULT: CPT | Performed by: RADIOLOGY

## 2020-12-21 PROCEDURE — 77427 RADIATION TX MANAGEMENT X5: CPT | Performed by: RADIOLOGY

## 2020-12-23 ENCOUNTER — HOSPITAL ENCOUNTER (OUTPATIENT)
Dept: OCCUPATIONAL THERAPY | Facility: CLINIC | Age: 55
Setting detail: THERAPIES SERIES
End: 2020-12-23
Attending: FAMILY MEDICINE
Payer: COMMERCIAL

## 2020-12-23 PROCEDURE — 97140 MANUAL THERAPY 1/> REGIONS: CPT | Mod: GO | Performed by: OCCUPATIONAL THERAPIST

## 2020-12-28 ENCOUNTER — ONCOLOGY VISIT (OUTPATIENT)
Dept: RADIATION ONCOLOGY | Facility: CLINIC | Age: 55
End: 2020-12-28

## 2020-12-30 ENCOUNTER — HOSPITAL ENCOUNTER (OUTPATIENT)
Dept: OCCUPATIONAL THERAPY | Facility: CLINIC | Age: 55
Setting detail: THERAPIES SERIES
End: 2020-12-30
Attending: FAMILY MEDICINE
Payer: COMMERCIAL

## 2020-12-30 PROCEDURE — 97140 MANUAL THERAPY 1/> REGIONS: CPT | Mod: GO | Performed by: OCCUPATIONAL THERAPIST

## 2020-12-31 NOTE — PROCEDURES
Radiotherapy Treatment Summary          Date of Report: 2020     PATIENT: CHERIE SCHAFER  MEDICAL RECORD NO: [id number]  : 1965     DIAGNOSIS: C50.511 Malignant neoplasm of lower-outer quadrant of right female breast  INTENT OF RADIOTHERAPY: Cure  PATHOLOGY/STAGE:   This is a 55 year old woman with right LOQ breast G1 IDCA with G2 DCIS status post lumpectomy and SLNBx, revealing [id number], ER+/MN+/H2N- disease referred for adjuvant radiation therapy. She will not be receiving chemotherapy.                         Details of the treatments summarized below are found in records kept in the Department of Radiation Oncology at [facility].     Treatment Summary:  Radiation Oncology - Course: 1   Treatment Site Dose               Modality From  To Elapsed Days Fx.  1 Rt Breast           4,256 cGy   6X/10X 11/23/2020 12/15/2020  22 16  1 Rt Breast Bst 1,000 cGy    6X/10X 12/16/2020 2020   5  4                      Dose per Fraction:         Total Dose:        Right breast  266 cGy     4,256 cGy  Right breast boost 250 cGy     5,256 cGy        COMMENTS:                      Patient experienced grade 1 skin reaction managed well with moisturizer and hydrocortisone.  Patient experienced mild fatigue related to radiation therapy.     PAIN MANAGEMENT:      Patient did not require any pain medications.                          FOLLOW UP PLAN:  Patient will follow up in Radiation Oncology RN on 2021 or sooner if any concerns.  Patient has an appointment with medical oncologist.                      Staff Physician: Robby Weaver M.D.  Physicist: Michelle Lim MS     CC:   Juwan Perry MD              Radiation Oncology [id number] [place] [id number] Phone: [phone]

## 2021-01-06 ENCOUNTER — HOSPITAL ENCOUNTER (OUTPATIENT)
Dept: OCCUPATIONAL THERAPY | Facility: CLINIC | Age: 56
Setting detail: THERAPIES SERIES
End: 2021-01-06
Attending: FAMILY MEDICINE
Payer: COMMERCIAL

## 2021-01-06 PROCEDURE — 97140 MANUAL THERAPY 1/> REGIONS: CPT | Mod: GO | Performed by: OCCUPATIONAL THERAPIST

## 2021-01-07 NOTE — PROGRESS NOTES
[facility]      OUTPATIENT Edema THERAPY  PLAN OF TREATMENT FOR OUTPATIENT REHABILITATION    Patient's Last Name, First Name, M.I.                YOB: 1965  SladeMaribel  M                        Provider's Name  [facility] Medical Record No.  [id number]                               Onset Date: 11/1/2020   Start of Care Date: 11/25/2020   Type:     ___PT   _X_OT   ___SLP Medical Diagnosis: RUE Axillary Web Syndrome/Cording                       OT Diagnosis: CASSIE of RUE      _________________________________________________________________________________  Plan of Treatment:    Frequency/Duration: 1x/wk x4 weeks     Goals:  Goal Identifier home program   Goal Description Pt will report compliance with home program 7/7 days including wear of compression to RUE during wakeful hours, self MLD and HEP to reduce cording and edema and to improve ROM of RUE for improved functional use of RUE.   Target Date 02/03/21   Date Met      Progress:     Goal Identifier ROM   Goal Description Pt will increase 2/2 RUE AROM values by at least 10* due to less pain in RUE with movement and to improve functional use of RUE.   Target Date 02/03/21   Date Met      Progress:     Goal Identifier volume   Goal Description Pt will reduce volume of RUE by at least 50mL to reduce pain/discomfort in RUE.   Target Date 02/03/21   Date Met      Progress:     Goal Identifier garment   Goal Description Pt will be IND with donning compression garment(s) and verbalizing wear/wash/replace schedule for edema management at discharge.    Target Date 02/03/21   Date Met      Progress:     Goal Identifier     Goal Description     Target Date     Date Met      Progress:     Goal Identifier     Goal Description     Target Date     Date Met      Progress:     Goal Identifier     Goal Description     Target Date     Date  Met      Progress:     Goal Identifier     Goal Description     Target Date     Date Met      Progress:     Progress Toward Goals:   Pt has been seen for 6 tx sessions.  Pt has been making slow progress due to fatigue, tightness in right forearm and elbow.  Pt has increased right Gaby flexion by 12* and right GABY abduction by 5* from initial evaluation 11/25/2020.      Certification date from 1/6/2021 to 2/3/56232    Ameena Colunga OT          I CERTIFY THE NEED FOR THESE SERVICES FURNISHED UNDER        THIS PLAN OF TREATMENT AND WHILE UNDER MY CARE     (Physician co-signature of this document indicates review and certification of the therapy plan).                Referring Provider: Saige Eli MD

## 2021-01-11 ENCOUNTER — VIRTUAL VISIT (OUTPATIENT)
Dept: RADIATION ONCOLOGY | Facility: CLINIC | Age: 56
End: 2021-01-11
Payer: COMMERCIAL

## 2021-01-11 DIAGNOSIS — Z17.0 MALIGNANT NEOPLASM OF LOWER-OUTER QUADRANT OF RIGHT BREAST OF FEMALE, ESTROGEN RECEPTOR POSITIVE (H): Primary | ICD-10-CM

## 2021-01-11 DIAGNOSIS — C50.511 MALIGNANT NEOPLASM OF LOWER-OUTER QUADRANT OF RIGHT BREAST OF FEMALE, ESTROGEN RECEPTOR POSITIVE (H): Primary | ICD-10-CM

## 2021-01-11 ASSESSMENT — PAIN SCALES - GENERAL: PAINLEVEL: NO PAIN (0)

## 2021-01-11 NOTE — NURSING NOTE
Radiation Oncology Nurse Only Telephone Visit - One Month Follow-Up      History of Radiation Treatment:    Site: Right Breast  Total Dose: 5,256 cGy   Date Completed: 12/21/2020  Clinic: [facility]  Physician: Dr. Robby Weaver    Pain:  Denies right breast pain, patient reports intermittent joint aches related to Femara    Range of Motion:   No Concerns, patient reports she is following with lymphedema therapy    Fatigue:   Grade 2: Fatigue not relieved by rest; limiting instrumental ADL - patient reports fatigue has resolved from radiation treatment and is now experiencing fatigue related to Femara    Skin:  No Concerns  Lotions/Creams: patient reports using daily moisturizer to right breast    Medical Oncologist: Dr. Eli    Endocrine Therapy: Femara    Follow-up with Radiation Oncologist: scheduled for in-person follow-up with Dr. Weaver on 4/12/2021      Future Appointments:      MD Name: Dr. Eli Appointment Date: 3/1/2021   MD Name:  Appointment Date:    MD Name:  Appointment Date:         Patient verbalized understanding of all information and was encouraged to contact this RN with questions or concerns following today's visit at [phone].    Previous Radiation Treatment added to Medical History: Yes    Georgia Morgan, RN BSN OCN CBCN

## 2021-01-11 NOTE — PATIENT INSTRUCTIONS
Please contact [facility] RN with questions or concerns following today's appointment: [phone].    Thank you!

## 2021-01-13 ENCOUNTER — HOSPITAL ENCOUNTER (OUTPATIENT)
Dept: OCCUPATIONAL THERAPY | Facility: CLINIC | Age: 56
Setting detail: THERAPIES SERIES
End: 2021-01-13
Attending: FAMILY MEDICINE
Payer: COMMERCIAL

## 2021-01-13 PROCEDURE — 97140 MANUAL THERAPY 1/> REGIONS: CPT | Mod: GO | Performed by: OCCUPATIONAL THERAPIST

## 2021-01-18 ENCOUNTER — HOSPITAL ENCOUNTER (OUTPATIENT)
Dept: OCCUPATIONAL THERAPY | Facility: CLINIC | Age: 56
Setting detail: THERAPIES SERIES
End: 2021-01-18
Attending: FAMILY MEDICINE
Payer: COMMERCIAL

## 2021-01-18 PROCEDURE — 97140 MANUAL THERAPY 1/> REGIONS: CPT | Mod: GO | Performed by: OCCUPATIONAL THERAPIST

## 2021-01-25 ENCOUNTER — HOSPITAL ENCOUNTER (OUTPATIENT)
Dept: OCCUPATIONAL THERAPY | Facility: CLINIC | Age: 56
Setting detail: THERAPIES SERIES
End: 2021-01-25
Attending: FAMILY MEDICINE
Payer: COMMERCIAL

## 2021-01-25 PROCEDURE — 97140 MANUAL THERAPY 1/> REGIONS: CPT | Mod: GO | Performed by: OCCUPATIONAL THERAPIST

## 2021-01-26 NOTE — PROGRESS NOTES
Outpatient Occupational Therapy Discharge Note     Patient: Maribel June  : 1965    Beginning/End Dates of Reporting Period:  2020 to 2021    Referring Provider: Saige Eli MD    Therapy Diagnosis: decreased RUE ROM and CASSIE    Client Self Report:   0    Objective Measurements:     Objective Measure: skin assessment   Details: 2021: Continued heaviness in right elbow, very mild edema of medial, right forearm and mild edema of medial elbow joint.  However, smaller area of edema, today.   Objective Measure: volume   Details: Compared to 2020: -62mL   Objective Measure: ROM   Details: From 2020:  right GABY flexion 0-1501* (+12), right GABY abduction 0-147* (+7* change).         Goals:   Goal Identifier home program   Goal Description Pt will report compliance with home program 7/7 days including wear of compression to RUE during wakeful hours, self MLD and HEP to reduce cording and edema and to improve ROM of RUE for improved functional use of RUE.   Target Date 21   Date Met  21   Progress:     Goal Identifier ROM   Goal Description Pt will increase 2/2 RUE AROM values by at least 10* due to less pain in RUE with movement and to improve functional use of RUE.   Target Date 21   Date Met  21(partially met; +12* increase with GABY flexion)   Progress:     Goal Identifier volume   Goal Description Pt will reduce volume of RUE by at least 50mL to reduce pain/discomfort in RUE.   Target Date 21   Date Met  21   Progress:     Goal Identifier garment   Goal Description Pt will be IND with donning compression garment(s) and verbalizing wear/wash/replace schedule for edema management at discharge.    Target Date 21   Date Met  21   Progress:     Goal Identifier     Goal Description     Target Date     Date Met      Progress:     Goal Identifier     Goal Description     Target Date     Date Met      Progress:     Goal Identifier      Goal Description     Target Date     Date Met      Progress:     Goal Identifier     Goal Description     Target Date     Date Met      Progress:     Progress Toward Goals:   Pt seen for 9 visits with increased right GABY ROM and a reduction in CASSIE and pain around right elbow joint.  Pt was fit for CCLl compression sleeve which has been assisting with reducing pain at right elbow joint.  Pt met goals 1,3,4 and partially met goal 2.       Plan:  Discharge from therapy.    Discharge:    Reason for Discharge: Patient has met goals 1,3,4 and partially met goal 2.   Equipment Issued: GCB materials, size E comperm    Discharge Plan: Patient to continue home program: day wear of CCLl compression sleeve, try Comperm to RUE at night.

## 2021-02-27 NOTE — PROGRESS NOTES
Crispin is a 55 year old who is being evaluated via a billable video visit.      How would you like to obtain your AVS? MyChart  If the video visit is dropped, the invitation should be resent by: Text to cell phone: [phone]  Will anyone else be joining your video visit? YAIR Lunsford    Video-Visit Details    Type of service:  Video Visit    40 minutes spent on the date of the encounter doing chart review, review of test results, interpretation of tests, patient visit, documentation and discussion with family       Originating Location (pt. Location): Home    Distant Location (provider location):  [facility]     Platform used for Video Visit: [employer]      Oncology Visit:  Date on this visit: 3/1/2021    Diagnosis: Stage Ia, [id number], grade 1, ER positive, LA positive, HER-2 negative invasive carcinoma of the right breast. Oncotype dx recurrence score = 16.    Primary Physician: Juwan Perry     History Of Present Illness:  Ms. June is a 55 year old female with right breast cancer.  Routine screening mammogram on 8/6/2020 showed heterogeneously dense breasts but no concerning findings.  A physician then palpated a mass in the right breast.  Diagnostic mammogram and ultrasound showed a 2.2 cm mass at 8:00, 5 cm from the nipple.  Right breast biopsy showed a grade 1 invasive mammary carcinoma, ER strong in 100%, LA strong in 100%, HER2 negative.  She had a right breast lumpectomy and sentinel lymph node procedure with Dr. Watson on 9/29/2020.  Pathology showed a grade 1 invasive mammary carcinoma measuring 1.6 cm.  There was associated intermediate grade DCIS.  Surgical margins were negative.  A single lymph node was benign.  Oncotype dx recurrence score was 16.  She completed radiation to the right breast, a total of 5256 cGy in 20 fractions, on 12/21/2020.  On treatment with letrozole since 1/2021.    Interval History:  Ms. June was contacted via video visit today for  routine breast cancer followup.  She started treatment with letrozole approximately 2 months ago.  Since starting the medication, she has had a number of bothersome side effects.  Most bothersome to her at this time is diffuse joint aches and pains.  She has pain in her feet, her bilateral knees, her bilateral elbows and her hands.  She also reports weakness with standing from a sitting position and weakness in her hands.  This is significantly interfering with daily activities including daily exercise.  She also continues to suffer from insomnia.  It takes her multiple hours at night in order to be able to go to sleep.  She occasionally will try to take a half of a clonazepam with some relief.  She has significant daytime fatigue.  When she is done working for the day, it is hard for her to do anything but lay on the couch.  She reports pain in the lateral aspect of her right breast.  She denies swelling or associated firmness.  She does have what feels like firmness and swelling of the inner aspect of her right forearm.  She has diligently been wearing a compression sleeve.  She does report significant vaginal dryness that is quite bothersome as well.  She denies cough, shortness of breath or chest pain.  She has no abdominal complaints.  The remainder of a complete 12-point review of systems was reviewed with the patient and was negative with the exception of that mentioned above.            Past Medical/Surgical History:  Past Medical History:   Diagnosis Date     Depressive disorder      Endometriosis      Herpes genitalis in women      History of major depression 2007    situational with first .      Kidney stone      Malignant neoplasm of right breast in female, estrogen receptor positive (H) 08/27/2020     S/P radiation therapy     5,256 cGy to right breast completed 12/21/2020 - [facility]   - Hyperlipidemia.  - Osteopenia    Past Surgical History:   Procedure Laterality Date      BREAST BIOPSY, RT/LT Right 2020     BREAST SURGERY Right     Right Breast - Benign      SECTION      x1     COLONOSCOPY N/A 2016    Procedure: COMBINED COLONOSCOPY, SINGLE OR MULTIPLE BIOPSY/POLYPECTOMY BY BIOPSY;  Surgeon: Duane, William Charles, MD;  Location: [facility] OR     COLONOSCOPY WITH CO2 INSUFFLATION N/A 2016    Procedure: COLONOSCOPY WITH CO2 INSUFFLATION;  Surgeon: Duane, William Charles, MD;  Location: [facility] OR     CYSTOSCOPY  2009    left stent placement (C-ARM)     GENITOURINARY SURGERY      surgery for kidney stone     GYN SURGERY      Partial Hysterectomy at age 29     LUMPECTOMY BREAST WITH SENTINEL NODE, COMBINED Right 2020    Procedure: Right breast lumpectomy with [place] node biopsy;  Surgeon: Jose Watson MD;  Location: [facility] OR     Allergies:  Allergies as of 2021 - Reviewed 2021   Allergen Reaction Noted     Flagyl [metronidazole] Nausea and Vomiting 2015     Lisinopril  2019     Ondansetron  2019     Oxycodone-acetaminophen Nausea and Vomiting 2019     Sulfamethoxazole-trimethoprim  2011     Zithromax [azithromycin dihydrate] Rash 2013     Current Medications:  Current Outpatient Medications   Medication Sig Dispense Refill     acyclovir (ZOVIRAX) 400 MG tablet Take 1 tablet (400 mg) by mouth every 8 hours for 5 days (Patient not taking: Reported on 2021) 15 tablet 11     amLODIPine (NORVASC) 5 MG tablet Take 1 tablet by mouth       bimatoprost (LUMIGAN) 0.01 % SOLN        clonazePAM (KLONOPIN) 0.5 MG tablet Take 1 tablet (0.5 mg) by mouth nightly as needed for sleep 20 tablet 0     letrozole (FEMARA) 2.5 MG tablet Take 1 tablet (2.5 mg) by mouth daily 90 tablet 3     Multiple Vitamin (MULTIVITAMIN PO) Take by mouth daily       Omega-3 Fatty Acids (OMEGA 3 PO) Take by mouth daily       Turmeric (QC TUMERIC COMPLEX PO) Take by mouth daily        Family and Social History:  Please see initial Oncology  consultation dated 10/27/2020 for details.  9/25/2020 Breast Actionable Panel was negative.    Physical Exam:  General:  Fatigued appearing adult female in NAD.  Alert and oriented.  Eyes:  No erythema or discharge.  Respiratory:  Breathing comfortably on room air.  No audible wheezing.   Skin:  No visible concerning skin rashes or lesions  Neuro:  No notable tremor and dyskinetic movements.  Psych:  Mood and affect appear normal.    The rest of a comprehensive physical examination is deferred due to PHE (public health emergency) video visit restrictions.    Laboratory/Imaging Studies  No interim imaging or laboratory studies pertinent to today's visit.    ASSESSMENT/PLAN:  Ms. June is a 54 yo female with a stage Ia, [id number], grade 1, ER positive, AK positive, HER2 negative invasive ductal carcinoma of the right breast.  She is s/p treatment with right breast lumpectomy and radiation.     1.  Right breast cancer:  She is 5 months out from excision of a right breast cancer.  On treatment with letrozole for the past two months.  She has fatigue, insomnia, diffuse arthralgias, and vaginal dryness on the medication.  We reviewed options including 1) continue letrozole and treat side effects, 2) change to another aromatase inhibitor such as exemestane, 3) change treatment to tamoxifen.  We reviewed the pros and cons including side effects and efficacy of each option.  Given multitude of symptoms and poor quality of life on aromatase inhibitor, she ultimately decided to try tamoxifen.    She is asymptomatic of disease recurrence on history taken today.  Given increased breast density and that her breast cancer was not initially seen on screening mammogram, we plan to perform high risk breast screening with both annual mammograms and breast MRI, spacing the two studies so that she is having some form of breast imaging once every 6 months.  Will obtain bilateral diagnostic mammograms at the time of her return visit in 3  months.  We again reviewed that surveillance with breast cancer tumor markers and whole body imaging is not recommended as these have been shown to be non-specific and have not been shown to improve outcomes respectively.    2.  Bone health:  DEXA in 08/2020 with a lowest T-score of -2.1 which is c/w osteopenia and encroaching on osteoporosis. We previously discussed treatment with Zometa, both for prevention of bone loss and prevention of breast cancer bone metastases.  Start of Zometa has been deferred as she wanted to see how she tolerated endocrine therapy first.  Given plan to change endocrine therapy, will again defer for now and re-discuss at next visit. In the meantime she will continue daily calcium and vitamin D supplement and daily walking.    3.  Arthralgias:  Secondary to menopause and exacerbated by letrozole.  Will change letrozole to tamoxifen as above, then re-evaluate.     4. Insomnia  Uses clonazepam intermittently.  Discussed if no improvement in insomnia with change in endocrine therapy, could consider a sleep aide such as ambien.    5.  Vaginal dryness:  Secondary to letrozole.  Replens prn prescribed today.    6.  COVID vaccination:  Received second vaccination (Moderna) on 2/27/2021, of note had fever, chills, body aches, and injection site reaction.    7. Follow Up:    Bilateral diagnostic mammograms and survivor clinic visit with Briana Burgos in 3 months.  Return to clinic in 6 months for visit with me.

## 2021-03-01 ENCOUNTER — VIRTUAL VISIT (OUTPATIENT)
Dept: ONCOLOGY | Facility: CLINIC | Age: 56
End: 2021-03-01
Attending: INTERNAL MEDICINE
Payer: COMMERCIAL

## 2021-03-01 DIAGNOSIS — C50.511 MALIGNANT NEOPLASM OF LOWER-OUTER QUADRANT OF RIGHT BREAST OF FEMALE, ESTROGEN RECEPTOR POSITIVE (H): Primary | ICD-10-CM

## 2021-03-01 DIAGNOSIS — N95.2 VAGINAL ATROPHY: ICD-10-CM

## 2021-03-01 DIAGNOSIS — Z17.0 MALIGNANT NEOPLASM OF LOWER-OUTER QUADRANT OF RIGHT BREAST OF FEMALE, ESTROGEN RECEPTOR POSITIVE (H): Primary | ICD-10-CM

## 2021-03-01 PROCEDURE — 99215 OFFICE O/P EST HI 40 MIN: CPT | Mod: GT | Performed by: INTERNAL MEDICINE

## 2021-03-01 PROCEDURE — 999N001193 HC VIDEO/TELEPHONE VISIT; NO CHARGE

## 2021-03-01 RX ORDER — TAMOXIFEN CITRATE 20 MG/1
20 TABLET ORAL DAILY
Qty: 90 TABLET | Refills: 3 | Status: SHIPPED | OUTPATIENT
Start: 2021-03-01 | End: 2021-08-10

## 2021-03-01 RX ORDER — GLYCERIN/MIN OIL/POLYCARBOPHIL
1 GEL WITH APPLICATOR (GRAM) VAGINAL
Qty: 30 G | Refills: 3 | Status: SHIPPED | OUTPATIENT
Start: 2021-03-01 | End: 2021-08-10

## 2021-03-01 NOTE — LETTER
3/1/2021         RE: Maribel June  [address]  [place] [address]        Dear Colleague,    Thank you for referring your patient, Marible June, to the [facility]. Please see a copy of my visit note below.    Crispin is a 55 year old who is being evaluated via a billable video visit.      How would you like to obtain your AVS? MyChart  If the video visit is dropped, the invitation should be resent by: Text to cell phone: [phone]  Will anyone else be joining your video visit? No    YAIR Costello    Video-Visit Details    Type of service:  Video Visit    40 minutes spent on the date of the encounter doing chart review, review of test results, interpretation of tests, patient visit, documentation and discussion with family       Originating Location (pt. Location): Home    Distant Location (provider location):  [facility]     Platform used for Video Visit: [employer]      Oncology Visit:  Date on this visit: 3/1/2021    Diagnosis: Stage Ia, [id number], grade 1, ER positive, ID positive, HER-2 negative invasive carcinoma of the right breast. Oncotype dx recurrence score = 16.    Primary Physician: Juwan Perry     History Of Present Illness:  Ms. June is a 55 year old female with right breast cancer.  Routine screening mammogram on 8/6/2020 showed heterogeneously dense breasts but no concerning findings.  A physician then palpated a mass in the right breast.  Diagnostic mammogram and ultrasound showed a 2.2 cm mass at 8:00, 5 cm from the nipple.  Right breast biopsy showed a grade 1 invasive mammary carcinoma, ER strong in 100%, ID strong in 100%, HER2 negative.  She had a right breast lumpectomy and sentinel lymph node procedure with Dr. Watson on 9/29/2020.  Pathology showed a grade 1 invasive mammary carcinoma measuring 1.6 cm.  There was associated intermediate grade DCIS.  Surgical margins were negative.  A single lymph node was  benign.  Oncotype dx recurrence score was 16.  She completed radiation to the right breast, a total of 5256 cGy in 20 fractions, on 12/21/2020.  On treatment with letrozole since 1/2021.    Interval History:  Ms. June was contacted via video visit today for routine breast cancer followup.  She started treatment with letrozole approximately 2 months ago.  Since starting the medication, she has had a number of bothersome side effects.  Most bothersome to her at this time is diffuse joint aches and pains.  She has pain in her feet, her bilateral knees, her bilateral elbows and her hands.  She also reports weakness with standing from a sitting position and weakness in her hands.  This is significantly interfering with daily activities including daily exercise.  She also continues to suffer from insomnia.  It takes her multiple hours at night in order to be able to go to sleep.  She occasionally will try to take a half of a clonazepam with some relief.  She has significant daytime fatigue.  When she is done working for the day, it is hard for her to do anything but lay on the couch.  She reports pain in the lateral aspect of her right breast.  She denies swelling or associated firmness.  She does have what feels like firmness and swelling of the inner aspect of her right forearm.  She has diligently been wearing a compression sleeve.  She does report significant vaginal dryness that is quite bothersome as well.  She denies cough, shortness of breath or chest pain.  She has no abdominal complaints.  The remainder of a complete 12-point review of systems was reviewed with the patient and was negative with the exception of that mentioned above.            Past Medical/Surgical History:  Past Medical History:   Diagnosis Date     Depressive disorder      Endometriosis      Herpes genitalis in women      History of major depression 2007    situational with first .      Kidney stone      Malignant neoplasm of right  breast in female, estrogen receptor positive (H) 2020     S/P radiation therapy     5,256 cGy to right breast completed 2020 - [facility]   - Hyperlipidemia.  - Osteopenia    Past Surgical History:   Procedure Laterality Date     BREAST BIOPSY, RT/LT Right 2020     BREAST SURGERY Right     Right Breast - Benign      SECTION      x1     COLONOSCOPY N/A 2016    Procedure: COMBINED COLONOSCOPY, SINGLE OR MULTIPLE BIOPSY/POLYPECTOMY BY BIOPSY;  Surgeon: Duane, William Charles, MD;  Location: [facility] OR     COLONOSCOPY WITH CO2 INSUFFLATION N/A 2016    Procedure: COLONOSCOPY WITH CO2 INSUFFLATION;  Surgeon: Duane, William Charles, MD;  Location: [facility] OR     CYSTOSCOPY  2009    left stent placement (C-ARM)     GENITOURINARY SURGERY      surgery for kidney stone     GYN SURGERY      Partial Hysterectomy at age 29     LUMPECTOMY BREAST WITH SENTINEL NODE, COMBINED Right 2020    Procedure: Right breast lumpectomy with [place] node biopsy;  Surgeon: Jose Watson MD;  [facility]: [facility] OR     Allergies:  Allergies as of 2021 - Reviewed 2021   Allergen Reaction Noted     Flagyl [metronidazole] Nausea and Vomiting 2015     Lisinopril  2019     Ondansetron  2019     Oxycodone-acetaminophen Nausea and Vomiting 2019     Sulfamethoxazole-trimethoprim  2011     Zithromax [azithromycin dihydrate] Rash 2013     Current Medications:  Current Outpatient Medications   Medication Sig Dispense Refill     acyclovir (ZOVIRAX) 400 MG tablet Take 1 tablet (400 mg) by mouth every 8 hours for 5 days (Patient not taking: Reported on 2021) 15 tablet 11     amLODIPine (NORVASC) 5 MG tablet Take 1 tablet by mouth       bimatoprost (LUMIGAN) 0.01 % SOLN        clonazePAM (KLONOPIN) 0.5 MG tablet Take 1 tablet (0.5 mg) by mouth nightly as needed for sleep 20 tablet 0     letrozole (FEMARA) 2.5 MG tablet Take 1 tablet (2.5 mg) by mouth  daily 90 tablet 3     Multiple Vitamin (MULTIVITAMIN PO) Take by mouth daily       Omega-3 Fatty Acids (OMEGA 3 PO) Take by mouth daily       Turmeric (QC TUMERIC COMPLEX PO) Take by mouth daily        Family and Social History:  Please see initial Oncology consultation dated 10/27/2020 for details.  9/25/2020 Breast Actionable Panel was negative.    Physical Exam:  General:  Fatigued appearing adult female in NAD.  Alert and oriented.  Eyes:  No erythema or discharge.  Respiratory:  Breathing comfortably on room air.  No audible wheezing.   Skin:  No visible concerning skin rashes or lesions  Neuro:  No notable tremor and dyskinetic movements.  Psych:  Mood and affect appear normal.    The rest of a comprehensive physical examination is deferred due to PHE (public health emergency) video visit restrictions.    Laboratory/Imaging Studies  No interim imaging or laboratory studies pertinent to today's visit.    ASSESSMENT/PLAN:  Ms. June is a 54 yo female with a stage Ia, Q2vS1D8, grade 1, ER positive, VA positive, HER2 negative invasive ductal carcinoma of the right breast.  She is s/p treatment with right breast lumpectomy and radiation.     1.  Right breast cancer:  She is 5 months out from excision of a right breast cancer.  On treatment with letrozole for the past two months.  She has fatigue, insomnia, diffuse arthralgias, and vaginal dryness on the medication.  We reviewed options including 1) continue letrozole and treat side effects, 2) change to another aromatase inhibitor such as exemestane, 3) change treatment to tamoxifen.  We reviewed the pros and cons including side effects and efficacy of each option.  Given multitude of symptoms and poor quality of life on aromatase inhibitor, she ultimately decided to try tamoxifen.    She is asymptomatic of disease recurrence on history taken today.  Given increased breast density and that her breast cancer was not initially seen on screening mammogram, we plan  to perform high risk breast screening with both annual mammograms and breast MRI, spacing the two studies so that she is having some form of breast imaging once every 6 months.  Will obtain bilateral diagnostic mammograms at the time of her return visit in 3 months.  We again reviewed that surveillance with breast cancer tumor markers and whole body imaging is not recommended as these have been shown to be non-specific and have not been shown to improve outcomes respectively.    2.  Bone health:  DEXA in 08/2020 with a lowest T-score of -2.1 which is c/w osteopenia and encroaching on osteoporosis. We previously discussed treatment with Zometa, both for prevention of bone loss and prevention of breast cancer bone metastases.  Start of Zometa has been deferred as she wanted to see how she tolerated endocrine therapy first.  Given plan to change endocrine therapy, will again defer for now and re-discuss at next visit. In the meantime she will continue daily calcium and vitamin D supplement and daily walking.    3.  Arthralgias:  Secondary to menopause and exacerbated by letrozole.  Will change letrozole to tamoxifen as above, then re-evaluate.     4. Insomnia  Uses clonazepam intermittently.  Discussed if no improvement in insomnia with change in endocrine therapy, could consider a sleep aide such as ambien.    5.  Vaginal dryness:  Secondary to letrozole.  Replens prn prescribed today.    6.  COVID vaccination:  Received second vaccination (Moderna) on 2/27/2021, of note had fever, chills, body aches, and injection site reaction.    7. Follow Up:    Bilateral diagnostic mammograms and survivor clinic visit with Briana Burgos in 3 months.  Return to clinic in 6 months for visit with me.            Again, thank you for allowing me to participate in the care of your patient.        Sincerely,        Saige Eli MD

## 2021-03-10 ENCOUNTER — MYC MEDICAL ADVICE (OUTPATIENT)
Dept: ONCOLOGY | Facility: CLINIC | Age: 56
End: 2021-03-10

## 2021-03-10 DIAGNOSIS — R11.0 NAUSEA: Primary | ICD-10-CM

## 2021-03-10 DIAGNOSIS — R11.0 NAUSEA: ICD-10-CM

## 2021-03-10 DIAGNOSIS — C50.911 MALIGNANT NEOPLASM OF RIGHT BREAST IN FEMALE, ESTROGEN RECEPTOR POSITIVE, UNSPECIFIED SITE OF BREAST (H): Primary | ICD-10-CM

## 2021-03-10 DIAGNOSIS — Z17.0 MALIGNANT NEOPLASM OF RIGHT BREAST IN FEMALE, ESTROGEN RECEPTOR POSITIVE, UNSPECIFIED SITE OF BREAST (H): Primary | ICD-10-CM

## 2021-03-10 RX ORDER — ONDANSETRON 8 MG/1
8 TABLET, FILM COATED ORAL EVERY 8 HOURS PRN
Qty: 30 TABLET | Refills: 1 | Status: SHIPPED | OUTPATIENT
Start: 2021-03-10 | End: 2021-09-21

## 2021-03-10 NOTE — TELEPHONE ENCOUNTER
Pt called back to discuss symptomatic mychart;    Stated she started tamoxifen last week; she is getting less joint pains than when she was on letrozole but having more constant mild nausea. She already noticed weight loss, she is currently 108 lbs and was recorded as 114 lbs in Dec 2020. State appetite is less, she has not vomited but has nausea all day.     Wants to approach nausea in a holistic way; advised peppermint may help, either use extract to sniff prn throughout the day this comes in a spray, tube for nasal inhaling, oil or she can chew gum, suck on mints. She will try this.     [place] 2 [place] and would like to consider trying a Rx in case nausea should get worse then, she will update care team if weight loss should get worse or other symptoms worsen. Routed to care team for follow-up. Uses [facility] on file.

## 2021-03-10 NOTE — TELEPHONE ENCOUNTER
"Per Dr. Eli: ok for zofran 8 mg q8h prn, called pt to inform her. zofran is on her list of allergies, she stated she does not remember ever taking it before, reaction noted as \"nausea\". Informed her will delete from list of allergy, of course if it causes more nausea to discontinue it and call us back and will prescribe something different, she verbalized understanding. Rx sent to [facility].  "

## 2021-04-06 ENCOUNTER — MYC MEDICAL ADVICE (OUTPATIENT)
Dept: ONCOLOGY | Facility: CLINIC | Age: 56
End: 2021-04-06

## 2021-04-06 DIAGNOSIS — Z17.0 MALIGNANT NEOPLASM OF LOWER-OUTER QUADRANT OF RIGHT BREAST OF FEMALE, ESTROGEN RECEPTOR POSITIVE (H): ICD-10-CM

## 2021-04-06 DIAGNOSIS — C50.911 MALIGNANT NEOPLASM OF RIGHT BREAST IN FEMALE, ESTROGEN RECEPTOR POSITIVE, UNSPECIFIED SITE OF BREAST (H): Primary | ICD-10-CM

## 2021-04-06 DIAGNOSIS — Z17.0 MALIGNANT NEOPLASM OF RIGHT BREAST IN FEMALE, ESTROGEN RECEPTOR POSITIVE, UNSPECIFIED SITE OF BREAST (H): Primary | ICD-10-CM

## 2021-04-06 DIAGNOSIS — C50.511 MALIGNANT NEOPLASM OF LOWER-OUTER QUADRANT OF RIGHT BREAST OF FEMALE, ESTROGEN RECEPTOR POSITIVE (H): ICD-10-CM

## 2021-04-06 NOTE — TELEPHONE ENCOUNTER
Pt called back to discuss symptoms; stated joint pains since first starting letrozole, continued but at a lesser extent when she switched to tamoxifen. Right shoulder pain increasing x3 weeks.     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 Intermittent with certain movements only so has not tried any prn pain med or topical pain cream. R breast pain with movement of R arm, no changes to breast. Tried massaging in the shower. Denied any past history of R shoulder injuries, after R lumpectomy did have some swelling and still wearing compression sleeve, state swelling has not gotten worse with this new pain.    Tailbone pain is also new, occurred around the same time. Pain does not radiate anywhere, no skin changes or trauma. Unsure if pains due to worsening joint pain overall or something new. Paged Dr. Eli, next follow-up is 6/2 with Briana TEJADA and mammogram.

## 2021-04-06 NOTE — TELEPHONE ENCOUNTER
Per Dr. Eli: pain at these sites not typical on tamoxifen, pt to follow-up with pcp. Pt informed and verbalized understanding.

## 2021-04-12 ENCOUNTER — OFFICE VISIT (OUTPATIENT)
Dept: RADIATION ONCOLOGY | Facility: CLINIC | Age: 56
End: 2021-04-12
Payer: COMMERCIAL

## 2021-04-12 VITALS
SYSTOLIC BLOOD PRESSURE: 113 MMHG | WEIGHT: 108.5 LBS | HEART RATE: 59 BPM | RESPIRATION RATE: 16 BRPM | TEMPERATURE: 98 F | BODY MASS INDEX: 21.19 KG/M2 | DIASTOLIC BLOOD PRESSURE: 71 MMHG | OXYGEN SATURATION: 99 %

## 2021-04-12 DIAGNOSIS — Z17.0 MALIGNANT NEOPLASM OF LOWER-OUTER QUADRANT OF RIGHT BREAST OF FEMALE, ESTROGEN RECEPTOR POSITIVE (H): Primary | ICD-10-CM

## 2021-04-12 DIAGNOSIS — C50.511 MALIGNANT NEOPLASM OF LOWER-OUTER QUADRANT OF RIGHT BREAST OF FEMALE, ESTROGEN RECEPTOR POSITIVE (H): Primary | ICD-10-CM

## 2021-04-12 PROCEDURE — 99213 OFFICE O/P EST LOW 20 MIN: CPT | Performed by: RADIOLOGY

## 2021-04-12 ASSESSMENT — PAIN SCALES - GENERAL: PAINLEVEL: MODERATE PAIN (5)

## 2021-04-12 NOTE — LETTER
4/12/2021         RE: Maribel June  [address]  [place] [address]        Dear Colleague,    Thank you for referring your patient, Maribel June, to the [facility] RADIATION ONCOLOGY [place]. Please see a copy of my visit note below.    Dear Colleagues,  Today Maribel June was seen in follow up      IDENTIFICATION: This is a 55 year old woman with right LOQ breast G1 IDCA with G2 DCIS status post lumpectomy and SLNBx, revealing [id number], ER+/NH+/H2N- disease who completed adjuvant radiation therapy on 12/21/21. She will not be receiving chemotherapy.                      INTERVAL HISTORY:  Maribel June was last seen in our clinic during her last week of treatment. While on treatment she had complaints of mild fatigue which was relieved by rest (grade 1) and developed diffuse skin erythema (grade 1).  Post treatment she states that all of her acute skin symptoms have resolved. However she has had significant fatigue and joint pain in multiple areas throughout her body since starting tamoxifen.  Her right her right shoulder joint is particularly painful and she pain and stiffness in her right axilla affecting her range of motion.  She has been seen by lymphedema clinic.  She returns today in follow up. Specifically denies n/v/ha/sob/cp  REVIEW OF SYSTEMS: As per HPI, a 14-point review of systems is otherwise negative.     Past Medical History:   Diagnosis Date     Depressive disorder      Endometriosis      Herpes genitalis in women      History of major depression 2007    situational with first .      Kidney stone      Malignant neoplasm of right breast in female, estrogen receptor positive (H) 08/27/2020     S/P radiation therapy     5,256 cGy to right breast completed 12/21/2020 - [facility]       Past Surgical History:   Procedure Laterality Date     BREAST BIOPSY, RT/LT Right 08/27/2020     BREAST SURGERY Right 2005    Right Breast - Benign       SECTION      x1     COLONOSCOPY N/A 2016    Procedure: COMBINED COLONOSCOPY, SINGLE OR MULTIPLE BIOPSY/POLYPECTOMY BY BIOPSY;  Surgeon: Duane, William Charles, MD;  Location: [facility] OR     COLONOSCOPY WITH CO2 INSUFFLATION N/A 2016    Procedure: COLONOSCOPY WITH CO2 INSUFFLATION;  Surgeon: Duane, William Charles, MD;  Location: [facility] OR     CYSTOSCOPY  2009    left stent placement (C-ARM)     GENITOURINARY SURGERY      surgery for kidney stone     GYN SURGERY      Partial Hysterectomy at age 29     LUMPECTOMY BREAST WITH SENTINEL NODE, COMBINED Right 2020    Procedure: Right breast lumpectomy with [place] node biopsy;  Surgeon: Jose Watson MD;  Location: [facility] OR       Family History   Problem Relation Age of Onset     Hypertension Father      Kidney failure Father      Aortic aneurysm Father      Prostate Cancer Father      Stomach Cancer Maternal Grandfather      Heart Disease Paternal Grandfather      Hypertension Paternal Grandfather      Neurologic Disorder Brother         has seizures from Epilepsy     Asthma Mother      Hypertension Mother      Arthritis Mother      Hypertension Brother      Lymphoma Maternal Uncle      Cancer Maternal Uncle         Cholangiocarcinoma     Breast Cancer Paternal Aunt      Breast Cancer Cousin         Paternal 1st Female Cousin (daughter of paternal aunt with breast cancer)       Social History     Tobacco Use     Smoking status: Former Smoker     Smokeless tobacco: Never Used     Tobacco comment: social use in college   Substance Use Topics     Alcohol use: Not Currently       Current Outpatient Medications   Medication     amLODIPine (NORVASC) 5 MG tablet     bimatoprost (LUMIGAN) 0.01 % SOLN     clonazePAM (KLONOPIN) 0.5 MG tablet     Multiple Vitamin (MULTIVITAMIN PO)     Omega-3 Fatty Acids (OMEGA 3 PO)     ondansetron (ZOFRAN) 8 MG tablet     tamoxifen (NOLVADEX) 20 MG tablet     Turmeric (QC TUMERIC COMPLEX PO)     acyclovir (ZOVIRAX) 400  MG tablet     Vaginal Lubricant (REPLENS) GEL     No current facility-administered medications for this visit.           Allergies   Allergen Reactions     Flagyl [Metronidazole] Nausea and Vomiting     Lisinopril      Other reaction(s): Headaches     Oxycodone-Acetaminophen Nausea and Vomiting     States Oxycodone is fine     Sulfamethoxazole-Trimethoprim      Other reaction(s): Headache     Zithromax [Azithromycin Dihydrate] Rash        PHYSICAL EXAMINATION:  /71 (BP Location: Left arm, Patient Position: Chair, Cuff Size: Adult Regular)   Pulse 59   Temp 98  F (36.7  C) (Oral)   Resp 16   Wt 49.2 kg (108 lb 8 oz)   SpO2 99%   BMI 21.19 kg/m    GENERAL Well-appearing woman in no acute distress.  HEENT Normocephalic, atraumatic.  Sclerae anicteric.  CVR  Regular rate and rhythm.  No murmurs, rubs, or gallops.  LUNGS Clear to auscultation bilaterally.  BREASTS Breasts are examined in the supine position.    Subtle hyperpigmentation and dry skin noted in prior treatment field.  No nodularity erythema or desquamation. Scar well healed.  LYMPH no supraclavicular, infraclavicular, or axillary lymphadenopathy appreciated bilaterally  EXT  No clubbing, cyanosis or edema.    MSK  decreased right arm range of motion with patient unable to abduct  NEURO Gait within normal limits.  SKIN  Warm and well perfused.  See breast exam  PSYCH:         Orientation: Alert, attentive, and oriented to time, place, and person                         Affect: Affect was appropriate to the situation and showed normal range and stability. No anxious mood                         Speech: Speech was clear with normal fluency, rate, tone, and volume.                         Memory: Although not formally assessed, immediate, recent, and remote memory appeared to be intact.                          Insight and Judgement:  demonstrates adequate awareness of the issues discussed.                         Thought: Thought patterns were  coherent and logical.      IMAGING: No new imaging.        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 She will not be receiving chemotherapy. She is currently on tamoxifen.  She has joint pains and fatigue because of this.  She also has more significant pain and joint stiffness within the right shoulder joint and has developing stiffness in the right axilla.  This is likely multifactorial in that patient had surgery within her axilla severing nerve fibers, adjuvant radiation affected the shoulder joint, and she has generalized joint aches secondary to hormonal therapy.  She also has some residual skin dryness and hyperpigmentation within the treatment field, which is to be expected. This is however is not bothersome.  Recommend the followin. Continue to use sunblock and moisturizer in the previously treated area generously. Will refer to Cancer Rehab at the [facility].  Trial of ibuprofen x 7 days starting now and pt to continue to see lymphedema clinic.   2. Follow up with Rad Onc in 4 months.    3. Currently on Rees. Follow up with Med Onc for management regarding hormonal therapy and joint aches. Scans per Med Onc.  4. Follow up with Surgery as previously directed.    There was ample time for questions and all were answered to the patient's satisfaction. Thank you for allowing me to participate in the care of this pleasant patient. If you have any questions, please do not hesitate to contact my office.      Sincerely,  Robby Weaver MD  Adjunct   Dept of Radiation Oncology  [facility]        Again, thank you for allowing me to participate in the care of your patient.        Sincerely,        Winsome Weaver MD

## 2021-04-12 NOTE — NURSING NOTE
"  RADIATION ONCOLOGY BREAST FOLLOW-UP VISIT    Patient Name: Maribel June      : 1965     Age: 55 year old        ______________________________________________________________________________       Chief Complaint   Patient presents with     Cancer     Radiation oncology return visit with Dr. Weaver     /71 (BP Location: Left arm, Patient Position: Chair, Cuff Size: Adult Regular)   Pulse 59   Temp 98  F (36.7  C) (Oral)   Resp 16   Wt 49.2 kg (108 lb 8 oz)   SpO2 99%   BMI 21.19 kg/m        History of Radiation Treatment:  Site: right breast  Total Dose: 5,256 cGy   Date Completed: 2020  Clinic: [facility]  Physician: Dr. Robby Weaver      Pain:  Current history of pain associated with this visit:   Intensity: 5/10  Current: \"tender and sore\"  Location: right breast  Treatment: patient reports taking Ibuprofen po as needed    Labs:  Other Labs: No    Imaging:  None    Fatigue: patient reports fatigue related to Tamoxifen    Skin:  No Concerns  Lotions/Creams: patient reports use of daily body lotion    Range of Motion:   Concerns (explain) - patient reports limitations with range of motion of right upper extremity related to increasing right breast and right shoulder pain, patient referred to Dr. Jeanne Jack with [facility].    Lymphedema:  No Concerns, patient previously seen by lymphedema therapy, patient reports she wears compression garment intermittently, reports occasional breast massage and range of motion stretching exercises as able.      Medical Oncologist: Dr. Eli    Endocrine Therapy: Tamoxifen      Future Appointments:      Briana Burgos Appointment Date: 2021   Mammogram Appointment Date: 2021   Dr. Eli Appointment Date: 2021      Patient reports concerns related to depression and feeling overwhelmed, active listening and support provided.  Reviewed and provided support resources for [employer] " Society, [employer], [employer] and Abilio Tracy, cancer center psychologist.    Nurse face-to-face time: Level 5:  over 15 min face to face time.    Georgia James RN BSN OCN CBCN

## 2021-04-12 NOTE — PATIENT INSTRUCTIONS
Please contact [facility] RN with questions or concerns following today's appointment: [phone].    Thank you!

## 2021-04-14 ENCOUNTER — VIRTUAL VISIT (OUTPATIENT)
Dept: ONCOLOGY | Facility: CLINIC | Age: 56
End: 2021-04-14
Attending: PSYCHOLOGIST
Payer: COMMERCIAL

## 2021-04-14 DIAGNOSIS — C50.911 MALIGNANT NEOPLASM OF RIGHT BREAST IN FEMALE, ESTROGEN RECEPTOR POSITIVE, UNSPECIFIED SITE OF BREAST (H): Primary | ICD-10-CM

## 2021-04-14 DIAGNOSIS — Z17.0 MALIGNANT NEOPLASM OF RIGHT BREAST IN FEMALE, ESTROGEN RECEPTOR POSITIVE, UNSPECIFIED SITE OF BREAST (H): Primary | ICD-10-CM

## 2021-04-14 DIAGNOSIS — F43.21 ADJUSTMENT DISORDER WITH DEPRESSED MOOD: Primary | ICD-10-CM

## 2021-04-14 PROCEDURE — 98968 PH1 ASSMT&MGMT NQHP 21-30: CPT | Mod: TEL | Performed by: PSYCHOLOGIST

## 2021-04-14 NOTE — PROGRESS NOTES
Dear Colleagues,  Today Maribel June was seen in follow up      IDENTIFICATION: This is a 55 year old woman with right LOQ breast G1 IDCA with G2 DCIS status post lumpectomy and SLNBx, revealing [id number], ER+/NH+/H2N- disease who completed adjuvant radiation therapy on 21. She will not be receiving chemotherapy.                      INTERVAL HISTORY:  Maribel June was last seen in our clinic during her last week of treatment. While on treatment she had complaints of mild fatigue which was relieved by rest (grade 1) and developed diffuse skin erythema (grade 1).  Post treatment she states that all of her acute skin symptoms have resolved. However she has had significant fatigue and joint pain in multiple areas throughout her body since starting tamoxifen.  Her right her right shoulder joint is particularly painful and she pain and stiffness in her right axilla affecting her range of motion.  She has been seen by lymphedema clinic.  She returns today in follow up. Specifically denies n/v/ha/sob/cp  REVIEW OF SYSTEMS: As per HPI, a 14-point review of systems is otherwise negative.     Past Medical History:   Diagnosis Date     Depressive disorder      Endometriosis      Herpes genitalis in women      History of major depression     situational with first .      Kidney stone      Malignant neoplasm of right breast in female, estrogen receptor positive (H) 2020     S/P radiation therapy     5,256 cGy to right breast completed 2020 - [facility]       Past Surgical History:   Procedure Laterality Date     BREAST BIOPSY, RT/LT Right 2020     BREAST SURGERY Right     Right Breast - Benign      SECTION      x1     COLONOSCOPY N/A 2016    Procedure: COMBINED COLONOSCOPY, SINGLE OR MULTIPLE BIOPSY/POLYPECTOMY BY BIOPSY;  Surgeon: Duane, William Charles, MD;  Location: [facility] OR     COLONOSCOPY WITH CO2 INSUFFLATION N/A 2016    Procedure:  COLONOSCOPY WITH CO2 INSUFFLATION;  Surgeon: Duane, William Charles, MD;  Location: [facility] OR     CYSTOSCOPY  11/13/2009    left stent placement (C-ARM)     GENITOURINARY SURGERY      surgery for kidney stone     GYN SURGERY      Partial Hysterectomy at age 29     LUMPECTOMY BREAST WITH SENTINEL NODE, COMBINED Right 09/29/2020    Procedure: Right breast lumpectomy with [place] node biopsy;  Surgeon: Jose Watson MD;  Location: [facility] OR       Family History   Problem Relation Age of Onset     Hypertension Father      Kidney failure Father      Aortic aneurysm Father      Prostate Cancer Father      Stomach Cancer Maternal Grandfather      Heart Disease Paternal Grandfather      Hypertension Paternal Grandfather      Neurologic Disorder Brother         has seizures from Epilepsy     Asthma Mother      Hypertension Mother      Arthritis Mother      Hypertension Brother      Lymphoma Maternal Uncle      Cancer Maternal Uncle         Cholangiocarcinoma     Breast Cancer Paternal Aunt      Breast Cancer Cousin         Paternal 1st Female Cousin (daughter of paternal aunt with breast cancer)       Social History     Tobacco Use     Smoking status: Former Smoker     Smokeless tobacco: Never Used     Tobacco comment: social use in college   Substance Use Topics     Alcohol use: Not Currently       Current Outpatient Medications   Medication     amLODIPine (NORVASC) 5 MG tablet     bimatoprost (LUMIGAN) 0.01 % SOLN     clonazePAM (KLONOPIN) 0.5 MG tablet     Multiple Vitamin (MULTIVITAMIN PO)     Omega-3 Fatty Acids (OMEGA 3 PO)     ondansetron (ZOFRAN) 8 MG tablet     tamoxifen (NOLVADEX) 20 MG tablet     Turmeric (QC TUMERIC COMPLEX PO)     acyclovir (ZOVIRAX) 400 MG tablet     Vaginal Lubricant (REPLENS) GEL     No current facility-administered medications for this visit.           Allergies   Allergen Reactions     Flagyl [Metronidazole] Nausea and Vomiting     Lisinopril      Other reaction(s): Headaches      Oxycodone-Acetaminophen Nausea and Vomiting     States Oxycodone is fine     Sulfamethoxazole-Trimethoprim      Other reaction(s): Headache     Zithromax [Azithromycin Dihydrate] Rash        PHYSICAL EXAMINATION:  /71 (BP Location: Left arm, Patient Position: Chair, Cuff Size: Adult Regular)   Pulse 59   Temp 98  F (36.7  C) (Oral)   Resp 16   Wt 49.2 kg (108 lb 8 oz)   SpO2 99%   BMI 21.19 kg/m    GENERAL Well-appearing woman in no acute distress.  HEENT Normocephalic, atraumatic.  Sclerae anicteric.  CVR  Regular rate and rhythm.  No murmurs, rubs, or gallops.  LUNGS Clear to auscultation bilaterally.  BREASTS Breasts are examined in the supine position.    Subtle hyperpigmentation and dry skin noted in prior treatment field.  No nodularity erythema or desquamation. Scar well healed.  LYMPH no supraclavicular, infraclavicular, or axillary lymphadenopathy appreciated bilaterally  EXT  No clubbing, cyanosis or edema.    MSK  decreased right arm range of motion with patient unable to abduct  NEURO Gait within normal limits.  SKIN  Warm and well perfused.  See breast exam  PSYCH:         Orientation: Alert, attentive, and oriented to time, place, and person                         Affect: Affect was appropriate to the situation and showed normal range and stability. No anxious mood                         Speech: Speech was clear with normal fluency, rate, tone, and volume.                         Memory: Although not formally assessed, immediate, recent, and remote memory appeared to be intact.                          Insight and Judgement:  demonstrates adequate awareness of the issues discussed.                         Thought: Thought patterns were coherent and logical.      IMAGING: No new imaging.        IMPRESSION/PLAN:  Maribel June is 55 year old woman with right LOQ breast G1 IDCA with G2 DCIS status post lumpectomy and SLNBx, revealing [id number], ER+/NC+/H2N- disease who completed adjuvant  radiation therapy on 21. She will not be receiving chemotherapy. She is currently on tamoxifen.  She has joint pains and fatigue because of this.  She also has more significant pain and joint stiffness within the right shoulder joint and has developing stiffness in the right axilla.  This is likely multifactorial in that patient had surgery within her axilla severing nerve fibers, adjuvant radiation affected the shoulder joint, and she has generalized joint aches secondary to hormonal therapy.  She also has some residual skin dryness and hyperpigmentation within the treatment field, which is to be expected. This is however is not bothersome.  Recommend the followin. Continue to use sunblock and moisturizer in the previously treated area generously. Will refer to [facility] Rehab at the [facility].  Trial of ibuprofen x 7 days starting now and pt to continue to see lymphedema clinic.   2. Follow up with Rad Onc in 4 months.    3. Currently on Rees. Follow up with Med Onc for management regarding hormonal therapy and joint aches. Scans per Med Onc.  4. Follow up with Surgery as previously directed.    There was ample time for questions and all were answered to the patient's satisfaction. Thank you for allowing me to participate in the care of this pleasant patient. If you have any questions, please do not hesitate to contact my office.      Sincerely,  Robby Weaver MD  Adjunct   Dept of Radiation Oncology  [facility]

## 2021-04-15 ENCOUNTER — PATIENT OUTREACH (OUTPATIENT)
Dept: ONCOLOGY | Facility: CLINIC | Age: 56
End: 2021-04-15

## 2021-04-15 ENCOUNTER — TELEPHONE (OUTPATIENT)
Dept: ONCOLOGY | Facility: CLINIC | Age: 56
End: 2021-04-15

## 2021-04-15 NOTE — TELEPHONE ENCOUNTER
RN Kevan Triage Note    Called triage in f/u to nausea, lymphedema,  joint pain, ongoing hot flashes, fatigue    Diagnosis: Right breast cancer    Oncologist: Alcira    Last Chemo date: Currently taking Tamoxifen    Support: Patient able to care for self independently    Health Status:    Patient started noticing nausea two days after first taking tamoxifen in the beginning of March. Patient has not vomited. Burping a lot.     Zofran- gives headache, makes dizzy.  Alternative meds- mint, laron drops, laron tea, sea bands.     Nausea is worse after eating, constantly has nausea after eating. Patient is eating bland foods. Patient feels that she's staying hydrated, but it is challenging.     Losing weight- 10 points     Fevers or Chills:  denies    Cough or SOB: with exertion    Nausea yes  Vomiting: denies    Dizziness or Lightheadedness: every so often    Diet: Drinking around 40 ounces of water per day, able to eat 3 meals per day, but having difficulty with nausea post meal.     Pain: breast 6/10   Joint pain- 7/10  Medication: taking ibuprofen with relief, patient also using vitamin e oil for breast pain    Bowels: slightly constipated per patient report, every other day.     Discussion:  All of Crispin's questions were answered.      Patient has our contact information and I have asked that they call with any further questions or concerns.    Plan: RN paged Dr. Eli regarding patient's symptoms at 1020. Per Dr. Eli, RNCC will reach out to patient.     Hina GRAY, RN

## 2021-04-15 NOTE — PROGRESS NOTES
RN CARE COORDINATION NOTE      Spoke to patient to advise Dr Eli recommends stopping Tamoxifen at this time . Will arrange for CARLEY visit in next 2-3 week to discuss other treatment options.   Patient verbalized agreement with plan.         Rosy Calderón MSN, RN, OCN  RN Care Coordinator  [facility]  [phone]

## 2021-04-16 NOTE — PROGRESS NOTES
"Maribel June is a 55 year old female who is being evaluated via a billable telephone visit. Phone call duration: 30 minutes      The patient has been notified of following:      \"This telephone visit will be conducted via a call between you and your physician/provider. We have found that certain health care needs can be provided without the need for a physical exam.  This service lets us provide the care you need with a short phone conversation.  If a prescription is necessary we can send it directly to your pharmacy.  If lab work is needed we can place an order for that and you can then stop by our lab to have the test done at a later time.     Telephone visits are billed at different rates depending on your insurance coverage. During this emergency period, for some insurers they may be billed the same as an in-person visit.  Please reach out to your insurance provider with any questions.     If during the course of the call the physician/provider feels a telephone visit is not appropriate, you will not be charged for this service.\"     Patient has given verbal consent for Telephone visit? Yes    Confidential Summary of Oncology Psychology Initial Visit    Maribel June is a 55 year old female who was referred by Dr. Weaver for  Depression  The patient was seen for an initial 30 minute evaluation on 4/14/2021.    Presenting Concerns: sadness, fatigue, nausea, difficulty coping, uncertainty, loneliness, depressed mood, concerns about her  and marriage.     Mental Status/Interview:   Orientation: Maribel June was alert, attentive, and oriented to time, place, and person  Affect: Affect was appropriate to the situation and showed normal range and stability.  Speech: Speech was clear with normal fluency, rate, tone, and volume.  Memory: Although not formally assessed, immediate, recent, and remote memory appeared to be intact.   Insight and Judgement: Maribel June demonstrates adequate " awareness of the issues discussed and was able to come to reasonable conclusions.  Thought: Thought patterns were coherent and logical. Hallucinations were denied and delusions were not evident.   Lethality: Maribel June denied suicidal or homicidal ideation or intention.        Impression: She feels her cancer treatment side-effects are impacting her ability to live her life. She reported nausea and fatigue that makes it difficult to work and participate with her . She says she comes home from work and just goes to bed due to fatigue. We discussed her concerns and she was recommended to speak with her care team.     Diagnosis:   Encounter Diagnosis   Name Primary?     Adjustment disorder with depressed mood Yes     Recommendations/Plan:  1. Speak with her care team regarding her side-effects  2. Return for follow-up for supportive therapy    Thank you for this opportunity to participate in your care of this patient.    Abilio Tracy Psy.D., L.P.  Director, Oncology Supportive Care

## 2021-04-20 NOTE — PROGRESS NOTES
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    Oncology History:  -2020- Routine screening mammogram demonstrated heterogeneously dense breasts but no concerning findings.  Then physician palpated a mass in her right breast.  -Diagnostic mammogram and ultrasound demonstrated a 2.2 cm mass at 8:00, 5 cm from the nipple.  -Right breast biopsy demonstrated grade 1 invasive mammary carcinoma  -2020- Patient underwent a right breast lumpectomy and sentinel lymph node procedure with Dr. Watson. Pathology demonstrated grade 1 invasive mammar carcinoma measuring 1.6 cm. Also associated intermediate grade DCIS. Surgical margins were negative. Single lymph node was benign.   -Had been receiving adjuvant radiation, will not be receiving chemotherapy.    Patient was last seen by Dr. Weaver in clinic on 21, and complained of mild fatigue which was relieved by rest and had developed diffuse skin erythema. Has fatigue and joint pain in multiple areas of her body since starting tamoxifen. Her right shoulder joint is particulary painful and she has pain in her right axilla affecting her range of motion.    She has been following with lymphedema therapist, Ameena Colunga. Patient last saw Ameena on 21, and continued compression garment for RUE, self MLS and home exercise program to reduce cording and edema. Patient was discharged from therapy and instructed to continue her home  program. She has 5-6 appts with Ameena with the last appointment being in January at which time she was discharged with instructions to wear compression sleeve for right arm. Patient feels that her swelling has returned, and her arm feels heavy at times with noticeable swelling and she is unsure if the sleeve is tight enough. She has not been doing any manual drainage techniques at home.    Her shoulder pain became progressively worse and is aggravated with certain movements of her shoulder like abduction and external rotation. She saw her PCP at the [facility] in [place], and was told that pain is likely due to RTC pathology and she was referred to outpatient PT to help with RTC strengthening. She has to set up her first PT appointment. She states that activities that are very difficult for her include lifting her arms to take her top on and off and putting deodarant because she has to abduct her arm.     Patient also complains of limited range of motion of her right shoulder and feels like scar tissue might be limiting her range. In addition, patient has developed acute right elbow pain and swelling at the site of olecranon. She does not recall any injury to her right shoulder or right elbow.     Lastly, she also complains of a burning axillary pain that wraps around her chest and upper arm and is especially painful when something touches it. Sometimes this pain presents as a shooting sensation down her upper arm. She has tried ibuprofen with some relief. She was also prescribed gabapentin in the past BID, but felt very groggy with her dosing/frequency so only used it for 4 days, then stopped. This was prescribed by her PCP.    Patient's upcoming PT appointment for her shoulder is within the [facility].      Therapies/HEP,  She is currently not participating in outpatient therapies, has an upcoming appointment to start PT for her right shoulder. She remains active at home and is  working.      Functionally,   Patient is independent with mobility, ADLs and IADLs.             Past Medical and Surgical History:     Past Medical History:   Diagnosis Date     Depressive disorder      Endometriosis      Herpes genitalis in women      History of major depression     situational with first .      Kidney stone      Malignant neoplasm of right breast in female, estrogen receptor positive (H) 2020     S/P radiation therapy     5,256 cGy to right breast completed 2020 [facility]     Past Surgical History:   Procedure Laterality Date     BREAST BIOPSY, RT/LT Right 2020     BREAST SURGERY Right     Right Breast - Benign      SECTION      x1     COLONOSCOPY N/A 2016    Procedure: COMBINED COLONOSCOPY, SINGLE OR MULTIPLE BIOPSY/POLYPECTOMY BY BIOPSY;  Surgeon: Duane, William Charles, MD;  Location: [facility] OR     COLONOSCOPY WITH CO2 INSUFFLATION N/A 2016    Procedure: COLONOSCOPY WITH CO2 INSUFFLATION;  Surgeon: Duane, William Charles, MD;  Location: [facility] OR     CYSTOSCOPY  2009    left stent placement ([facility])     GENITOURINARY SURGERY      surgery for kidney stone     GYN SURGERY      Partial Hysterectomy at age 29     LUMPECTOMY BREAST WITH SENTINEL NODE, COMBINED Right 2020    Procedure: Right breast lumpectomy with [place] node biopsy;  Surgeon: Jose Watson MD;  Location: [facility] OR            Social History:     Social History     Tobacco Use     Smoking status: Former Smoker     Smokeless tobacco: Never Used     Tobacco comment: social use in college   Substance Use Topics     Alcohol use: Not Currently       Marital Status:   Living situation: Lives in [place], in a single family home, home is a two story home.  Family support: Her , also has sibling and niece in area and friends for support if needed.  Vocational History:  at [employer]. Is still working  Tobacco use: non  smoker  Alcohol use: occasional  Recreational drug use: denies         Functional history:     Maribel June is independent with all aspects of her life.    Hand dominance: right handed  Driving: driving           Family History:     Family History   Problem Relation Age of Onset     Hypertension Father      Kidney failure Father      Aortic aneurysm Father      Prostate Cancer Father      Stomach Cancer Maternal Grandfather      Heart Disease Paternal Grandfather      Hypertension Paternal Grandfather      Neurologic Disorder Brother         has seizures from Epilepsy     Asthma Mother      Hypertension Mother      Arthritis Mother      Hypertension Brother      Lymphoma Maternal Uncle      Cancer Maternal Uncle         Cholangiocarcinoma     Breast Cancer Paternal Aunt      Breast Cancer Cousin         Paternal 1st Female Cousin (daughter of paternal aunt with breast cancer)            Medications:     Current Outpatient Medications   Medication Sig Dispense Refill     acyclovir (ZOVIRAX) 400 MG tablet Take 1 tablet (400 mg) by mouth every 8 hours for 5 days (Patient not taking: Reported on 1/11/2021) 15 tablet 11     amLODIPine (NORVASC) 5 MG tablet Take 1 tablet by mouth       bimatoprost (LUMIGAN) 0.01 % SOLN        clonazePAM (KLONOPIN) 0.5 MG tablet Take 1 tablet (0.5 mg) by mouth nightly as needed for sleep 20 tablet 0     Multiple Vitamin (MULTIVITAMIN PO) Take by mouth daily       Omega-3 Fatty Acids (OMEGA 3 PO) Take by mouth daily       ondansetron (ZOFRAN) 8 MG tablet Take 1 tablet (8 mg) by mouth every 8 hours as needed for nausea 30 tablet 1     tamoxifen (NOLVADEX) 20 MG tablet Take 1 tablet (20 mg) by mouth daily 90 tablet 3     Turmeric (QC TUMERIC COMPLEX PO) Take by mouth daily       Vaginal Lubricant (REPLENS) GEL Place 1 Applicatorful vaginally 2 times daily (Patient not taking: Reported on 4/12/2021) 30 g 3            Allergies:     Allergies   Allergen Reactions     Flagyl  [Metronidazole] Nausea and Vomiting     Lisinopril      Other reaction(s): Headaches     Oxycodone-Acetaminophen Nausea and Vomiting     States Oxycodone is fine     Sulfamethoxazole-Trimethoprim      Other reaction(s): Headache     Zithromax [Azithromycin Dihydrate] Rash              ROS:     A focused ROS is negative other than the symptoms noted above in the HPI.             Physical Examiniation:     VITAL SIGNS: There were no vitals taken for this visit.  BMI: Estimated body mass index is 21.19 kg/m  as calculated from the following:    Height as of 11/9/20: 1.524 m (5').    Weight as of 4/12/21: 49.2 kg (108 lb 8 oz).    Gen: NAD, pleasant and cooperative   HEENT: normocephalic, atraumatic, EOMI  Pulm: non-labored breathing in room air  Ext: no edema in BLE, no tenderness in calves  Neuro/MSK:   Orientation: Patient exhibits good insight into her condition, symptoms and treatment.  Motor: 5/5 strength in bilateral upper extremities with shoulder abduction, elbow extension, wrist extension,  strength. MMT testing is pain limited with right shoulder testing.   Right shoulder/breast exam: Patient just short of full range with active abduction. She has notable scar tissue in right axillary and chest wall area that is limiting her range with abduction and flexion. No tenderness to palpation of anterior or posterior joint lines. Pain is palpable and reproduced with abduction both passively and actively. No axillary lymph nodes palpable. Breast tissue with well healed incisional scar over lateral aspect. There is no notable edema, tenderness, erythema or warmth on palpation of breast tissue. Notable sensitivity to touch over medial upper arm, axillary region and outer chest all. Right arm with mild edema throughout entire extremity, most notable at elbow level and above.   Special tests: Hawkin's +ve and empty can is +ve for pain but not weakness.  Sensation: intact to light touch in bilateral upper extremities.             Laboratory/Imaging:     [facility] Diagnostic Right with [place], Ultrasound Right Breast Limited 1-3 quadrants (8/17/2020):  Impression:  BREAST DENSITY: Heterogeneously dense.  Findings:     Mammographic views demonstrate architectural distortion with concern for associated isodense mass in region of palpable concern. Targeted right breast ultrasound by radiologist and technologist demonstrates irregular heterogeneous hypoechoic parenchyma that correlates with mammography findings and palpable lump 8:00 position 5 cm from nipple, approximately 1.8 x 1.7 x 2.2 cm. No morphologically abnormal right axillary lymph nodes.           Assessment/Plan:   Maribel June is a 55 year old female with history of right left outer quadrant breast grade I IDCA with grade II DCIS (s/p lumpectomy/adjuvant radiation and sentinel lymph node biopsy, [id number], ER+ve, ND+ve,Her2-ve) who presents [facility] for evaluation of her rehabilitation needs and limited ROM of her right upper extremity, worsening right breast and shoulder pain. As discussed, her right upper extremity symptoms are multifactorial at this time. She appears to have a RTC injury, though I believe based on exam that this is a tendinitis or small tear as she does not demonstrate significant weakness. I agree with starting PT to help strengthen the muscles, and this was explained to the patient. Her RUE lymphedema is aggravated again and I think it would be beneficial for her to see lymphedema therapy again to help evaluate for any further management recommendations including manual lymphatic drainage to help with this. In regards to her axillary/upper arm/chest wall pain, this pain appears to be neuropathic in nature based on history and exam in clinic today, so likely a component similar to PMPS. We discussed possibly starting gabapentin to see if this helps, but as she has tried this in the past, she would like to start with PT and see if  loosening up some of the scar tissue will help relieve her neuropathic symptoms. Lastly, we will obtain a right elbow XR to further evaluate for any bony or joint pathology that might be contributing to her symptoms. Patient is in agreement with this plan.       1. Patient education: In depth discussion and education was provided about the assessment and implications of each of the below recommendations for management. Patient indicated readiness to learn, all questions were answered and understanding of material presented was confirmed.  2. Work-up:  1. Right elbow XR to further evaluate acute pain and swelling and assess for any joint pathology  3. Therapy/equipment/braces:  1. Outpatient PT (ordered by PCP) to help with RTC strengthening and RUE range of motion in the setting of likely RTC injury and continued limited shoulder range of motion due to scar tissue/fibrosis  2. A lymphedema therapy referral was placed to restart edema therapy including manual lymphatic drainage if possible to help with ongoing RUE edema.  4. Medications:  1. Diclofenac gel prescribed as needed to help with shoulder and elbow pain  2. We discussed gabapentin low dose at HS to help with neuropathic symptoms, patient would like to wait to see if therapy techniques help relieve some of her neuropathic symptoms and we will readdress this at the next visit.   5. Follow up: 1 month    Jeanne Jack MD  Physical Medicine & Rehabilitation    I appreciate the opportunity to participate in the care of your patient.

## 2021-04-21 ENCOUNTER — ONCOLOGY VISIT (OUTPATIENT)
Dept: ONCOLOGY | Facility: CLINIC | Age: 56
End: 2021-04-21
Attending: RADIOLOGY
Payer: COMMERCIAL

## 2021-04-21 VITALS
BODY MASS INDEX: 21.54 KG/M2 | HEART RATE: 70 BPM | RESPIRATION RATE: 16 BRPM | TEMPERATURE: 98.5 F | DIASTOLIC BLOOD PRESSURE: 84 MMHG | OXYGEN SATURATION: 97 % | WEIGHT: 109.7 LBS | HEIGHT: 60 IN | SYSTOLIC BLOOD PRESSURE: 124 MMHG

## 2021-04-21 DIAGNOSIS — I89.0 LYMPHEDEMA: Primary | ICD-10-CM

## 2021-04-21 DIAGNOSIS — S46.001A INJURY OF RIGHT ROTATOR CUFF, INITIAL ENCOUNTER: ICD-10-CM

## 2021-04-21 DIAGNOSIS — Z17.0 MALIGNANT NEOPLASM OF LOWER-OUTER QUADRANT OF RIGHT BREAST OF FEMALE, ESTROGEN RECEPTOR POSITIVE (H): ICD-10-CM

## 2021-04-21 DIAGNOSIS — C50.511 MALIGNANT NEOPLASM OF LOWER-OUTER QUADRANT OF RIGHT BREAST OF FEMALE, ESTROGEN RECEPTOR POSITIVE (H): ICD-10-CM

## 2021-04-21 PROCEDURE — 999N000102 HC STATISTIC NO CHARGE CLINIC VISIT

## 2021-04-21 PROCEDURE — 99205 OFFICE O/P NEW HI 60 MIN: CPT | Performed by: STUDENT IN AN ORGANIZED HEALTH CARE EDUCATION/TRAINING PROGRAM

## 2021-04-21 ASSESSMENT — MIFFLIN-ST. JEOR: SCORE: 1019.1

## 2021-04-21 ASSESSMENT — PAIN SCALES - GENERAL: PAINLEVEL: SEVERE PAIN (6)

## 2021-04-21 NOTE — LETTER
2021         RE: Maribel June  [address]  [facility] [address]        Dear Colleague,    Thank you for referring your patient, Maribel June, to the [facility]. Please see a copy of my visit note below.           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    Oncology History:  -2020- Routine screening mammogram demonstrated heterogeneously dense breasts but no concerning findings.  Then physician palpated a mass in her right breast.  -Diagnostic mammogram and ultrasound demonstrated a 2.2 cm mass at 8:00, 5 cm from the nipple.  -Right breast biopsy demonstrated grade 1 invasive mammary carcinoma  -2020- Patient underwent a right breast lumpectomy and sentinel lymph node procedure with Dr. Watson. Pathology demonstrated grade 1 invasive mammar carcinoma measuring 1.6 cm. Also associated intermediate grade DCIS. Surgical margins were negative. Single lymph node was benign.   -Had been receiving adjuvant radiation, will not be receiving chemotherapy.    Patient was last seen by Dr. Weaver in clinic on 21, and complained of mild fatigue which was relieved by rest and had developed diffuse skin erythema. Has fatigue and joint pain in multiple areas of her body since starting tamoxifen. Her right shoulder joint is particulary painful and she has pain in her right axilla affecting her range of motion.    She has been  following with lymphedema therapist, Ameena Colunga. Patient last saw Ameena on 1/25/21, and continued compression garment for RUE, self MLS and home exercise program to reduce cording and edema. Patient was discharged from therapy and instructed to continue her home program. She has 5-6 appts with Ameena with the last appointment being in January at which time she was discharged with instructions to wear compression sleeve for right arm. Patient feels that her swelling has returned, and her arm feels heavy at times with noticeable swelling and she is unsure if the sleeve is tight enough. She has not been doing any manual drainage techniques at home.    Her shoulder pain became progressively worse and is aggravated with certain movements of her shoulder like abduction and external rotation. She saw her PCP at the [facility] in [place], and was told that pain is likely due to RTC pathology and she was referred to outpatient PT to help with RTC strengthening. She has to set up her first PT appointment. She states that activities that are very difficult for her include lifting her arms to take her top on and off and putting deodarant because she has to abduct her arm.     Patient also complains of limited range of motion of her right shoulder and feels like scar tissue might be limiting her range. In addition, patient has developed acute right elbow pain and swelling at the site of olecranon. She does not recall any injury to her right shoulder or right elbow.     Lastly, she also complains of a burning axillary pain that wraps around her chest and upper arm and is especially painful when something touches it. Sometimes this pain presents as a shooting sensation down her upper arm. She has tried ibuprofen with some relief. She was also prescribed gabapentin in the past BID, but felt very groggy with her dosing/frequency so only used it for 4 days, then stopped. This was prescribed by her PCP.    Patient's upcoming  PT appointment for her shoulder is within the [facility].      Therapies/HEP,  She is currently not participating in outpatient therapies, has an upcoming appointment to start PT for her right shoulder. She remains active at home and is working.      Functionally,   Patient is independent with mobility, ADLs and IADLs.             Past Medical and Surgical History:     Past Medical History:   Diagnosis Date     Depressive disorder      Endometriosis      Herpes genitalis in women      History of major depression     situational with first .      Kidney stone      Malignant neoplasm of right breast in female, estrogen receptor positive (H) 2020     S/P radiation therapy     5,256 cGy to right breast completed 2020 [facility]     Past Surgical History:   Procedure Laterality Date     BREAST BIOPSY, RT/LT Right 2020     BREAST SURGERY Right     Right Breast - Benign      SECTION      x1     COLONOSCOPY N/A 2016    Procedure: COMBINED COLONOSCOPY, SINGLE OR MULTIPLE BIOPSY/POLYPECTOMY BY BIOPSY;  Surgeon: Duane, William Charles, MD;  Location: [facility] OR     COLONOSCOPY WITH CO2 INSUFFLATION N/A 2016    Procedure: COLONOSCOPY WITH CO2 INSUFFLATION;  Surgeon: Duane, William Charles, MD;  Location: [facility] OR     CYSTOSCOPY  2009    left stent placement (C-ARM)     GENITOURINARY SURGERY      surgery for kidney stone     GYN SURGERY      Partial Hysterectomy at age 29     LUMPECTOMY BREAST WITH SENTINEL NODE, COMBINED Right 2020    Procedure: Right breast lumpectomy with [place] node biopsy;  Surgeon: Jose Watson MD;  Location: [facility] OR            Social History:     Social History     Tobacco Use     Smoking status: Former Smoker     Smokeless tobacco: Never Used     Tobacco comment: social use in college   Substance Use Topics     Alcohol use: Not Currently       Marital Status:   Living situation: Lives in [place], in a single  family home, home is a two story home.  Family support: Her , also has sibling and niece in area and friends for support if needed.  Vocational History:  at [employer]. Is still working  Tobacco use: non smoker  Alcohol use: occasional  Recreational drug use: denies         Functional history:     Maribel June is independent with all aspects of her life.    Hand dominance: right handed  Driving: driving           Family History:     Family History   Problem Relation Age of Onset     Hypertension Father      Kidney failure Father      Aortic aneurysm Father      Prostate Cancer Father      Stomach Cancer Maternal Grandfather      Heart Disease Paternal Grandfather      Hypertension Paternal Grandfather      Neurologic Disorder Brother         has seizures from Epilepsy     Asthma Mother      Hypertension Mother      Arthritis Mother      Hypertension Brother      Lymphoma Maternal Uncle      Cancer Maternal Uncle         Cholangiocarcinoma     Breast Cancer Paternal Aunt      Breast Cancer Cousin         Paternal 1st Female Cousin (daughter of paternal aunt with breast cancer)            Medications:     Current Outpatient Medications   Medication Sig Dispense Refill     acyclovir (ZOVIRAX) 400 MG tablet Take 1 tablet (400 mg) by mouth every 8 hours for 5 days (Patient not taking: Reported on 1/11/2021) 15 tablet 11     amLODIPine (NORVASC) 5 MG tablet Take 1 tablet by mouth       bimatoprost (LUMIGAN) 0.01 % SOLN        clonazePAM (KLONOPIN) 0.5 MG tablet Take 1 tablet (0.5 mg) by mouth nightly as needed for sleep 20 tablet 0     Multiple Vitamin (MULTIVITAMIN PO) Take by mouth daily       Omega-3 Fatty Acids (OMEGA 3 PO) Take by mouth daily       ondansetron (ZOFRAN) 8 MG tablet Take 1 tablet (8 mg) by mouth every 8 hours as needed for nausea 30 tablet 1     tamoxifen (NOLVADEX) 20 MG tablet Take 1 tablet (20 mg) by mouth daily 90 tablet 3     Turmeric (QC TUMERIC COMPLEX PO)  Take by mouth daily       Vaginal Lubricant (REPLENS) GEL Place 1 Applicatorful vaginally 2 times daily (Patient not taking: Reported on 4/12/2021) 30 g 3            Allergies:     Allergies   Allergen Reactions     Flagyl [Metronidazole] Nausea and Vomiting     Lisinopril      Other reaction(s): Headaches     Oxycodone-Acetaminophen Nausea and Vomiting     States Oxycodone is fine     Sulfamethoxazole-Trimethoprim      Other reaction(s): Headache     Zithromax [Azithromycin Dihydrate] Rash              ROS:     A focused ROS is negative other than the symptoms noted above in the HPI.             Physical Examiniation:     VITAL SIGNS: There were no vitals taken for this visit.  BMI: Estimated body mass index is 21.19 kg/m  as calculated from the following:    Height as of 11/9/20: 1.524 m (5').    Weight as of 4/12/21: 49.2 kg (108 lb 8 oz).    Gen: NAD, pleasant and cooperative   HEENT: normocephalic, atraumatic, EOMI  Pulm: non-labored breathing in room air  Ext: no edema in BLE, no tenderness in calves  Neuro/MSK:   Orientation: Patient exhibits good insight into her condition, symptoms and treatment.  Motor: 5/5 strength in bilateral upper extremities with shoulder abduction, elbow extension, wrist extension,  strength. MMT testing is pain limited with right shoulder testing.   Right shoulder/breast exam: Patient just short of full range with active abduction. She has notable scar tissue in right axillary and chest wall area that is limiting her range with abduction and flexion. No tenderness to palpation of anterior or posterior joint lines. Pain is palpable and reproduced with abduction both passively and actively. No axillary lymph nodes palpable. Breast tissue with well healed incisional scar over lateral aspect. There is no notable edema, tenderness, erythema or warmth on palpation of breast tissue. Notable sensitivity to touch over medial upper arm, axillary region and outer chest all. Right arm with  mild edema throughout entire extremity, most notable at elbow level and above.   Special tests: Hawkin's +ve and empty can is +ve for pain but not weakness.  Sensation: intact to light touch in bilateral upper extremities.            Laboratory/Imaging:     [facility] Diagnostic Right with [place], Ultrasound Right Breast Limited 1-3 quadrants (8/17/2020):  Impression:  BREAST DENSITY: Heterogeneously dense.  Findings:     Mammographic views demonstrate architectural distortion with concern for associated isodense mass in region of palpable concern. Targeted right breast ultrasound by radiologist and technologist demonstrates irregular heterogeneous hypoechoic parenchyma that correlates with mammography findings and palpable lump 8:00 position 5 cm from nipple, approximately 1.8 x 1.7 x 2.2 cm. No morphologically abnormal right axillary lymph nodes.           Assessment/Plan:   Maribel June is a 55 year old female with history of right left outer quadrant breast grade I IDCA with grade II DCIS (s/p lumpectomy/adjuvant radiation and sentinel lymph node biopsy, [id number], ER+ve, UT+ve,Her2-ve) who presents to [facility] Cancer [facility] clinic for evaluation of her rehabilitation needs and limited ROM of her right upper extremity, worsening right breast and shoulder pain. As discussed, her right upper extremity symptoms are multifactorial at this time. She appears to have a RTC injury, though I believe based on exam that this is a tendinitis or small tear as she does not demonstrate significant weakness. I agree with starting PT to help strengthen the muscles, and this was explained to the patient. Her RUE lymphedema is aggravated again and I think it would be beneficial for her to see lymphedema therapy again to help evaluate for any further management recommendations including manual lymphatic drainage to help with this. In regards to her axillary/upper arm/chest wall pain, this pain appears to be neuropathic in nature based on  history and exam in clinic today, so likely a component similar to PMPS. We discussed possibly starting gabapentin to see if this helps, but as she has tried this in the past, she would like to start with PT and see if loosening up some of the scar tissue will help relieve her neuropathic symptoms. Lastly, we will obtain a right elbow XR to further evaluate for any bony or joint pathology that might be contributing to her symptoms. Patient is in agreement with this plan.       1. Patient education: In depth discussion and education was provided about the assessment and implications of each of the below recommendations for management. Patient indicated readiness to learn, all questions were answered and understanding of material presented was confirmed.  2. Work-up:  1. Right elbow XR to further evaluate acute pain and swelling and assess for any joint pathology  3. Therapy/equipment/braces:  1. Outpatient PT (ordered by PCP) to help with RTC strengthening and RUE range of motion in the setting of likely RTC injury and continued limited shoulder range of motion due to scar tissue/fibrosis  2. A lymphedema therapy referral was placed to restart edema therapy including manual lymphatic drainage if possible to help with ongoing RUE edema.  4. Medications:  1. Diclofenac gel prescribed as needed to help with shoulder and elbow pain  2. We discussed gabapentin low dose at HS to help with neuropathic symptoms, patient would like to wait to see if therapy techniques help relieve some of her neuropathic symptoms and we will readdress this at the next visit.   5. Follow up: 1 month    Jeanne Jack MD  Physical Medicine & Rehabilitation    I appreciate the opportunity to participate in the care of your patient.                   Again, thank you for allowing me to participate in the care of your patient.        Sincerely,        Jeanne Jack MD

## 2021-04-21 NOTE — PATIENT INSTRUCTIONS
1. Will plan for outpatient physical therapy to address your rotator cuff injury and help with stretching some of the scar tissue/fibrosis that is limiting your right shoulder/arm movements.  2. A lymphedema therapy referral order was placed to help with continued management of your edema including reassessing your compression garment and manual drainage techniques.  3. Diclofenac gel was prescribed for your joint pains.  3. A right elbow XRay was ordered to further evaluate your pain.  4. Return to clinic with Dr. Jack in 1 month for follow up.

## 2021-04-21 NOTE — NURSING NOTE
Oncology Rooming Note    April 21, 2021 3:17 PM   Maribel June is a 55 year old female who presents for:    No chief complaint on file.    Initial Vitals: Blood Pressure 124/84   Pulse 70   Temperature 98.5  F (36.9  C) (Oral)   Respiration 16   Height 1.524 m (5')   Weight 49.8 kg (109 lb 11.2 oz)   Oxygen Saturation 97%   Body Mass Index 21.42 kg/m   Estimated body mass index is 21.42 kg/m  as calculated from the following:    Height as of this encounter: 1.524 m (5').    Weight as of this encounter: 49.8 kg (109 lb 11.2 oz). Body surface area is 1.45 meters squared.  Severe Pain (6) Comment: Data Unavailable   No LMP recorded. Patient has had a hysterectomy.  Allergies reviewed: Yes  Medications reviewed: Yes    Medications: Medication refills not needed today.  Pharmacy name entered into "[employer]: [id number] [place], [place] - [address] AT [facility]    Clinical concerns: none      Madison High MA

## 2021-04-26 ENCOUNTER — THERAPY VISIT (OUTPATIENT)
Dept: PHYSICAL THERAPY | Facility: CLINIC | Age: 56
End: 2021-04-26
Payer: COMMERCIAL

## 2021-04-26 DIAGNOSIS — M25.511 ACUTE PAIN OF RIGHT SHOULDER: ICD-10-CM

## 2021-04-26 PROCEDURE — 97110 THERAPEUTIC EXERCISES: CPT | Mod: GP | Performed by: PHYSICAL THERAPIST

## 2021-04-26 PROCEDURE — 97112 NEUROMUSCULAR REEDUCATION: CPT | Mod: GP | Performed by: PHYSICAL THERAPIST

## 2021-04-26 PROCEDURE — 97161 PT EVAL LOW COMPLEX 20 MIN: CPT | Mod: GP | Performed by: PHYSICAL THERAPIST

## 2021-04-26 NOTE — PROGRESS NOTES
Physical Therapy Initial Evaluation  Subjective:    Patient Health History  Maribel June being seen for R shoulder pain, S/P R lumpectomy.     Problem began: 4/12/2021 (MD appointment).   Problem occurred: possibly related to lumpectomy and radiation for breast cancer   Pain is reported as 6/10 on pain scale.  General health as reported by patient is fair.  Pertinent medical history includes: cancer, depression, high blood pressure and pain at night/rest.        Surgeries include:  Cancer surgery.    Current medications:  High blood pressure medication.    Current occupation is teacher.   Primary job tasks include:  Computer work, prolonged sitting and prolonged standing.                  Therapist Generated HPI Evaluation  Problem details: Pt presents to clinic with complaints of R shoulder pain over the past month. Pt reports having lumpectomy on 9/28/2021 for breast cancer. Pt also had radiation treatment. Pt has noticed increased pain and difficulty reaching overhead and laying on R side. Pt having difficulty writing on board while teaching. Pt had MD appointment on 4/12/2021 and referred to PT. .         Type of problem:  Right shoulder.    This is a new condition.  Condition occurred with:  Other.  Where condition occurred: for unknown reasons.  Patient reports pain:  Lateral and anterior.  Pain is described as aching and sharp and is intermittent.  Pain radiates to:  Upper arm. Pain is the same all the time (activity dependent).  Since onset symptoms are gradually worsening.  Associated symptoms:  Loss of motion/stiffness, loss of strength and edema. Symptoms are exacerbated by certain positions, lying on extremity, using arm at shoulder level, using arm behind back and using arm overhead  and relieved by NSAID's and rest.  Imaging testing: none.  Previous treatment includes physical therapy. There was mild improvement following previous treatment.  Restrictions due to condition include:  Working in  normal job without restrictions.  Barriers include:  None as reported by patient.                        Objective:  Standing Alignment:    Cervical/Thoracic:  Forward head  Shoulder/UE:  Rounded shoulders                                       Shoulder Evaluation:  ROM:  AROM:    Flexion:  Left:  160    Right:  120    Abduction:  Left: 160   Right:  80      External Rotation:  Left:  75    Right:  50          Flexion/External Rotation:  Left:  T3    Right:  C1  Extension/Internal Rotation:  Left:  T5    Right:  L 5    PROM:    Flexion:  Right: 130      Abduction:  Right:  100                              Special Tests:      Right shoulder positive for the following special tests:Impingement  Palpation:      Right shoulder tenderness present at: Levator and Upper Trap  Right shoulder tenderness not present at:Bicipital Groove                                     General     ROS    Assessment/Plan:    Patient is a 55 year old female with right side shoulder complaints.    Patient has the following significant findings with corresponding treatment plan.                Diagnosis 1:  R shoulder pain, rotator cuff tendonitis  Pain -  hot/cold therapy, manual therapy, self management, education and home program  Decreased ROM/flexibility - manual therapy, therapeutic exercise, therapeutic activity and home program  Decreased strength - therapeutic exercise, therapeutic activities and home program  Impaired muscle performance - neuro re-education and home program  Decreased function - therapeutic activities and home program  Impaired posture - neuro re-education, therapeutic activities and home program    Therapy Evaluation Codes:   1) History comprised of:   Personal factors that impact the plan of care:      None.    Comorbidity factors that impact the plan of care are:      Cancer, Depression and High blood pressure.     Medications impacting care: High blood pressure.  2) Examination of Body Systems comprised of:   Body  structures and functions that impact the plan of care:      Shoulder.   Activity limitations that impact the plan of care are:      Bending, Lifting, Standing, Sleeping and Laying down.  3) Clinical presentation characteristics are:   Stable/Uncomplicated.  4) Decision-Making    Low complexity using standardized patient assessment instrument and/or measureable assessment of functional outcome.  Cumulative Therapy Evaluation is: Low complexity.    Previous and current functional limitations:  (See Goal Flow Sheet for this information)    Short term and Long term goals: (See Goal Flow Sheet for this information)     Communication ability:  Patient appears to be able to clearly communicate and understand verbal and written communication and follow directions correctly.  Treatment Explanation - The following has been discussed with the patient:   RX ordered/plan of care  Anticipated outcomes  Possible risks and side effects  This patient would benefit from PT intervention to resume normal activities.   Rehab potential is good.    Frequency:  1 X week, once daily  Duration:  for 8 weeks  Discharge Plan:  Achieve all LTG.  Independent in home treatment program.  Return to previous functional level by discharge.  Reach maximal therapeutic benefit.    Please refer to the daily flowsheet for treatment today, total treatment time and time spent performing 1:1 timed codes.

## 2021-04-26 NOTE — LETTER
[facility]  [address] [address] [place] [id number]  [phone]    2021    Re: Maribel June   :   1965  MRN:  [id number]   REFERRING PHYSICIAN:   Juwan TORRES [facility]  Date of Initial Evaluation:  2021  Visits:  Rxs Used: 1  Reason for Referral:  Acute pain of right shoulder    EVALUATION SUMMARY  Physical Therapy Initial Evaluation  Subjective:  Patient Health History  Maribel June being seen for R shoulder pain, S/P R lumpectomy.   Problem began: 2021 (MD appointment).   Problem occurred: possibly related to lumpectomy and radiation for breast cancer   Pain is reported as 6/10 on pain scale.  General health as reported by patient is fair.  Pertinent medical history includes: cancer, depression, high blood pressure and pain at night/rest.   Surgeries include:  Cancer surgery.    Current medications:  High blood pressure medication.    Current occupation is teacher.   Primary job tasks include:  Computer work, prolonged sitting and prolonged standing.      Therapist Generated HPI Evaluation  Problem details: Pt presents to clinic with complaints of R shoulder pain over the past month. Pt reports having lumpectomy on 2021 for breast cancer. Pt also had radiation treatment. Pt has noticed increased pain and difficulty reaching overhead and laying on R side. Pt having difficulty writing on board while teaching. Pt had MD appointment on 2021 and referred to PT. .         Type of problem:  Right shoulder.  This is a new condition.  Condition occurred with:  Other.  Where condition occurred: for unknown reasons.  Patient reports pain:  Lateral and anterior.  Pain is described as aching and sharp and is intermittent.  Pain radiates to:  Upper arm. Pain is the same all the time (activity dependent).  Since onset symptoms are gradually worsening.  Associated symptoms:   Loss of motion/stiffness, loss of strength and edema. Symptoms are exacerbated by certain positions, lying on extremity, using arm at shoulder level, using arm behind back and using arm overhead  and relieved by NSAID's and rest.  Imaging testing: none.  Previous treatment includes physical therapy. There was mild improvement following previous treatment.  Restrictions due to condition include:  Working in normal job without restrictions.  Barriers include:  None as reported by patient.    Objective:  Standing Alignment:    Cervical/Thoracic:  Forward head  Shoulder/UE:  Rounded shoulders  Shoulder Evaluation:  ROM:  AROM:    Flexion:  Left:  160    Right:  120  Abduction:  Left: 160   Right:  80  External Rotation:  Left:  75    Right:  50  Flexion/External Rotation:  Left:  T3    Right:  C1  Extension/Internal Rotation:  Left:  T5    Right:  L 5    PROM:    Flexion:  Right: 130    Abduction:  Right:  100  Special Tests:    Right shoulder positive for the following special tests:Impingement  Palpation:    Right shoulder tenderness present at: Levator and Upper Trap  Right shoulder tenderness not present at:Bicipital Groove       Assessment/Plan:    Patient is a 55 year old female with right side shoulder complaints.    Patient has the following significant findings with corresponding treatment plan.                Diagnosis 1:  R shoulder pain, rotator cuff tendonitis  Pain -  hot/cold therapy, manual therapy, self management, education and home program  Decreased ROM/flexibility - manual therapy, therapeutic exercise, therapeutic activity and home program  Decreased strength - therapeutic exercise, therapeutic activities and home program  Impaired muscle performance - neuro re-education and home program  Decreased function - therapeutic activities and home program  Impaired posture - neuro re-education, therapeutic activities and home program    Therapy Evaluation Codes:   1) History comprised of:   Personal  factors that impact the plan of care:      None.    Comorbidity factors that impact the plan of care are:      Cancer, Depression and High blood pressure.     Medications impacting care: High blood pressure.  2) Examination of Body Systems comprised of:   Body structures and functions that impact the plan of care:      Shoulder.   Activity limitations that impact the plan of care are:      Bending, Lifting, Standing, Sleeping and Laying down.  3) Clinical presentation characteristics are:   Stable/Uncomplicated.  4) Decision-Making    Low complexity using standardized patient assessment instrument and/or measureable assessment of functional outcome.  Cumulative Therapy Evaluation is: Low complexity.    Previous and current functional limitations:  (See Goal Flow Sheet for this information)    Short term and Long term goals: (See Goal Flow Sheet for this information)     Communication ability:  Patient appears to be able to clearly communicate and understand verbal and written communication and follow directions correctly.  Treatment Explanation - The following has been discussed with the patient:   RX ordered/plan of care  Anticipated outcomes  Possible risks and side effects  This patient would benefit from PT intervention to resume normal activities.   Rehab potential is good.    Frequency:  1 X week, once daily  Duration:  for 8 weeks  Discharge Plan:  Achieve all LTG.  Independent in home treatment program.  Return to previous functional level by discharge.  Reach maximal therapeutic benefit.    Thank you for your referral.    INQUIRIES  Therapist: Erick Rueda DPT   [facility] SERVICES [address] [address]  Phone: [phone]  Fax: [phone]

## 2021-04-28 ENCOUNTER — ANCILLARY PROCEDURE (OUTPATIENT)
Dept: GENERAL RADIOLOGY | Facility: CLINIC | Age: 56
End: 2021-04-28
Attending: STUDENT IN AN ORGANIZED HEALTH CARE EDUCATION/TRAINING PROGRAM
Payer: COMMERCIAL

## 2021-04-28 DIAGNOSIS — C50.511 MALIGNANT NEOPLASM OF LOWER-OUTER QUADRANT OF RIGHT BREAST OF FEMALE, ESTROGEN RECEPTOR POSITIVE (H): ICD-10-CM

## 2021-04-28 DIAGNOSIS — Z17.0 MALIGNANT NEOPLASM OF LOWER-OUTER QUADRANT OF RIGHT BREAST OF FEMALE, ESTROGEN RECEPTOR POSITIVE (H): ICD-10-CM

## 2021-04-28 PROCEDURE — 73070 X-RAY EXAM OF ELBOW: CPT | Mod: RT | Performed by: RADIOLOGY

## 2021-04-29 ENCOUNTER — HOSPITAL ENCOUNTER (OUTPATIENT)
Dept: OCCUPATIONAL THERAPY | Facility: CLINIC | Age: 56
Setting detail: THERAPIES SERIES
End: 2021-04-29
Attending: STUDENT IN AN ORGANIZED HEALTH CARE EDUCATION/TRAINING PROGRAM
Payer: COMMERCIAL

## 2021-04-29 PROCEDURE — 97140 MANUAL THERAPY 1/> REGIONS: CPT | Mod: GO | Performed by: OCCUPATIONAL THERAPIST

## 2021-04-29 PROCEDURE — 97165 OT EVAL LOW COMPLEX 30 MIN: CPT | Mod: GO | Performed by: OCCUPATIONAL THERAPIST

## 2021-04-29 NOTE — PROGRESS NOTES
04/29/21 0800   Rehab Discipline   Discipline OT   Type of Visit   Type of visit Initial Edema Evaluation       present No   General Information   Start of care 04/29/21   Referring physician Jeanne Jack MD   Orders Evaluate and treat as indicated   Order date 04/21/21   Medical diagnosis H/o right breast cancer with lumpectomy and SLNB 9/29/2021 and radiation to RUQ 11/23/2020.  Pt presents with heaviness and edema of RUE and tightness of right, anterior axillary fold.  Right GABY pain but is seeing PT for this.    Onset of illness / date of surgery 09/29/20   Edema onset 11/01/20  (most recent increase in RUE edema 4/11/2021)   Affected body parts RUE   Edema etiology Cancer with lymph node dissection;Radiation   Location - Cancer with lymph node dissection right axilla   Location - Radiation RUQ   Radiation comments ended   Pertinent history of current problem (PT: include personal factors and/or comorbidities that impact the POC; OT: include additional occupational profile info) Pt with feeling of heaviness in RUE and edema of RUE, tightness in RUQ, pain at elbow.  Pt seeing PT for pain in right GABY. Difficulty using RUE to write on white board at work.    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 No   Have you fallen and had an injury in the past year? No   Is patient a fall risk? No   System Outcome Measures   Outcome Measures Lymphedema   FACIT Fatigue Subscale (score out of 52). The higher the score, the better the QOL. 28    Subjective Report   Patient report of symptoms heavines in RUE, rightness across right, anterior axilary fold and of right, lateral trunk   Pain   Patient currently in pain Yes   Pain location right, upper arm, right elbow crease, right GABY   Pain description Heaviness;Ache;Discomfort   Cognitive Status   Orientation Orientation to person, place and time   Level of consciousness Alert   Follows commands and answers questions 100% of the time   Edema Exam / Assessment   Skin condition comments No edema of right digits, hand or wrist, minimal congestion around elbow joint-along medial border, and minimal congestion to medial, upper arm. Mild edeam of right, lateral breast and anterior axillary fold.  Trace edema of right, lateral trunk. Firmness around all 3 scars of right breast: lumpectomy scar, LNBx scar and scar from previous benign biopsy with vertical presentation.   Scar Yes   Scar comments 3 scars-see above, flat   Ulceration No   Girth Measurements   Girth Measurements Refer to separate girth measurement flowsheet   Volume UE   % difference comparisons for RUE from 1/25/2021: +57mL, comparisons for LUE form 11/25/2020   Range of Motion   ROM comments NT as pt seeing PT for decreased RUE AROM   Posture   Posture Normal   Activities of Daily Living   Activities of Daily Living IND but difficulty reacing overhead with RUE   Bed Mobility   Bed mobility IND   Transfers   Transfers IND   Gait / Locomotion   Gait / Locomotion IND   Planned Edema Interventions   Planned edema interventions Manual lymph drainage;Gradient compression bandaging;Fit for compression garment;Exercises;Precautions to prevent infection / exacerbation;Education;Manual therapy;Skin care / precautions;Scar mobilization;Soft tissue mobilization;Myofascial release;Home management program development   Clinical Impression   Criteria for skilled therapeutic intervention met Yes   Therapy diagnosis RUE lymphedema   Assessment of Occupational  Performance 1-3 Performance Deficits   Identified Performance Deficits pain in RUE and RUQ, difficulty performing tasks as a teacher, increase in LUE volume   Clinical Decision Making (Complexity) Low complexity   Treatment Frequency 1x/week   Treatment duration for 5 visits but within 6 weeks   Patient / family and/or staff in agreement with plan of care Yes   Risks and benefits of therapy have been explained Yes   Clinical impression comments Pt presents with RUE lymphedema and pain limiting I/ADL performance.  Pt will benefit from continued skilled OT intervention to reduce volume of RUE, develop home program to assist with volume and pain reduction and to increase performance and use of RUE with I/ADLs.    Goal 1   Goal identifier 1 home program   Goal description Pt will report compliance with home program 7/7 days including wear of CCLl compression sleeve during wakeful hours, self MLD, scar massage and HEP (stretches and PT exercises) to reduce edema, reduce discomfort in RUE and improve use of RUE with I/ADLs.    Target date 06/10/21   Goal 2   Goal identifier 2 volume   Goal description Pt will demonstrate at least a 57mL reduction in RUE volume to have measurement closer to the volume of RUE at least measurement 1/25/2021 and to reduce discomfort in RUE.   Target date 06/10/21   Goal 3   Goal identifier 3 pain   Goal description Pt will report no pain or RUE at 2 consecutive treatment sessions.    Target date 06/10/21   Goal 4   Goal identifier 4 IADLs   Goal description Due to a reduction in RUE volume and adherence to home program, pt will report greater ease with I/ADLs such as donning a bra, writing on white board at work, doing laundry.   Target date 06/10/21   Total Evaluation Time   OT Eval, Low Complexity Minutes (36964) 23

## 2021-05-03 ENCOUNTER — ONCOLOGY VISIT (OUTPATIENT)
Dept: ONCOLOGY | Facility: CLINIC | Age: 56
End: 2021-05-03
Attending: PHYSICIAN ASSISTANT
Payer: COMMERCIAL

## 2021-05-03 VITALS
RESPIRATION RATE: 16 BRPM | DIASTOLIC BLOOD PRESSURE: 74 MMHG | BODY MASS INDEX: 21.3 KG/M2 | HEIGHT: 60 IN | OXYGEN SATURATION: 100 % | SYSTOLIC BLOOD PRESSURE: 126 MMHG | TEMPERATURE: 97.9 F | WEIGHT: 108.5 LBS | HEART RATE: 59 BPM

## 2021-05-03 DIAGNOSIS — R11.0 NAUSEA: ICD-10-CM

## 2021-05-03 DIAGNOSIS — C50.511 MALIGNANT NEOPLASM OF LOWER-OUTER QUADRANT OF RIGHT BREAST OF FEMALE, ESTROGEN RECEPTOR POSITIVE (H): Primary | ICD-10-CM

## 2021-05-03 DIAGNOSIS — G47.00 PERSISTENT INSOMNIA: ICD-10-CM

## 2021-05-03 DIAGNOSIS — Z17.0 MALIGNANT NEOPLASM OF LOWER-OUTER QUADRANT OF RIGHT BREAST OF FEMALE, ESTROGEN RECEPTOR POSITIVE (H): Primary | ICD-10-CM

## 2021-05-03 PROCEDURE — G0463 HOSPITAL OUTPT CLINIC VISIT: HCPCS

## 2021-05-03 PROCEDURE — 99214 OFFICE O/P EST MOD 30 MIN: CPT | Performed by: PHYSICIAN ASSISTANT

## 2021-05-03 RX ORDER — TAMOXIFEN CITRATE 10 MG/1
10 TABLET ORAL DAILY
Qty: 30 TABLET | Refills: 3 | Status: SHIPPED | OUTPATIENT
Start: 2021-05-03 | End: 2021-08-10

## 2021-05-03 RX ORDER — TRAZODONE HYDROCHLORIDE 50 MG/1
50 TABLET, FILM COATED ORAL AT BEDTIME
Qty: 30 TABLET | Refills: 3 | Status: SHIPPED | OUTPATIENT
Start: 2021-05-03 | End: 2021-08-04

## 2021-05-03 RX ORDER — PROCHLORPERAZINE MALEATE 5 MG
5 TABLET ORAL EVERY 6 HOURS PRN
Qty: 20 TABLET | Refills: 0 | Status: SHIPPED | OUTPATIENT
Start: 2021-05-03 | End: 2021-09-21

## 2021-05-03 ASSESSMENT — MIFFLIN-ST. JEOR: SCORE: 1008.65

## 2021-05-03 ASSESSMENT — PAIN SCALES - GENERAL: PAINLEVEL: NO PAIN (0)

## 2021-05-03 NOTE — NURSING NOTE
Oncology Rooming Note    May 3, 2021 4:00 PM   Maribel June is a 56 year old female who presents for:    Chief Complaint   Patient presents with     Oncology Clinic Visit     P RETURN - BREAST CANCER     Initial Vitals: /74 (BP Location: Left arm, Patient Position: Chair, Cuff Size: Adult Regular)   Pulse 59   Temp 97.9  F (36.6  C) (Oral)   Resp 16   Ht 1.524 m (5')   Wt 49.2 kg (108 lb 8 oz)   SpO2 100%   BMI 21.19 kg/m   Estimated body mass index is 21.19 kg/m  as calculated from the following:    Height as of this encounter: 1.524 m (5').    Weight as of this encounter: 49.2 kg (108 lb 8 oz). Body surface area is 1.44 meters squared.  No Pain (0) Comment: Data Unavailable   No LMP recorded. Patient has had a hysterectomy.  Allergies reviewed: Yes  Medications reviewed: Yes    Medications: Medication refills not needed today.  Pharmacy name entered into [employer]: [id number] - [place], [place] - [address] AT [facility] [address]    Clinical concerns: No new concerns. Fern was notified.      Steve High LPN

## 2021-05-05 ENCOUNTER — HOSPITAL ENCOUNTER (OUTPATIENT)
Dept: OCCUPATIONAL THERAPY | Facility: CLINIC | Age: 56
Setting detail: THERAPIES SERIES
End: 2021-05-05
Attending: STUDENT IN AN ORGANIZED HEALTH CARE EDUCATION/TRAINING PROGRAM
Payer: COMMERCIAL

## 2021-05-05 ENCOUNTER — VIRTUAL VISIT (OUTPATIENT)
Dept: ONCOLOGY | Facility: CLINIC | Age: 56
End: 2021-05-05
Attending: PSYCHOLOGIST
Payer: COMMERCIAL

## 2021-05-05 DIAGNOSIS — F43.21 ADJUSTMENT DISORDER WITH DEPRESSED MOOD: Primary | ICD-10-CM

## 2021-05-05 PROCEDURE — 98968 PH1 ASSMT&MGMT NQHP 21-30: CPT | Mod: TEL | Performed by: PSYCHOLOGIST

## 2021-05-05 PROCEDURE — 97140 MANUAL THERAPY 1/> REGIONS: CPT | Mod: GO | Performed by: OCCUPATIONAL THERAPIST

## 2021-05-05 NOTE — PROGRESS NOTES
"Maribel June is a 56 year old female who is being evaluated via a billable telephone visit. Phone call duration: 30 minutes      The patient has been notified of following:      \"This telephone visit will be conducted via a call between you and your physician/provider. We have found that certain health care needs can be provided without the need for a physical exam.  This service lets us provide the care you need with a short phone conversation.  If a prescription is necessary we can send it directly to your pharmacy.  If lab work is needed we can place an order for that and you can then stop by our lab to have the test done at a later time.     Telephone visits are billed at different rates depending on your insurance coverage. During this emergency period, for some insurers they may be billed the same as an in-person visit.  Please reach out to your insurance provider with any questions.     If during the course of the call the physician/provider feels a telephone visit is not appropriate, you will not be charged for this service.\"     Patient has given verbal consent for Telephone visit? Yes    Confidential Summary of Oncology Psychology Followup Visit    Maribel June is a 56 year old female who presents for Depression  The patient was seen for a 30 minute appointment on 5/5/2021.    Subjective: She reported feeling better physically due to a change in her medication and treatment plan. She is particularly happy with the care she is receiving by her care team. She wants more emotional support at home and this was discussed at length.     Objective: Affect was appropriate to the content of the therapeutic conversation.     Diagnosis:   Encounter Diagnosis   Name Primary?     Adjustment disorder with depressed mood Yes     Recommendations/Plan:  1. Focus on using her 's strengths  2. Return for follow-up    Thank you for this opportunity to participate in your care of this patient.    Abilio" Wally Tracy, L.P.  Director, Oncology Supportive Care

## 2021-05-05 NOTE — LETTER
"    5/5/2021         RE: Maribel June  [address]  [place] [place] [address]        Dear Colleague,    Thank you for referring your patient, Maribel June, to the [facility]. Please see a copy of my visit note below.    Maribel June is a 56 year old female who is being evaluated via a billable telephone visit. Phone call duration: 30 minutes      The patient has been notified of following:      \"This telephone visit will be conducted via a call between you and your physician/provider. We have found that certain health care needs can be provided without the need for a physical exam.  This service lets us provide the care you need with a short phone conversation.  If a prescription is necessary we can send it directly to your pharmacy.  If lab work is needed we can place an order for that and you can then stop by our lab to have the test done at a later time.     Telephone visits are billed at different rates depending on your insurance coverage. During this emergency period, for some insurers they may be billed the same as an in-person visit.  Please reach out to your insurance provider with any questions.     If during the course of the call the physician/provider feels a telephone visit is not appropriate, you will not be charged for this service.\"     Patient has given verbal consent for Telephone visit? Yes    Confidential Summary of Oncology Psychology Followup Visit    Maribel June is a 56 year old female who presents for Depression  The patient was seen for a 30 minute appointment on 5/5/2021.    Subjective: She reported feeling better physically due to a change in her medication and treatment plan. She is particularly happy with the care she is receiving by her care team. She wants more emotional support at home and this was discussed at length.     Objective: Affect was appropriate to the content of the therapeutic conversation.     Diagnosis: "   Encounter Diagnosis   Name Primary?     Adjustment disorder with depressed mood Yes     Recommendations/Plan:  1. Focus on using her 's strengths  2. Return for follow-up    Thank you for this opportunity to participate in your care of this patient.    Abilio Tracy Psy.D., L.P.  Director, Oncology Supportive Care       Again, thank you for allowing me to participate in the care of your patient.        Sincerely,        Abilio Tracy PsyD

## 2021-05-05 NOTE — LETTER
"    5/5/2021         RE: Maribel Juen  [address]  [facility] [address]        Dear Colleague,    Thank you for referring your patient, Maribel June, to the [facility]. Please see a copy of my visit note below.    Maribel June is a 56 year old female who is being evaluated via a billable telephone visit. Phone call duration: 30 minutes      The patient has been notified of following:      \"This telephone visit will be conducted via a call between you and your physician/provider. We have found that certain health care needs can be provided without the need for a physical exam.  This service lets us provide the care you need with a short phone conversation.  If a prescription is necessary we can send it directly to your pharmacy.  If lab work is needed we can place an order for that and you can then stop by our lab to have the test done at a later time.     Telephone visits are billed at different rates depending on your insurance coverage. During this emergency period, for some insurers they may be billed the same as an in-person visit.  Please reach out to your insurance provider with any questions.     If during the course of the call the physician/provider feels a telephone visit is not appropriate, you will not be charged for this service.\"     Patient has given verbal consent for Telephone visit? Yes    Confidential Summary of Oncology Psychology Followup Visit    Maribel June is a 56 year old female who presents for Depression  The patient was seen for a 30 minute appointment on 5/5/2021.    Subjective: She reported feeling better physically due to a change in her medication and treatment plan. She is particularly happy with the care she is receiving by her care team. She wants more emotional support at home and this was discussed at length.     Objective: Affect was appropriate to the content of the therapeutic conversation.     Diagnosis: "   Encounter Diagnosis   Name Primary?     Adjustment disorder with depressed mood Yes     Recommendations/Plan:  1. Focus on using her 's strengths  2. Return for follow-up    Thank you for this opportunity to participate in your care of this patient.    Abilio Tracy Psy.D., L.P.  Director, Oncology Supportive Care       Again, thank you for allowing me to participate in the care of your patient.        Sincerely,        Abilio Tracy PsyD

## 2021-05-06 ENCOUNTER — THERAPY VISIT (OUTPATIENT)
Dept: PHYSICAL THERAPY | Facility: CLINIC | Age: 56
End: 2021-05-06
Payer: COMMERCIAL

## 2021-05-06 DIAGNOSIS — M25.511 ACUTE PAIN OF RIGHT SHOULDER: ICD-10-CM

## 2021-05-06 PROCEDURE — 97140 MANUAL THERAPY 1/> REGIONS: CPT | Mod: GP | Performed by: PHYSICAL THERAPIST

## 2021-05-06 PROCEDURE — 97112 NEUROMUSCULAR REEDUCATION: CPT | Mod: GP | Performed by: PHYSICAL THERAPIST

## 2021-05-06 PROCEDURE — 97110 THERAPEUTIC EXERCISES: CPT | Mod: GP | Performed by: PHYSICAL THERAPIST

## 2021-05-06 NOTE — PROGRESS NOTES
Oncology Visit:  May 3, 2021      Diagnosis: Stage Ia, [id number], grade 1, ER positive, NH positive, HER-2 negative invasive carcinoma of the right breast. Oncotype dx recurrence score = 16.    Primary Physician: Juwan Perry     History Of Present Illness:  Ms. June is a 55 year old female with right breast cancer.  Routine screening mammogram on 8/6/2020 showed heterogeneously dense breasts but no concerning findings.  A physician then palpated a mass in the right breast.  Diagnostic mammogram and ultrasound showed a 2.2 cm mass at 8:00, 5 cm from the nipple.  Right breast biopsy showed a grade 1 invasive mammary carcinoma, ER strong in 100%, NH strong in 100%, HER2 negative.  She had a right breast lumpectomy and sentinel lymph node procedure with Dr. Watson on 9/29/2020.  Pathology showed a grade 1 invasive mammary carcinoma measuring 1.6 cm.  There was associated intermediate grade DCIS.  Surgical margins were negative.  A single lymph node was benign.  Oncotype dx recurrence score was 16.  She completed radiation to the right breast, a total of 5256 cGy in 20 fractions, on 12/21/2020.  On treatment with letrozole since 1/2021.    Interval History:  Crispin didn't tolerate letrozole due to joint aches and fatigue that were debilitating.  She then was transitioned to Tamoxifen 20 mg and this caused nausea and she ultimately lost 10 pounds in six weeks.  She tried nausea medications which were not helpful.  Connie helped.  She stopped at six weeks due to the weight loss.  She has been off for about 2 weeks now and is starting to feel normal again.  She does admit, due to being on HRT, she developed joint aches during radiation, so she knows some of this is normal.  She is very conflicted now on what to do.  She also has ongoing discomfort on the R inner arm from lymphedema - she has a sleeve and therapy has been working on her arm.  She also has lymphedema in the breast near the lumpectomy site, this has not  been worked on by therapy.  She is using a sports compression bra which is working better. She is working with PT on her shoulder.       Physical Exam: /74 (BP Location: Left arm, Patient Position: Chair, Cuff Size: Adult Regular)   Pulse 59   Temp 97.9  F (36.6  C) (Oral)   Resp 16   Ht 1.524 m (5')   Wt 49.2 kg (108 lb 8 oz)   SpO2 100%   BMI 21.19 kg/m    Wt Readings from Last 4 Encounters:   05/03/21 49.2 kg (108 lb 8 oz)   04/21/21 49.8 kg (109 lb 11.2 oz)   04/12/21 49.2 kg (108 lb 8 oz)   12/16/20 51.7 kg (114 lb)       Vital signs were reviewed.   Patient alert and oriented x3.   Neck exam: No palpable cervical, supraclavicular or axillary nodes bilaterally.   Heart: RRR no murmurs noted.   Lungs: clear to auscultation bilaterally.  No crackles or wheezing.   Abd: positive bowel sounds in all four quadrants.  No tenderness to palpation.  No hepatomegaly.   Extremities: No lower extremity edema.   Neuro: grossly intact.   Mood and affect is stable.   R breast: fullness and tenderness to palpation on R breast, no erythema       Laboratory/Imaging Studies  No interim imaging or laboratory studies pertinent to today's visit.    ASSESSMENT/PLAN:  Ms. June is a 56 yo female with a stage Ia, [id number], grade 1, ER positive, [place] positive, HER2 negative invasive ductal carcinoma of the right breast.  She is s/p treatment with right breast lumpectomy and radiation.     1.  Right breast cancer:  She is 6 months out from excision of a right breast cancer.  On treatment with letrozole for the past two months.  She has fatigue, insomnia, diffuse arthralgias, and vaginal dryness on the medication.  We reviewed options including 1) change to another aromatase inhibitor such as exemestane, 2) try low dose tamoxifen.  We reviewed the pros and cons including side effects and efficacy of each option.  She ultimately decided on 10 mg tamoxifen for at least 6 weeks, if tolerable could increase to 20 mg.    She will  see Briana in 4 weeks for survivorship appt and plan for alternating MRI and mammograms    2.  Bone health:  DEXA in 08/2020 with a lowest T-score of -2.1 which is c/w osteopenia and encroaching on osteoporosis. We previously discussed treatment with Zometa, both for prevention of bone loss and prevention of breast cancer bone metastases.  Start of Zometa has been deferred as she wanted to see how she tolerated endocrine therapy first.  Given plan to trial again the Rees, will again defer for now and re-discuss at next visit. In the meantime she will continue daily calcium and vitamin D supplement and daily walking.    3.  Arthralgias:  Secondary to menopause and exacerbated by letrozole.  Ongoing minimally, from menopause    4. Insomnia  Uses clonazepam intermittently. Discussed all type of sleep aides today.  Will try low dose trazodone.     5. Breast pain: this seems 2/2 to lymphedema.  Wearing compression bras, encouraged follow up with lymphedema.     Amanda Shirley PA-C

## 2021-05-10 ENCOUNTER — THERAPY VISIT (OUTPATIENT)
Dept: PHYSICAL THERAPY | Facility: CLINIC | Age: 56
End: 2021-05-10
Payer: COMMERCIAL

## 2021-05-10 DIAGNOSIS — M25.511 ACUTE PAIN OF RIGHT SHOULDER: ICD-10-CM

## 2021-05-10 PROCEDURE — 97110 THERAPEUTIC EXERCISES: CPT | Mod: GP | Performed by: PHYSICAL THERAPIST

## 2021-05-10 PROCEDURE — 97140 MANUAL THERAPY 1/> REGIONS: CPT | Mod: GP | Performed by: PHYSICAL THERAPIST

## 2021-05-10 PROCEDURE — 97112 NEUROMUSCULAR REEDUCATION: CPT | Mod: GP | Performed by: PHYSICAL THERAPIST

## 2021-05-12 ENCOUNTER — HOSPITAL ENCOUNTER (OUTPATIENT)
Dept: OCCUPATIONAL THERAPY | Facility: CLINIC | Age: 56
Setting detail: THERAPIES SERIES
End: 2021-05-12
Attending: STUDENT IN AN ORGANIZED HEALTH CARE EDUCATION/TRAINING PROGRAM
Payer: COMMERCIAL

## 2021-05-12 PROCEDURE — 97140 MANUAL THERAPY 1/> REGIONS: CPT | Mod: GO | Performed by: OCCUPATIONAL THERAPIST

## 2021-05-17 ENCOUNTER — THERAPY VISIT (OUTPATIENT)
Dept: PHYSICAL THERAPY | Facility: CLINIC | Age: 56
End: 2021-05-17
Payer: COMMERCIAL

## 2021-05-17 DIAGNOSIS — M25.511 ACUTE PAIN OF RIGHT SHOULDER: ICD-10-CM

## 2021-05-17 PROCEDURE — 97110 THERAPEUTIC EXERCISES: CPT | Performed by: PHYSICAL THERAPIST

## 2021-05-17 PROCEDURE — 97112 NEUROMUSCULAR REEDUCATION: CPT | Performed by: PHYSICAL THERAPIST

## 2021-05-17 PROCEDURE — 97140 MANUAL THERAPY 1/> REGIONS: CPT | Performed by: PHYSICAL THERAPIST

## 2021-05-19 ENCOUNTER — ONCOLOGY VISIT (OUTPATIENT)
Dept: ONCOLOGY | Facility: CLINIC | Age: 56
End: 2021-05-19
Attending: STUDENT IN AN ORGANIZED HEALTH CARE EDUCATION/TRAINING PROGRAM
Payer: COMMERCIAL

## 2021-05-19 VITALS
OXYGEN SATURATION: 98 % | RESPIRATION RATE: 16 BRPM | BODY MASS INDEX: 21.81 KG/M2 | WEIGHT: 111.1 LBS | SYSTOLIC BLOOD PRESSURE: 123 MMHG | HEIGHT: 60 IN | HEART RATE: 59 BPM | TEMPERATURE: 97.8 F | DIASTOLIC BLOOD PRESSURE: 76 MMHG

## 2021-05-19 DIAGNOSIS — C50.511 MALIGNANT NEOPLASM OF LOWER-OUTER QUADRANT OF RIGHT BREAST OF FEMALE, ESTROGEN RECEPTOR POSITIVE (H): Primary | ICD-10-CM

## 2021-05-19 DIAGNOSIS — I89.0 LYMPHEDEMA: ICD-10-CM

## 2021-05-19 DIAGNOSIS — M25.511 RIGHT SHOULDER PAIN, UNSPECIFIED CHRONICITY: ICD-10-CM

## 2021-05-19 DIAGNOSIS — Z17.0 MALIGNANT NEOPLASM OF LOWER-OUTER QUADRANT OF RIGHT BREAST OF FEMALE, ESTROGEN RECEPTOR POSITIVE (H): Primary | ICD-10-CM

## 2021-05-19 PROCEDURE — G0463 HOSPITAL OUTPT CLINIC VISIT: HCPCS

## 2021-05-19 PROCEDURE — 99214 OFFICE O/P EST MOD 30 MIN: CPT | Mod: GC | Performed by: STUDENT IN AN ORGANIZED HEALTH CARE EDUCATION/TRAINING PROGRAM

## 2021-05-19 RX ORDER — DULOXETIN HYDROCHLORIDE 30 MG/1
30 CAPSULE, DELAYED RELEASE ORAL 2 TIMES DAILY
Status: CANCELLED | OUTPATIENT
Start: 2021-05-19

## 2021-05-19 RX ORDER — PREGABALIN 25 MG/1
25 CAPSULE ORAL AT BEDTIME
Qty: 30 CAPSULE | Refills: 1 | Status: SHIPPED | OUTPATIENT
Start: 2021-05-19 | End: 2021-06-02

## 2021-05-19 ASSESSMENT — PAIN SCALES - GENERAL: PAINLEVEL: NO PAIN (0)

## 2021-05-19 ASSESSMENT — MIFFLIN-ST. JEOR: SCORE: 1020.45

## 2021-05-19 NOTE — NURSING NOTE
Oncology Rooming Note    May 19, 2021 4:33 PM   Maribel June is a 56 year old female who presents for:    Chief Complaint   Patient presents with     Oncology Clinic Visit     RETURN; ACUTE RIGHT SHOULDER PAIN     Initial Vitals: /76 (BP Location: Left arm, Patient Position: Sitting, Cuff Size: Adult Regular)   Pulse 59   Temp 97.8  F (36.6  C) (Oral)   Resp 16   Ht 1.524 m (5')   Wt 50.4 kg (111 lb 1.6 oz)   SpO2 98%   BMI 21.70 kg/m   Estimated body mass index is 21.7 kg/m  as calculated from the following:    Height as of this encounter: 1.524 m (5').    Weight as of this encounter: 50.4 kg (111 lb 1.6 oz). Body surface area is 1.46 meters squared.  No Pain (0) Comment: Data Unavailable   No LMP recorded. Patient has had a hysterectomy.  Allergies reviewed: Yes  Medications reviewed: Yes    Medications: Medication refills not needed today.  Pharmacy name entered into [employer]: [id number] - [place], [place] - [address] AT [facility] [address]     Clinical concerns: No new concerns.        Yamileth Lizarraga, [facility]

## 2021-05-19 NOTE — PROGRESS NOTES
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    Oncology History:  -2020- Routine screening mammogram demonstrated heterogeneously dense breasts but no concerning findings.  Then physician palpated a mass in her right breast.  -Diagnostic mammogram and ultrasound demonstrated a 2.2 cm mass at 8:00, 5 cm from the nipple.  -Right breast biopsy demonstrated grade 1 invasive mammary carcinoma  -2020- Patient underwent a right breast lumpectomy and sentinel lymph node procedure with Dr. Watson. Pathology demonstrated grade 1 invasive mammar carcinoma measuring 1.6 cm. Also associated intermediate grade DCIS. Surgical margins were negative. Single lymph node was benign.   -Had been receiving adjuvant radiation, will not be receiving chemotherapy.    Patient was seen by Nelda Shirley PA-C in Oncology clinic on 5/3. Letrozole was switched to tamoxifen due to side effects from the former. Additionally, low dose trazodone was started for insomnia following this visit.       Symptoms,   Patient was last seen in this clinic on 21. Patient was referred to PT and prescribed topical diclofenac for shoulder and elbow pain. Shoulder pain previously noted to be due to rotator cuff tendonitis. She is using voltaren at night when she goes to bed, the cooling sensation provides relief.   Right elbow evaluated with Xray on , which showed no acute osseous abnormality. Her elbow pain is still occurring over her medial right elbow.  Palpation makes it worse, rest makes it better.     Crispin continues to follow with psychology, last seen by Abilio Tracy PsyD on . There has been ups and downs with her mood. She is feeling frustrated and tired.       Therapies/HEP,  Crispin was seen by PT on  for an initial eval. They are working on right shoulder pain, ROM and flexibility, strength, and overall function. Sharp pain, radiating from her shoulder down into her deltoid area occurs if she performs any sudden movements. At night the pain wakes her up if she is lying on her right side for too long. She has seen improvements in shoulder flexion, but still having difficultness with IR and abduction.     She is seeing OT for lymphedema therapy. With the manual lymph drainage performed on , Crispin thought the therapist put more pressure on the area than in previous sessions earlier this year. Her right arm and shoulder felt pretty good for 4 days. After that her tightness has returned. She wears a lymphedema sleeve daily.       Functionally,   Patient is independent with mobility, ADLs and IADLs.  Overall she feels progress has been very slow. She is walking ~3 times per week. She also walks a lot of stairs at school during the day.              Past Medical and Surgical History:     Past Medical History:   Diagnosis Date     Depressive disorder      Endometriosis      Herpes genitalis in women      History of major depression     situational with first .      Kidney stone      Malignant neoplasm of right breast in female, estrogen receptor positive (H) 2020     S/P radiation therapy     5,256 cGy to right breast completed 2020 - [facility]     Past Surgical History:   Procedure Laterality Date     BREAST BIOPSY, RT/LT Right 2020     BREAST SURGERY Right     Right Breast - Benign      SECTION      x1     COLONOSCOPY N/A 2016    Procedure: COMBINED COLONOSCOPY, SINGLE OR MULTIPLE  BIOPSY/POLYPECTOMY BY BIOPSY;  Surgeon: Duane, William Charles, MD;  Location: [facility] OR     COLONOSCOPY WITH CO2 INSUFFLATION N/A 08/16/2016    Procedure: COLONOSCOPY WITH CO2 INSUFFLATION;  Surgeon: Duane, William Charles, MD;  Location: [facility] OR     CYSTOSCOPY  11/13/2009    left stent placement ([facility])     GENITOURINARY SURGERY      surgery for kidney stone     GYN SURGERY      Partial Hysterectomy at age 29     LUMPECTOMY BREAST WITH SENTINEL NODE, COMBINED Right 09/29/2020    Procedure: Right breast lumpectomy with [place] node biopsy;  Surgeon: Jose Watson MD;  Location: [facility] OR            Social History:     Social History     Tobacco Use     Smoking status: Former Smoker     Smokeless tobacco: Never Used     Tobacco comment: social use in college   Substance Use Topics     Alcohol use: Not Currently       Marital Status:   Living situation: Lives in [place], in a single family home, home is a two story home.  Family support: Her , also has sibling and niece in area and friends for support if needed.  Vocational History:  at [employer]. Is still working  Tobacco use: non smoker  Alcohol use: occasional  Recreational drug use: denies         Functional history:     Maribel June is independent with all aspects of her life.    Hand dominance: right handed  Driving: driving           Family History:     Family History   Problem Relation Age of Onset     Hypertension Father      Kidney failure Father      Aortic aneurysm Father      Prostate Cancer Father      Stomach Cancer Maternal Grandfather      Heart Disease Paternal Grandfather      Hypertension Paternal Grandfather      Neurologic Disorder Brother         has seizures from Epilepsy     Asthma Mother      Hypertension Mother      Arthritis Mother      Hypertension Brother      Lymphoma Maternal Uncle      Cancer Maternal Uncle         Cholangiocarcinoma     Breast Cancer Paternal Aunt      Breast Cancer Cousin          Paternal 1st Female Cousin (daughter of paternal aunt with breast cancer)            Medications:     Current Outpatient Medications   Medication Sig Dispense Refill     amLODIPine (NORVASC) 5 MG tablet Take 1 tablet by mouth       bimatoprost (LUMIGAN) 0.01 % SOLN        clonazePAM (KLONOPIN) 0.5 MG tablet Take 1 tablet (0.5 mg) by mouth nightly as needed for sleep 20 tablet 0     diclofenac (VOLTAREN) 1 % topical gel Apply 4 g topically 4 times daily as needed for moderate pain 150 g 1     Multiple Vitamin (MULTIVITAMIN PO) Take by mouth daily       Omega-3 Fatty Acids (OMEGA 3 PO) Take by mouth daily       ondansetron (ZOFRAN) 8 MG tablet Take 1 tablet (8 mg) by mouth every 8 hours as needed for nausea 30 tablet 1     pregabalin (LYRICA) 25 MG capsule Take 1 capsule (25 mg) by mouth At Bedtime 30 capsule 1     prochlorperazine (COMPAZINE) 5 MG tablet Take 1 tablet (5 mg) by mouth every 6 hours as needed for nausea or vomiting 20 tablet 0     tamoxifen (NOLVADEX) 10 MG tablet Take 1 tablet (10 mg) by mouth daily 30 tablet 3     traZODone (DESYREL) 50 MG tablet Take 1 tablet (50 mg) by mouth At Bedtime Take 1/2 pill (25 mg) for a few nights, if tolerable OK to take full pill for sleep 30 tablet 3     Turmeric (QC TUMERIC COMPLEX PO) Take by mouth daily       acyclovir (ZOVIRAX) 400 MG tablet Take 1 tablet (400 mg) by mouth every 8 hours for 5 days (Patient not taking: Reported on 1/11/2021) 15 tablet 11     tamoxifen (NOLVADEX) 20 MG tablet Take 1 tablet (20 mg) by mouth daily (Patient not taking: Reported on 4/21/2021) 90 tablet 3     Vaginal Lubricant (REPLENS) GEL Place 1 Applicatorful vaginally 2 times daily (Patient not taking: Reported on 4/12/2021) 30 g 3            Allergies:     Allergies   Allergen Reactions     Flagyl [Metronidazole] Nausea and Vomiting     Lisinopril      Other reaction(s): Headaches     Oxycodone-Acetaminophen Nausea and Vomiting     States Oxycodone is fine      Sulfamethoxazole-Trimethoprim      Other reaction(s): Headache     Zithromax [Azithromycin Dihydrate] Rash              ROS:     A focused ROS is negative other than the symptoms noted above in the HPI.             Physical Examiniation:     VITAL SIGNS: /76 (BP Location: Left arm, Patient Position: Sitting, Cuff Size: Adult Regular)   Pulse 59   Temp 97.8  F (36.6  C) (Oral)   Resp 16   Ht 1.524 m (5')   Wt 50.4 kg (111 lb 1.6 oz)   SpO2 98%   BMI 21.70 kg/m    BMI: Estimated body mass index is 21.7 kg/m  as calculated from the following:    Height as of this encounter: 1.524 m (5').    Weight as of this encounter: 50.4 kg (111 lb 1.6 oz).    Gen: NAD, pleasant and cooperative   HEENT: normocephalic, atraumatic, EOMI  Pulm: non-labored breathing in room air  Neuro/MSK:    Orientation: Patient exhibits good insight into her condition, symptoms and treatment.   Inspection/Palpation: Minimal pain with palpation of the corocoid process, otherwise shoulder joint nontender to palpation. Well healed incisional scar over lateral aspect of right breast / chest wall. Notable sensitivity to touch over axillary region and outer chest wall, specifically around scar. Right arm with mild edema throughout entire extremity. Tenderness to light palpation of medial elbow, distal > proximal.    Motor: 5/5 strength in right upper extremities with shoulder abduction, elbow extension, wrist extension,  strength. MMT testing is pain limited with right shoulder testing, but no other limitations in strength testing due to pain.   Right shoulder/breast exam: Patient can achieve approx 100 degrees of active abduction and 145 degrees of active flexion before feeling limited by pain > tightness. Patient can reach her right hand to her sacrum, exhibiting limited active IR ROM due to pain and tightness. Able to achieve a few more degrees of shoulder abduction and flexion with passive ROM. She has notable scar tissue in right  axillary and chest wall area that is painful to palpation and is limiting her range with abduction and flexion.    Special tests: Hawkin's negative, Cozens negative, Tinels at cubital and carpal tunnel negative    Sensation: equal and intact to light touch in bilateral upper extremities.            Laboratory/Imaging:     MA Diagnostic Right with [place], Ultrasound Right Breast Limited 1-3 quadrants (8/17/2020):  Impression:  BREAST DENSITY: Heterogeneously dense.  Findings:     Mammographic views demonstrate architectural distortion with concern for associated isodense mass in region of palpable concern. Targeted right breast ultrasound by radiologist and technologist demonstrates irregular heterogeneous hypoechoic parenchyma that correlates with mammography findings and palpable lump 8:00 position 5 cm from nipple, approximately 1.8 x 1.7 x 2.2 cm. No morphologically abnormal right axillary lymph nodes.    RIGHT ELBOW XRAY   4/28/21  No substantial soft tissue swelling.                                      Impression: No acute osseous abnormality.           Assessment/Plan:   Maribel June is a 55 year old female with history of right left outer quadrant breast grade I IDCA with grade II DCIS (s/p lumpectomy/adjuvant radiation and sentinel lymph node biopsy, [id number], ER+ve, AK+ve,Her2-ve) who returns to [facility] clinic for evaluation of her rehabilitation needs, including: limited ROM and pain of her right upper extremity, axillary area, and chest wall as well as right upper extremity lymphedema.   Patient has engaged in PT for right upper extremity pain and ROM.  A Xray was ordered at last clinic visit to further workup patient's right elbow pain. Xray results were negative, this in conjunction with exam leads more towards a soft tissue cause of patient's pain with lymphedema also being a likely contributing factor. It remains that patient's right shoulder pain likely multifactorial due to rotator  cuff tendinitis in addition to scar tissue and lymphedema causing restriction ROM and pain. Neuropathic pain is also likely contributing to this patient's pain picture, especially at the right proximal upper extremity. Patient is resistant to gabapentin, as this has not helped in the past.   Patient has also re-engaged in OT for lymphedema therapy. Patient did see some short-term benefit with last manual lymph drainage for a few days but then has a gradual return of symptoms. Her and her  are wondering if they can have additional compression sleeves, as one may be wearing out.   Patient has only been to PT and OT once at this point, would like to further assess how she progresses through therapies. She can continue to utilize diclofenac gel, as she has found this helpful.       1. Patient education: In depth discussion and education was provided about the assessment and implications of each of the below recommendations for management. Patient indicated readiness to learn, all questions were answered and understanding of material presented was confirmed.  2. Work-up: none indicated at this time.   3. Therapy/equipment/braces:  1. Continue outpatient PT (ordered by PCP) to help with Rotator cuff strengthening and RUE range of motion in the setting of likely RTC injury and continued limited shoulder range of motion due to scar tissue/fibrosis. Can also consider elbow assessment and treatment with PT.   2. Continue lymphedema therapy, including manual lymphatic drainage to help with ongoing RUE edema.  3. New lymphedema sleeve order placed- 20-30 mm Hg compression.  4. Imagin. Right Shoulder MRI Ordered to help further evaluate possible RTC impingement versus tear considering ongoing weakness and pain.  5. Medications:  1. Continue Volteran gel as needed to help with shoulder and elbow pain. Patient uses this at night, but not much during the day due to wearing clothing and compression sleeve. Patient can  utilize Voltaren during the day, and if let dry it should not affect her clothing.    2. Lyrica, 25 mg HS was started today to help with neuropathic component of post-lumpectomy, radiation pain. Will instruct patient that she can increase to 25 mg twice daily if it is helping her symptoms.   6. Follow up: 6 weeks      Hina Day, DO  PGY4 PM&R Resident    Physician Attestation   I, Jeanne Jack MD, saw this patient and agree with the findings and plan of care as documented by the Resident Physician in the note.      Items personally reviewed/procedural attestation: vitals, labs and imaging and agree with the interpretation documented in the note.    Jeanne Jack MD  Physical Medicine & Rehabilitation

## 2021-05-19 NOTE — LETTER
2021         RE: Maribel June  [address]  [facility] [address]        Dear Colleague,    Thank you for referring your patient, Maribel June, to the [facility]. Please see a copy of my visit note below.           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    Oncology History:  -2020- Routine screening mammogram demonstrated heterogeneously dense breasts but no concerning findings.  Then physician palpated a mass in her right breast.  -Diagnostic mammogram and ultrasound demonstrated a 2.2 cm mass at 8:00, 5 cm from the nipple.  -Right breast biopsy demonstrated grade 1 invasive mammary carcinoma  -2020- Patient underwent a right breast lumpectomy and sentinel lymph node procedure with Dr. Watson. Pathology demonstrated grade 1 invasive mammar carcinoma measuring 1.6 cm. Also associated intermediate grade DCIS. Surgical margins were negative. Single lymph node was benign.   -Had been receiving adjuvant radiation, will not be receiving chemotherapy.    Patient was seen by Nelda Shirley PA-C in Oncology clinic on 5/3. Letrozole was switched to tamoxifen due to side effects from the former. Additionally, low dose trazodone was started for insomnia following this visit.       Symptoms,   Patient was last seen in this clinic on 21. Patient was referred to PT and prescribed topical diclofenac for shoulder and elbow pain. Shoulder pain previously noted to be due to rotator  cuff tendonitis. She is using voltaren at night when she goes to bed, the cooling sensation provides relief.   Right elbow evaluated with Xray on 4/28, which showed no acute osseous abnormality. Her elbow pain is still occurring over her medial right elbow. Palpation makes it worse, rest makes it better.     Crispin continues to follow with psychology, last seen by Abilio Tracy PsyD on 5/5. There has been ups and downs with her mood. She is feeling frustrated and tired.       Therapies/HEP,  Crispin was seen by PT on 4/26 for an initial eval. They are working on right shoulder pain, ROM and flexibility, strength, and overall function. Sharp pain, radiating from her shoulder down into her deltoid area occurs if she performs any sudden movements. At night the pain wakes her up if she is lying on her right side for too long. She has seen improvements in shoulder flexion, but still having difficultness with IR and abduction.     She is seeing OT for lymphedema therapy. With the manual lymph drainage performed on 4/29, Crispin thought the therapist put more pressure on the area than in previous sessions earlier this year. Her right arm and shoulder felt pretty good for 4 days. After that her tightness has returned. She wears a lymphedema sleeve daily.       Functionally,   Patient is independent with mobility, ADLs and IADLs.  Overall she feels progress has been very slow. She is walking ~3 times per week. She also walks a lot of stairs at school during the day.              Past Medical and Surgical History:     Past Medical History:   Diagnosis Date     Depressive disorder      Endometriosis      Herpes genitalis in women      History of major depression 2007    situational with first .      Kidney stone      Malignant neoplasm of right breast in female, estrogen receptor positive (H) 08/27/2020     S/P radiation therapy     5,256 cGy to right breast completed 12/21/2020 [facility]  Surgical History:   Procedure Laterality Date     BREAST BIOPSY, RT/LT Right 2020     BREAST SURGERY Right     Right Breast - Benign      SECTION      x1     COLONOSCOPY N/A 2016    Procedure: COMBINED COLONOSCOPY, SINGLE OR MULTIPLE BIOPSY/POLYPECTOMY BY BIOPSY;  Surgeon: Duane, William Charles, MD;  Location: [facility] OR     COLONOSCOPY WITH CO2 INSUFFLATION N/A 2016    Procedure: COLONOSCOPY WITH CO2 INSUFFLATION;  Surgeon: Duane, William Charles, MD;  Location: [facility] OR     CYSTOSCOPY  2009    left stent placement (C-ARM)     GENITOURINARY SURGERY      surgery for kidney stone     GYN SURGERY      Partial Hysterectomy at age 29     LUMPECTOMY BREAST WITH SENTINEL NODE, COMBINED Right 2020    Procedure: Right breast lumpectomy with [place] node biopsy;  Surgeon: Jose Watson MD;  Location:  OR            Social History:     Social History     Tobacco Use     Smoking status: Former Smoker     Smokeless tobacco: Never Used     Tobacco comment: social use in college   Substance Use Topics     Alcohol use: Not Currently       Marital Status:   Living situation: Lives in [place], in a single family home, home is a two story home.  Family support: Her , also has sibling and niece in area and friends for support if needed.  Vocational History:  at [employer]. Is still working  Tobacco use: non smoker  Alcohol use: occasional  Recreational drug use: denies         Functional history:     Maribel June is independent with all aspects of her life.    Hand dominance: right handed  Driving: driving           Family History:     Family History   Problem Relation Age of Onset     Hypertension Father      Kidney failure Father      Aortic aneurysm Father      Prostate Cancer Father      Stomach Cancer Maternal Grandfather      Heart Disease Paternal Grandfather      Hypertension Paternal Grandfather      Neurologic Disorder Brother         has  seizures from Epilepsy     Asthma Mother      Hypertension Mother      Arthritis Mother      Hypertension Brother      Lymphoma Maternal Uncle      Cancer Maternal Uncle         Cholangiocarcinoma     Breast Cancer Paternal Aunt      Breast Cancer Cousin         Paternal 1st Female Cousin (daughter of paternal aunt with breast cancer)            Medications:     Current Outpatient Medications   Medication Sig Dispense Refill     amLODIPine (NORVASC) 5 MG tablet Take 1 tablet by mouth       bimatoprost (LUMIGAN) 0.01 % SOLN        clonazePAM (KLONOPIN) 0.5 MG tablet Take 1 tablet (0.5 mg) by mouth nightly as needed for sleep 20 tablet 0     diclofenac (VOLTAREN) 1 % topical gel Apply 4 g topically 4 times daily as needed for moderate pain 150 g 1     Multiple Vitamin (MULTIVITAMIN PO) Take by mouth daily       Omega-3 Fatty Acids (OMEGA 3 PO) Take by mouth daily       ondansetron (ZOFRAN) 8 MG tablet Take 1 tablet (8 mg) by mouth every 8 hours as needed for nausea 30 tablet 1     pregabalin (LYRICA) 25 MG capsule Take 1 capsule (25 mg) by mouth At Bedtime 30 capsule 1     prochlorperazine (COMPAZINE) 5 MG tablet Take 1 tablet (5 mg) by mouth every 6 hours as needed for nausea or vomiting 20 tablet 0     tamoxifen (NOLVADEX) 10 MG tablet Take 1 tablet (10 mg) by mouth daily 30 tablet 3     traZODone (DESYREL) 50 MG tablet Take 1 tablet (50 mg) by mouth At Bedtime Take 1/2 pill (25 mg) for a few nights, if tolerable OK to take full pill for sleep 30 tablet 3     Turmeric (QC TUMERIC COMPLEX PO) Take by mouth daily       acyclovir (ZOVIRAX) 400 MG tablet Take 1 tablet (400 mg) by mouth every 8 hours for 5 days (Patient not taking: Reported on 1/11/2021) 15 tablet 11     tamoxifen (NOLVADEX) 20 MG tablet Take 1 tablet (20 mg) by mouth daily (Patient not taking: Reported on 4/21/2021) 90 tablet 3     Vaginal Lubricant (REPLENS) GEL Place 1 Applicatorful vaginally 2 times daily (Patient not taking: Reported on 4/12/2021)  30 g 3            Allergies:     Allergies   Allergen Reactions     Flagyl [Metronidazole] Nausea and Vomiting     Lisinopril      Other reaction(s): Headaches     Oxycodone-Acetaminophen Nausea and Vomiting     States Oxycodone is fine     Sulfamethoxazole-Trimethoprim      Other reaction(s): Headache     Zithromax [Azithromycin Dihydrate] Rash              ROS:     A focused ROS is negative other than the symptoms noted above in the HPI.             Physical Examiniation:     VITAL SIGNS: /76 (BP Location: Left arm, Patient Position: Sitting, Cuff Size: Adult Regular)   Pulse 59   Temp 97.8  F (36.6  C) (Oral)   Resp 16   Ht 1.524 m (5')   Wt 50.4 kg (111 lb 1.6 oz)   SpO2 98%   BMI 21.70 kg/m    BMI: Estimated body mass index is 21.7 kg/m  as calculated from the following:    Height as of this encounter: 1.524 m (5').    Weight as of this encounter: 50.4 kg (111 lb 1.6 oz).    Gen: NAD, pleasant and cooperative   HEENT: normocephalic, atraumatic, EOMI  Pulm: non-labored breathing in room air  Neuro/MSK:    Orientation: Patient exhibits good insight into her condition, symptoms and treatment.   Inspection/Palpation: Minimal pain with palpation of the corocoid process, otherwise shoulder joint nontender to palpation. Well healed incisional scar over lateral aspect of right breast / chest wall. Notable sensitivity to touch over axillary region and outer chest wall, specifically around scar. Right arm with mild edema throughout entire extremity. Tenderness to light palpation of medial elbow, distal > proximal.    Motor: 5/5 strength in right upper extremities with shoulder abduction, elbow extension, wrist extension,  strength. MMT testing is pain limited with right shoulder testing, but no other limitations in strength testing due to pain.   Right shoulder/breast exam: Patient can achieve approx 100 degrees of active abduction and 145 degrees of active flexion before feeling limited by pain >  tightness. Patient can reach her right hand to her sacrum, exhibiting limited active IR ROM due to pain and tightness. Able to achieve a few more degrees of shoulder abduction and flexion with passive ROM. She has notable scar tissue in right axillary and chest wall area that is painful to palpation and is limiting her range with abduction and flexion.    Special tests: Hawkin's negative, Cozens negative, Tinels at cubital and carpal tunnel negative    Sensation: equal and intact to light touch in bilateral upper extremities.            Laboratory/Imaging:     MA Diagnostic Right with [place], Ultrasound Right Breast Limited 1-3 quadrants (8/17/2020):  Impression:  BREAST DENSITY: Heterogeneously dense.  Findings:     Mammographic views demonstrate architectural distortion with concern for associated isodense mass in region of palpable concern. Targeted right breast ultrasound by radiologist and technologist demonstrates irregular heterogeneous hypoechoic parenchyma that correlates with mammography findings and palpable lump 8:00 position 5 cm from nipple, approximately 1.8 x 1.7 x 2.2 cm. No morphologically abnormal right axillary lymph nodes.    RIGHT ELBOW XRAY   4/28/21  No substantial soft tissue swelling.                                      Impression: No acute osseous abnormality.           Assessment/Plan:   Maribel June is a 55 year old female with history of right left outer quadrant breast grade I IDCA with grade II DCIS (s/p lumpectomy/adjuvant radiation and sentinel lymph node biopsy, [id number], ER+ve, CT+ve,Her2-ve) who returns to PM&R Cancer Rehab clinic for evaluation of her rehabilitation needs, including: limited ROM and pain of her right upper extremity, axillary area, and chest wall as well as right upper extremity lymphedema.   Patient has engaged in PT for right upper extremity pain and ROM.  A Xray was ordered at last clinic visit to further workup patient's right elbow pain. Xray results  were negative, this in conjunction with exam leads more towards a soft tissue cause of patient's pain with lymphedema also being a likely contributing factor. It remains that patient's right shoulder pain likely multifactorial due to rotator cuff tendinitis in addition to scar tissue and lymphedema causing restriction ROM and pain. Neuropathic pain is also likely contributing to this patient's pain picture, especially at the right proximal upper extremity. Patient is resistant to gabapentin, as this has not helped in the past.   Patient has also re-engaged in OT for lymphedema therapy. Patient did see some short-term benefit with last manual lymph drainage for a few days but then has a gradual return of symptoms. Her and her  are wondering if they can have additional compression sleeves, as one may be wearing out.   Patient has only been to PT and OT once at this point, would like to further assess how she progresses through therapies. She can continue to utilize diclofenac gel, as she has found this helpful.       1. Patient education: In depth discussion and education was provided about the assessment and implications of each of the below recommendations for management. Patient indicated readiness to learn, all questions were answered and understanding of material presented was confirmed.  2. Work-up: none indicated at this time.   3. Therapy/equipment/braces:  1. Continue outpatient PT (ordered by PCP) to help with Rotator cuff strengthening and RUE range of motion in the setting of likely RTC injury and continued limited shoulder range of motion due to scar tissue/fibrosis. Can also consider elbow assessment and treatment with PT.   2. Continue lymphedema therapy, including manual lymphatic drainage to help with ongoing RUE edema.  3. New lymphedema sleeve order placed- 20-30 mm Hg compression.  4. Imagin. Right Shoulder MRI Ordered to help further evaluate possible RTC impingement versus tear  considering ongoing weakness and pain.  5. Medications:  1. Continue Volteran gel as needed to help with shoulder and elbow pain. Patient uses this at night, but not much during the day due to wearing clothing and compression sleeve. Patient can utilize Voltaren during the day, and if let dry it should not affect her clothing.    2. Lyrica, 25 mg HS was started today to help with neuropathic component of post-lumpectomy, radiation pain. Will instruct patient that she can increase to 25 mg twice daily if it is helping her symptoms.   6. Follow up: 6 weeks      Hina Day, DO  PGY4 PM&R Resident    Physician Attestation   I, Jeanne Jack MD, saw this patient and agree with the findings and plan of care as documented by the Resident Physician in the note.      Items personally reviewed/procedural attestation: vitals, labs and imaging and agree with the interpretation documented in the note.    Jeanne Jack MD  Physical Medicine & Rehabilitation                      Again, thank you for allowing me to participate in the care of your patient.        Sincerely,        Jeanne Jack MD

## 2021-05-20 ENCOUNTER — HOSPITAL ENCOUNTER (OUTPATIENT)
Dept: OCCUPATIONAL THERAPY | Facility: CLINIC | Age: 56
Setting detail: THERAPIES SERIES
End: 2021-05-20
Attending: STUDENT IN AN ORGANIZED HEALTH CARE EDUCATION/TRAINING PROGRAM
Payer: COMMERCIAL

## 2021-05-20 PROCEDURE — 97140 MANUAL THERAPY 1/> REGIONS: CPT | Mod: GO | Performed by: OCCUPATIONAL THERAPIST

## 2021-05-20 NOTE — PATIENT INSTRUCTIONS
1. Continue outpatient physical therapy for strengthening and range of motion of your right shoulder.  2. Continue outpatient lymphedema therapy for manual drainage techniques.  3. New Compression sleeve order placed. You can call the [facility] [place], Address is: [id number], [place] [address]. Phone Number: [phone] to let them know an order has been placed for a new compression sleeve and if you can pick it up at that location (as it is close to home). They will be able to direct you to another location if they cannot provide one.  4. Right shoulder MRI has been ordered to further evaluate your pain and weakness.  5. Dr. Jack has prescribed Lyrica 25 mg to be taken at bedtime once daily for one week to see if this helps with your nerve pain symptoms. You can increase this to 25 mg twice daily (by adding a morning dose) from the second week onwards if it is helping your symptoms and not causing too much drowsiness.  6. Return to clinic with Dr. Jack in 6-8 weeks.

## 2021-05-24 ENCOUNTER — THERAPY VISIT (OUTPATIENT)
Dept: PHYSICAL THERAPY | Facility: CLINIC | Age: 56
End: 2021-05-24
Payer: COMMERCIAL

## 2021-05-24 DIAGNOSIS — M25.511 ACUTE PAIN OF RIGHT SHOULDER: ICD-10-CM

## 2021-05-24 PROCEDURE — 97112 NEUROMUSCULAR REEDUCATION: CPT | Performed by: PHYSICAL THERAPIST

## 2021-05-24 PROCEDURE — 97110 THERAPEUTIC EXERCISES: CPT | Performed by: PHYSICAL THERAPIST

## 2021-05-24 PROCEDURE — 97140 MANUAL THERAPY 1/> REGIONS: CPT | Performed by: PHYSICAL THERAPIST

## 2021-05-27 ENCOUNTER — HOSPITAL ENCOUNTER (OUTPATIENT)
Dept: OCCUPATIONAL THERAPY | Facility: CLINIC | Age: 56
Setting detail: THERAPIES SERIES
End: 2021-05-27
Attending: STUDENT IN AN ORGANIZED HEALTH CARE EDUCATION/TRAINING PROGRAM
Payer: COMMERCIAL

## 2021-05-27 PROCEDURE — 97140 MANUAL THERAPY 1/> REGIONS: CPT | Mod: GO | Performed by: OCCUPATIONAL THERAPIST

## 2021-06-02 ENCOUNTER — ANCILLARY PROCEDURE (OUTPATIENT)
Dept: MAMMOGRAPHY | Facility: CLINIC | Age: 56
End: 2021-06-02
Attending: INTERNAL MEDICINE
Payer: COMMERCIAL

## 2021-06-02 ENCOUNTER — VIRTUAL VISIT (OUTPATIENT)
Dept: ONCOLOGY | Facility: CLINIC | Age: 56
End: 2021-06-02
Attending: INTERNAL MEDICINE
Payer: COMMERCIAL

## 2021-06-02 DIAGNOSIS — Z17.0 MALIGNANT NEOPLASM OF LOWER-OUTER QUADRANT OF RIGHT BREAST OF FEMALE, ESTROGEN RECEPTOR POSITIVE (H): ICD-10-CM

## 2021-06-02 DIAGNOSIS — M85.80 OSTEOPENIA, UNSPECIFIED LOCATION: ICD-10-CM

## 2021-06-02 DIAGNOSIS — I89.0 LYMPHEDEMA: ICD-10-CM

## 2021-06-02 DIAGNOSIS — C50.511 MALIGNANT NEOPLASM OF LOWER-OUTER QUADRANT OF RIGHT BREAST OF FEMALE, ESTROGEN RECEPTOR POSITIVE (H): Primary | ICD-10-CM

## 2021-06-02 DIAGNOSIS — C50.511 MALIGNANT NEOPLASM OF LOWER-OUTER QUADRANT OF RIGHT BREAST OF FEMALE, ESTROGEN RECEPTOR POSITIVE (H): ICD-10-CM

## 2021-06-02 DIAGNOSIS — Z17.0 MALIGNANT NEOPLASM OF LOWER-OUTER QUADRANT OF RIGHT BREAST OF FEMALE, ESTROGEN RECEPTOR POSITIVE (H): Primary | ICD-10-CM

## 2021-06-02 PROCEDURE — 77066 DX MAMMO INCL CAD BI: CPT

## 2021-06-02 PROCEDURE — 999N001193 HC VIDEO/TELEPHONE VISIT; NO CHARGE

## 2021-06-02 PROCEDURE — 99215 OFFICE O/P EST HI 40 MIN: CPT | Mod: GT | Performed by: PHYSICIAN ASSISTANT

## 2021-06-02 PROCEDURE — G0279 TOMOSYNTHESIS, MAMMO: HCPCS

## 2021-06-02 RX ORDER — TAMOXIFEN CITRATE 20 MG/1
20 TABLET ORAL DAILY
Qty: 90 TABLET | Refills: 0 | Status: SHIPPED | OUTPATIENT
Start: 2021-06-02 | End: 2021-09-13

## 2021-06-02 RX ORDER — ZOLEDRONIC ACID 0.04 MG/ML
4 INJECTION, SOLUTION INTRAVENOUS ONCE
Status: CANCELLED
Start: 2021-08-02 | End: 2021-08-02

## 2021-06-02 RX ORDER — HEPARIN SODIUM,PORCINE 10 UNIT/ML
5 VIAL (ML) INTRAVENOUS
Status: CANCELLED | OUTPATIENT
Start: 2021-08-02

## 2021-06-02 RX ORDER — HEPARIN SODIUM (PORCINE) LOCK FLUSH IV SOLN 100 UNIT/ML 100 UNIT/ML
5 SOLUTION INTRAVENOUS
Status: CANCELLED | OUTPATIENT
Start: 2021-08-02

## 2021-06-02 NOTE — PROGRESS NOTES
Crispin is a 56 year old who is being evaluated via a billable video visit.      How would you like to obtain your AVS? MyChart  If the video visit is dropped, the invitation should be resent by: Text to cell phone: [phone]  Will anyone else be joining your video visit? No     Vitals - Patient Reported  Weight (Patient Reported): 49 kg (108 lb)  Height (Patient Reported): 152.4 cm (5')  BMI (Based on Pt Reported Ht/Wt): 21.09  Pain Score: Moderate Pain (5)(RIGHT SHOULDER / RIGHT BREAST)  Pain Loc: Other - see comment    Lucinda Ireland, [facility] June 2, 2021  4:26 PM         Video Start Time: 4:33 PM  Video-Visit Details    Type of service:  Video Visit    Video End Time:5:15 pm    Originating Location (pt. Location): Home    Distant Location (provider location):  [facility] CANCER [facility]     Platform used for Video Visit: [facility]        Oncology Visit:  Jun 2, 2021      Diagnosis: Stage Ia, [id number], grade 1, ER positive, KY positive, HER-2 negative invasive carcinoma of the right breast. Oncotype dx recurrence score = 16.    Primary Physician: Juwan Perry     History Of Present Illness:  Ms. June is a 56 year old female with right breast cancer.  Routine screening mammogram on 8/6/2020 showed heterogeneously dense breasts but no concerning findings.  A physician then palpated a mass in the right breast.  Diagnostic mammogram and ultrasound showed a 2.2 cm mass at 8:00, 5 cm from the nipple.  Right breast biopsy showed a grade 1 invasive mammary carcinoma, ER strong in 100%, KY strong in 100%, HER2 negative.  She had a right breast lumpectomy and sentinel lymph node procedure with Dr. Watson on 9/29/2020.  Pathology showed a grade 1 invasive mammary carcinoma measuring 1.6 cm.  There was associated intermediate grade DCIS.  Surgical margins were negative.  A single lymph node was benign.  Oncotype dx recurrence score was 16.  She completed radiation to the right breast, a total of 5256 cGy in 20  fractions, on 12/21/2020.  On treatment with letrozole since 1/2021. She did not tolerate with joint aches and fatigue and was transitioned to tamoxifen 20 mg. This caused nausea.     Interval History: Crispin is feeling well today. She has been taking tamoxifen 10 mg for a month and is tolerating well. She has mild fatigue and joint aches but this is manageable. No nausea at all. She is planning to start real 10 mg alternating with 20 mg as Amanda instructed at last visit in 2 weeks but is not sure how long she should do this.     She continues to have a lot of pain in R shoulder, axilla, arm, and lateral breast. She has been working with lymphedema and PT. She has a compression sleeve but does not have compression garment for bra or axilla. She is having skin erythema and thickening and it feels tight. Skin is visibly more erythematous on R axilla than left and wraps around back. No fevers/chills. It sometimes feels warm to touch and is tender.     Dr. Jack ordered a shoulder MRI due to decrease ability to raise shoulder and pain. She started lyrica as well but this is making her tired and dizzy and has not helped her pain so she would like to stop. She is also interested in a massage.     She continues to have insomnia but stopped trazodone with starting lyrica. She is planning to resume 50 mg now.     She had a good dental exam 2 months ago and is comfortable proceeding with zometa in August.     Physical Exam: There were no vitals taken for this visit.  Wt Readings from Last 4 Encounters:   05/19/21 50.4 kg (111 lb 1.6 oz)   05/03/21 49.2 kg (108 lb 8 oz)   04/21/21 49.8 kg (109 lb 11.2 oz)   04/12/21 49.2 kg (108 lb 8 oz)   Video physical exam  General: Patient appears well in no acute distress.   Skin: No visualized rash or lesions on visualized skin  Eyes: EOMI, no erythema, sclera icterus or discharge noted  Resp: Appears to be breathing comfortably without accessory muscle usage, speaking in full sentences, no  cough  MSK: Appears to have normal range of motion based on visualized movements  Neurologic: No apparent tremors, facial movements symmetric  Psych: affect bright, alert and oriented    The rest of a comprehensive physical examination is deferred due to PHE (public health emergency) video restrictions    Picture shows pink erythema along posterior axilla    Laboratory/Imaging Studies  No interim imaging or laboratory studies pertinent to today's visit.    ASSESSMENT/PLAN:  Ms. June is a 57 yo female with a stage Ia, [id number], grade 1, ER positive, VA positive, HER2 negative invasive ductal carcinoma of the right breast.  She is s/p treatment with right breast lumpectomy and radiation.     1.  Right breast cancer:  She is 6 months out from excision of a right breast cancer. She did not tolerate letrozole or tamoxifen 20 mg. Now on tamoxifen 10 mg and tolerating well with mild side effects. Will proceed with Amanda Shirley's plan of alternating tamoxifen 10 mg with 20 mg daily for 2 weeks and then try 20 mg alone again.     Mammogram today was benign. I do want her to have a breast US to evaluate axilla and lateral breast and ensure she does not have abscess causing cellulitis with the degree of erythema. The skin changes could be from lymphedema + radiation changes but it is important to rule this out.     Of note, I was not able to review the survivorship care plan or surveillance recommendations due to length of visit. This can be done at next [facility] follow-up in December.     2.  Bone health:  DEXA in 08/2020 with a lowest T-score of -2.1 which is c/w osteopenia and encroaching on osteoporosis. Prior discussions on treatment with Zometa, both for prevention of bone loss and prevention of breast cancer bone metastases. Will plan to proceed in early August. In the meantime she will continue daily calcium and vitamin D supplement and daily walking.    3.  Lymphedema + right shoulder pain: She may have RTC  injury/tendonitis in addition to axillary cording/lymphedema. With her description, I would recommend more compression and lymphedema treatment. Will refer to [facility] lymphedema team for a second opinion. Also recommend proceeding with the shoulder MRI as planned. Massage should be okay. I will write a note okaying this.     4. Insomnia  Resume trazodone 50 mg daily. Warned her this may need to be titrated up in the future.     60 minutes spent on the date of the encounter doing chart review, review of test results, interpretation of tests, patient visit and documentation     Briana Burgos PA-C

## 2021-06-02 NOTE — LETTER
June 2, 2021      RE: Maribel June  [address]  [facility] [address]         To whom it may concern,    Massage therapy is recommended to help with musculoskeletal pain secondary to after effects of breast cancer treatment.       Sincerely,      Briana Burgos PA-C

## 2021-06-02 NOTE — LETTER
6/2/2021         RE: Maribel June  [address]  [facility] [address]        Dear Colleague,    Thank you for referring your patient, Maribel June, to the [facility]. Please see a copy of my visit note below.    Crispin is a 56 year old who is being evaluated via a billable video visit.      How would you like to obtain your AVS? MyChart  If the video visit is dropped, the invitation should be resent by: Text to cell phone: [phone]  Will anyone else be joining your video visit? No     Vitals - Patient Reported  Weight (Patient Reported): 49 kg (108 lb)  Height (Patient Reported): 152.4 cm (5')  BMI (Based on Pt Reported Ht/Wt): 21.09  Pain Score: Moderate Pain (5)(RIGHT SHOULDER / RIGHT BREAST)  Pain Loc: Other - see comment    Lucinda Ireland CMA June 2, 2021  4:26 PM         Video Start Time: 4:33 PM  Video-Visit Details    Type of service:  Video Visit    Video End Time:5:15 pm    Originating Location (pt. Location): Home    Distant Location (provider location):  [facility]     Platform used for Video Visit: AppCast        Oncology Visit:  Jun 2, 2021      Diagnosis: Stage Ia, [id number], grade 1, ER positive, ND positive, HER-2 negative invasive carcinoma of the right breast. Oncotype dx recurrence score = 16.    Primary Physician: Juwan Perry     History Of Present Illness:  Ms. June is a 56 year old female with right breast cancer.  Routine screening mammogram on 8/6/2020 showed heterogeneously dense breasts but no concerning findings.  A physician then palpated a mass in the right breast.  Diagnostic mammogram and ultrasound showed a 2.2 cm mass at 8:00, 5 cm from the nipple.  Right breast biopsy showed a grade 1 invasive mammary carcinoma, ER strong in 100%, ND strong in 100%, HER2 negative.  She had a right breast lumpectomy and sentinel lymph node procedure with Dr. Watson on 9/29/2020.  Pathology showed a grade 1  invasive mammary carcinoma measuring 1.6 cm.  There was associated intermediate grade DCIS.  Surgical margins were negative.  A single lymph node was benign.  Oncotype dx recurrence score was 16.  She completed radiation to the right breast, a total of 5256 cGy in 20 fractions, on 12/21/2020.  On treatment with letrozole since 1/2021. She did not tolerate with joint aches and fatigue and was transitioned to tamoxifen 20 mg. This caused nausea.     Interval History: Crispin is feeling well today. She has been taking tamoxifen 10 mg for a month and is tolerating well. She has mild fatigue and joint aches but this is manageable. No nausea at all. She is planning to start real 10 mg alternating with 20 mg as Amanda instructed at last visit in 2 weeks but is not sure how long she should do this.     She continues to have a lot of pain in R shoulder, axilla, arm, and lateral breast. She has been working with lymphedema and PT. She has a compression sleeve but does not have compression garment for bra or axilla. She is having skin erythema and thickening and it feels tight. Skin is visibly more erythematous on R axilla than left and wraps around back. No fevers/chills. It sometimes feels warm to touch and is tender.     Dr. Jack ordered a shoulder MRI due to decrease ability to raise shoulder and pain. She started lyrica as well but this is making her tired and dizzy and has not helped her pain so she would like to stop. She is also interested in a massage.     She continues to have insomnia but stopped trazodone with starting lyrica. She is planning to resume 50 mg now.     She had a good dental exam 2 months ago and is comfortable proceeding with zometa in August.     Physical Exam: There were no vitals taken for this visit.  Wt Readings from Last 4 Encounters:   05/19/21 50.4 kg (111 lb 1.6 oz)   05/03/21 49.2 kg (108 lb 8 oz)   04/21/21 49.8 kg (109 lb 11.2 oz)   04/12/21 49.2 kg (108 lb 8 oz)   Video physical exam  General:  Patient appears well in no acute distress.   Skin: No visualized rash or lesions on visualized skin  Eyes: EOMI, no erythema, sclera icterus or discharge noted  Resp: Appears to be breathing comfortably without accessory muscle usage, speaking in full sentences, no cough  MSK: Appears to have normal range of motion based on visualized movements  Neurologic: No apparent tremors, facial movements symmetric  Psych: affect bright, alert and oriented    The rest of a comprehensive physical examination is deferred due to PHE (public health emergency) video restrictions    Picture shows pink erythema along posterior axilla    Laboratory/Imaging Studies  No interim imaging or laboratory studies pertinent to today's visit.    ASSESSMENT/PLAN:  Ms. June is a 55 yo female with a stage Ia, [id number], grade 1, ER positive, WA positive, HER2 negative invasive ductal carcinoma of the right breast.  She is s/p treatment with right breast lumpectomy and radiation.     1.  Right breast cancer:  She is 6 months out from excision of a right breast cancer. She did not tolerate letrozole or tamoxifen 20 mg. Now on tamoxifen 10 mg and tolerating well with mild side effects. Will proceed with Amanda Shirley's plan of alternating tamoxifen 10 mg with 20 mg daily for 2 weeks and then try 20 mg alone again.     Mammogram today was benign. I do want her to have a breast US to evaluate axilla and lateral breast and ensure she does not have abscess causing cellulitis with the degree of erythema. The skin changes could be from lymphedema + radiation changes but it is important to rule this out.     Of note, I was not able to review the survivorship care plan or surveillance recommendations due to length of visit. This can be done at next [facility] follow-up in December.     2.  Bone health:  DEXA in 08/2020 with a lowest T-score of -2.1 which is c/w osteopenia and encroaching on osteoporosis. Prior discussions on treatment with Zometa, both for  prevention of bone loss and prevention of breast cancer bone metastases. Will plan to proceed in early August. In the meantime she will continue daily calcium and vitamin D supplement and daily walking.    3.  Lymphedema + right shoulder pain: She may have RTC injury/tendonitis in addition to axillary cording/lymphedema. With her description, I would recommend more compression and lymphedema treatment. Will refer to [facility] lymphedema team for a second opinion. Also recommend proceeding with the shoulder MRI as planned. Massage should be okay. I will write a note okaying this.     4. Insomnia  Resume trazodone 50 mg daily. Warned her this may need to be titrated up in the future.     60 minutes spent on the date of the encounter doing chart review, review of test results, interpretation of tests, patient visit and documentation     Briana Burgos PA-C             Again, thank you for allowing me to participate in the care of your patient.        Sincerely,        Briana Burgos PA-C

## 2021-06-03 ENCOUNTER — HOSPITAL ENCOUNTER (OUTPATIENT)
Dept: OCCUPATIONAL THERAPY | Facility: CLINIC | Age: 56
Setting detail: THERAPIES SERIES
End: 2021-06-03
Attending: STUDENT IN AN ORGANIZED HEALTH CARE EDUCATION/TRAINING PROGRAM
Payer: COMMERCIAL

## 2021-06-03 PROCEDURE — 97140 MANUAL THERAPY 1/> REGIONS: CPT | Mod: GO | Performed by: OCCUPATIONAL THERAPIST

## 2021-06-04 ENCOUNTER — THERAPY VISIT (OUTPATIENT)
Dept: PHYSICAL THERAPY | Facility: CLINIC | Age: 56
End: 2021-06-04
Payer: COMMERCIAL

## 2021-06-04 ENCOUNTER — MYC MEDICAL ADVICE (OUTPATIENT)
Dept: ONCOLOGY | Facility: CLINIC | Age: 56
End: 2021-06-04

## 2021-06-04 DIAGNOSIS — M25.511 ACUTE PAIN OF RIGHT SHOULDER: ICD-10-CM

## 2021-06-04 PROCEDURE — 97110 THERAPEUTIC EXERCISES: CPT | Mod: GP | Performed by: PHYSICAL THERAPIST

## 2021-06-04 PROCEDURE — 97112 NEUROMUSCULAR REEDUCATION: CPT | Mod: GP | Performed by: PHYSICAL THERAPIST

## 2021-06-04 PROCEDURE — 97140 MANUAL THERAPY 1/> REGIONS: CPT | Mod: GP | Performed by: PHYSICAL THERAPIST

## 2021-06-07 ENCOUNTER — ANCILLARY PROCEDURE (OUTPATIENT)
Dept: MAMMOGRAPHY | Facility: CLINIC | Age: 56
End: 2021-06-07
Attending: PHYSICIAN ASSISTANT
Payer: COMMERCIAL

## 2021-06-07 ENCOUNTER — TELEPHONE (OUTPATIENT)
Dept: ONCOLOGY | Facility: CLINIC | Age: 56
End: 2021-06-07

## 2021-06-07 DIAGNOSIS — Z17.0 MALIGNANT NEOPLASM OF LOWER-OUTER QUADRANT OF RIGHT BREAST OF FEMALE, ESTROGEN RECEPTOR POSITIVE (H): ICD-10-CM

## 2021-06-07 DIAGNOSIS — I89.0 LYMPHEDEMA: ICD-10-CM

## 2021-06-07 DIAGNOSIS — C50.511 MALIGNANT NEOPLASM OF LOWER-OUTER QUADRANT OF RIGHT BREAST OF FEMALE, ESTROGEN RECEPTOR POSITIVE (H): ICD-10-CM

## 2021-06-07 PROCEDURE — 76642 ULTRASOUND BREAST LIMITED: CPT | Mod: RT | Performed by: RADIOLOGY

## 2021-06-07 NOTE — TELEPHONE ENCOUNTER
[facility]    Called patient in follow up to my chart message about insomnia.  She felt like she could increase the dosage of Trazodone and take 2.  Took the pills at 10 pm and went to be at 11 pm.      She still cannot fall asleep.  After the two Trazodone she was still unable to sleep.  Woke up felt very tired and sleepy.      She is at work, feels a little tired only but no dizziness.    Page to ALEXIA Burgos @ 1:08 pm - called back and she should try to continue with the 50 mg dose for at least one week, then can go to 100 mg if needed.      Called patient back to confirm - she has taken the 50 mg for one week at this time.  She will take the 100 mg starting now.   Instructed to contact our office prior to increasing any dosages.  Voiced understanding.    Gilda Banda RN   BSN, [facility], [facility]  [facility]

## 2021-06-09 ENCOUNTER — VIRTUAL VISIT (OUTPATIENT)
Dept: ONCOLOGY | Facility: CLINIC | Age: 56
End: 2021-06-09
Attending: PSYCHOLOGIST
Payer: COMMERCIAL

## 2021-06-09 DIAGNOSIS — F43.21 ADJUSTMENT DISORDER WITH DEPRESSED MOOD: Primary | ICD-10-CM

## 2021-06-09 PROCEDURE — 90832 PSYTX W PT 30 MINUTES: CPT | Mod: TEL | Performed by: PSYCHOLOGIST

## 2021-06-09 NOTE — LETTER
"    6/9/2021         RE: Maribel June  [address]  [facility] [address]        Dear Colleague,    Thank you for referring your patient, Maribel June, to the [facility]. Please see a copy of my visit note below.    Maribel June is a 56 year old female who is being evaluated via a billable telephone visit. Phone call duration: 30 minutes      The patient has been notified of following:      \"This telephone visit will be conducted via a call between you and your physician/provider. We have found that certain health care needs can be provided without the need for a physical exam.  This service lets us provide the care you need with a short phone conversation.  If a prescription is necessary we can send it directly to your pharmacy.  If lab work is needed we can place an order for that and you can then stop by our lab to have the test done at a later time.     Telephone visits are billed at different rates depending on your insurance coverage. During this emergency period, for some insurers they may be billed the same as an in-person visit.  Please reach out to your insurance provider with any questions.     If during the course of the call the physician/provider feels a telephone visit is not appropriate, you will not be charged for this service.\"     Patient has given verbal consent for Telephone visit? Yes    Confidential Summary of Oncology Psychology Followup Visit    Maribel June is a 56 year old female who presents for Depression  The patient was seen for a 30 minute appointment on 6/11/2021.    Subjective: She reported experiencing frustration related to the length of time it is taking for her body to recover from her treatment. The lymphedema in her arm and its impact on her use of her arm is frustrating too. We discussed methods of self-soothing that can assist in reducing her frustration.      Objective: Affect was appropriate to the content of the " therapeutic conversation.     Diagnosis:   Encounter Diagnosis   Name Primary?     Adjustment disorder with depressed mood Yes     Recommendations/Plan  1. Increase her daily use of enjoyable activities  2. Return for follow-up in three weeks.     Thank you for this opportunity to participate in your care of this patient.    Reema Qiu.SABRINA., L.P.  Director, Oncology Supportive Care       Again, thank you for allowing me to participate in the care of your patient.        Sincerely,        Abilio Tracy PsyD

## 2021-06-09 NOTE — LETTER
"    6/9/2021         RE: Maribel June  [address]  [facility] [address]        Dear Colleague,    Thank you for referring your patient, Maribel June, to the [facility]. Please see a copy of my visit note below.    Maribel June is a 56 year old female who is being evaluated via a billable telephone visit. Phone call duration: 30 minutes      The patient has been notified of following:      \"This telephone visit will be conducted via a call between you and your physician/provider. We have found that certain health care needs can be provided without the need for a physical exam.  This service lets us provide the care you need with a short phone conversation.  If a prescription is necessary we can send it directly to your pharmacy.  If lab work is needed we can place an order for that and you can then stop by our lab to have the test done at a later time.     Telephone visits are billed at different rates depending on your insurance coverage. During this emergency period, for some insurers they may be billed the same as an in-person visit.  Please reach out to your insurance provider with any questions.     If during the course of the call the physician/provider feels a telephone visit is not appropriate, you will not be charged for this service.\"     Patient has given verbal consent for Telephone visit? Yes    Confidential Summary of Oncology Psychology Followup Visit    Maribel June is a 56 year old female who presents for Depression  The patient was seen for a 30 minute appointment on 6/11/2021.    Subjective: She reported experiencing frustration related to the length of time it is taking for her body to recover from her treatment. The lymphedema in her arm and its impact on her use of her arm is frustrating too. We discussed methods of self-soothing that can assist in reducing her frustration.      Objective: Affect was appropriate to the content of the " therapeutic conversation.     Diagnosis:   Encounter Diagnosis   Name Primary?     Adjustment disorder with depressed mood Yes     Recommendations/Plan  1. Increase her daily use of enjoyable activities  2. Return for follow-up in three weeks.     Thank you for this opportunity to participate in your care of this patient.    Reema Qiu.SABRINA., L.P.  Director, Oncology Supportive Care       Again, thank you for allowing me to participate in the care of your patient.        Sincerely,        Abilio Tracy PsyD

## 2021-06-10 ENCOUNTER — ANCILLARY PROCEDURE (OUTPATIENT)
Dept: MRI IMAGING | Facility: CLINIC | Age: 56
End: 2021-06-10
Attending: STUDENT IN AN ORGANIZED HEALTH CARE EDUCATION/TRAINING PROGRAM
Payer: COMMERCIAL

## 2021-06-10 ENCOUNTER — HOSPITAL ENCOUNTER (OUTPATIENT)
Dept: PHYSICAL THERAPY | Facility: CLINIC | Age: 56
Setting detail: THERAPIES SERIES
End: 2021-06-10
Payer: COMMERCIAL

## 2021-06-10 DIAGNOSIS — C50.511 MALIGNANT NEOPLASM OF LOWER-OUTER QUADRANT OF RIGHT BREAST OF FEMALE, ESTROGEN RECEPTOR POSITIVE (H): ICD-10-CM

## 2021-06-10 DIAGNOSIS — M25.511 RIGHT SHOULDER PAIN, UNSPECIFIED CHRONICITY: ICD-10-CM

## 2021-06-10 DIAGNOSIS — Z17.0 MALIGNANT NEOPLASM OF LOWER-OUTER QUADRANT OF RIGHT BREAST OF FEMALE, ESTROGEN RECEPTOR POSITIVE (H): ICD-10-CM

## 2021-06-10 DIAGNOSIS — I89.0 LYMPHEDEMA: ICD-10-CM

## 2021-06-10 PROCEDURE — 97535 SELF CARE MNGMENT TRAINING: CPT | Mod: GP | Performed by: PHYSICAL THERAPIST

## 2021-06-10 PROCEDURE — 97162 PT EVAL MOD COMPLEX 30 MIN: CPT | Mod: GP | Performed by: PHYSICAL THERAPIST

## 2021-06-10 PROCEDURE — 97140 MANUAL THERAPY 1/> REGIONS: CPT | Mod: GP | Performed by: PHYSICAL THERAPIST

## 2021-06-10 PROCEDURE — 73221 MRI JOINT UPR EXTREM W/O DYE: CPT | Mod: RT | Performed by: RADIOLOGY
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
## 2021-06-11 NOTE — PROGRESS NOTES
"Maribel June is a 56 year old female who is being evaluated via a billable telephone visit. Phone call duration: 30 minutes      The patient has been notified of following:      \"This telephone visit will be conducted via a call between you and your physician/provider. We have found that certain health care needs can be provided without the need for a physical exam.  This service lets us provide the care you need with a short phone conversation.  If a prescription is necessary we can send it directly to your pharmacy.  If lab work is needed we can place an order for that and you can then stop by our lab to have the test done at a later time.     Telephone visits are billed at different rates depending on your insurance coverage. During this emergency period, for some insurers they may be billed the same as an in-person visit.  Please reach out to your insurance provider with any questions.     If during the course of the call the physician/provider feels a telephone visit is not appropriate, you will not be charged for this service.\"     Patient has given verbal consent for Telephone visit? Yes    Confidential Summary of Oncology Psychology Followup Visit    Maribel June is a 56 year old female who presents for Depression  The patient was seen for a 30 minute appointment on 6/11/2021.    Subjective: She reported experiencing frustration related to the length of time it is taking for her body to recover from her treatment. The lymphedema in her arm and its impact on her use of her arm is frustrating too. We discussed methods of self-soothing that can assist in reducing her frustration.      Objective: Affect was appropriate to the content of the therapeutic conversation.     Diagnosis:   Encounter Diagnosis   Name Primary?     Adjustment disorder with depressed mood Yes     Recommendations/Plan  1. Increase her daily use of enjoyable activities  2. Return for follow-up in three weeks.     Thank you for " this opportunity to participate in your care of this patient.    Abilio Tracy Psy.D., L.P.  Director, Oncology Supportive Care

## 2021-06-14 ENCOUNTER — THERAPY VISIT (OUTPATIENT)
Dept: PHYSICAL THERAPY | Facility: CLINIC | Age: 56
End: 2021-06-14
Payer: COMMERCIAL

## 2021-06-14 DIAGNOSIS — M25.511 ACUTE PAIN OF RIGHT SHOULDER: ICD-10-CM

## 2021-06-14 PROCEDURE — 97112 NEUROMUSCULAR REEDUCATION: CPT | Mod: GP | Performed by: PHYSICAL THERAPIST

## 2021-06-14 PROCEDURE — 97140 MANUAL THERAPY 1/> REGIONS: CPT | Mod: GP | Performed by: PHYSICAL THERAPIST

## 2021-06-14 PROCEDURE — 97110 THERAPEUTIC EXERCISES: CPT | Mod: GP | Performed by: PHYSICAL THERAPIST

## 2021-06-14 NOTE — PROGRESS NOTES
Outpatient Occupational Therapy Discharge Note     Patient: Maribel June  : 1965    Beginning/End Dates of Reporting Period:  2021 to 2021    Referring Provider: Jeanne Jack MD    Therapy Diagnosis: RUE Lymphedema    Client Self Report: Pt reported less discomfort and burning of right elbow, right, lateral trunk and right, lateral breast after last tx session that last 2-3 days. Pt reported fitting appointment for 20-30mmHg compression sleeve that will arrive next week.  Pt has MRI and US of right, lateral trunk and breast 6/10/2021.      Objective Measurements:     Objective Measure: skin assessment   Details: 6/3/2021:  no edema of right digits or hand, trace edema of right forearm, mild edema around elbow joint on medial border, trace edema of right upper arm.  Minimal, soft edema of right, lateral trunk-inferior to axilla, minimal edema of right, lateral breast that is more firm with palpation.     Objective Measure: volume   Details: NT   Objective Measure: heaviness/discomfort of RUE?   Details: 6/3/2021: Pt reported discomfort of RUE at elbow crease and discomfort of right, lateral trunk and right, lateral breast.  This burning sensation is especially present when medial, upper arm rubs against lateral, right trunk.                 Goals:   Goal 1   Goal identifier 1 home program   Goal description Pt will report compliance with home program 7/7 days including wear of CCLl compression sleeve during wakeful hours, self MLD, scar massage and HEP (stretches and PT exercises) to reduce edema, reduce discomfort in RUE and improve use of RUE with I/ADLs.    Target date 06/10/21   Goal 2   Goal identifier 2 volume   Goal description Pt will demonstrate at least a 57mL reduction in RUE volume to have measurement closer to the volume of RUE at least measurement 2021 and to reduce discomfort in RUE.   Target date 06/10/21   Goal 3   Goal identifier 3 pain   Goal description Pt will report  no pain or RUE at 2 consecutive treatment sessions.    Target date 06/10/21   Goal 4   Goal identifier 4 IADLs   Goal description Due to a reduction in RUE volume and adherence to home program, pt will report greater ease with I/ADLs such as donning a bra, writing on white board at work, doing laundry.   Target date 06/10/21         Progress Toward Goals:   Pt seen for 6 visits.  Slow progress due to pain in right shoulder, congestion in right, lateral breast, pain and minimal edema of right, medial elbow despite manual work and home program.  Pt to see lymphedema therapist in [facility].        Plan:  Discharge from therapy.    Discharge: Yes    Reason for Discharge: Pt to see lymphedema therapist at the [facility].     Equipment Issued: 0    Discharge Plan: To work with lymphedema therapist at [facility].

## 2021-06-18 ENCOUNTER — HOSPITAL ENCOUNTER (OUTPATIENT)
Dept: PHYSICAL THERAPY | Facility: CLINIC | Age: 56
Setting detail: THERAPIES SERIES
End: 2021-06-18
Payer: COMMERCIAL

## 2021-06-18 PROCEDURE — 97110 THERAPEUTIC EXERCISES: CPT | Mod: GP | Performed by: PHYSICAL THERAPIST

## 2021-06-18 PROCEDURE — 97140 MANUAL THERAPY 1/> REGIONS: CPT | Mod: GP | Performed by: PHYSICAL THERAPIST

## 2021-06-21 ENCOUNTER — THERAPY VISIT (OUTPATIENT)
Dept: PHYSICAL THERAPY | Facility: CLINIC | Age: 56
End: 2021-06-21
Payer: COMMERCIAL

## 2021-06-21 DIAGNOSIS — M25.511 ACUTE PAIN OF RIGHT SHOULDER: ICD-10-CM

## 2021-06-21 PROCEDURE — 97140 MANUAL THERAPY 1/> REGIONS: CPT | Mod: GP | Performed by: PHYSICAL THERAPIST

## 2021-06-21 PROCEDURE — 97110 THERAPEUTIC EXERCISES: CPT | Mod: GP | Performed by: PHYSICAL THERAPIST

## 2021-06-21 PROCEDURE — 97112 NEUROMUSCULAR REEDUCATION: CPT | Mod: GP | Performed by: PHYSICAL THERAPIST

## 2021-06-24 PROBLEM — C50.511 MALIGNANT NEOPLASM OF LOWER-OUTER QUADRANT OF RIGHT BREAST OF FEMALE, ESTROGEN RECEPTOR POSITIVE (H): Status: ACTIVE | Noted: 2020-09-03

## 2021-06-24 PROBLEM — Z17.0 MALIGNANT NEOPLASM OF LOWER-OUTER QUADRANT OF RIGHT BREAST OF FEMALE, ESTROGEN RECEPTOR POSITIVE (H): Status: ACTIVE | Noted: 2020-09-03

## 2021-06-25 ENCOUNTER — HOSPITAL ENCOUNTER (OUTPATIENT)
Dept: PHYSICAL THERAPY | Facility: CLINIC | Age: 56
Setting detail: THERAPIES SERIES
End: 2021-06-25
Payer: COMMERCIAL

## 2021-06-25 PROCEDURE — 97140 MANUAL THERAPY 1/> REGIONS: CPT | Mod: GP | Performed by: PHYSICAL THERAPIST

## 2021-06-28 ENCOUNTER — THERAPY VISIT (OUTPATIENT)
Dept: PHYSICAL THERAPY | Facility: CLINIC | Age: 56
End: 2021-06-28
Payer: COMMERCIAL

## 2021-06-28 DIAGNOSIS — M25.511 ACUTE PAIN OF RIGHT SHOULDER: ICD-10-CM

## 2021-06-28 PROCEDURE — 97140 MANUAL THERAPY 1/> REGIONS: CPT | Mod: GP | Performed by: PHYSICAL THERAPIST

## 2021-06-28 PROCEDURE — 97110 THERAPEUTIC EXERCISES: CPT | Mod: GP | Performed by: PHYSICAL THERAPIST

## 2021-06-28 PROCEDURE — 97112 NEUROMUSCULAR REEDUCATION: CPT | Mod: GP | Performed by: PHYSICAL THERAPIST

## 2021-06-28 NOTE — PROGRESS NOTES
Subjective:  HPI  Physical Exam                    Objective:  System    Physical Exam    General     ROS    Assessment/Plan:    PROGRESS  REPORT    Progress reporting period is from 4/26/2021 to 6/28/2021. Progress report on 6/28/2021 equal to discharge summary.     SUBJECTIVE  Subjective: Crispin reports overall functioning at 70%. Having most difficulty reaching behind back and reaching overhead/out to the side due to pain and stiffness. Has been consistent with HEP focusing on improving R shoulder ROM and scapular stabilization.    Current Pain level: 5/10   Initial Pain level: 9/10   Changes in function: No changes noted in function since last SOAP note   Adverse reactions: None;   ,         OBJECTIVE  Objective: R shoulder AROM: flexion 125 deg, abduction 120 deg, ER 60 deg, EADIR L 5; L shoulder strength: flexion 5-/5 +pain, abduction 5-/5 +pain, IR 5/5, ER 4+/5 +pain      ASSESSMENT/PLAN  Updated problem list and treatment plan: Diagnosis 1:  R shoulder pain  Pain -  hot/cold therapy, manual therapy, self management, education and home program  Decreased ROM/flexibility - manual therapy, therapeutic exercise, therapeutic activity and home program  Decreased strength - therapeutic exercise, therapeutic activities and home program  Impaired muscle performance - neuro re-education and home program  Decreased function - therapeutic activities and home program  Impaired posture - neuro re-education, therapeutic activities and home program  STG/LTGs have been met or progress has been made towards goals:  None  Assessment of Progress: The patient's progress has plateaued.  Self Management Plans:  Patient has been instructed in a home treatment program.  Patient is independent in a home treatment program.  Patient  has been instructed in self management of symptoms.  I have re-evaluated this patient and find that the nature, scope, duration and intensity of the therapy is appropriate for the medical condition of the  patient.  Maribel continues to require the following intervention to meet STG and LTG's: PT  The patient is returning to your office for a recheck appointment.    Recommendations:  This patient would benefit from further evaluation.    Please refer to the daily flowsheet for treatment today, total treatment time and time spent performing 1:1 timed codes.

## 2021-06-28 NOTE — LETTER
[facility]  [address] [address] [place] [id number]  [phone]    2021    Re: Maribel June   :   1965  MRN:  [id number]   REFERRING PHYSICIAN:   Juwan TORRES [facility]    Date of Initial Evaluation:  2021  Visits:  Rxs Used: 9  Reason for Referral:  Acute pain of right shoulder    PROGRESS  REPORT  Progress reporting period is from 2021 to 2021.     SUBJECTIVE  Subjective: Crispin reports overall functioning at 70%. Having most difficulty reaching behind back and reaching overhead/out to the side due to pain and stiffness. Has been consistent with HEP focusing on improving R shoulder ROM and scapular stabilization.  Current Pain level: 5/10 Initial Pain level: 9/10   Changes in function: No changes noted in function since last SOAP note   Adverse reactions: None.    OBJECTIVE  Objective: R shoulder AROM: flexion 125 deg, abduction 120 deg, ER 60 deg, EADIR L 5; L shoulder strength: flexion 5-/5 +pain, abduction 5-/5 +pain, IR 5/5, ER 4+/5 +pain      ASSESSMENT/PLAN  Updated problem list and treatment plan: Diagnosis 1:  R shoulder pain  Pain -  hot/cold therapy, manual therapy, self management, education and home program  Decreased ROM/flexibility - manual therapy, therapeutic exercise, therapeutic activity and home program  Decreased strength - therapeutic exercise, therapeutic activities and home program  Impaired muscle performance - neuro re-education and home program  Decreased function - therapeutic activities and home program  Impaired posture - neuro re-education, therapeutic activities and home program  STG/LTGs have been met or progress has been made towards goals:  None  Assessment of Progress: The patient's progress has plateaued.  Self Management Plans:  Patient has been instructed in a home treatment program.  Patient is independent in a home treatment  program.  Patient  has been instructed in self management of symptoms.  I have re-evaluated this patient and find that the nature, scope, duration and intensity of the therapy is appropriate for the medical condition of the patient.  Maribel continues to require the following intervention to meet STG and LTG's: PT  The patient is returning to your office for a recheck appointment.    Recommendations:  This patient would benefit from further evaluation.    Thank you for your referral.    INQUIRIES  Therapist: Erick Rueda DPT   [address] [address]  Phone: [phone]  Fax: [phone]

## 2021-06-30 ENCOUNTER — ONCOLOGY VISIT (OUTPATIENT)
Dept: ONCOLOGY | Facility: CLINIC | Age: 56
End: 2021-06-30
Attending: STUDENT IN AN ORGANIZED HEALTH CARE EDUCATION/TRAINING PROGRAM
Payer: COMMERCIAL

## 2021-06-30 VITALS
TEMPERATURE: 98.2 F | BODY MASS INDEX: 21.46 KG/M2 | OXYGEN SATURATION: 99 % | DIASTOLIC BLOOD PRESSURE: 76 MMHG | SYSTOLIC BLOOD PRESSURE: 126 MMHG | HEART RATE: 63 BPM | WEIGHT: 109.9 LBS

## 2021-06-30 DIAGNOSIS — M25.511 RIGHT SHOULDER PAIN, UNSPECIFIED CHRONICITY: ICD-10-CM

## 2021-06-30 DIAGNOSIS — C50.511 MALIGNANT NEOPLASM OF LOWER-OUTER QUADRANT OF RIGHT BREAST OF FEMALE, ESTROGEN RECEPTOR POSITIVE (H): Primary | ICD-10-CM

## 2021-06-30 DIAGNOSIS — Z17.0 MALIGNANT NEOPLASM OF LOWER-OUTER QUADRANT OF RIGHT BREAST OF FEMALE, ESTROGEN RECEPTOR POSITIVE (H): Primary | ICD-10-CM

## 2021-06-30 DIAGNOSIS — I89.0 LYMPHEDEMA: ICD-10-CM

## 2021-06-30 PROCEDURE — G0463 HOSPITAL OUTPT CLINIC VISIT: HCPCS

## 2021-06-30 PROCEDURE — 99214 OFFICE O/P EST MOD 30 MIN: CPT | Performed by: STUDENT IN AN ORGANIZED HEALTH CARE EDUCATION/TRAINING PROGRAM

## 2021-06-30 ASSESSMENT — PAIN SCALES - GENERAL: PAINLEVEL: NO PAIN (0)

## 2021-06-30 NOTE — PROGRESS NOTES
[facility]   PM&R clinic note        Interval history:     Maribel June presents to clinic today for follow up reg her rehab needs.   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    Recommendations included:  1. Work-up: none indicated at this time.   2. Therapy/equipment/braces:  1. Continue outpatient PT (ordered by PCP) to help with Rotator cuff strengthening and RUE range of motion in the setting of likely RTC injury and continued limited shoulder range of motion due to scar tissue/fibrosis. Can also consider elbow assessment and treatment with PT.   2. Continue lymphedema therapy, including manual lymphatic drainage to help with ongoing RUE edema.  3. New lymphedema sleeve order placed- 20-30 mm Hg compression.  3. Imagin. Right Shoulder MRI Ordered to help further evaluate possible RTC impingement versus tear considering ongoing weakness and pain.  4. Medications:  1. Continue Volteran gel as needed to help with shoulder and elbow pain. Patient uses this at night, but not much during the day due to wearing clothing and compression sleeve. Patient can utilize Voltaren during the day, and if let dry it should not affect her clothing.    2. Lyrica, 25 mg HS was started today to help with neuropathic component of post-lumpectomy, radiation pain. Will instruct patient that she can increase to 25 mg twice daily if it is helping her symptoms.   5. Follow up: 6 weeks        Symptoms,  Patient was seen this morning for a follow up visit. She states that she has continued to see her PT for ongoing stretching and work on her shoulder, and had her last visit a few days ago, has now been discharged from outpatient PT and instructed to continue with her daily exercises.  She continues with lymphedema therapy once  weekly for at least a few more sessions with plans to reassess at that time.    She continues to complain of significant pain in her right shoulder and limited range of motion with the left shoulder, she can only abduct her shoulder to about 100 degrees but cannot go beyond that. Her edema and measurements have improved significantly in her left upper extremity. She states that ongoing pain is mainly with increased activity, and continues to complain that certain movements such as reaching behind and any overhead movements. She has no pain in her right shoulder at rest.        Therapies/HEP,  She has been discharged from outpatient PT and continues with once weekly lymphedema therapy. Has continued daily stretching exercises at home and MLD techniques at home as well.      Functionally,   No changes since her last visit.      Social history is unchanged.      Medications:  Current Outpatient Medications   Medication Sig Dispense Refill     acyclovir (ZOVIRAX) 400 MG tablet Take 1 tablet (400 mg) by mouth every 8 hours for 5 days (Patient not taking: Reported on 1/11/2021) 15 tablet 11     amLODIPine (NORVASC) 5 MG tablet Take 1 tablet by mouth       bimatoprost (LUMIGAN) 0.01 % SOLN        clonazePAM (KLONOPIN) 0.5 MG tablet Take 1 tablet (0.5 mg) by mouth nightly as needed for sleep 20 tablet 0     diclofenac (VOLTAREN) 1 % topical gel Apply 4 g topically 4 times daily as needed for moderate pain 150 g 1     Multiple Vitamin (MULTIVITAMIN PO) Take by mouth daily       Omega-3 Fatty Acids (OMEGA 3 PO) Take by mouth daily       ondansetron (ZOFRAN) 8 MG tablet Take 1 tablet (8 mg) by mouth every 8 hours as needed for nausea 30 tablet 1     prochlorperazine (COMPAZINE) 5 MG tablet Take 1 tablet (5 mg) by mouth every 6 hours as needed for nausea or vomiting 20 tablet 0     tamoxifen (NOLVADEX) 10 MG tablet Take 1 tablet (10 mg) by mouth daily 30 tablet 3     tamoxifen (NOLVADEX) 20 MG tablet Take 1 tablet (20 mg) by  mouth daily 90 tablet 0     tamoxifen (NOLVADEX) 20 MG tablet Take 1 tablet (20 mg) by mouth daily (Patient not taking: Reported on 4/21/2021) 90 tablet 3     traZODone (DESYREL) 50 MG tablet Take 1 tablet (50 mg) by mouth At Bedtime Take 1/2 pill (25 mg) for a few nights, if tolerable OK to take full pill for sleep 30 tablet 3     Turmeric (QC TUMERIC COMPLEX PO) Take by mouth daily       Vaginal Lubricant (REPLENS) GEL Place 1 Applicatorful vaginally 2 times daily (Patient not taking: Reported on 4/12/2021) 30 g 3              Physical Exam:   There were no vitals taken for this visit.  Gen: NAD, pleasant and cooperative   Pulm: non-labored breathing in room air  Neuro/MSK:   Right shoulder/breast exam: Patient can achieve approx 100 degrees of active abduction before pain/ tightness. Patient can reach her right hand to her sacrum, exhibiting limited active IR ROM due to pain and tightness. Able to achieve a few more degrees of shoulder abduction and flexion with passive ROM. She has notable scar tissue in right axillary and chest wall area that is painful to palpation and is limiting her range with abduction and flexion. Negative for axillary or cervical lymphadenopathy with palpation. Small seroma is still present in right axillary region with palpation.  Significant improvement noted in right upper extremity lymphedema when compared to last exam. No erythema, minimal swelling.      Labs/Imaging:  Lab Results   Component Value Date    WBC 7.0 01/22/2014    HGB 11.1 (L) 01/22/2014    HCT 32.7 (L) 01/22/2014    MCV 89 01/22/2014     01/22/2014     Lab Results   Component Value Date     07/31/2018    POTASSIUM 3.6 07/31/2018    CHLORIDE 106 07/31/2018    CO2 28 07/31/2018    CO2 28 07/31/2018    GLC 89 08/12/2020     Lab Results   Component Value Date    GFRESTIMATED >90 10/28/2019    GFRESTBLACK >90 10/28/2019     Lab Results   Component Value Date    AST 20 01/22/2014    ALT 29 01/22/2014    ALKPHOS  59 10/28/2019    BILITOTAL 0.6 01/22/2014     No results found for: INR  Lab Results   Component Value Date    BUN 16 10/28/2019    CR 0.50 (L) 10/28/2019     IMAGING:  MR Shoulder Right (6/10/21):  Impression:  1. No high grade rotator cuff tear.   a. Small focal low-grade intrasubstance tear of the supraspinatus  anterior fibers at the footprint.    b. Low to moderate grade bursal sided tearing of the infraspinatus  anterior fibers adjacent to the footprint.    c. No muscle bulk loss.  2. Query mild subacromial/subdeltoid bursitis.  3. Query sprain posterior band of the inferior glenohumeral  ligament/axillary pouch complex           Assessment/Plan   Maribel June presents to clinic today for follow up reg her rehab needs.   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 As discussed with Crispin, recommendation is to try CS injections for right shoulder bursitis which may help relieve some her pain, now that we have tried extensive conservative measures including therapy. Her lymphedema has significantly improved in her right upper extremity/chest, and it is recommended that she continue with lymphedema therapy until discharged and continue with daily stretching, MLD at home. We discussed that the radiation fibrosis is a large contributing factor to her limited range, and there could likely be a component of adhesive capsulitis that is present also, and contributing to the discomfort and limited range. The natural time course for symptom resolution was discussed with Crispin. Patient is in agreement with the above plan.       1. Therapy/equipment/braces:  1. Continue home exercise regimen and edema therapy regimen, including stretching and MLD techniques at home.  2. Continue lymphedema therapy once weekly.  3. Continue wearing right upper extremity  compression garments for edema control.  2. Medications:  1. Continue Voltaren gel as needed for pain.  3. Interventions:  1. Will place a PM&R referral for possible CS injection in right shoulder for subacromial bursitis.  4. Follow up: 3 months      Jeanne Jack MD  Physical Medicine & Rehabilitation

## 2021-06-30 NOTE — PATIENT INSTRUCTIONS
1. A referral was placed today for a right shoulder corticosteroid injection to help with your right shoulder pain.  2. Continue to see lymphedema therapy once weekly until you are discharged.  3. Continue your daily stretching exercises and manual massage for your right arm edema, this has been getting much better.  4. As we discussed, contact Dr. Jack in 2-3 weeks once you have your injection to report on symptom relief and we will decide on restarting physical therapy.  4. Follow up with Dr. Jack in 3 months for a return visit.

## 2021-06-30 NOTE — NURSING NOTE
Oncology Rooming Note    June 30, 2021 10:34 AM   Maribel June is a 56 year old female who presents for:    Chief Complaint   Patient presents with     Oncology Clinic Visit     breast cancer     Initial Vitals: /76   Pulse 63   Temp 98.2  F (36.8  C) (Oral)   Wt 49.9 kg (109 lb 14.4 oz)   SpO2 99%   BMI 21.46 kg/m   Estimated body mass index is 21.46 kg/m  as calculated from the following:    Height as of 5/19/21: 1.524 m (5').    Weight as of this encounter: 49.9 kg (109 lb 14.4 oz). Body surface area is 1.45 meters squared.  No Pain (0) Comment: Data Unavailable   No LMP recorded. Patient has had a hysterectomy.  Allergies reviewed: Yes  Medications reviewed: Yes    Medications: Medication refills not needed today.  Pharmacy name entered into [place]: [id number] [place], [place] - [address] AT [facility]    Clinical concerns: none       Jacquelyn Jon CMA

## 2021-06-30 NOTE — LETTER
2021         RE: Maribel June  [address]  [place] [address]        Dear Colleague,    Thank you for referring your patient, Maribel June, to the [facility]. Please see a copy of my visit note below.    [facility]   PM&R clinic note        Interval history:     Maribel June presents to clinic today for follow up reg her rehab needs.   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    Recommendations included:  1. Work-up: none indicated at this time.   2. Therapy/equipment/braces:  1. Continue outpatient PT (ordered by PCP) to help with Rotator cuff strengthening and RUE range of motion in the setting of likely RTC injury and continued limited shoulder range of motion due to scar tissue/fibrosis. Can also consider elbow assessment and treatment with PT.   2. Continue lymphedema therapy, including manual lymphatic drainage to help with ongoing RUE edema.  3. New lymphedema sleeve order placed- 20-30 mm Hg compression.  3. Imagin. Right Shoulder MRI Ordered to help further evaluate possible RTC impingement versus tear considering ongoing weakness and pain.  4. Medications:  1. Continue Volteran gel as needed to help with shoulder and elbow pain. Patient uses this at night, but not much during the day due to wearing clothing and compression sleeve. Patient can utilize Voltaren during the day, and if let dry it should not affect her clothing.    2. Lyrica, 25 mg HS was started today to help with neuropathic component of post-lumpectomy, radiation pain. Will instruct patient that she can increase to 25 mg twice daily if it is helping her symptoms.   5. Follow up: 6 weeks        Symptoms,  Patient was seen this morning for a follow up visit. She states that  she has continued to see her PT for ongoing stretching and work on her shoulder, and had her last visit a few days ago, has now been discharged from outpatient PT and instructed to continue with her daily exercises.  She continues with lymphedema therapy once weekly for at least a few more sessions with plans to reassess at that time.    She continues to complain of significant pain in her right shoulder and limited range of motion with the left shoulder, she can only abduct her shoulder to about 100 degrees but cannot go beyond that. Her edema and measurements have improved significantly in her left upper extremity. She states that ongoing pain is mainly with increased activity, and continues to complain that certain movements such as reaching behind and any overhead movements. She has no pain in her right shoulder at rest.        Therapies/HEP,  She has been discharged from outpatient PT and continues with once weekly lymphedema therapy. Has continued daily stretching exercises at home and MLD techniques at home as well.      Functionally,   No changes since her last visit.      Social history is unchanged.      Medications:  Current Outpatient Medications   Medication Sig Dispense Refill     acyclovir (ZOVIRAX) 400 MG tablet Take 1 tablet (400 mg) by mouth every 8 hours for 5 days (Patient not taking: Reported on 1/11/2021) 15 tablet 11     amLODIPine (NORVASC) 5 MG tablet Take 1 tablet by mouth       bimatoprost (LUMIGAN) 0.01 % SOLN        clonazePAM (KLONOPIN) 0.5 MG tablet Take 1 tablet (0.5 mg) by mouth nightly as needed for sleep 20 tablet 0     diclofenac (VOLTAREN) 1 % topical gel Apply 4 g topically 4 times daily as needed for moderate pain 150 g 1     Multiple Vitamin (MULTIVITAMIN PO) Take by mouth daily       Omega-3 Fatty Acids (OMEGA 3 PO) Take by mouth daily       ondansetron (ZOFRAN) 8 MG tablet Take 1 tablet (8 mg) by mouth every 8 hours as needed for nausea 30 tablet 1     prochlorperazine  (COMPAZINE) 5 MG tablet Take 1 tablet (5 mg) by mouth every 6 hours as needed for nausea or vomiting 20 tablet 0     tamoxifen (NOLVADEX) 10 MG tablet Take 1 tablet (10 mg) by mouth daily 30 tablet 3     tamoxifen (NOLVADEX) 20 MG tablet Take 1 tablet (20 mg) by mouth daily 90 tablet 0     tamoxifen (NOLVADEX) 20 MG tablet Take 1 tablet (20 mg) by mouth daily (Patient not taking: Reported on 4/21/2021) 90 tablet 3     traZODone (DESYREL) 50 MG tablet Take 1 tablet (50 mg) by mouth At Bedtime Take 1/2 pill (25 mg) for a few nights, if tolerable OK to take full pill for sleep 30 tablet 3     Turmeric (QC TUMERIC COMPLEX PO) Take by mouth daily       Vaginal Lubricant (REPLENS) GEL Place 1 Applicatorful vaginally 2 times daily (Patient not taking: Reported on 4/12/2021) 30 g 3              Physical Exam:   There were no vitals taken for this visit.  Gen: NAD, pleasant and cooperative   Pulm: non-labored breathing in room air  Neuro/MSK:   Right shoulder/breast exam: Patient can achieve approx 100 degrees of active abduction before pain/ tightness. Patient can reach her right hand to her sacrum, exhibiting limited active IR ROM due to pain and tightness. Able to achieve a few more degrees of shoulder abduction and flexion with passive ROM. She has notable scar tissue in right axillary and chest wall area that is painful to palpation and is limiting her range with abduction and flexion. Negative for axillary or cervical lymphadenopathy with palpation. Small seroma is still present in right axillary region with palpation.  Significant improvement noted in right upper extremity lymphedema when compared to last exam. No erythema, minimal swelling.      Labs/Imaging:  Lab Results   Component Value Date    WBC 7.0 01/22/2014    HGB 11.1 (L) 01/22/2014    HCT 32.7 (L) 01/22/2014    MCV 89 01/22/2014     01/22/2014     Lab Results   Component Value Date     07/31/2018    POTASSIUM 3.6 07/31/2018    CHLORIDE 106  07/31/2018    CO2 28 07/31/2018    CO2 28 07/31/2018    GLC 89 08/12/2020     Lab Results   Component Value Date    GFRESTIMATED >90 10/28/2019    GFRESTBLACK >90 10/28/2019     Lab Results   Component Value Date    AST 20 01/22/2014    ALT 29 01/22/2014    ALKPHOS 59 10/28/2019    BILITOTAL 0.6 01/22/2014     No results found for: INR  Lab Results   Component Value Date    BUN 16 10/28/2019    CR 0.50 (L) 10/28/2019     IMAGING:  MR Shoulder Right (6/10/21):  Impression:  1. No high grade rotator cuff tear.   a. Small focal low-grade intrasubstance tear of the supraspinatus  anterior fibers at the footprint.    b. Low to moderate grade bursal sided tearing of the infraspinatus  anterior fibers adjacent to the footprint.    c. No muscle bulk loss.  2. Query mild subacromial/subdeltoid bursitis.  3. Query sprain posterior band of the inferior glenohumeral  ligament/axillary pouch complex           Assessment/Plan   Maribel TORRES Slade presents to clinic today for follow up reg her rehab needs.   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 As discussed with Crispin, recommendation is to try CS injections for right shoulder bursitis which may help relieve some her pain, now that we have tried extensive conservative measures including therapy. Her lymphedema has significantly improved in her right upper extremity/chest, and it is recommended that she continue with lymphedema therapy until discharged and continue with daily stretching, MLD at home. We discussed that the radiation fibrosis is a large contributing factor to her limited range, and there could likely be a component of adhesive capsulitis that is present also, and contributing to the discomfort and limited range. The natural time course for symptom resolution was discussed with Crispin. Patient is in  agreement with the above plan.       1. Therapy/equipment/braces:  1. Continue home exercise regimen and edema therapy regimen, including stretching and MLD techniques at home.  2. Continue lymphedema therapy once weekly.  3. Continue wearing right upper extremity compression garments for edema control.  2. Medications:  1. Continue Voltaren gel as needed for pain.  3. Interventions:  1. Will place a PM&R referral for possible CS injection in right shoulder for subacromial bursitis.  4. Follow up: 3 months      Jeanne Jack MD  Physical Medicine & Rehabilitation                Again, thank you for allowing me to participate in the care of your patient.        Sincerely,        Jeanne Jack MD

## 2021-07-01 ENCOUNTER — TELEPHONE (OUTPATIENT)
Dept: PHYSICAL MEDICINE AND REHAB | Facility: CLINIC | Age: 56
End: 2021-07-01

## 2021-07-01 NOTE — TELEPHONE ENCOUNTER
[facility] Call Center    Phone Message    May a detailed message be left on voicemail: yes     Reason for Call: Appointment Intake    Referring Provider Name: Jeanne Jack MD   Diagnosis and/or Symptoms: Malignant neoplasm of lower-outer quadrant of right breast of female, estrogen r...  Lymphedema   Right shoulder pain, unspecified chronicity     Please schedule with Dr. Kathrin Hood for right subacromial CS injection    Please review and contact the patient to schedule.    Action Taken: Other: PMR    Travel Screening: Not Applicable

## 2021-07-02 ENCOUNTER — HOSPITAL ENCOUNTER (OUTPATIENT)
Dept: PHYSICAL THERAPY | Facility: CLINIC | Age: 56
Setting detail: THERAPIES SERIES
End: 2021-07-02
Payer: COMMERCIAL

## 2021-07-02 PROCEDURE — 97140 MANUAL THERAPY 1/> REGIONS: CPT | Mod: GP | Performed by: PHYSICAL THERAPIST

## 2021-07-02 PROCEDURE — 97535 SELF CARE MNGMENT TRAINING: CPT | Mod: GP | Performed by: PHYSICAL THERAPIST

## 2021-07-09 ENCOUNTER — HOSPITAL ENCOUNTER (OUTPATIENT)
Dept: PHYSICAL THERAPY | Facility: CLINIC | Age: 56
Setting detail: THERAPIES SERIES
End: 2021-07-09
Payer: COMMERCIAL

## 2021-07-09 ENCOUNTER — MEDICAL CORRESPONDENCE (OUTPATIENT)
Dept: HEALTH INFORMATION MANAGEMENT | Facility: CLINIC | Age: 56
End: 2021-07-09

## 2021-07-09 PROCEDURE — 97110 THERAPEUTIC EXERCISES: CPT | Mod: GP | Performed by: PHYSICAL THERAPIST

## 2021-07-09 PROCEDURE — 97140 MANUAL THERAPY 1/> REGIONS: CPT | Mod: GP | Performed by: PHYSICAL THERAPIST

## 2021-07-15 ENCOUNTER — OFFICE VISIT (OUTPATIENT)
Dept: PHYSICAL MEDICINE AND REHAB | Facility: CLINIC | Age: 56
End: 2021-07-15
Payer: COMMERCIAL

## 2021-07-15 DIAGNOSIS — M75.51 SUBACROMIAL BURSITIS OF RIGHT SHOULDER JOINT: Primary | ICD-10-CM

## 2021-07-15 DIAGNOSIS — M67.819 TENDINOSIS OF ROTATOR CUFF: ICD-10-CM

## 2021-07-15 PROCEDURE — 20611 DRAIN/INJ JOINT/BURSA W/US: CPT | Performed by: PHYSICAL MEDICINE & REHABILITATION

## 2021-07-15 RX ORDER — TRIAMCINOLONE ACETONIDE 40 MG/ML
40 INJECTION, SUSPENSION INTRA-ARTICULAR; INTRAMUSCULAR ONCE
Status: COMPLETED | OUTPATIENT
Start: 2021-07-15 | End: 2021-07-15

## 2021-07-15 RX ADMIN — TRIAMCINOLONE ACETONIDE 40 MG: 40 INJECTION, SUSPENSION INTRA-ARTICULAR; INTRAMUSCULAR at 12:49

## 2021-07-15 NOTE — LETTER
7/15/2021       RE: Maribel June  [address]  [facility] [place] [address]     Dear Colleague,    Thank you for referring your patient, Maribel June, to the [facility] [place] [facility]. Please see a copy of my visit note below.    PROCEDURE NOTE: Subacromial Bursa Injection Under Ultrasound Guidance    PROCEDURE DATE: 7/15/2021    PATIENT NAME: Maribel June  YOB: 1965    ATTENDING PHYSICIAN: Kota Bales MD  FELLOW/RESIDENT PHYSICIAN: None    PREOPERATIVE DIAGNOSIS:   #. Subacromial bursitis of right shoulder joint  (primary encounter diagnosis)  #. Tendinosis of rotator cuff    POSTOPERATIVE DIAGNOSIS: same    PROCEDURE PERFORMED: Right Subacromial Bursa Injection Under Ultrasound Guidance    ULTRASOUND WAS USED.    INDICATIONS FOR THE PROCEDURE:  Maribel June is a 56 year old female who presents with right shoulder pain.     PROCEDURE AND FINDINGS:  She was greeted in the clinic. The risk, benefits and alternatives to the procedure were again reviewed with her and informed consent was obtained and the patient agreed to proceed. A time-out was performed. Following review alternatives, benefits and risks, the procedure was carried out under sterile prep with sterile gel. The use of direct sonographic guidance was used to ensure accurate placement of the needle (rather than non-guided injection) and required to minimize the risk of bleeding or injury to nearby neurovascular structures.     A 15-6MHz ultrasound transducer was used to visualize the relevant structures and determine the optimal needle path for the procedure. A 25g 2-inch needle was advanced from lateral to medial utilizing an in-plane approach, under continuous ultrasound guidance to the right subacromial/subdeltoid bursal space. After negative aspiration, slow injection of the treatment solution 3mL total  consisting of 1mL Kenalog (40mg/mL) and 2mL of 1% Lidocaine was instilled into affected area. The tip of the needle was visualized throughout the procedure. The remainder of the single-use vials were discarded.      Pos Neer's Test: Before the procedure, she reported a pain score of 5/10. After the procedure, she reported a pain score of 0/10 with passive shoulder internal rotation and forward flexion.     She tolerated the procedure well, was discharged home in stable condition.     Follow-up will be in clinic with Dr. Jack     COMPLICATIONS: None    COMMENTS: Depending on response and concern for adhesive capsulitis could consider US-guided glenohumeral joint injection x1 month v repeat subacromial bursa injection. Repeating corticosteroid injections could be considered, not exceeding 3 injections into anyone area in a calender year.            Again, thank you for allowing me to participate in the care of your patient.      Sincerely,    Kota Bales MD

## 2021-07-15 NOTE — NURSING NOTE
Chief Complaint   Patient presents with     Consult     Right shoulder pain      Jerry Meraz CMA

## 2021-07-15 NOTE — PROGRESS NOTES
PROCEDURE NOTE: Subacromial Bursa Injection Under Ultrasound Guidance    PROCEDURE DATE: 7/15/2021    PATIENT NAME: Maribel June  YOB: 1965    ATTENDING PHYSICIAN: Kota Bales MD  FELLOW/RESIDENT PHYSICIAN: None    PREOPERATIVE DIAGNOSIS:   #. Subacromial bursitis of right shoulder joint  (primary encounter diagnosis)  #. Tendinosis of rotator cuff    POSTOPERATIVE DIAGNOSIS: same    PROCEDURE PERFORMED: Right Subacromial Bursa Injection Under Ultrasound Guidance    ULTRASOUND WAS USED.    INDICATIONS FOR THE PROCEDURE:  Maribel June is a 56 year old female who presents with right shoulder pain.     PROCEDURE AND FINDINGS:  She was greeted in the clinic. The risk, benefits and alternatives to the procedure were again reviewed with her and informed consent was obtained and the patient agreed to proceed. A time-out was performed. Following review alternatives, benefits and risks, the procedure was carried out under sterile prep with sterile gel. The use of direct sonographic guidance was used to ensure accurate placement of the needle (rather than non-guided injection) and required to minimize the risk of bleeding or injury to nearby neurovascular structures.     A 15-6MHz ultrasound transducer was used to visualize the relevant structures and determine the optimal needle path for the procedure. A 25g 2-inch needle was advanced from lateral to medial utilizing an in-plane approach, under continuous ultrasound guidance to the right subacromial/subdeltoid bursal space. After negative aspiration, slow injection of the treatment solution 3mL total consisting of 1mL Kenalog (40mg/mL) and 2mL of 1% Lidocaine was instilled into affected area. The tip of the needle was visualized throughout the procedure. The remainder of the single-use vials were discarded.      Pos Neer's Test: Before the procedure, she reported a pain score of 5/10. After the procedure, she reported a pain score of 0/10 with  passive shoulder internal rotation and forward flexion.     She tolerated the procedure well, was discharged home in stable condition.     Follow-up will be in clinic with Dr. Jack     COMPLICATIONS: None    COMMENTS: Depending on response and concern for adhesive capsulitis could consider US-guided glenohumeral joint injection x1 month v repeat subacromial bursa injection. Repeating corticosteroid injections could be considered, not exceeding 3 injections into anyone area in a calender year.

## 2021-07-24 DIAGNOSIS — C50.511 MALIGNANT NEOPLASM OF LOWER-OUTER QUADRANT OF RIGHT BREAST OF FEMALE, ESTROGEN RECEPTOR POSITIVE (H): Primary | ICD-10-CM

## 2021-07-24 DIAGNOSIS — M25.511 RIGHT SHOULDER PAIN, UNSPECIFIED CHRONICITY: ICD-10-CM

## 2021-07-24 DIAGNOSIS — Z17.0 MALIGNANT NEOPLASM OF LOWER-OUTER QUADRANT OF RIGHT BREAST OF FEMALE, ESTROGEN RECEPTOR POSITIVE (H): Primary | ICD-10-CM

## 2021-07-27 ENCOUNTER — HOSPITAL ENCOUNTER (OUTPATIENT)
Dept: PHYSICAL THERAPY | Facility: CLINIC | Age: 56
Setting detail: THERAPIES SERIES
End: 2021-07-27
Payer: COMMERCIAL

## 2021-07-27 PROCEDURE — 97140 MANUAL THERAPY 1/> REGIONS: CPT | Mod: GP | Performed by: PHYSICAL THERAPIST

## 2021-07-27 PROCEDURE — 97535 SELF CARE MNGMENT TRAINING: CPT | Mod: GP | Performed by: PHYSICAL THERAPIST

## 2021-07-27 NOTE — PROGRESS NOTES
Outpatient Physical Therapy Discharge Note     Patient: Maribel June  : 1965    Beginning/End Dates of Reporting Period:  6/10/21 to 21    Referring Provider: Briana Burgos PA-C    Therapy Diagnosis: Lymphedema or UE and trunk     Client Self Report: Pt got an US guided injection and her shoulder is feeling much better.      Objective Measurements:  Objective Measure: R UE volume  Details: 1304.43mL  Objective Measure: Trunk measurements  Details: under axilla 82.5cm, under breasts 77cm  Objective Measure: Edema  Details: small amount of pocketing at the medial elbow above and beleow crease and lateral trunk.  Fibrotic arc of breast from about 3  o'clock to 6 o'clock  Objective Measure: SPADI  Details: 26.92 (-43.85)     Outcome Measures (most recent score):  Lymphedema Life Impact Scale (score range 0-72). A higher score indicates greater impairment.: 17    Goals:  Goal Identifier SELF MLD   Goal Description Pt will be indepedent with self MLD sequence for the R UE and trunk drainage to better manage lymphedema related pain   Target Date 21   Date Met  21   Progress (detail required for progress note): Pt independent and compliant daily     Goal Identifier LLIS   Goal Description Pt will score 23 or  less to reflect the MICD in impact of swelling on quality of life   Target Date 21   Date Met   21   Progress (detail required for progress note): score of 17     Goal Identifier Edema related pain management   Goal Description Pt will reports 3 strategies that she can use to help manage pain that is related to her lymphedema for improved quality of life.     Target Date 21   Date Met  21   Progress (detail required for progress note): pt is using her pump, compression, and MLD at home     Plan:  Discharge from therapy.    Discharge:    Reason for Discharge: Patient has met all goals.    Equipment Issued: Flexitouch pump, lateral bra swell spot, pt got  compression bras and a light compression sleeve, home program    Discharge Plan: Patient to continue home program.

## 2021-07-29 ENCOUNTER — APPOINTMENT (OUTPATIENT)
Dept: LAB | Facility: CLINIC | Age: 56
End: 2021-07-29
Attending: INTERNAL MEDICINE
Payer: COMMERCIAL

## 2021-07-29 ENCOUNTER — INFUSION THERAPY VISIT (OUTPATIENT)
Dept: ONCOLOGY | Facility: CLINIC | Age: 56
End: 2021-07-29
Attending: INTERNAL MEDICINE
Payer: COMMERCIAL

## 2021-07-29 ENCOUNTER — MYC MEDICAL ADVICE (OUTPATIENT)
Dept: ONCOLOGY | Facility: CLINIC | Age: 56
End: 2021-07-29

## 2021-07-29 VITALS
WEIGHT: 108.3 LBS | TEMPERATURE: 98 F | BODY MASS INDEX: 21.15 KG/M2 | DIASTOLIC BLOOD PRESSURE: 76 MMHG | SYSTOLIC BLOOD PRESSURE: 129 MMHG | OXYGEN SATURATION: 97 % | HEART RATE: 96 BPM | RESPIRATION RATE: 16 BRPM

## 2021-07-29 DIAGNOSIS — C50.511 MALIGNANT NEOPLASM OF LOWER-OUTER QUADRANT OF RIGHT BREAST OF FEMALE, ESTROGEN RECEPTOR POSITIVE (H): ICD-10-CM

## 2021-07-29 DIAGNOSIS — M85.80 OSTEOPENIA, UNSPECIFIED LOCATION: Primary | ICD-10-CM

## 2021-07-29 DIAGNOSIS — Z17.0 MALIGNANT NEOPLASM OF LOWER-OUTER QUADRANT OF RIGHT BREAST OF FEMALE, ESTROGEN RECEPTOR POSITIVE (H): ICD-10-CM

## 2021-07-29 LAB
ALBUMIN SERPL-MCNC: 3.5 G/DL (ref 3.4–5)
CALCIUM SERPL-MCNC: 9.1 MG/DL (ref 8.5–10.1)
CREAT SERPL-MCNC: 0.56 MG/DL (ref 0.52–1.04)
GFR SERPL CREATININE-BSD FRML MDRD: >90 ML/MIN/1.73M2

## 2021-07-29 PROCEDURE — 96365 THER/PROPH/DIAG IV INF INIT: CPT

## 2021-07-29 PROCEDURE — 258N000003 HC RX IP 258 OP 636: Performed by: PHYSICIAN ASSISTANT

## 2021-07-29 PROCEDURE — 82565 ASSAY OF CREATININE: CPT | Performed by: PHYSICIAN ASSISTANT

## 2021-07-29 PROCEDURE — 36415 COLL VENOUS BLD VENIPUNCTURE: CPT | Performed by: PHYSICIAN ASSISTANT

## 2021-07-29 PROCEDURE — 250N000011 HC RX IP 250 OP 636: Performed by: PHYSICIAN ASSISTANT

## 2021-07-29 PROCEDURE — 82040 ASSAY OF SERUM ALBUMIN: CPT | Performed by: PHYSICIAN ASSISTANT

## 2021-07-29 PROCEDURE — 82310 ASSAY OF CALCIUM: CPT | Performed by: PHYSICIAN ASSISTANT

## 2021-07-29 RX ORDER — ZOLEDRONIC ACID 0.04 MG/ML
4 INJECTION, SOLUTION INTRAVENOUS ONCE
Status: COMPLETED | OUTPATIENT
Start: 2021-07-29 | End: 2021-07-29

## 2021-07-29 RX ADMIN — SODIUM CHLORIDE 250 ML: 9 INJECTION, SOLUTION INTRAVENOUS at 11:08

## 2021-07-29 RX ADMIN — ZOLEDRONIC ACID 4 MG: 0.04 INJECTION, SOLUTION INTRAVENOUS at 11:08

## 2021-07-29 ASSESSMENT — PAIN SCALES - GENERAL: PAINLEVEL: NO PAIN (0)

## 2021-07-29 NOTE — PROGRESS NOTES
Infusion Nursing Note:  Maribel June presents today for Zometa.    Patient seen by provider today: No    Note: Patient new to infusion, received first zometa today.  Pt oriented to infusion room, location of bathrooms, nutrition stations, and call light. New patient teaching done previously.  All medications reviewed prior to administration and all patient's questions answered.  Patient instructed to call triage with chills and/or temperature greater than or equal to 100.5, uncontrolled nausea/vomiting, diarrhea, constipation, dizziness, shortness of breath, chest pain, bleeding, unexplained bruising, or any new/concerning symptoms, questions/concerns. Pt did not request or require any intervention for pain today.  Pt declined to collect COVID test today/has completed vaccination.    Discussed starting calcium supplement with pt per orders.  Pt hesitant because she reports she frequently gets kidney stones from calcium.  She is taking a multivitamin daily.  Notified Dr. Eli for recommendations and asked that RNCC follow up with pt.      Intravenous Access:  Peripheral IV placed.    Treatment Conditions:  Corrected Ca: 9.5.    Post Infusion Assessment:  Patient tolerated infusion without incident.  Blood return noted pre and post infusion.  Site patent and intact, free from redness, edema or discomfort.  No evidence of extravasations.  Access discontinued per protocol.    Discharge Plan:   Patient declined prescription refills.  Discharge instructions reviewed with: Patient.  Patient and/or family verbalized understanding of discharge instructions and all questions answered.  Copy of AVS reviewed with patient and/or family.  Patient will return in six months for next appointment--yet to be scheduled, will see MD 9/13/2021.  Patient discharged in stable condition accompanied by: self.  Departure Mode: Ambulatory.  Face to Face time: 5 minutes.    Migdalia Galloway RN

## 2021-07-29 NOTE — NURSING NOTE
Chief Complaint   Patient presents with     Blood Draw     Labs drawn via PIV in lab by RN. VS taken       Labs drawn from PIV placed by RN. Line flushed with saline. Vitals taken. Pt checked in for appointment(s).      Akosua Gabriel RN

## 2021-07-30 ENCOUNTER — APPOINTMENT (OUTPATIENT)
Dept: GENERAL RADIOLOGY | Facility: CLINIC | Age: 56
End: 2021-07-30
Attending: EMERGENCY MEDICINE
Payer: COMMERCIAL

## 2021-07-30 ENCOUNTER — NURSE TRIAGE (OUTPATIENT)
Dept: NURSING | Facility: CLINIC | Age: 56
End: 2021-07-30

## 2021-07-30 ENCOUNTER — HOSPITAL ENCOUNTER (EMERGENCY)
Facility: CLINIC | Age: 56
Discharge: HOME | End: 2021-07-31
Attending: EMERGENCY MEDICINE | Admitting: EMERGENCY MEDICINE
Payer: COMMERCIAL

## 2021-07-30 VITALS
RESPIRATION RATE: 18 BRPM | HEIGHT: 60 IN | TEMPERATURE: 99.6 F | BODY MASS INDEX: 21.2 KG/M2 | WEIGHT: 108 LBS | DIASTOLIC BLOOD PRESSURE: 73 MMHG | HEART RATE: 102 BPM | SYSTOLIC BLOOD PRESSURE: 120 MMHG | OXYGEN SATURATION: 96 %

## 2021-07-30 DIAGNOSIS — N39.0 URINARY TRACT INFECTION WITHOUT HEMATURIA, SITE UNSPECIFIED: ICD-10-CM

## 2021-07-30 DIAGNOSIS — R50.9 FEVER, UNSPECIFIED FEVER CAUSE: ICD-10-CM

## 2021-07-30 LAB
ALBUMIN SERPL-MCNC: 3.5 G/DL (ref 3.4–5)
ALBUMIN UR-MCNC: NEGATIVE MG/DL
ALP SERPL-CCNC: 85 U/L (ref 40–150)
ALT SERPL W P-5'-P-CCNC: 33 U/L (ref 0–50)
ANION GAP SERPL CALCULATED.3IONS-SCNC: 6 MMOL/L (ref 3–14)
APPEARANCE UR: CLEAR
AST SERPL W P-5'-P-CCNC: 27 U/L (ref 0–45)
BACTERIA #/AREA URNS HPF: ABNORMAL /HPF
BASOPHILS # BLD AUTO: 0 10E3/UL (ref 0–0.2)
BASOPHILS NFR BLD AUTO: 0 %
BILIRUB SERPL-MCNC: 0.6 MG/DL (ref 0.2–1.3)
BILIRUB UR QL STRIP: NEGATIVE
BUN SERPL-MCNC: 11 MG/DL (ref 7–30)
CALCIUM SERPL-MCNC: 8.3 MG/DL (ref 8.5–10.1)
CHLORIDE BLD-SCNC: 106 MMOL/L (ref 94–109)
CO2 SERPL-SCNC: 25 MMOL/L (ref 20–32)
COLOR UR AUTO: ABNORMAL
CREAT SERPL-MCNC: 0.67 MG/DL (ref 0.52–1.04)
EOSINOPHIL # BLD AUTO: 0.2 10E3/UL (ref 0–0.7)
EOSINOPHIL NFR BLD AUTO: 3 %
ERYTHROCYTE [DISTWIDTH] IN BLOOD BY AUTOMATED COUNT: 13.6 % (ref 10–15)
GFR SERPL CREATININE-BSD FRML MDRD: >90 ML/MIN/1.73M2
GLUCOSE BLD-MCNC: 105 MG/DL (ref 70–99)
GLUCOSE UR STRIP-MCNC: NEGATIVE MG/DL
HCT VFR BLD AUTO: 37.5 % (ref 35–47)
HGB BLD-MCNC: 12.6 G/DL (ref 11.7–15.7)
HGB UR QL STRIP: NEGATIVE
IMM GRANULOCYTES # BLD: 0 10E3/UL
IMM GRANULOCYTES NFR BLD: 0 %
KETONES UR STRIP-MCNC: NEGATIVE MG/DL
LACTATE SERPL-SCNC: 0.9 MMOL/L (ref 0.7–2)
LEUKOCYTE ESTERASE UR QL STRIP: NEGATIVE
LYMPHOCYTES # BLD AUTO: 0.4 10E3/UL (ref 0.8–5.3)
LYMPHOCYTES NFR BLD AUTO: 5 %
MCH RBC QN AUTO: 29.6 PG (ref 26.5–33)
MCHC RBC AUTO-ENTMCNC: 33.6 G/DL (ref 31.5–36.5)
MCV RBC AUTO: 88 FL (ref 78–100)
MONOCYTES # BLD AUTO: 0.2 10E3/UL (ref 0–1.3)
MONOCYTES NFR BLD AUTO: 3 %
MUCOUS THREADS #/AREA URNS LPF: PRESENT /LPF
NEUTROPHILS # BLD AUTO: 6.3 10E3/UL (ref 1.6–8.3)
NEUTROPHILS NFR BLD AUTO: 89 %
NITRATE UR QL: NEGATIVE
NRBC # BLD AUTO: 0 10E3/UL
NRBC BLD AUTO-RTO: 0 /100
PH UR STRIP: 5.5 [PH] (ref 5–7)
PLATELET # BLD AUTO: 275 10E3/UL (ref 150–450)
POTASSIUM BLD-SCNC: 3.4 MMOL/L (ref 3.4–5.3)
PROT SERPL-MCNC: 7.2 G/DL (ref 6.8–8.8)
RBC # BLD AUTO: 4.25 10E6/UL (ref 3.8–5.2)
RBC URINE: <1 /HPF
SODIUM SERPL-SCNC: 137 MMOL/L (ref 133–144)
SP GR UR STRIP: 1.01 (ref 1–1.03)
SQUAMOUS EPITHELIAL: 1 /HPF
TROPONIN I SERPL-MCNC: <0.015 UG/L (ref 0–0.04)
UROBILINOGEN UR STRIP-MCNC: NORMAL MG/DL
WBC # BLD AUTO: 7.1 10E3/UL (ref 4–11)
WBC URINE: 7 /HPF

## 2021-07-30 PROCEDURE — 83605 ASSAY OF LACTIC ACID: CPT | Performed by: EMERGENCY MEDICINE

## 2021-07-30 PROCEDURE — 87040 BLOOD CULTURE FOR BACTERIA: CPT | Performed by: EMERGENCY MEDICINE

## 2021-07-30 PROCEDURE — 80053 COMPREHEN METABOLIC PANEL: CPT | Performed by: EMERGENCY MEDICINE

## 2021-07-30 PROCEDURE — 36415 COLL VENOUS BLD VENIPUNCTURE: CPT | Performed by: EMERGENCY MEDICINE

## 2021-07-30 PROCEDURE — 96360 HYDRATION IV INFUSION INIT: CPT

## 2021-07-30 PROCEDURE — 84484 ASSAY OF TROPONIN QUANT: CPT | Performed by: EMERGENCY MEDICINE

## 2021-07-30 PROCEDURE — 81001 URINALYSIS AUTO W/SCOPE: CPT | Performed by: EMERGENCY MEDICINE

## 2021-07-30 PROCEDURE — C9803 HOPD COVID-19 SPEC COLLECT: HCPCS

## 2021-07-30 PROCEDURE — 71046 X-RAY EXAM CHEST 2 VIEWS: CPT

## 2021-07-30 PROCEDURE — 99284 EMERGENCY DEPT VISIT MOD MDM: CPT | Mod: 25

## 2021-07-30 PROCEDURE — 96361 HYDRATE IV INFUSION ADD-ON: CPT

## 2021-07-30 PROCEDURE — 85025 COMPLETE CBC W/AUTO DIFF WBC: CPT | Performed by: EMERGENCY MEDICINE

## 2021-07-30 PROCEDURE — 87635 SARS-COV-2 COVID-19 AMP PRB: CPT | Performed by: EMERGENCY MEDICINE

## 2021-07-30 PROCEDURE — 258N000003 HC RX IP 258 OP 636: Performed by: EMERGENCY MEDICINE

## 2021-07-30 RX ORDER — SODIUM CHLORIDE 9 MG/ML
INJECTION, SOLUTION INTRAVENOUS CONTINUOUS
Status: DISCONTINUED | OUTPATIENT
Start: 2021-07-30 | End: 2021-07-31 | Stop reason: HOSPADM

## 2021-07-30 RX ORDER — CEPHALEXIN 500 MG/1
500 CAPSULE ORAL 3 TIMES DAILY
Qty: 21 CAPSULE | Refills: 0 | Status: SHIPPED | OUTPATIENT
Start: 2021-07-30 | End: 2021-08-06

## 2021-07-30 RX ADMIN — SODIUM CHLORIDE 1000 ML: 9 INJECTION, SOLUTION INTRAVENOUS at 21:54

## 2021-07-30 ASSESSMENT — ENCOUNTER SYMPTOMS
DYSURIA: 0
CHILLS: 1
ABDOMINAL PAIN: 0
FEVER: 1
DIFFICULTY URINATING: 0
DIARRHEA: 0
NAUSEA: 0
COUGH: 0
VOMITING: 0

## 2021-07-30 ASSESSMENT — MIFFLIN-ST. JEOR: SCORE: 1006.38

## 2021-07-30 NOTE — TELEPHONE ENCOUNTER
Maribel calls and says that she got an infusion yesterday and began running a fever last night. Fever = 101-tympanic. Pt. Says that she is also very cold. Pt. Will go to an ER with . COVID 19 Nurse Triage Plan/Patient Instructions    Please be aware that novel coronavirus (COVID-19) may be circulating in the community. If you develop symptoms such as fever, cough, or SOB or if you have concerns about the presence of another infection including coronavirus (COVID-19), please contact your health care provider or visit [email or web address].     Disposition/Instructions    ED Visit recommended. Follow protocol based instructions.     Bring Your Own Device:  Please also bring your smart device(s) (smart phones, tablets, laptops) and their charging cables for your personal use and to communicate with your care team during your visit.    Thank you for taking steps to prevent the spread of this virus.  o Limit your contact with others.  o Wear a simple mask to cover your cough.  o Wash your hands well and often.    Resources    [facility]: About COVID-19: [email or web address]    CDC: What to Do If You're Sick: www.cdc.gov/coronavirus/2019-ncov/about/steps-when-sick.html    CDC: Ending Home Isolation: www.cdc.gov/coronavirus/2019-ncov/hcp/disposition-in-home-patients.html     CDC: Caring for Someone: www.cdc.gov/coronavirus/2019-ncov/if-you-are-sick/care-for-someone.html     [facility]: Interim Guidance for Hospital Discharge to Home: www.health.[facility].[place]/diseases/coronavirus/hcp/hospdischarge.pdf    [facility] clinical trials (COVID-19 research studies): [email or web address]     Below are the COVID-19 hotlines at the [place] Department of [facility] ([facility]). Interpreters are available.   o For health questions: Call 090-297-6681 or 1-660.661.1442 (7 a.m. to 7 p.m.)  o For questions about schools and childcare: Call 710-998-7396 or 1-886.994.2764 (7 a.m. to 7  p.m.)

## 2021-07-31 LAB — SARS-COV-2 RNA RESP QL NAA+PROBE: NEGATIVE

## 2021-07-31 NOTE — ED PROVIDER NOTES
History   Chief Complaint:  Fever     The history is provided by the patient and the spouse.      Maribel June is a 56 year old female with history of breast cancer with last chemo infusion yesterday who presents with fever. The patient reports that she received her first dose of chemo infusion yesterday. She states that she then developed a fever later yesterday night and tried ibuprofen with no change. She took her temperature again this morning and noted it to be 101.1 and tried ibuprofen again. She denies cough, cold, nausea, emesis, diarrhea, abdominal pain, chest pain, or recent COVID exposure. She states that she is fully vaccinated for COVID. She denies urinary symptoms.       Review of Systems   Constitutional: Positive for chills and fever.   Respiratory: Negative for cough.    Cardiovascular: Negative for chest pain.   Gastrointestinal: Negative for abdominal pain, diarrhea, nausea and vomiting.   Genitourinary: Negative for difficulty urinating and dysuria.   All other systems reviewed and are negative.        Allergies:  Flagyl [Metronidazole]  Lisinopril  Oxycodone-Acetaminophen  Sulfamethoxazole-Trimethoprim  Zithromax [Azithromycin Dihydrate]    Medications:  Zovirax   Amlodipine   Clonazepam   Diclofenac  Ondansetron   Prochlorperazine   Tamoxifen   Trazodone     Past Medical History:    Depressive disorder   Endometriosis   Herpes   Kidney stone   Malignant neoplasm of right breast   Radiation therapy   Osteopenia     Past Surgical History:    Breast surgery    section   Colonoscopy x2  Cystoscopy   GI surgery   Lumpectomy breast right     Family History:    Hypertension, father   Kidney failure, father   Aortic aneurysm, father   Prostate cancer, father   Neurologic disorder, brother   Asthma, mother   Hypertension, mother   Arthritis, mother     Social History:  PCP: Juwan Perry  Presents to the ED with her father    Physical Exam     Patient Vitals for the past 24 hrs:   BP  Temp Temp src Pulse Resp SpO2 Height Weight   07/30/21 1950 120/73 99.6  F (37.6  C) Oral 102 18 96 % 1.524 m (5') 49 kg (108 lb)       Physical Exam  Vitals: reviewed by me  General: [facility] [place], cooperative, and alert to conversation  Eyes: Tracking well, clear conjunctiva BL  ENT: MMM, midline trachea.   Lungs: No tachypnea, no accessory muscle use. No respiratory distress.   CV: Rate as above  Abd: Soft, non tender, no guarding, no rebound. Non distended  Specifically no right upper quadrant tenderness.  MSK: no joint effusion.  No evidence of trauma  Skin: No rash  Neuro: Clear speech and no facial droop.  Psych: Not RIS, no e/o AH/VH        Emergency Department Course     Imaging:  Chest  XR:   Negative chest.    Reading per radiology     Laboratory:  CBC: WBC 7.1, HGB 12.6,    CMP: Glucose 105(H), Calcium 8.3(L), o/w WNL (Creatinine: 0.67)    UA: WBC: 7 (H), Bacteria: few(A), Mucous: Present, o/w Negative  Troponin (Collected 2152): <0.015  Lactic acid (Resulted 2152): 0.9  Asymptomatic COVID-19 PCR: Pending     Emergency Department Course:  Reviewed:  I reviewed the patient's nursing notes, vitals, past medical records, Care Everywhere.     Assessments:  2045 I performed an assessment and examination of the patient as noted above.      2254 Findings and plan explained to the Patient. Patient discharged home with instructions regarding supportive care, medications, and reasons to return. The importance of close follow-up was reviewed.     Interventions:  2154 NS 1L IV Bolus     Disposition:  The patient was discharged to home.       Impression & Plan   Medical Decision Making:  This is a very pleasant 56-year-old female who presents emergency room with fever x1 day.  She has no localizing review of system components, and tells me that apart in the fever she feels well.  She has benign abdomen, normal chest x-ray.  I will note that her urine is slightly positive, though if it  was strong enough to cause a fever, I would have expected a more robust UA, and therefore am also looking for additional sources.  She has a normal skin exam and no evidence of cellulitis.  She does have a history of breast cancer, and took Zometa, but does not seem to be close to neutropenic, or overtly immunocompromised as Zometa as a hypercalcemia medication.  Her fever also technically could be a reaction to this infusion from yesterday.  She tells me she feels well enough to go home right now, and out of an abundance of caution, I did send blood cultures, and swab her for coronavirus although she is vaccinated.  She knows that she may be asked to come back to the blood cultures are positive, and both her and her  are okay with this plan.  They feel comfortable coming back to the ER with any worsening symptoms, will plan for discharge home as above.    Covid-19  Maribel June was evaluated during a global COVID-19 pandemic, which necessitated consideration that the patient might be at risk for infection with the SARS-CoV-2 virus that causes COVID-19.   Applicable protocols for evaluation were followed during the patient's care.   COVID-19 was considered as part of the patient's evaluation. The plan for testing is:  a test was obtained during this visit.    Diagnosis:  No diagnosis found.    Discharge Medications:  New Prescriptions    No medications on file       Scribe Disclosure:  I, Roxanna Martinez, am serving as a scribe at 8:45 PM on 7/30/2021 to document services personally performed by Juan Garcia MD based on my observations and the provider's statements to me.        Juan Garcia MD  07/30/21 3755

## 2021-07-31 NOTE — ED TRIAGE NOTES
Pt had chemo yesterday. Pt developed a fever last night. She states highest was 101.1. pt took 600mg ibuprofen at 1800. Pt denies urinary symptoms or cough. Pt A&Ox4

## 2021-07-31 NOTE — DISCHARGE INSTRUCTIONS
As we discussed, no clear cause of your fever was found today.  We did send blood cultures, and if they do grow out bacteria, you will be called and asked to come back to the ER.  We also did do a coronavirus test, and I will do my best to call you on Sunday to give you the results.  That being said, I do think you are stable to go home right now because your labs and physical exam are reassuring.  Come back to the ER immediately with any worsening symptoms, if you develop any new symptoms like vomiting, painful urination, diarrhea, or with any other concerns.  Out of an abundance of caution, please also follow with your regular doctor either in person or in a virtual visit in the next 3 days.

## 2021-08-04 DIAGNOSIS — G47.00 PERSISTENT INSOMNIA: ICD-10-CM

## 2021-08-04 RX ORDER — TRAZODONE HYDROCHLORIDE 50 MG/1
50 TABLET, FILM COATED ORAL AT BEDTIME
Qty: 30 TABLET | Refills: 3 | Status: SHIPPED | OUTPATIENT
Start: 2021-08-04 | End: 2021-09-13

## 2021-08-04 NOTE — TELEPHONE ENCOUNTER
Last prescribing provider: Amanda Shirley on 5/3/2021    Last clinic visit date: 6/2/21 with Briana Burgos    Any missed appointments or no-shows since last clinic visit?:none    Recommendations for requested medication: from 6/2 provider note ritu be resuming 50mg      Next clinic visit date:9/13/21 with Dr. Eli    Any other pertinent information (if none, N/A): Routed to Dr. Eli.

## 2021-08-05 ENCOUNTER — ANCILLARY PROCEDURE (OUTPATIENT)
Dept: BONE DENSITY | Facility: CLINIC | Age: 56
End: 2021-08-05
Attending: INTERNAL MEDICINE
Payer: COMMERCIAL

## 2021-08-05 LAB
BACTERIA BLD CULT: NO GROWTH
BACTERIA BLD CULT: NO GROWTH

## 2021-08-05 PROCEDURE — 77080 DXA BONE DENSITY AXIAL: CPT | Performed by: RADIOLOGY

## 2021-08-10 ENCOUNTER — OFFICE VISIT (OUTPATIENT)
Dept: RADIATION ONCOLOGY | Facility: CLINIC | Age: 56
End: 2021-08-10
Payer: COMMERCIAL

## 2021-08-10 VITALS
TEMPERATURE: 98.1 F | BODY MASS INDEX: 20.99 KG/M2 | OXYGEN SATURATION: 97 % | DIASTOLIC BLOOD PRESSURE: 66 MMHG | HEART RATE: 68 BPM | SYSTOLIC BLOOD PRESSURE: 103 MMHG | RESPIRATION RATE: 16 BRPM | WEIGHT: 107.5 LBS

## 2021-08-10 DIAGNOSIS — Z17.0 MALIGNANT NEOPLASM OF LOWER-OUTER QUADRANT OF RIGHT BREAST OF FEMALE, ESTROGEN RECEPTOR POSITIVE (H): Primary | ICD-10-CM

## 2021-08-10 DIAGNOSIS — C50.511 MALIGNANT NEOPLASM OF LOWER-OUTER QUADRANT OF RIGHT BREAST OF FEMALE, ESTROGEN RECEPTOR POSITIVE (H): Primary | ICD-10-CM

## 2021-08-10 PROCEDURE — 99213 OFFICE O/P EST LOW 20 MIN: CPT | Performed by: RADIOLOGY

## 2021-08-10 ASSESSMENT — PAIN SCALES - GENERAL: PAINLEVEL: NO PAIN (0)

## 2021-08-10 NOTE — LETTER
8/10/2021         RE: Maribel June  [address]  [facility] [address]        Dear Colleague,    Thank you for referring your patient, Maribel June, to the [facility] RADIATION ONCOLOGY [place]. Please see a copy of my visit note below.    Dear Colleagues,  Today Maribel June was seen in follow up      IDENTIFICATION: This is a 56 year old woman with right LOQ breast G1 IDCA with G2 DCIS status post lumpectomy and SLNBx, revealing [id number], ER+/KS+/H2N- disease who completed adjuvant radiation therapy on 21. She will not be receiving chemotherapy.                      INTERVAL HISTORY:  Maribel June was last seen in our clinic during her last week of treatment. While on treatment she had complaints of mild fatigue which was relieved by rest (grade 1) and developed diffuse skin erythema (grade 1).  Post treatment she states that all of her acute skin symptoms have resolved. However she has had right shoulder joint discomfort/frozen shoulder and has been working with PT.  She recently received a steroid injection last month and has noted much improvement in mobility and discomfort. She is also followed by lymphedema clinic.  She returns today in follow up. Specifically denies n/v/ha/sob/cp/new complaints.  REVIEW OF SYSTEMS: As per HPI, a 14-point review of systems is otherwise negative.     Past Medical History:   Diagnosis Date     Depressive disorder      Endometriosis      Herpes genitalis in women      History of major depression     situational with first .      Kidney stone      Malignant neoplasm of right breast in female, estrogen receptor positive (H) 2020     S/P radiation therapy     5,256 cGy to right breast completed 2020 - [facility]       Past Surgical History:   Procedure Laterality Date     BREAST BIOPSY, RT/LT Right 2020     BREAST SURGERY Right     Right Breast - Benign      SECTION      x1      COLONOSCOPY N/A 08/16/2016    Procedure: COMBINED COLONOSCOPY, SINGLE OR MULTIPLE BIOPSY/POLYPECTOMY BY BIOPSY;  Surgeon: Duane, William Charles, MD;  Location: [facility] OR     COLONOSCOPY WITH CO2 INSUFFLATION N/A 08/16/2016    Procedure: COLONOSCOPY WITH CO2 INSUFFLATION;  Surgeon: Duane, William Charles, MD;  Location: [facility] OR     CYSTOSCOPY  11/13/2009    left stent placement (C-ARM)     GENITOURINARY SURGERY      surgery for kidney stone     GYN SURGERY      Partial Hysterectomy at age 29     LUMPECTOMY BREAST WITH SENTINEL NODE, COMBINED Right 09/29/2020    Procedure: Right breast lumpectomy with [place] node biopsy;  Surgeon: Jose Watson MD;  Location: [facility] OR       Family History   Problem Relation Age of Onset     Hypertension Father      Kidney failure Father      Aortic aneurysm Father      Prostate Cancer Father      Stomach Cancer Maternal Grandfather      Heart Disease Paternal Grandfather      Hypertension Paternal Grandfather      Neurologic Disorder Brother         has seizures from Epilepsy     Asthma Mother      Hypertension Mother      Arthritis Mother      Hypertension Brother      Lymphoma Maternal Uncle      Cancer Maternal Uncle         Cholangiocarcinoma     Breast Cancer Paternal Aunt      Breast Cancer Cousin         Paternal 1st Female Cousin (daughter of paternal aunt with breast cancer)       Social History     Tobacco Use     Smoking status: Former Smoker     Smokeless tobacco: Never Used     Tobacco comment: social use in college   Substance Use Topics     Alcohol use: Not Currently       Current Outpatient Medications   Medication     amLODIPine (NORVASC) 5 MG tablet     bimatoprost (LUMIGAN) 0.01 % SOLN     Multiple Vitamin (MULTIVITAMIN PO)     Omega-3 Fatty Acids (OMEGA 3 PO)     tamoxifen (NOLVADEX) 20 MG tablet     traZODone (DESYREL) 50 MG tablet     acyclovir (ZOVIRAX) 400 MG tablet     ondansetron (ZOFRAN) 8 MG tablet     prochlorperazine (COMPAZINE) 5 MG tablet     No current  facility-administered medications for this visit.          Allergies   Allergen Reactions     Flagyl [Metronidazole] Nausea and Vomiting     Lisinopril      Other reaction(s): Headaches     Oxycodone-Acetaminophen Nausea and Vomiting     States Oxycodone is fine     Sulfamethoxazole-Trimethoprim      Other reaction(s): Headache     Zithromax [Azithromycin Dihydrate] Rash        PHYSICAL EXAMINATION:  /66 (BP Location: Left arm, Patient Position: Sitting, Cuff Size: Adult Regular)   Pulse 68   Temp 98.1  F (36.7  C) (Oral)   Resp 16   Wt 48.8 kg (107 lb 8 oz)   SpO2 97%   BMI 20.99 kg/m    GENERAL Well-appearing woman in no acute distress.  HEENT Normocephalic, atraumatic.  Sclerae anicteric.  CVR  Regular rate and rhythm.  No murmurs, rubs, or gallops.  LUNGS Clear to auscultation bilaterally.  BREASTS Breasts are examined in the supine position.  Dry skin noted in prior treatment field.  No nodularity erythema or desquamation. Scar well healed.  LYMPH no supraclavicular, infraclavicular, or axillary lymphadenopathy appreciated bilaterally  EXT  No clubbing, cyanosis or edema.    MSK  improved right arm range of motion with patient able to abduct more as compared to her visit with me 4 months ago  NEURO Gait within normal limits.  SKIN  Warm and well perfused.  See breast exam  PSYCH:         Orientation: Alert, attentive, and oriented to time, place, and person                         Affect: Affect was appropriate to the situation and showed normal range and stability. No anxious mood                         Speech: Speech was clear with normal fluency, rate, tone, and volume.                         Memory: Although not formally assessed, immediate, recent, and remote memory appeared to be intact.                          Insight and Judgement:  demonstrates adequate awareness of the issues discussed.                         Thought: Thought patterns were coherent and logical.      IMAGING: June 2,   bilateral diagnostic mammogram showed breast conservation changes but no suspicious finding.  2021 right targeted ultrasound was completed for right breast pain showed no suspicious sonographic findings.      IMPRESSION/PLAN:  Maribel June is 55 year old woman with right LOQ breast G1 IDCA with G2 DCIS status post lumpectomy and SLNBx, revealing [id number], ER+/NM+/H2N- disease who completed adjuvant radiation therapy on 21. She will not be receiving chemotherapy. She is currently on tamoxifen. Her joint stiffness within the right shoulder joint has been improving with PT.  This is likely multifactorial in that patient had surgery within her axilla severing nerve fibers, adjuvant radiation affected the shoulder joint, and she has generalized joint aches secondary to hormonal therapy.  I do not note any right arm lymphedema today however patient is wearing a sleeve.  It may be that the lymphedema is caused by decreased mobility of the right arm. She also has some residual skin dryness within the treatment field, which is to be expected.   Recommend the followin. Continue to use sunblock and moisturizer in the previously treated area generously.  Continue with [facility] Rehab at the [facility].    2. Follow up with Rad Onc prn.  3. Follow-up with lymphedema as previously directed.  4. Currently on Rees. Follow up with Med Onc for management regarding hormonal therapy and joint aches. Scans per Med Onc.  5. Follow up with Surgery as previously directed.    There was ample time for questions and all were answered to the patient's satisfaction. Thank you for allowing me to participate in the care of this pleasant patient. If you have any questions, please do not hesitate to contact my office.      Sincerely,  Robby Weaver MD  Adjunct   Dept of Radiation Oncology  [facility]        Again, thank you for allowing me to participate in the care of your patient.         Sincerely,        RENNY Weaver MD

## 2021-08-10 NOTE — NURSING NOTE
RADIATION ONCOLOGY BREAST FOLLOW-UP VISIT    Patient Name: Maribel June      : 1965     Age: 56 year old        ______________________________________________________________________________       Chief Complaint   Patient presents with     Radiation Therapy     Return appointment with Dr. Weaver     /66 (BP Location: Left arm, Patient Position: Sitting, Cuff Size: Adult Regular)   Pulse 68   Temp 98.1  F (36.7  C) (Oral)   Resp 16   Wt 48.8 kg (107 lb 8 oz)   SpO2 97%   BMI 20.99 kg/m        History of Radiation Treatment:  Site: R breast  Total Dose: 5,256 cGy  Date Completed: 2020  Clinic: [facility]  Physician: Dr. Weaver      Pain:  Denies    Labs:  Other Labs: No    Imaging:  Mammogram: 2021  US R breast: 2021    Fatigue:   Grade 1: Fatigue relieved by rest    Skin:  No Concerns  Lotions/Creams: daily Jergens moisturizer    Range of Motion:   Concerns (explain) - Patient following with PT and Dr. Jeanne Jack for R shoulder pain, had injection last week. Next PT appointment Monday, 2021.    Lymphedema:  No Concerns  Referral Needed: No      Medical Oncologist: Dr. Eli    Endocrine Therapy: Tamoxifen       Future Appointments:      Dr. Jeanne Jack  Appointment Date: 10/8/2021   Dr. Eli  Appointment Date: 2021    Appointment Date:        Additional Instructions: Follow up with Rad Onc per Dr. Weaver.    Nurse face-to-face time: Level 3:  10 min face to face time    Mallorie Manuel RN

## 2021-08-10 NOTE — PROGRESS NOTES
Dear Colleagues,  Today Maribel June was seen in follow up      IDENTIFICATION: This is a 56 year old woman with right LOQ breast G1 IDCA with G2 DCIS status post lumpectomy and SLNBx, revealing [id number], ER+/MN+/H2N- disease who completed adjuvant radiation therapy on 21. She will not be receiving chemotherapy.                      INTERVAL HISTORY:  Maribel June was last seen in our clinic during her last week of treatment. While on treatment she had complaints of mild fatigue which was relieved by rest (grade 1) and developed diffuse skin erythema (grade 1).  Post treatment she states that all of her acute skin symptoms have resolved. However she has had right shoulder joint discomfort/frozen shoulder and has been working with PT.  She recently received a steroid injection last month and has noted much improvement in mobility and discomfort. She is also followed by lymphedema clinic.  She returns today in follow up. Specifically denies n/v/ha/sob/cp/new complaints.  REVIEW OF SYSTEMS: As per HPI, a 14-point review of systems is otherwise negative.     Past Medical History:   Diagnosis Date     Depressive disorder      Endometriosis      Herpes genitalis in women      History of major depression     situational with first .      Kidney stone      Malignant neoplasm of right breast in female, estrogen receptor positive (H) 2020     S/P radiation therapy     5,256 cGy to right breast completed 2020 - [facility]       Past Surgical History:   Procedure Laterality Date     BREAST BIOPSY, RT/LT Right 2020     BREAST SURGERY Right     Right Breast - Benign      SECTION      x1     COLONOSCOPY N/A 2016    Procedure: COMBINED COLONOSCOPY, SINGLE OR MULTIPLE BIOPSY/POLYPECTOMY BY BIOPSY;  Surgeon: Duane, William Charles, MD;  Location: [facility] OR     COLONOSCOPY WITH CO2 INSUFFLATION N/A 2016    Procedure: COLONOSCOPY WITH CO2  INSUFFLATION;  Surgeon: Duane, William Charles, MD;  Location: [facility] OR     CYSTOSCOPY  11/13/2009    left stent placement (C-ARM)     GENITOURINARY SURGERY      surgery for kidney stone     GYN SURGERY      Partial Hysterectomy at age 29     LUMPECTOMY BREAST WITH SENTINEL NODE, COMBINED Right 09/29/2020    Procedure: Right breast lumpectomy with [place] node biopsy;  Surgeon: Jose Watson MD;  Location: [facility] OR       Family History   Problem Relation Age of Onset     Hypertension Father      Kidney failure Father      Aortic aneurysm Father      Prostate Cancer Father      Stomach Cancer Maternal Grandfather      Heart Disease Paternal Grandfather      Hypertension Paternal Grandfather      Neurologic Disorder Brother         has seizures from Epilepsy     Asthma Mother      Hypertension Mother      Arthritis Mother      Hypertension Brother      Lymphoma Maternal Uncle      Cancer Maternal Uncle         Cholangiocarcinoma     Breast Cancer Paternal Aunt      Breast Cancer Cousin         Paternal 1st Female Cousin (daughter of paternal aunt with breast cancer)       Social History     Tobacco Use     Smoking status: Former Smoker     Smokeless tobacco: Never Used     Tobacco comment: social use in college   Substance Use Topics     Alcohol use: Not Currently       Current Outpatient Medications   Medication     amLODIPine (NORVASC) 5 MG tablet     bimatoprost (LUMIGAN) 0.01 % SOLN     Multiple Vitamin (MULTIVITAMIN PO)     Omega-3 Fatty Acids (OMEGA 3 PO)     tamoxifen (NOLVADEX) 20 MG tablet     traZODone (DESYREL) 50 MG tablet     acyclovir (ZOVIRAX) 400 MG tablet     ondansetron (ZOFRAN) 8 MG tablet     prochlorperazine (COMPAZINE) 5 MG tablet     No current facility-administered medications for this visit.          Allergies   Allergen Reactions     Flagyl [Metronidazole] Nausea and Vomiting     Lisinopril      Other reaction(s): Headaches     Oxycodone-Acetaminophen Nausea and Vomiting     States Oxycodone is  fine     Sulfamethoxazole-Trimethoprim      Other reaction(s): Headache     Zithromax [Azithromycin Dihydrate] Rash        PHYSICAL EXAMINATION:  /66 (BP Location: Left arm, Patient Position: Sitting, Cuff Size: Adult Regular)   Pulse 68   Temp 98.1  F (36.7  C) (Oral)   Resp 16   Wt 48.8 kg (107 lb 8 oz)   SpO2 97%   BMI 20.99 kg/m    GENERAL Well-appearing woman in no acute distress.  HEENT Normocephalic, atraumatic.  Sclerae anicteric.  CVR  Regular rate and rhythm.  No murmurs, rubs, or gallops.  LUNGS Clear to auscultation bilaterally.  BREASTS Breasts are examined in the supine position.  Dry skin noted in prior treatment field.  No nodularity erythema or desquamation. Scar well healed.  LYMPH no supraclavicular, infraclavicular, or axillary lymphadenopathy appreciated bilaterally  EXT  No clubbing, cyanosis or edema.    MSK  improved right arm range of motion with patient able to abduct more as compared to her visit with me 4 months ago  NEURO Gait within normal limits.  SKIN  Warm and well perfused.  See breast exam  PSYCH:         Orientation: Alert, attentive, and oriented to time, place, and person                         Affect: Affect was appropriate to the situation and showed normal range and stability. No anxious mood                         Speech: Speech was clear with normal fluency, rate, tone, and volume.                         Memory: Although not formally assessed, immediate, recent, and remote memory appeared to be intact.                          Insight and Judgement:  demonstrates adequate awareness of the issues discussed.                         Thought: Thought patterns were coherent and logical.      IMAGING: June 2, 2021 bilateral diagnostic mammogram showed breast conservation changes but no suspicious finding.  June 7, 2021 right targeted ultrasound was completed for right breast pain showed no suspicious sonographic findings.      IMPRESSION/PLAN:  Maribel June  is 55 year old woman with right LOQ breast G1 IDCA with G2 DCIS status post lumpectomy and SLNBx, revealing [id number], ER+/CT+/H2N- disease who completed adjuvant radiation therapy on 21. She will not be receiving chemotherapy. She is currently on tamoxifen. Her joint stiffness within the right shoulder joint has been improving with PT.  This is likely multifactorial in that patient had surgery within her axilla severing nerve fibers, adjuvant radiation affected the shoulder joint, and she has generalized joint aches secondary to hormonal therapy.  I do not note any right arm lymphedema today however patient is wearing a sleeve.  It may be that the lymphedema is caused by decreased mobility of the right arm. She also has some residual skin dryness within the treatment field, which is to be expected.   Recommend the followin. Continue to use sunblock and moisturizer in the previously treated area generously.  Continue with cancer Rehab at the [facility].    2. Follow up with Rad Onc prn.  3. Follow-up with lymphedema as previously directed.  4. Currently on Rees. Follow up with Med Onc for management regarding hormonal therapy and joint aches. Scans per Med Onc.  5. Follow up with Surgery as previously directed.    There was ample time for questions and all were answered to the patient's satisfaction. Thank you for allowing me to participate in the care of this pleasant patient. If you have any questions, please do not hesitate to contact my office.      Sincerely,  Robby Weaver MD  Adjunct   Dept of Radiation Oncology  [facility]

## 2021-08-10 NOTE — PATIENT INSTRUCTIONS
Please contact [facility] RN with questions or concerns following today's appointment: [phone].    Thank you!

## 2021-08-16 ENCOUNTER — THERAPY VISIT (OUTPATIENT)
Dept: PHYSICAL THERAPY | Facility: CLINIC | Age: 56
End: 2021-08-16
Payer: COMMERCIAL

## 2021-08-16 ENCOUNTER — OFFICE VISIT (OUTPATIENT)
Dept: OBGYN | Facility: CLINIC | Age: 56
End: 2021-08-16
Payer: COMMERCIAL

## 2021-08-16 VITALS
SYSTOLIC BLOOD PRESSURE: 107 MMHG | HEART RATE: 66 BPM | BODY MASS INDEX: 20.67 KG/M2 | OXYGEN SATURATION: 97 % | HEIGHT: 60 IN | WEIGHT: 105.3 LBS | DIASTOLIC BLOOD PRESSURE: 68 MMHG

## 2021-08-16 DIAGNOSIS — Z00.00 ANNUAL PHYSICAL EXAM: Primary | ICD-10-CM

## 2021-08-16 DIAGNOSIS — Z13.29 SCREENING FOR THYROID DISORDER: ICD-10-CM

## 2021-08-16 DIAGNOSIS — Z00.00 ROUTINE GENERAL MEDICAL EXAMINATION AT A HEALTH CARE FACILITY: ICD-10-CM

## 2021-08-16 DIAGNOSIS — Z13.220 LIPID SCREENING: ICD-10-CM

## 2021-08-16 DIAGNOSIS — D22.9 NUMEROUS MOLES: ICD-10-CM

## 2021-08-16 DIAGNOSIS — Z13.1 SCREENING FOR DIABETES MELLITUS: ICD-10-CM

## 2021-08-16 DIAGNOSIS — Z85.3 PERSONAL HISTORY OF MALIGNANT NEOPLASM OF BREAST: ICD-10-CM

## 2021-08-16 DIAGNOSIS — M25.511 ACUTE PAIN OF RIGHT SHOULDER: Primary | ICD-10-CM

## 2021-08-16 DIAGNOSIS — B00.9 HERPES SIMPLEX VIRUS INFECTION: ICD-10-CM

## 2021-08-16 DIAGNOSIS — M85.80 OSTEOPENIA, UNSPECIFIED LOCATION: ICD-10-CM

## 2021-08-16 PROCEDURE — 99396 PREV VISIT EST AGE 40-64: CPT | Performed by: OBSTETRICS & GYNECOLOGY

## 2021-08-16 PROCEDURE — 97110 THERAPEUTIC EXERCISES: CPT | Mod: GP | Performed by: PHYSICAL THERAPIST

## 2021-08-16 PROCEDURE — 97161 PT EVAL LOW COMPLEX 20 MIN: CPT | Mod: GP | Performed by: PHYSICAL THERAPIST

## 2021-08-16 PROCEDURE — 97112 NEUROMUSCULAR REEDUCATION: CPT | Mod: GP | Performed by: PHYSICAL THERAPIST

## 2021-08-16 RX ORDER — ACYCLOVIR 400 MG/1
400 TABLET ORAL EVERY 8 HOURS
Qty: 15 TABLET | Refills: 11 | Status: SHIPPED | OUTPATIENT
Start: 2021-08-16 | End: 2022-09-19

## 2021-08-16 ASSESSMENT — MIFFLIN-ST. JEOR: SCORE: 988.52

## 2021-08-16 ASSESSMENT — PAIN SCALES - GENERAL: PAINLEVEL: NO PAIN (0)
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
## 2021-08-16 NOTE — PATIENT INSTRUCTIONS
If you have any questions regarding your visit, Please contact your care team.    [facility]: [phone]      Women s Health CLINIC HOURS TELEPHONE NUMBER   Laurita Damon DO.    Paulina -  Shira -     DONALD Amador RN     Monday, Wednesday, Thursday and Friday[facility]  8:30a.m-5:00 p.m   [facility]  [address] [place] [address]  [phone] ask for [facility]    Imaging [phone]       Urgent Care locations:    [facility] Saturday and Sunday   9 am - 5 pm    Monday-Friday   11 pm - 7 pm  Saturday and Sunday   9 am - 5 pm   [phone]     [facility]:  [phone]    If you need a medication refill, please contact your pharmacy. Please allow 3 business days for your refill to be completed.  As always, Thank you for trusting us with your healthcare needs!    Preventive Health Recommendations  Female Ages 50 - 64    Yearly exam: See your health care provider every year in order to  o Review health changes.   o Discuss preventive care.    o Review your medicines if your doctor has prescribed any.      Get a Pap test every three years (unless you have an abnormal result and your provider advises testing more often).    If you get Pap tests with HPV test, you only need to test every 5 years, unless you have an abnormal result.     You do not need a Pap test if your uterus was removed (hysterectomy) and you have not had cancer.    You should be tested each year for STDs (sexually transmitted diseases) if you're at risk.     Have a mammogram every 1 to 2 years.    Have a colonoscopy at age 50, or have a yearly FIT test (stool test). These exams screen for colon cancer.      Have a cholesterol test every 5 years, or more often if advised.    Have a diabetes test (fasting glucose) every three  years. If you are at risk for diabetes, you should have this test more often.     If you are at risk for osteoporosis (brittle bone disease), think about having a bone density scan (DEXA).    Shots: Get a flu shot each year. Get a tetanus shot every 10 years.    Nutrition:     Eat at least 5 servings of fruits and vegetables each day.    Eat whole-grain bread, whole-wheat pasta and brown rice instead of white grains and rice.    Get adequate Calcium and Vitamin D.     Lifestyle    Exercise at least 150 minutes a week (30 minutes a day, 5 days a week). This will help you control your weight and prevent disease.    Limit alcohol to one drink per day.    No smoking.     Wear sunscreen to prevent skin cancer.     See your dentist every six months for an exam and cleaning.    See your eye doctor every 1 to 2 years.

## 2021-08-16 NOTE — PROGRESS NOTES
Physical Therapy Initial Evaluation  Subjective:    Patient Health History  Maribel June being seen for R shoulder pain.     Problem began: 4/1/2021.   Problem occurred: Radiation treatment   Pain is reported as 2/10 on pain scale.  General health as reported by patient is good.  Pertinent medical history includes: cancer and high blood pressure.   Red flags:  None as reported by patient.  Medical allergies: none.   Surgeries include:  Cancer surgery.    Current medications:  Bone density, high blood pressure medication and other. Other medications details: Hormonal therapy for breast cancer.    Current occupation is Teacher.   Primary job tasks include:  Computer work and prolonged standing.                  Therapist Generated HPI Evaluation  Problem details: Pt presents to clinic with ongoing complaints of R shoulder pain following radiation treatment for breast cancer. Pt had steroid injection on 7/15/2021 with moderate improvement in overall pain. Pt continues to have increased stiffness reaching overhead, out to the side and behind back. Pt had MD appointment on 7/24/2021 and referred to PT. .         Type of problem:  Right shoulder.    This is a recurrent condition.  Condition occurred with:  Unknown cause.  Where condition occurred: for unknown reasons.  Patient reports pain:  Anterior, in the joint and lateral.  Pain is described as aching and is intermittent.  Radiates to: none. Pain is the same all the time.  Since onset symptoms are gradually improving.  Associated symptoms:  Loss of motion/stiffness. Symptoms are exacerbated by certain positions, lying on extremity, lifting, using arm at shoulder level and using arm behind back  and relieved by activity/movement and NSAID's.  Special tests included:  MRI and x-ray.  Previous treatment includes physical therapy. There was mild improvement following previous treatment.  Restrictions due to condition include:  Working in normal job without  restrictions.  Barriers include:  None as reported by patient.                        Objective:  Standing Alignment:    Cervical/Thoracic:  Forward head  Shoulder/UE:  Rounded shoulders                                       Shoulder Evaluation:  ROM:  AROM:    Flexion:  Left:  160    Right:  125    Abduction:  Left: 160   Right:  125      External Rotation:  Left:  80    Right:  55            Extension/Internal Rotation:  Left:  T5    Right:  T10    PROM:    Flexion:  Right: 135      Abduction:  Right:  130                              Special Tests:      Right shoulder positive for the following special tests:Impingement  Palpation:      Right shoulder tenderness present at: Upper Trap and Bicipital Groove  Mobility Tests:    Glenohumeral anterior right:  Hypomobile    Glenohumeral inferior right:  Hypomobile                                             General     ROS    Assessment/Plan:    Patient is a 56 year old female with right side shoulder complaints.    Patient has the following significant findings with corresponding treatment plan.                Diagnosis 1:  R shoulder pain  Pain -  hot/cold therapy, manual therapy, self management, education and home program  Decreased ROM/flexibility - manual therapy, therapeutic exercise, therapeutic activity and home program  Decreased strength - therapeutic exercise, therapeutic activities and home program  Impaired muscle performance - neuro re-education and home program  Decreased function - therapeutic activities and home program  Impaired posture - neuro re-education, therapeutic activities and home program    Therapy Evaluation Codes:   1) History comprised of:   Personal factors that impact the plan of care:      None.    Comorbidity factors that impact the plan of care are:      Cancer and High blood pressure.     Medications impacting care: Bone density and High blood pressure.  2) Examination of Body Systems comprised of:   Body structures and functions  that impact the plan of care:      Shoulder.   Activity limitations that impact the plan of care are:      Dressing, Grasping, Lifting and Laying down.  3) Clinical presentation characteristics are:   Stable/Uncomplicated.  4) Decision-Making    Low complexity using standardized patient assessment instrument and/or measureable assessment of functional outcome.  Cumulative Therapy Evaluation is: Low complexity.    Previous and current functional limitations:  (See Goal Flow Sheet for this information)    Short term and Long term goals: (See Goal Flow Sheet for this information)     Communication ability:  Patient appears to be able to clearly communicate and understand verbal and written communication and follow directions correctly.  Treatment Explanation - The following has been discussed with the patient:   RX ordered/plan of care  Anticipated outcomes  Possible risks and side effects  This patient would benefit from PT intervention to resume normal activities.   Rehab potential is good.    Frequency:  1 X week, once daily  Duration:  for 8 weeks  Discharge Plan:  Achieve all LTG.  Independent in home treatment program.  Return to previous functional level by discharge.  Reach maximal therapeutic benefit.    Please refer to the daily flowsheet for treatment today, total treatment time and time spent performing 1:1 timed codes.

## 2021-08-17 ENCOUNTER — LAB (OUTPATIENT)
Dept: LAB | Facility: CLINIC | Age: 56
End: 2021-08-17
Payer: COMMERCIAL

## 2021-08-17 DIAGNOSIS — Z13.1 SCREENING FOR DIABETES MELLITUS: ICD-10-CM

## 2021-08-17 DIAGNOSIS — Z13.220 LIPID SCREENING: ICD-10-CM

## 2021-08-17 DIAGNOSIS — Z13.29 SCREENING FOR THYROID DISORDER: ICD-10-CM

## 2021-08-17 LAB
CHOLEST SERPL-MCNC: 230 MG/DL
FASTING STATUS PATIENT QL REPORTED: YES
FASTING STATUS PATIENT QL REPORTED: YES
GLUCOSE BLD-MCNC: 99 MG/DL (ref 70–99)
HDLC SERPL-MCNC: 68 MG/DL
LDLC SERPL CALC-MCNC: 134 MG/DL
NONHDLC SERPL-MCNC: 162 MG/DL
TRIGL SERPL-MCNC: 140 MG/DL
TSH SERPL DL<=0.005 MIU/L-ACNC: 2.49 MU/L (ref 0.4–4)

## 2021-08-17 PROCEDURE — 80061 LIPID PANEL: CPT

## 2021-08-17 PROCEDURE — 84443 ASSAY THYROID STIM HORMONE: CPT

## 2021-08-17 PROCEDURE — 82947 ASSAY GLUCOSE BLOOD QUANT: CPT

## 2021-08-17 PROCEDURE — 36415 COLL VENOUS BLD VENIPUNCTURE: CPT

## 2021-08-26 ENCOUNTER — THERAPY VISIT (OUTPATIENT)
Dept: PHYSICAL THERAPY | Facility: CLINIC | Age: 56
End: 2021-08-26
Payer: COMMERCIAL

## 2021-08-26 DIAGNOSIS — M25.511 ACUTE PAIN OF RIGHT SHOULDER: Primary | ICD-10-CM

## 2021-08-26 PROCEDURE — 97110 THERAPEUTIC EXERCISES: CPT | Mod: GP | Performed by: PHYSICAL THERAPIST

## 2021-08-26 PROCEDURE — 97140 MANUAL THERAPY 1/> REGIONS: CPT | Mod: GP | Performed by: PHYSICAL THERAPIST

## 2021-09-03 ENCOUNTER — THERAPY VISIT (OUTPATIENT)
Dept: PHYSICAL THERAPY | Facility: CLINIC | Age: 56
End: 2021-09-03
Payer: COMMERCIAL

## 2021-09-03 DIAGNOSIS — M25.511 ACUTE PAIN OF RIGHT SHOULDER: Primary | ICD-10-CM

## 2021-09-03 PROCEDURE — 97110 THERAPEUTIC EXERCISES: CPT | Mod: GP | Performed by: PHYSICAL THERAPIST

## 2021-09-03 PROCEDURE — 97140 MANUAL THERAPY 1/> REGIONS: CPT | Mod: GP | Performed by: PHYSICAL THERAPIST

## 2021-09-09 ENCOUNTER — THERAPY VISIT (OUTPATIENT)
Dept: PHYSICAL THERAPY | Facility: CLINIC | Age: 56
End: 2021-09-09
Payer: COMMERCIAL

## 2021-09-09 DIAGNOSIS — M25.511 ACUTE PAIN OF RIGHT SHOULDER: Primary | ICD-10-CM

## 2021-09-09 PROCEDURE — 97140 MANUAL THERAPY 1/> REGIONS: CPT | Mod: GP | Performed by: PHYSICAL THERAPIST

## 2021-09-09 PROCEDURE — 97110 THERAPEUTIC EXERCISES: CPT | Mod: GP | Performed by: PHYSICAL THERAPIST

## 2021-09-12 NOTE — PROGRESS NOTES
Oncology Visit:  Date on this visit: 9/13/2021    Diagnosis: Stage Ia, [id number], grade 1, ER positive, CA positive, HER-2 negative invasive carcinoma of the right breast. Oncotype dx recurrence score = 16.    Primary Physician: Juwan Perry     History Of Present Illness:  Ms. June is a 56 year old female with right breast cancer.  Routine screening mammogram on 8/6/2020 showed heterogeneously dense breasts but no concerning findings.  A physician then palpated a mass in the right breast.  Diagnostic mammogram and ultrasound showed a 2.2 cm mass at 8:00, 5 cm from the nipple.  Right breast biopsy showed a grade 1 invasive mammary carcinoma, ER strong in 100%, CA strong in 100%, HER2 negative.  She had a right breast lumpectomy and sentinel lymph node procedure with Dr. Watson on 9/29/2020.  Pathology showed a grade 1 invasive mammary carcinoma measuring 1.6 cm.  There was associated intermediate grade DCIS.  Surgical margins were negative.  A single lymph node was benign.  Oncotype dx recurrence score was 16.  She completed radiation to the right breast, a total of 5256 cGy in 20 fractions, on 12/21/2020.  She started treatment with letrozole in 1/2021.  The medication was poorly tolerated with arthralgias, insomnia, vaginal dryness and fatigue.  Treatment was changed to Tamoxifen in 03/2021.  This caused nausea and so was dose reduced to 10 mg daily in 05/2021.  She received a dose of Zometa 7/29/2021 with subsequent fever.    Interval History:  Ms. June comes into clinic today for routine breast cancer follow-up.  Her  was at the visit as well.  She is currently on treatment with Tamoxifen.  Since last visit she resumed the 20 mg dose.  She has not had return of nausea with this dose.  Most bothersome are fatigue and insomnia.  She states she has insomnia most nights.  She is taking trazodone 50 mg at night and with this is able to sleep approximately 7 to 8 hours.  Despite this, at the end of a  workday she feels very fatigued.  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  When she does gets fatigued at the end of the day occasionally she will experience dizziness as well.  She admits that she is likely not drinking enough water during the day.      She reports swelling and firmness of the lateral and inferior right breast.  She tries to wear compression bra but it is hard to compress the area.  She also has ongoing tightness in her right upper extremity.  She has not noted necessarily swelling only tightness and firmness.  She has been diligent about wearing her compression sleeve and is wondering if it would be okay to go periods of time without wearing it.  She denies current cough, shortness of breath, or chest pain.  She has no current abdominal complaints.  She denies significant joint stiffness.  She has intermittent hot flashes and states that they are tolerable.  She has no headaches, visual changes, or focal neurologic complaints.  The remainder of a complete 12 point review of systems was reviewed with patient was negative with exception that mentioned above.  Of note, she has a number of upcoming trips.  She has a school trip to [facility] planned in 2022.  In March, they plan to visit [facility].  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    Past Medical/Surgical History:  Past Medical History:   Diagnosis Date     Depressive disorder      Endometriosis      Herpes genitalis in women      History of major depression     situational with first .      Kidney stone      Malignant neoplasm of right breast in female, estrogen receptor positive (H) 2020     S/P radiation therapy     5,256 cGy to right breast completed 2020 - [facility]   - Hyperlipidemia.  - Osteopenia    Past Surgical History:   Procedure Laterality Date     BREAST BIOPSY, RT/LT Right 2020     BREAST SURGERY Right     Right Breast - Benign       SECTION      x1     COLONOSCOPY N/A 08/16/2016    Procedure: COMBINED COLONOSCOPY, SINGLE OR MULTIPLE BIOPSY/POLYPECTOMY BY BIOPSY;  Surgeon: Duane, William Charles, MD;  Location: [facility] OR     COLONOSCOPY WITH CO2 INSUFFLATION N/A 08/16/2016    Procedure: COLONOSCOPY WITH CO2 INSUFFLATION;  Surgeon: Duane, William Charles, MD;  Location: [facility] OR     CYSTOSCOPY  11/13/2009    left stent placement (C-ARM)     GENITOURINARY SURGERY      surgery for kidney stone     GYN SURGERY      Partial Hysterectomy at age 29     LUMPECTOMY BREAST WITH SENTINEL NODE, COMBINED Right 09/29/2020    Procedure: Right breast lumpectomy with [place] node biopsy;  Surgeon: Jose Watson MD;  Location: [facility] OR     Allergies:  Allergies as of 09/13/2021 - Reviewed 08/16/2021   Allergen Reaction Noted     Flagyl [metronidazole] Nausea and Vomiting 07/30/2015     Lisinopril  06/13/2019     Oxycodone-acetaminophen Nausea and Vomiting 06/13/2019     Sulfamethoxazole-trimethoprim  12/29/2011     Zithromax [azithromycin dihydrate] Rash 07/03/2013     Current Medications:  Current Outpatient Medications   Medication Sig Dispense Refill     acyclovir (ZOVIRAX) 400 MG tablet Take 1 tablet (400 mg) by mouth every 8 hours for 5 days 15 tablet 11     acyclovir (ZOVIRAX) 400 MG tablet Take 1 tablet (400 mg) by mouth every 8 hours for 5 days (Patient not taking: Reported on 1/11/2021) 15 tablet 11     amLODIPine (NORVASC) 5 MG tablet Take 1 tablet by mouth       bimatoprost (LUMIGAN) 0.01 % SOLN Place 1 drop into both eyes daily        Multiple Vitamin (MULTIVITAMIN PO) Take by mouth daily       Omega-3 Fatty Acids (OMEGA 3 PO) Take by mouth daily       ondansetron (ZOFRAN) 8 MG tablet Take 1 tablet (8 mg) by mouth every 8 hours as needed for nausea 30 tablet 1     prochlorperazine (COMPAZINE) 5 MG tablet Take 1 tablet (5 mg) by mouth every 6 hours as needed for nausea or vomiting 20 tablet 0     tamoxifen (NOLVADEX) 20 MG tablet Take 1 tablet (20 mg) by  "mouth daily 90 tablet 0     traZODone (DESYREL) 50 MG tablet Take 1 tablet (50 mg) by mouth At Bedtime Take 1/2 pill (25 mg) for a few nights, if tolerable OK to take full pill for sleep 30 tablet 3      Family and Social History:  Please see initial Oncology consultation dated 10/27/2020 for details.  9/25/2020 Breast Actionable Panel was negative.    Physical Exam:  /79   Pulse 67   Temp 98.6  F (37  C)   Resp 16   Ht 1.515 m (4' 11.65\")   Wt 48.9 kg (107 lb 14.4 oz)   SpO2 98%   BMI 21.32 kg/m    General:  Well appearing adult female in NAD.  Alert and oriented.  HEENT:  Normocephalic.  Sclera anicteric.  MMM.  No lesions of the oropharynx.  Lymph:  No palpable cervical, supraclavicular, or axillary LAD.  Chest:  CTA bilaterally.  No wheezes or crackles.  CV:  RRR.  Nl S1 and S2.  No m/r/g.  Breast:  Bilateral breasts are of increased fibroglandular density.  There are no discretely palpable masses in either breast. Right breast incision is well healed without significant underlying fibrosis.  Bilateral nipples are everted.  Abd:  Soft/ND.   Ext:  No pitting edema of the bilateral lower extremities.   Musculo:  Full ROM of the bilateral upper extremities.  No obvious lymphedema on clinical exam.  Neuro:  Cranial nerves grossly intact.  Psych:  Mood and affect appear normal.    Laboratory/Imaging Studies  8/5/2021 DEXA bone density scan:  Lowest T-score = -2.0 (bilateral femoral neck)  T-score of -1.6 in the lumbar spine.    ASSESSMENT/PLAN:  Ms. June is a 57 yo female with a stage Ia, [id number], grade 1, ER positive, GA positive, HER2 negative invasive ductal carcinoma of the right breast.  She is s/p treatment with right breast lumpectomy and radiation.     1.  Right breast cancer:  She is nearly 1 year out from excision of a right breast cancer.  She is currently on treatment with Tamoxifen.  She is currently on the 20 mg dose.  She has fatigue and insomnia on the medication.  Despite this, she is " willing to continue it.    She is asymptomatic of disease recurrence on history taken today.  Given increased breast density and that her breast cancer was not initially seen on screening mammogram, we are performing high risk breast screening with both annual mammograms and breast MRI, spacing the two studies so that she is having some form of breast imaging once every 6 months.  Plan to obtain breast MRI at time of return visit in 12/2021.  She will be due for mammograms in 06/2022.      2.  Bone health:  DEXA in 08/2021 with a lowest T-score of -2.0 which is c/w osteopenia and encroaching on osteoporosis. Zometa for prevention of bone loss and also prevention of breast cancer bone metastases was started 7/29/2021.  She had fever following the first dose.  We discussed this type of symptom is most common on the first dose and less so on subsequent doses.  Next dose will be due late 01/2022.  If she experiences fever and body aches with the second dose, plan will be to change treatment to Prolia.    3.  Arthralgias:  Secondary to menopause and exacerbated by endocrine therapy.  Continue daily vitamin D supplementation and daily cardiovascular activity.    4.  Fatigue:  We discussed fatigue in the months after initial breast cancer treatment is normal and this should continue to improve with time.  Recommend daily exercise, eat a diet high in variety and try to obtain 7-9 hours of sleep per night.  Try to follow a schedule and avoid daytime naps.  Offered referral to cancer rehab, she declines at this time.    5.  Insomnia:  Okay to continue trazodone at bedtime prn.    6.  RUE/lateral chest wall/breast lymphedema and radiation changes:  Ongoing stretching, massage, and compression.  She will let me know if she needs another referral to lymphedema clinic.      7.  COVID vaccination:  Received second vaccination (Moderna) on 2/27/2021, of note had fever, chills, body aches, and injection site reaction.  She has  received a booster vaccination.  She has made a number of changes at work such as plexi glass around her desk as cautionary measures as well.    8. Follow Up:    Breast MRI and survivor clinic visit with Briana Burgos in 3 months.  Zometa infusion around 1/14/2022.  Visit with me in 6 months.    Ms. June had multiple questions which I answered to the best of my ability today.  40 minutes spent on the date of the encounter doing chart review, review of test results, interpretation of tests, patient visit, documentation and discussion with family.

## 2021-09-13 ENCOUNTER — ONCOLOGY VISIT (OUTPATIENT)
Dept: ONCOLOGY | Facility: CLINIC | Age: 56
End: 2021-09-13
Attending: INTERNAL MEDICINE
Payer: COMMERCIAL

## 2021-09-13 VITALS
WEIGHT: 107.9 LBS | HEART RATE: 67 BPM | TEMPERATURE: 98.6 F | OXYGEN SATURATION: 98 % | HEIGHT: 60 IN | BODY MASS INDEX: 21.18 KG/M2 | DIASTOLIC BLOOD PRESSURE: 79 MMHG | RESPIRATION RATE: 16 BRPM | SYSTOLIC BLOOD PRESSURE: 135 MMHG

## 2021-09-13 DIAGNOSIS — Z17.0 MALIGNANT NEOPLASM OF LOWER-OUTER QUADRANT OF RIGHT BREAST OF FEMALE, ESTROGEN RECEPTOR POSITIVE (H): Primary | ICD-10-CM

## 2021-09-13 DIAGNOSIS — C50.511 MALIGNANT NEOPLASM OF LOWER-OUTER QUADRANT OF RIGHT BREAST OF FEMALE, ESTROGEN RECEPTOR POSITIVE (H): Primary | ICD-10-CM

## 2021-09-13 DIAGNOSIS — T73.2XXD FATIGUE DUE TO EXPOSURE, SUBSEQUENT ENCOUNTER: ICD-10-CM

## 2021-09-13 DIAGNOSIS — Z12.39 BREAST CANCER SCREENING, HIGH RISK PATIENT: ICD-10-CM

## 2021-09-13 DIAGNOSIS — Z85.3 PERSONAL HISTORY OF MALIGNANT NEOPLASM OF BREAST: ICD-10-CM

## 2021-09-13 DIAGNOSIS — G47.00 PERSISTENT INSOMNIA: ICD-10-CM

## 2021-09-13 DIAGNOSIS — R92.30 DENSE BREAST TISSUE ON MAMMOGRAM: ICD-10-CM

## 2021-09-13 PROCEDURE — G0463 HOSPITAL OUTPT CLINIC VISIT: HCPCS

## 2021-09-13 PROCEDURE — 99215 OFFICE O/P EST HI 40 MIN: CPT | Performed by: INTERNAL MEDICINE

## 2021-09-13 RX ORDER — TAMOXIFEN CITRATE 20 MG/1
20 TABLET ORAL DAILY
Qty: 90 TABLET | Refills: 0 | Status: SHIPPED | OUTPATIENT
Start: 2021-09-13 | End: 2021-12-15

## 2021-09-13 RX ORDER — TRAZODONE HYDROCHLORIDE 50 MG/1
50 TABLET, FILM COATED ORAL AT BEDTIME
Qty: 30 TABLET | Refills: 3 | Status: SHIPPED | OUTPATIENT
Start: 2021-09-13 | End: 2022-01-12

## 2021-09-13 ASSESSMENT — MIFFLIN-ST. JEOR: SCORE: 1000.31

## 2021-09-13 ASSESSMENT — PAIN SCALES - GENERAL: PAINLEVEL: NO PAIN (0)

## 2021-09-13 NOTE — NURSING NOTE
"Oncology Rooming Note    September 13, 2021 3:30 PM   Maribel June is a 56 year old female who presents for:    Chief Complaint   Patient presents with     Oncology Clinic Visit     Malignant neoplasm of lower-outer quadrant of right breast of female, estrogen receptor positive      Initial Vitals: /79   Pulse 67   Temp 98.6  F (37  C)   Resp 16   Ht 1.515 m (4' 11.65\")   Wt 48.9 kg (107 lb 14.4 oz)   SpO2 98%   BMI 21.32 kg/m   Estimated body mass index is 21.32 kg/m  as calculated from the following:    Height as of this encounter: 1.515 m (4' 11.65\").    Weight as of this encounter: 48.9 kg (107 lb 14.4 oz). Body surface area is 1.43 meters squared.  No Pain (0) Comment: Data Unavailable   No LMP recorded. Patient has had a hysterectomy.  Allergies reviewed: Yes  Medications reviewed: Yes    Medications: MEDICATION REFILLS NEEDED TODAY. Provider was notified.  Pharmacy name entered into Estim[facility]: [id number] - [place], [place] - [address] AT [facility] [address]     Clinical concerns: Requests Tamoxifen medication refill      James Lee MA            "

## 2021-09-13 NOTE — LETTER
9/13/2021         RE: Maribel June  [address]  [facility] [address]        Dear Colleague,    Thank you for referring your patient, Maribel June, to the [facility]. Please see a copy of my visit note below.      Oncology Visit:  Date on this visit: 9/13/2021    Diagnosis: Stage Ia, C3wM0G7, grade 1, ER positive, TN positive, HER-2 negative invasive carcinoma of the right breast. Oncotype dx recurrence score = 16.    Primary Physician: Juwan Perry     History Of Present Illness:  Ms. June is a 56 year old female with right breast cancer.  Routine screening mammogram on 8/6/2020 showed heterogeneously dense breasts but no concerning findings.  A physician then palpated a mass in the right breast.  Diagnostic mammogram and ultrasound showed a 2.2 cm mass at 8:00, 5 cm from the nipple.  Right breast biopsy showed a grade 1 invasive mammary carcinoma, ER strong in 100%, TN strong in 100%, HER2 negative.  She had a right breast lumpectomy and sentinel lymph node procedure with Dr. Watson on 9/29/2020.  Pathology showed a grade 1 invasive mammary carcinoma measuring 1.6 cm.  There was associated intermediate grade DCIS.  Surgical margins were negative.  A single lymph node was benign.  Oncotype dx recurrence score was 16.  She completed radiation to the right breast, a total of 5256 cGy in 20 fractions, on 12/21/2020.  She started treatment with letrozole in 1/2021.  The medication was poorly tolerated with arthralgias, insomnia, vaginal dryness and fatigue.  Treatment was changed to Tamoxifen in 03/2021.  This caused nausea and so was dose reduced to 10 mg daily in 05/2021.  She received a dose of Zometa 7/29/2021 with subsequent fever.    Interval History:  Ms. June comes into clinic today for routine breast cancer follow-up.  Her  was at the visit as well.  She is currently on treatment with Tamoxifen.  Since last visit she resumed the 20 mg dose.  She  has not had return of nausea with this dose.  Most bothersome are fatigue and insomnia.  She states she has insomnia most nights.  She is taking trazodone 50 mg at night and with this is able to sleep approximately 7 to 8 hours.  Despite this, at the end of a workday she feels very fatigued.  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  When she does gets fatigued at the end of the day occasionally she will experience dizziness as well.  She admits that she is likely not drinking enough water during the day.      She reports swelling and firmness of the lateral and inferior right breast.  She tries to wear compression bra but it is hard to compress the area.  She also has ongoing tightness in her right upper extremity.  She has not noted necessarily swelling only tightness and firmness.  She has been diligent about wearing her compression sleeve and is wondering if it would be okay to go periods of time without wearing it.  She denies current cough, shortness of breath, or chest pain.  She has no current abdominal complaints.  She denies significant joint stiffness.  She has intermittent hot flashes and states that they are tolerable.  She has no headaches, visual changes, or focal neurologic complaints.  The remainder of a complete 12 point review of systems was reviewed with patient was negative with exception that mentioned above.  Of note, she has a number of upcoming trips.  She has a school trip to [facility] planned in February 2022.  In March, they plan to visit [facility].  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    Past Medical/Surgical History:  Past Medical History:   Diagnosis Date     Depressive disorder      Endometriosis      Herpes genitalis in women      History of major depression 2007    situational with first .      Kidney stone      Malignant neoplasm of right breast in female, estrogen receptor positive (H) 08/27/2020     S/P radiation therapy     5,256 cGy to  right breast completed 2020 - [facility]   - Hyperlipidemia.  - Osteopenia    Past Surgical History:   Procedure Laterality Date     BREAST BIOPSY, RT/LT Right 2020     BREAST SURGERY Right     Right Breast - Benign      SECTION      x1     COLONOSCOPY N/A 2016    Procedure: COMBINED COLONOSCOPY, SINGLE OR MULTIPLE BIOPSY/POLYPECTOMY BY BIOPSY;  Surgeon: Duane, William Charles, MD;  Location: [facility] OR     COLONOSCOPY WITH CO2 INSUFFLATION N/A 2016    Procedure: COLONOSCOPY WITH CO2 INSUFFLATION;  Surgeon: Duane, William Charles, MD;  Location: [facility] OR     CYSTOSCOPY  2009    left stent placement ([facility])     GENITOURINARY SURGERY      surgery for kidney stone     GYN SURGERY      Partial Hysterectomy at age 29     LUMPECTOMY BREAST WITH SENTINEL NODE, COMBINED Right 2020    Procedure: Right breast lumpectomy with [place] node biopsy;  Surgeon: Jose Watson MD;  Location: [facility] OR     Allergies:  Allergies as of 2021 - Reviewed 2021   Allergen Reaction Noted     Flagyl [metronidazole] Nausea and Vomiting 2015     Lisinopril  2019     Oxycodone-acetaminophen Nausea and Vomiting 2019     Sulfamethoxazole-trimethoprim  2011     Zithromax [azithromycin dihydrate] Rash 2013     Current Medications:  Current Outpatient Medications   Medication Sig Dispense Refill     acyclovir (ZOVIRAX) 400 MG tablet Take 1 tablet (400 mg) by mouth every 8 hours for 5 days 15 tablet 11     acyclovir (ZOVIRAX) 400 MG tablet Take 1 tablet (400 mg) by mouth every 8 hours for 5 days (Patient not taking: Reported on 2021) 15 tablet 11     amLODIPine (NORVASC) 5 MG tablet Take 1 tablet by mouth       bimatoprost (LUMIGAN) 0.01 % SOLN Place 1 drop into both eyes daily        Multiple Vitamin (MULTIVITAMIN PO) Take by mouth daily       Omega-3 Fatty Acids (OMEGA 3 PO) Take by mouth daily       ondansetron (ZOFRAN) 8 MG tablet Take 1 tablet  "(8 mg) by mouth every 8 hours as needed for nausea 30 tablet 1     prochlorperazine (COMPAZINE) 5 MG tablet Take 1 tablet (5 mg) by mouth every 6 hours as needed for nausea or vomiting 20 tablet 0     tamoxifen (NOLVADEX) 20 MG tablet Take 1 tablet (20 mg) by mouth daily 90 tablet 0     traZODone (DESYREL) 50 MG tablet Take 1 tablet (50 mg) by mouth At Bedtime Take 1/2 pill (25 mg) for a few nights, if tolerable OK to take full pill for sleep 30 tablet 3      Family and Social History:  Please see initial Oncology consultation dated 10/27/2020 for details.  9/25/2020 Breast Actionable Panel was negative.    Physical Exam:  /79   Pulse 67   Temp 98.6  F (37  C)   Resp 16   Ht 1.515 m (4' 11.65\")   Wt 48.9 kg (107 lb 14.4 oz)   SpO2 98%   BMI 21.32 kg/m    General:  Well appearing adult female in NAD.  Alert and oriented.  HEENT:  Normocephalic.  Sclera anicteric.  MMM.  No lesions of the oropharynx.  Lymph:  No palpable cervical, supraclavicular, or axillary LAD.  Chest:  CTA bilaterally.  No wheezes or crackles.  CV:  RRR.  Nl S1 and S2.  No m/r/g.  Breast:  Bilateral breasts are of increased fibroglandular density.  There are no discretely palpable masses in either breast. Right breast incision is well healed without significant underlying fibrosis.  Bilateral nipples are everted.  Abd:  Soft/ND.   Ext:  No pitting edema of the bilateral lower extremities.   Musculo:  Full ROM of the bilateral upper extremities.  No obvious lymphedema on clinical exam.  Neuro:  Cranial nerves grossly intact.  Psych:  Mood and affect appear normal.    Laboratory/Imaging Studies  8/5/2021 DEXA bone density scan:  Lowest T-score = -2.0 (bilateral femoral neck)  T-score of -1.6 in the lumbar spine.    ASSESSMENT/PLAN:  Ms. June is a 57 yo female with a stage Ia, U7qU3G1, grade 1, ER positive, NC positive, HER2 negative invasive ductal carcinoma of the right breast.  She is s/p treatment with right breast lumpectomy and " radiation.     1.  Right breast cancer:  She is nearly 1 year out from excision of a right breast cancer.  She is currently on treatment with Tamoxifen.  She is currently on the 20 mg dose.  She has fatigue and insomnia on the medication.  Despite this, she is willing to continue it.    She is asymptomatic of disease recurrence on history taken today.  Given increased breast density and that her breast cancer was not initially seen on screening mammogram, we are performing high risk breast screening with both annual mammograms and breast MRI, spacing the two studies so that she is having some form of breast imaging once every 6 months.  Plan to obtain breast MRI at time of return visit in 12/2021.  She will be due for mammograms in 06/2022.      2.  Bone health:  DEXA in 08/2021 with a lowest T-score of -2.0 which is c/w osteopenia and encroaching on osteoporosis. Zometa for prevention of bone loss and also prevention of breast cancer bone metastases was started 7/29/2021.  She had fever following the first dose.  We discussed this type of symptom is most common on the first dose and less so on subsequent doses.  Next dose will be due late 01/2022.  If she experiences fever and body aches with the second dose, plan will be to change treatment to Prolia.    3.  Arthralgias:  Secondary to menopause and exacerbated by endocrine therapy.  Continue daily vitamin D supplementation and daily cardiovascular activity.    4.  Fatigue:  We discussed fatigue in the months after initial breast cancer treatment is normal and this should continue to improve with time.  Recommend daily exercise, eat a diet high in variety and try to obtain 7-9 hours of sleep per night.  Try to follow a schedule and avoid daytime naps.  Offered referral to cancer rehab, she declines at this time.    5.  Insomnia:  Okay to continue trazodone at bedtime prn.    6.  RUE/lateral chest wall/breast lymphedema and radiation changes:  Ongoing stretching,  massage, and compression.  She will let me know if she needs another referral to lymphedema clinic.      7.  COVID vaccination:  Received second vaccination (Moderna) on 2/27/2021, of note had fever, chills, body aches, and injection site reaction.  She has received a booster vaccination.  She has made a number of changes at work such as plexi glass around her desk as cautionary measures as well.    8. Follow Up:    Breast MRI and survivor clinic visit with Briana Burgos in 3 months.  Zometa infusion around 1/14/2022.  Visit with me in 6 months.    Ms. June had multiple questions which I answered to the best of my ability today.  40 minutes spent on the date of the encounter doing chart review, review of test results, interpretation of tests, patient visit, documentation and discussion with family.             Again, thank you for allowing me to participate in the care of your patient.        Sincerely,        Saige Eli MD

## 2021-09-14 ENCOUNTER — THERAPY VISIT (OUTPATIENT)
Dept: PHYSICAL THERAPY | Facility: CLINIC | Age: 56
End: 2021-09-14
Payer: COMMERCIAL

## 2021-09-14 DIAGNOSIS — M25.511 ACUTE PAIN OF RIGHT SHOULDER: Primary | ICD-10-CM

## 2021-09-14 PROCEDURE — 97140 MANUAL THERAPY 1/> REGIONS: CPT | Mod: GP | Performed by: PHYSICAL THERAPIST

## 2021-09-14 PROCEDURE — 97110 THERAPEUTIC EXERCISES: CPT | Mod: GP | Performed by: PHYSICAL THERAPIST

## 2021-09-14 NOTE — PROGRESS NOTES
Subjective:  HPI  Physical Exam                    Objective:  System    Physical Exam    General     ROS    Assessment/Plan:    PROGRESS  REPORT    Progress reporting period is from 8/16/2021 to 9/14/2021. Progress report on 9/14/2021 equal to discharge summary.     SUBJECTIVE  Subjective: Crispin pleased with overall progress at this time. Having minimal to no pain in shoulder at this time. Has been consistent with HEP 1-2 times/day. Encouraged pt to continue independently with HEP, reporting back to PT if noticing worsening of shoulder ROM.    Current Pain level: 1/10   Initial Pain level: 6/10   Changes in function: Yes, see goal flow sheet for change in function   Adverse reactions: None;   ,         OBJECTIVE  Objective: R shoulder AROM: flexion 150 deg, abduction 150 deg, ER 70 deg, EADIR T7, Passive R shoulder flexion 155 deg; posterior capsule stretch 55 deg in sleeper stretch position; R shoulder strength: flexion 5/5, abduction 5/5, ER 5-/5, IR 5/5      ASSESSMENT/PLAN  Updated problem list and treatment plan: Diagnosis 1:  R shoulder pain  Pain -  hot/cold therapy, manual therapy, self management, education and home program  Decreased ROM/flexibility - manual therapy, therapeutic exercise, therapeutic activity and home program  Decreased strength - therapeutic exercise, therapeutic activities and home program  Impaired muscle performance - neuro re-education and home program  Decreased function - therapeutic activities and home program  Impaired posture - neuro re-education, therapeutic activities and home program  STG/LTGs have been met or progress has been made towards goals:  Yes (See Goal flow sheet completed today.)  Assessment of Progress: The patient's condition is improving.  Self Management Plans:  Patient has been instructed in a home treatment program.  Patient is independent in a home treatment program.  Patient  has been instructed in self management of symptoms.  Patient is independent in self  management of symptoms.  I have re-evaluated this patient and find that the nature, scope, duration and intensity of the therapy is appropriate for the medical condition of the patient.  Maribel continues to require the following intervention to meet STG and LTG's: PT intervention is no longer required to meet STG/LTG.  We will discharge this patient from PT.    Recommendations:  This patient is ready to be discharged from therapy and continue their home treatment program.    Please refer to the daily flowsheet for treatment today, total treatment time and time spent performing 1:1 timed codes.

## 2021-09-16 ENCOUNTER — IMMUNIZATION (OUTPATIENT)
Dept: NURSING | Facility: CLINIC | Age: 56
End: 2021-09-16
Payer: COMMERCIAL

## 2021-09-16 PROCEDURE — 91301 PR COVID VAC MODERNA 100 MCG/0.5 ML IM: CPT

## 2021-09-16 PROCEDURE — 0013A PR COVID VAC MODERNA 100 MCG/0.5 ML IM: CPT

## 2021-09-21 ENCOUNTER — OFFICE VISIT (OUTPATIENT)
Dept: DERMATOLOGY | Facility: CLINIC | Age: 56
End: 2021-09-21
Attending: OBSTETRICS & GYNECOLOGY
Payer: COMMERCIAL

## 2021-09-21 DIAGNOSIS — D23.9 DERMATOFIBROMA: ICD-10-CM

## 2021-09-21 DIAGNOSIS — D22.9 MULTIPLE BENIGN NEVI: ICD-10-CM

## 2021-09-21 DIAGNOSIS — L81.4 SOLAR LENTIGO: ICD-10-CM

## 2021-09-21 DIAGNOSIS — L72.0 MILIUM: ICD-10-CM

## 2021-09-21 DIAGNOSIS — L57.8 SUN-DAMAGED SKIN: Primary | ICD-10-CM

## 2021-09-21 DIAGNOSIS — D18.01 CHERRY ANGIOMA: ICD-10-CM

## 2021-09-21 DIAGNOSIS — L85.8 KP (KERATOSIS PILARIS): ICD-10-CM

## 2021-09-21 DIAGNOSIS — L82.1 SEBORRHEIC KERATOSIS: ICD-10-CM

## 2021-09-21 PROCEDURE — 99213 OFFICE O/P EST LOW 20 MIN: CPT | Performed by: DERMATOLOGY

## 2021-09-21 NOTE — NURSING NOTE
Maribel June's goals for this visit include:   Chief Complaint   Patient presents with     Skin Check     Full skin check. Area of concern--variois moles on torso, recheck AK on face, lump left arm.        She requests these members of her care team be copied on today's visit information:     PCP: Juwan Perry    Referring Provider:  Laurita Damon [place]  [address] [place] [address]    There were no vitals taken for this visit.    Do you need any medication refills at today's visit? Rehana Dill on 9/21/2021 at 3:57 PM
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
## 2021-09-21 NOTE — PATIENT INSTRUCTIONS
For the right upper arm:  - I recommend using a medicated daily moisturizer. My preferred ones are Amlactin and CeraVe SA Renewal Cream.

## 2021-10-03 ENCOUNTER — HEALTH MAINTENANCE LETTER (OUTPATIENT)
Age: 56
End: 2021-10-03

## 2021-10-12 ENCOUNTER — ONCOLOGY VISIT (OUTPATIENT)
Dept: ONCOLOGY | Facility: CLINIC | Age: 56
End: 2021-10-12
Attending: STUDENT IN AN ORGANIZED HEALTH CARE EDUCATION/TRAINING PROGRAM
Payer: COMMERCIAL

## 2021-10-12 VITALS
WEIGHT: 107.4 LBS | SYSTOLIC BLOOD PRESSURE: 122 MMHG | OXYGEN SATURATION: 99 % | RESPIRATION RATE: 16 BRPM | HEART RATE: 58 BPM | DIASTOLIC BLOOD PRESSURE: 72 MMHG | BODY MASS INDEX: 21.22 KG/M2

## 2021-10-12 DIAGNOSIS — Z17.0 MALIGNANT NEOPLASM OF LOWER-OUTER QUADRANT OF RIGHT BREAST OF FEMALE, ESTROGEN RECEPTOR POSITIVE (H): Primary | ICD-10-CM

## 2021-10-12 DIAGNOSIS — C50.511 MALIGNANT NEOPLASM OF LOWER-OUTER QUADRANT OF RIGHT BREAST OF FEMALE, ESTROGEN RECEPTOR POSITIVE (H): Primary | ICD-10-CM

## 2021-10-12 DIAGNOSIS — T73.2XXD FATIGUE DUE TO EXPOSURE, SUBSEQUENT ENCOUNTER: ICD-10-CM

## 2021-10-12 LAB
ANION GAP SERPL CALCULATED.3IONS-SCNC: 6 MMOL/L (ref 3–14)
BASOPHILS # BLD AUTO: 0 10E3/UL (ref 0–0.2)
BASOPHILS NFR BLD AUTO: 1 %
BUN SERPL-MCNC: 18 MG/DL (ref 7–30)
CALCIUM SERPL-MCNC: 8.8 MG/DL (ref 8.5–10.1)
CHLORIDE BLD-SCNC: 108 MMOL/L (ref 94–109)
CO2 SERPL-SCNC: 27 MMOL/L (ref 20–32)
CREAT SERPL-MCNC: 0.62 MG/DL (ref 0.52–1.04)
EOSINOPHIL # BLD AUTO: 0.1 10E3/UL (ref 0–0.7)
EOSINOPHIL NFR BLD AUTO: 1 %
ERYTHROCYTE [DISTWIDTH] IN BLOOD BY AUTOMATED COUNT: 13.2 % (ref 10–15)
GFR SERPL CREATININE-BSD FRML MDRD: >90 ML/MIN/1.73M2
GLUCOSE BLD-MCNC: 93 MG/DL (ref 70–99)
HCT VFR BLD AUTO: 37.2 % (ref 35–47)
HGB BLD-MCNC: 12 G/DL (ref 11.7–15.7)
IMM GRANULOCYTES # BLD: 0 10E3/UL
IMM GRANULOCYTES NFR BLD: 0 %
LYMPHOCYTES # BLD AUTO: 2.3 10E3/UL (ref 0.8–5.3)
LYMPHOCYTES NFR BLD AUTO: 38 %
MCH RBC QN AUTO: 28.6 PG (ref 26.5–33)
MCHC RBC AUTO-ENTMCNC: 32.3 G/DL (ref 31.5–36.5)
MCV RBC AUTO: 89 FL (ref 78–100)
MONOCYTES # BLD AUTO: 0.3 10E3/UL (ref 0–1.3)
MONOCYTES NFR BLD AUTO: 5 %
NEUTROPHILS # BLD AUTO: 3.4 10E3/UL (ref 1.6–8.3)
NEUTROPHILS NFR BLD AUTO: 55 %
NRBC # BLD AUTO: 0 10E3/UL
NRBC BLD AUTO-RTO: 0 /100
PLATELET # BLD AUTO: 354 10E3/UL (ref 150–450)
POTASSIUM BLD-SCNC: 3.8 MMOL/L (ref 3.4–5.3)
RBC # BLD AUTO: 4.2 10E6/UL (ref 3.8–5.2)
SODIUM SERPL-SCNC: 141 MMOL/L (ref 133–144)
WBC # BLD AUTO: 6.2 10E3/UL (ref 4–11)

## 2021-10-12 PROCEDURE — 85004 AUTOMATED DIFF WBC COUNT: CPT | Performed by: STUDENT IN AN ORGANIZED HEALTH CARE EDUCATION/TRAINING PROGRAM

## 2021-10-12 PROCEDURE — G0463 HOSPITAL OUTPT CLINIC VISIT: HCPCS

## 2021-10-12 PROCEDURE — 80048 BASIC METABOLIC PNL TOTAL CA: CPT | Performed by: STUDENT IN AN ORGANIZED HEALTH CARE EDUCATION/TRAINING PROGRAM

## 2021-10-12 PROCEDURE — 36415 COLL VENOUS BLD VENIPUNCTURE: CPT | Performed by: STUDENT IN AN ORGANIZED HEALTH CARE EDUCATION/TRAINING PROGRAM

## 2021-10-12 PROCEDURE — 99214 OFFICE O/P EST MOD 30 MIN: CPT | Performed by: STUDENT IN AN ORGANIZED HEALTH CARE EDUCATION/TRAINING PROGRAM

## 2021-10-12 ASSESSMENT — PAIN SCALES - GENERAL: PAINLEVEL: NO PAIN (0)

## 2021-10-12 NOTE — PROGRESS NOTES
"Oncology Rooming Note    October 12, 2021 3:41 PM   Maribel June is a 56 year old female who presents for:    Chief Complaint   Patient presents with     Oncology Clinic Visit     Initial Vitals: /72   Pulse 58   Resp 16   Wt 48.7 kg (107 lb 6.4 oz)   SpO2 99%   BMI 21.22 kg/m   Estimated body mass index is 21.22 kg/m  as calculated from the following:    Height as of 9/13/21: 1.515 m (4' 11.65\").    Weight as of this encounter: 48.7 kg (107 lb 6.4 oz). Body surface area is 1.43 meters squared.  No Pain (0) Comment: Data Unavailable   No LMP recorded. Patient has had a hysterectomy.  Allergies reviewed: Yes  Medications reviewed: Yes    Medications: Medication refills not needed today.  Pharmacy name entered into [employer]: [id number] - [place], [place] - [address] AT [facility] & [facility]    Clinical concerns: no      Gina Lamb CMA            "

## 2021-10-12 NOTE — PROGRESS NOTES
[facility]   PM&R clinic note        Interval history:     Maribel June presents to clinic today for follow up reg her rehab needs.   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  Was last seen in clinic on 6/30/21.  Recommendations included:  1. Therapy/equipment/braces:   1. Continue home exercise regimen and edema therapy regimen, including stretching and MLD techniques at home.   2. Continue lymphedema therapy once weekly.   3. Continue wearing right upper extremity compression garments for edema control.  2. Medications:   1. Continue Voltaren gel as needed for pain.  3. Interventions:   1. Will place a PM&R referral for possible CS injection in right shoulder for subacromial bursitis.  4. Follow up: 3 months      Symptoms,  Patient was seen for a return visit with her  present for the visit.  Patient has overall been doing much better in terms of her right chest/upper extremity pain.  She also notes improved mobility at her shoulder with her right arm, and she has improved range after working with physical therapy and continuing her home exercise program.  She has completed 7-8 sessions with outpatient physical therapy specifically aimed at right shoulder symptoms.  She states that her therapy sessions went very well, and she has had significant relief after working with PT.  He has also continued working with outpatient lymphedema therapy, and feels that her lymphedema has been under good control with her lymphedema pump which she now uses as needed when she feels symptoms coming on.  She continues very regularly with her home stretching/exercise program for right upper extremity range and lymphedema.    She states that her neuropathic pain including previously mentioned sharp, shooting pain has completely resolved and she has not had  this for a while.  She had a cortical steroid injection into her right shoulder, which she states helped her shoulder pain significantly.          Therapies/HEP,  Patient has recently been discharged from outpatient physical therapy, and has completed sessions of lymphedema therapy for now.  She continues with her regular home exercise regimen and lymphedema pump use as needed.      Functionally,   No changes since last visit.      Social history is unchanged.      Medications:  Current Outpatient Medications   Medication Sig Dispense Refill     acyclovir (ZOVIRAX) 400 MG tablet Take 1 tablet (400 mg) by mouth every 8 hours for 5 days 15 tablet 11     amLODIPine (NORVASC) 5 MG tablet Take 1 tablet by mouth       bimatoprost (LUMIGAN) 0.01 % SOLN Place 1 drop into both eyes daily        Multiple Vitamin (MULTIVITAMIN PO) Take by mouth daily       Omega-3 Fatty Acids (OMEGA 3 PO) Take by mouth daily       tamoxifen (NOLVADEX) 20 MG tablet Take 1 tablet (20 mg) by mouth daily 90 tablet 0     traZODone (DESYREL) 50 MG tablet Take 1 tablet (50 mg) by mouth At Bedtime Take 1/2 pill (25 mg) for a few nights, if tolerable OK to take full pill for sleep (Patient not taking: Reported on 10/12/2021) 30 tablet 3              Physical Exam:   /72   Pulse 58   Resp 16   Wt 48.7 kg (107 lb 6.4 oz)   SpO2 99%   BMI 21.22 kg/m      Gen: NAD, pleasant and cooperative   Pulm: non-labored breathing in room air  Neuro/MSK:   Right shoulder/breast exam: Patient can achieve approx 115 degrees of active abduction before tightness. Able to achieve a few more degrees of shoulder abduction and flexion with passive ROM when compared to her last visit. She has notable scar tissue in right axillary and chest wall area that is no longer painful, and does not limit range of motion as in the past. Negative for axillary or cervical lymphadenopathy with palpation. Small seroma is still present in right axillary region with  palpation.  Right shoulder exam: No visible deformities, swelling or erythema.  Patient with 115 degrees of shoulder abduction with AROM.  Negative Funez or empty can.  Significant improvement noted in right upper extremity lymphedema when compared to last exam. No erythema or swelling.      Labs/Imaging:  Lab Results   Component Value Date    WBC 7.1 07/30/2021    HGB 12.6 07/30/2021    HCT 37.5 07/30/2021    MCV 88 07/30/2021     07/30/2021     Lab Results   Component Value Date     07/30/2021    POTASSIUM 3.4 07/30/2021    CHLORIDE 106 07/30/2021    CO2 25 07/30/2021    GLC 99 08/17/2021     Lab Results   Component Value Date    GFRESTIMATED >90 07/30/2021    GFRESTBLACK >90 10/28/2019     Lab Results   Component Value Date    AST 27 07/30/2021    ALT 33 07/30/2021    ALKPHOS 85 07/30/2021    BILITOTAL 0.6 07/30/2021     No results found for: INR  Lab Results   Component Value Date    BUN 11 07/30/2021    CR 0.67 07/30/2021              Assessment/Plan   Maribel June presents to clinic today for follow up reg her rehab needs. 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 Was last seen in clinic on 6/30/21.  As discussed with Crispin, she is doing very well with almost complete resolution of her lymphedema and significant improvement in her range of motion and decreased pain at her right shoulder, breast and upper extremity.  She should continue to do her regular home exercise regimen including stretching and MLD techniques.  She was also instructed to continue her lymphedema pump as needed, even when she feels subtle symptoms.  She has received significant relief from her right shoulder injection, and she was educated that if his symptoms ever recur she can always consider repeating the injection.  Patient is in agreement with the above  plan.      1. Therapy/equipment/braces:  1. Continue home exercise regimen including range of motion, stretching and MLD techniques for lymphedema.  2. Continue lymphedema pump as needed.  2. Interventions:  1. Can consider repeat corticosteroid injection if needed in right shoulder.  Patient has complete resolution of symptoms.  We will continue to monitor.  3. Follow up: 4 months      Jeanne Jack MD  Physical Medicine & Rehabilitation      I spent a total of 30 minutes face-to-face with Maribel June during today's office visit. Over 50% of this time was spent counseling the patient and/or coordinating care. See note for details.

## 2021-10-12 NOTE — LETTER
"    10/12/2021         RE: Maribel June  [address]  [place] [address]        Dear Colleague,    Thank you for referring your patient, Maribel June, to the [facility]. Please see a copy of my visit note below.    Oncology Rooming Note    October 12, 2021 3:41 PM   Maribel June is a 56 year old female who presents for:    Chief Complaint   Patient presents with     Oncology Clinic Visit     Initial Vitals: /72   Pulse 58   Resp 16   Wt 48.7 kg (107 lb 6.4 oz)   SpO2 99%   BMI 21.22 kg/m   Estimated body mass index is 21.22 kg/m  as calculated from the following:    Height as of 9/13/21: 1.515 m (4' 11.65\").    Weight as of this encounter: 48.7 kg (107 lb 6.4 oz). Body surface area is 1.43 meters squared.  No Pain (0) Comment: Data Unavailable   No LMP recorded. Patient has had a hysterectomy.  Allergies reviewed: Yes  Medications reviewed: Yes    Medications: Medication refills not needed today.  Pharmacy name entered into [employer]: [id number] [place], [place] - [address] AT [facility] & [facility]    Clinical concerns: no      Gina Lamb, [facility]   PM&R clinic note        Interval history:     Maribel June presents to clinic today for follow up reg her rehab needs.   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  Was last seen in clinic on 6/30/21.  Recommendations included:  1. Therapy/equipment/braces:   1. Continue home exercise regimen and edema therapy regimen, including stretching and MLD techniques at home.   2. Continue lymphedema therapy once weekly.   3. Continue wearing right upper extremity compression garments for edema control.  2. Medications:   1. Continue Voltaren gel as needed for pain.  3. Interventions:   1. Will place " a PM&R referral for possible CS injection in right shoulder for subacromial bursitis.  4. Follow up: 3 months      Symptoms,  Patient was seen for a return visit with her  present for the visit.  Patient has overall been doing much better in terms of her right chest/upper extremity pain.  She also notes improved mobility at her shoulder with her right arm, and she has improved range after working with physical therapy and continuing her home exercise program.  She has completed 7-8 sessions with outpatient physical therapy specifically aimed at right shoulder symptoms.  She states that her therapy sessions went very well, and she has had significant relief after working with PT.  He has also continued working with outpatient lymphedema therapy, and feels that her lymphedema has been under good control with her lymphedema pump which she now uses as needed when she feels symptoms coming on.  She continues very regularly with her home stretching/exercise program for right upper extremity range and lymphedema.    She states that her neuropathic pain including previously mentioned sharp, shooting pain has completely resolved and she has not had this for a while.  She had a cortical steroid injection into her right shoulder, which she states helped her shoulder pain significantly.          Therapies/HEP,  Patient has recently been discharged from outpatient physical therapy, and has completed sessions of lymphedema therapy for now.  She continues with her regular home exercise regimen and lymphedema pump use as needed.      Functionally,   No changes since last visit.      Social history is unchanged.      Medications:  Current Outpatient Medications   Medication Sig Dispense Refill     acyclovir (ZOVIRAX) 400 MG tablet Take 1 tablet (400 mg) by mouth every 8 hours for 5 days 15 tablet 11     amLODIPine (NORVASC) 5 MG tablet Take 1 tablet by mouth       bimatoprost (LUMIGAN) 0.01 % SOLN Place 1 drop into both eyes  daily        Multiple Vitamin (MULTIVITAMIN PO) Take by mouth daily       Omega-3 Fatty Acids (OMEGA 3 PO) Take by mouth daily       tamoxifen (NOLVADEX) 20 MG tablet Take 1 tablet (20 mg) by mouth daily 90 tablet 0     traZODone (DESYREL) 50 MG tablet Take 1 tablet (50 mg) by mouth At Bedtime Take 1/2 pill (25 mg) for a few nights, if tolerable OK to take full pill for sleep (Patient not taking: Reported on 10/12/2021) 30 tablet 3              Physical Exam:   /72   Pulse 58   Resp 16   Wt 48.7 kg (107 lb 6.4 oz)   SpO2 99%   BMI 21.22 kg/m      Gen: NAD, pleasant and cooperative   Pulm: non-labored breathing in room air  Neuro/MSK:   Right shoulder/breast exam: Patient can achieve approx 115 degrees of active abduction before tightness. Able to achieve a few more degrees of shoulder abduction and flexion with passive ROM when compared to her last visit. She has notable scar tissue in right axillary and chest wall area that is no longer painful, and does not limit range of motion as in the past. Negative for axillary or cervical lymphadenopathy with palpation. Small seroma is still present in right axillary region with palpation.  Right shoulder exam: No visible deformities, swelling or erythema.  Patient with 115 degrees of shoulder abduction with AROM.  Negative Funez or empty can.  Significant improvement noted in right upper extremity lymphedema when compared to last exam. No erythema or swelling.      Labs/Imaging:  Lab Results   Component Value Date    WBC 7.1 07/30/2021    HGB 12.6 07/30/2021    HCT 37.5 07/30/2021    MCV 88 07/30/2021     07/30/2021     Lab Results   Component Value Date     07/30/2021    POTASSIUM 3.4 07/30/2021    CHLORIDE 106 07/30/2021    CO2 25 07/30/2021    GLC 99 08/17/2021     Lab Results   Component Value Date    GFRESTIMATED >90 07/30/2021    GFRESTBLACK >90 10/28/2019     Lab Results   Component Value Date    AST 27 07/30/2021    ALT 33 07/30/2021     ALKPHOS 85 07/30/2021    BILITOTAL 0.6 07/30/2021     No results found for: INR  Lab Results   Component Value Date    BUN 11 07/30/2021    CR 0.67 07/30/2021              Assessment/Plan   Maribel June presents to clinic today for follow up reg her rehab needs. 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 Was last seen in clinic on 6/30/21.  As discussed with Crispin, she is doing very well with almost complete resolution of her lymphedema and significant improvement in her range of motion and decreased pain at her right shoulder, breast and upper extremity.  She should continue to do her regular home exercise regimen including stretching and MLD techniques.  She was also instructed to continue her lymphedema pump as needed, even when she feels subtle symptoms.  She has received significant relief from her right shoulder injection, and she was educated that if his symptoms ever recur she can always consider repeating the injection.  Patient is in agreement with the above plan.      1. Therapy/equipment/braces:  1. Continue home exercise regimen including range of motion, stretching and MLD techniques for lymphedema.  2. Continue lymphedema pump as needed.  2. Interventions:  1. Can consider repeat corticosteroid injection if needed in right shoulder.  Patient has complete resolution of symptoms.  We will continue to monitor.  3. Follow up: 4 months      Jeanne Jack MD  Physical Medicine & Rehabilitation      I spent a total of 30 minutes face-to-face with Maribel June during today's office visit. Over 50% of this time was spent counseling the patient and/or coordinating care. See note for details.               Again, thank you for allowing me to participate in the care of your patient.        Sincerely,        Jeanne Jack MD

## 2021-10-13 NOTE — PATIENT INSTRUCTIONS
1.  Continue your home exercise regimen including range of motion and massage techniques for lymphedema.  2.  Continue to use her lymphedema pump as needed if symptoms develop.  3.  Return to clinic with Dr. Jack in 4 months for follow-up.

## 2021-12-07 ENCOUNTER — ANCILLARY PROCEDURE (OUTPATIENT)
Dept: MRI IMAGING | Facility: CLINIC | Age: 56
End: 2021-12-07
Attending: INTERNAL MEDICINE
Payer: COMMERCIAL

## 2021-12-07 DIAGNOSIS — Z85.3 PERSONAL HISTORY OF MALIGNANT NEOPLASM OF BREAST: ICD-10-CM

## 2021-12-07 DIAGNOSIS — Z12.39 BREAST CANCER SCREENING, HIGH RISK PATIENT: ICD-10-CM

## 2021-12-07 DIAGNOSIS — R92.30 DENSE BREAST TISSUE ON MAMMOGRAM: ICD-10-CM

## 2021-12-07 PROCEDURE — A9585 GADOBUTROL INJECTION: HCPCS | Mod: JW | Performed by: RADIOLOGY

## 2021-12-07 PROCEDURE — 77049 MRI BREAST C-+ W/CAD BI: CPT | Performed by: RADIOLOGY

## 2021-12-07 RX ORDER — GADOBUTROL 604.72 MG/ML
7.5 INJECTION INTRAVENOUS ONCE
Status: COMPLETED | OUTPATIENT
Start: 2021-12-07 | End: 2021-12-07

## 2021-12-07 RX ADMIN — GADOBUTROL 5 ML: 604.72 INJECTION INTRAVENOUS at 16:04

## 2021-12-11 DIAGNOSIS — Z17.0 MALIGNANT NEOPLASM OF LOWER-OUTER QUADRANT OF RIGHT BREAST OF FEMALE, ESTROGEN RECEPTOR POSITIVE (H): ICD-10-CM

## 2021-12-11 DIAGNOSIS — C50.511 MALIGNANT NEOPLASM OF LOWER-OUTER QUADRANT OF RIGHT BREAST OF FEMALE, ESTROGEN RECEPTOR POSITIVE (H): ICD-10-CM

## 2021-12-14 NOTE — PROGRESS NOTES
Crispin is a 56 year old who is being evaluated via a billable video visit.      How would you like to obtain your AVS? MyChart  If the video visit is dropped, the invitation should be resent by: Send to e-mail at: [email or web address]  Will anyone else be joining your video visit? Rehana Gaticaluis Ross VF    Video Start Time: 4:26 pm  Video-Visit Details    Type of service:  Video Visit    Video End Time:4:56 PM    Originating Location (pt. Location): Home    Distant Location (provider location):  [facility]CER [facility]     Platform used for Video Visit: [employer]      Oncology Visit:  Date on this visit: Dec 15, 2021    Diagnosis: Stage Ia, [id number], grade 1, ER positive, CA positive, HER-2 negative invasive carcinoma of the right breast. Oncotype dx recurrence score = 16.    Primary Physician: Juwan Perry     History Of Present Illness:  Ms. June is a 56 year old female with right breast cancer.  Routine screening mammogram on 8/6/2020 showed heterogeneously dense breasts but no concerning findings.  A physician then palpated a mass in the right breast.  Diagnostic mammogram and ultrasound showed a 2.2 cm mass at 8:00, 5 cm from the nipple.  Right breast biopsy showed a grade 1 invasive mammary carcinoma, ER strong in 100%, CA strong in 100%, HER2 negative.  She had a right breast lumpectomy and sentinel lymph node procedure with Dr. Watson on 9/29/2020.  Pathology showed a grade 1 invasive mammary carcinoma measuring 1.6 cm.  There was associated intermediate grade DCIS.  Surgical margins were negative.  A single lymph node was benign.  Oncotype dx recurrence score was 16.  She completed radiation to the right breast, a total of 5256 cGy in 20 fractions, on 12/21/2020.  She started treatment with letrozole in 1/2021.  The medication was poorly tolerated with arthralgias, insomnia, vaginal dryness and fatigue.  Treatment was changed to Tamoxifen in 03/2021.  This caused nausea and so was dose  reduced to 10 mg daily in 2021. This was increased slowly to 20 mg again. She received a dose of Zometa 2021 with subsequent fever.    Interval History: Crispin is continuing to struggle with fatigue. She is not sleeping well, maybe 4-5 hours per night. She takes 25 mg of trazodone and gets partial sleep from this. A full tablet helps but then she has a headache the next day. 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 Insomnia started with endocrine therapy.     She notes her lymphedema and pain from this is better controlled with using her compression garments, lymphedema pump, and exercises. No new or different pain. No breast concerns.     No fevers/chills or recent infections. No cough. She will sometimes feel SOB when she is tired but this is transient and goes away when her energy is better. She is active at work but is not exercising routinely.     Past Medical/Surgical History:  Past Medical History:   Diagnosis Date     Depressive disorder      Endometriosis      Herpes genitalis in women      History of major depression     situational with first .      Kidney stone      Malignant neoplasm of right breast in female, estrogen receptor positive (H) 2020     S/P radiation therapy     5,256 cGy to right breast completed 2020 - [facility]   - Hyperlipidemia.  - Osteopenia    Past Surgical History:   Procedure Laterality Date     BREAST BIOPSY, RT/LT Right 2020     BREAST SURGERY Right     Right Breast - Benign      SECTION      x1     COLONOSCOPY N/A 2016    Procedure: COMBINED COLONOSCOPY, SINGLE OR MULTIPLE BIOPSY/POLYPECTOMY BY BIOPSY;  Surgeon: Duane, William Charles, MD;  Location: [facility] OR     COLONOSCOPY WITH CO2 INSUFFLATION N/A 2016    Procedure: COLONOSCOPY WITH CO2 INSUFFLATION;  Surgeon: Duane, William Charles, MD;  Location: [facility] OR     CYSTOSCOPY  2009    left stent placement  (C-ARM)     GENITOURINARY SURGERY      surgery for kidney stone     GYN SURGERY      Partial Hysterectomy at age 29     LUMPECTOMY BREAST WITH SENTINEL NODE, COMBINED Right 09/29/2020    Procedure: Right breast lumpectomy with [place] node biopsy;  Surgeon: Jose Watson MD;  Location: [facility] OR     Allergies:  Allergies as of 12/15/2021 - Reviewed 10/12/2021   Allergen Reaction Noted     Flagyl [metronidazole] Nausea and Vomiting 07/30/2015     Lisinopril  06/13/2019     Oxycodone-acetaminophen Nausea and Vomiting 06/13/2019     Sulfamethoxazole-trimethoprim  12/29/2011     Zithromax [azithromycin dihydrate] Rash 07/03/2013     Current Medications:  Current Outpatient Medications   Medication Sig Dispense Refill     acyclovir (ZOVIRAX) 400 MG tablet Take 1 tablet (400 mg) by mouth every 8 hours for 5 days 15 tablet 11     amLODIPine (NORVASC) 5 MG tablet Take 1 tablet by mouth       bimatoprost (LUMIGAN) 0.01 % SOLN Place 1 drop into both eyes daily        Multiple Vitamin (MULTIVITAMIN PO) Take by mouth daily       Omega-3 Fatty Acids (OMEGA 3 PO) Take by mouth daily       tamoxifen (NOLVADEX) 20 MG tablet Take 1 tablet (20 mg) by mouth daily 90 tablet 0     traZODone (DESYREL) 50 MG tablet Take 1 tablet (50 mg) by mouth At Bedtime Take 1/2 pill (25 mg) for a few nights, if tolerable OK to take full pill for sleep (Patient not taking: Reported on 10/12/2021) 30 tablet 3      Genetics  9/25/2020 Breast Actionable Panel was negative.    Physical Exam:  There were no vitals taken for this visit.  Video physical exam  General: Patient appears well in no acute distress.   Skin: No visualized rash or lesions on visualized skin  Eyes: EOMI, no erythema, sclera icterus or discharge noted  Resp: Appears to be breathing comfortably without accessory muscle usage, speaking in full sentences, no cough  MSK: Appears to have normal range of motion based on visualized movements  Neurologic: No apparent tremors, facial movements  symmetric  Psych: affect and mood congruent, alert and oriented    The rest of a comprehensive physical examination is deferred due to PHE (public health emergency) video restrictions        Laboratory/Imaging Studies  Breast MRI 12/7  FINDINGS:  Breast composition: Heterogeneous fibroglandular tissue  Background parenchymal enhancement: Minimal     There is no suspicious enhancement or lymphadenopathy.  Posttreatment  changes in the right breast.                                                                      IMPRESSION: BI-RADS CATEGORY: 2 - Benign.    ASSESSMENT/PLAN:  Ms. June is a 57 yo female with a stage Ia, [id number], grade 1, ER positive, WA positive, HER2 negative invasive ductal carcinoma of the right breast.  She is s/p treatment with right breast lumpectomy and radiation.     1.  Right breast cancer:  She is nearly 15 months out from excision of a right breast cancer.  She is currently on treatment with Tamoxifen.  She is currently on the 20 mg dose. She has fatigue and insomnia on the medication. She is willing to continue it. See below for side effect management.     She is asymptomatic of disease recurrence on history taken today. Her breast MRI from last week was benign. Given increased breast density and that her breast cancer was not initially seen on screening mammogram, we are performing high risk breast screening with both annual mammograms and breast MRI, spacing the two studies so that she is having some form of breast imaging once every 6 months.  Plan to obtain mammograms in 06/2022.      I completed her treatment plan summary and sent this via [employer].     2.  Bone health:  DEXA in 08/2021 with a lowest T-score of -2.0 which is c/w osteopenia and encroaching on osteoporosis. Zometa for prevention of bone loss and also prevention of breast cancer bone metastases was started 7/29/2021. She had fever following the first dose. She will have her second dose in January. Usually subsequent  doses of Zometa are better tolerated. If she experiences fever and body aches with the second dose, plan will be to change treatment to Prolia.    3.  Hot flashes/insomnia: Will try venlafaxine 37.5 mg daily. Update me in 3-4 weeks with response in case titration is needed.     4.  Fatigue: Hopefully insomnia will improve with venlafaxine over time. Discussed starting daily exercise, good sleep hygiene. She may continue low dose trazodone 25 mg if she would like. She has tried melatonin and lavender without relief.     5.  RUE/lateral chest wall/breast lymphedema and radiation changes:  Ongoing stretching, massage, and compression. She has seen PMR too.     7.  COVID vaccination:  Received second vaccination (Moderna) on 2/27/2021, of note had fever, chills, body aches, and injection site reaction. She has received a booster vaccination. She is practicing safe precautions at work.     45 minutes spent on the date of the encounter doing chart review, review of test results, interpretation of tests, patient visit and documentation     Briana Burgos PA-C

## 2021-12-15 ENCOUNTER — VIRTUAL VISIT (OUTPATIENT)
Dept: ONCOLOGY | Facility: CLINIC | Age: 56
End: 2021-12-15
Attending: PHYSICIAN ASSISTANT
Payer: COMMERCIAL

## 2021-12-15 DIAGNOSIS — Z17.0 MALIGNANT NEOPLASM OF LOWER-OUTER QUADRANT OF RIGHT BREAST OF FEMALE, ESTROGEN RECEPTOR POSITIVE (H): Primary | ICD-10-CM

## 2021-12-15 DIAGNOSIS — N95.1 MENOPAUSAL SYNDROME (HOT FLASHES): ICD-10-CM

## 2021-12-15 DIAGNOSIS — C50.511 MALIGNANT NEOPLASM OF LOWER-OUTER QUADRANT OF RIGHT BREAST OF FEMALE, ESTROGEN RECEPTOR POSITIVE (H): Primary | ICD-10-CM

## 2021-12-15 DIAGNOSIS — R53.83 OTHER FATIGUE: ICD-10-CM

## 2021-12-15 DIAGNOSIS — G47.00 INSOMNIA, UNSPECIFIED TYPE: ICD-10-CM

## 2021-12-15 PROCEDURE — 999N001193 HC VIDEO/TELEPHONE VISIT; NO CHARGE

## 2021-12-15 PROCEDURE — 99215 OFFICE O/P EST HI 40 MIN: CPT | Mod: GT | Performed by: PHYSICIAN ASSISTANT

## 2021-12-15 RX ORDER — TAMOXIFEN CITRATE 20 MG/1
TABLET ORAL
Qty: 90 TABLET | Refills: 0 | Status: SHIPPED | OUTPATIENT
Start: 2021-12-15 | End: 2022-03-14

## 2021-12-15 RX ORDER — VENLAFAXINE 37.5 MG/1
37.5 TABLET ORAL ONCE
Qty: 1 TABLET | Refills: 0 | Status: SHIPPED | OUTPATIENT
Start: 2021-12-15 | End: 2021-12-17

## 2021-12-15 NOTE — CONFIDENTIAL NOTE
tamoxifen (NOLVADEX)  Last prescribing provider: Dr kenny     Last clinic visit date: 9/13/21 Dr Kenny     Any missed appointments or no-shows since last clinic visit?: No     Recommendations for requested medication (if none, N/A): Copied from chart note 9/13/21 Dr kenny   1. Right breast cancer: She is nearly 1 year out from excision of a right breast cancer. She is currently on treatment with Tamoxifen. She is currently on the 20 mg dose. She has fatigue and insomnia on the medication. Despite this, she is willing to continue it.      Next clinic visit date: 3/14/22 Dr Kenny     Any other pertinent information (if none, N/A): N/A

## 2021-12-15 NOTE — LETTER
12/15/2021         RE: Maribel June  [address]  [place] [address]        Dear Colleague,    Thank you for referring your patient, Maribel June, to the [facility]CER [facility]. Please see a copy of my visit note below.    Crispin is a 56 year old who is being evaluated via a billable video visit.      How would you like to obtain your AVS? MyChart  If the video visit is dropped, the invitation should be resent by: Send to e-mail at: [email or web address]  Will anyone else be joining your video visit? No       Cami FLORES    Video Start Time: 4:26 pm  Video-Visit Details    Type of service:  Video Visit    Video End Time:4:56 PM    Originating Location (pt. Location): Home    Distant Location (provider location):  [facility]CER [facility]     Platform used for Video Visit: [employer]      Oncology Visit:  Date on this visit: Dec 15, 2021    Diagnosis: Stage Ia, [id number], grade 1, ER positive, NV positive, HER-2 negative invasive carcinoma of the right breast. Oncotype dx recurrence score = 16.    Primary Physician: Juwan Perry     History Of Present Illness:  Ms. June is a 56 year old female with right breast cancer.  Routine screening mammogram on 8/6/2020 showed heterogeneously dense breasts but no concerning findings.  A physician then palpated a mass in the right breast.  Diagnostic mammogram and ultrasound showed a 2.2 cm mass at 8:00, 5 cm from the nipple.  Right breast biopsy showed a grade 1 invasive mammary carcinoma, ER strong in 100%, NV strong in 100%, HER2 negative.  She had a right breast lumpectomy and sentinel lymph node procedure with Dr. Watson on 9/29/2020.  Pathology showed a grade 1 invasive mammary carcinoma measuring 1.6 cm.  There was associated intermediate grade DCIS.  Surgical margins were negative.  A single lymph node was benign.  Oncotype dx recurrence score was 16.  She completed radiation to the right breast, a total of 5256  cGy in 20 fractions, on 2020.  She started treatment with letrozole in 2021.  The medication was poorly tolerated with arthralgias, insomnia, vaginal dryness and fatigue.  Treatment was changed to Tamoxifen in 2021.  This caused nausea and so was dose reduced to 10 mg daily in 2021. This was increased slowly to 20 mg again. She received a dose of Zometa 2021 with subsequent fever.    Interval History: Crispin is continuing to struggle with fatigue. She is not sleeping well, maybe 4-5 hours per night. She takes 25 mg of trazodone and gets partial sleep from this. A full tablet helps but then she has a headache the next day. 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 Insomnia started with endocrine therapy.     She notes her lymphedema and pain from this is better controlled with using her compression garments, lymphedema pump, and exercises. No new or different pain. No breast concerns.     No fevers/chills or recent infections. No cough. She will sometimes feel SOB when she is tired but this is transient and goes away when her energy is better. She is active at work but is not exercising routinely.     Past Medical/Surgical History:  Past Medical History:   Diagnosis Date     Depressive disorder      Endometriosis      Herpes genitalis in women      History of major depression     situational with first .      Kidney stone      Malignant neoplasm of right breast in female, estrogen receptor positive (H) 2020     S/P radiation therapy     5,256 cGy to right breast completed 2020 - [facility]   - Hyperlipidemia.  - Osteopenia    Past Surgical History:   Procedure Laterality Date     BREAST BIOPSY, RT/LT Right 2020     BREAST SURGERY Right     Right Breast - Benign      SECTION      x1     COLONOSCOPY N/A 2016    Procedure: COMBINED COLONOSCOPY, SINGLE OR MULTIPLE BIOPSY/POLYPECTOMY BY BIOPSY;   Surgeon: Duane, William Charles, MD;  Location: [facility] OR     COLONOSCOPY WITH CO2 INSUFFLATION N/A 08/16/2016    Procedure: COLONOSCOPY WITH CO2 INSUFFLATION;  Surgeon: Duane, William Charles, MD;  Location: [facility] OR     CYSTOSCOPY  11/13/2009    left stent placement (C-ARM)     GENITOURINARY SURGERY      surgery for kidney stone     GYN SURGERY      Partial Hysterectomy at age 29     LUMPECTOMY BREAST WITH SENTINEL NODE, COMBINED Right 09/29/2020    Procedure: Right breast lumpectomy with [place] node biopsy;  Surgeon: Jose Watson MD;  [facility] OR     Allergies:  Allergies as of 12/15/2021 - Reviewed 10/12/2021   Allergen Reaction Noted     Flagyl [metronidazole] Nausea and Vomiting 07/30/2015     Lisinopril  06/13/2019     Oxycodone-acetaminophen Nausea and Vomiting 06/13/2019     Sulfamethoxazole-trimethoprim  12/29/2011     Zithromax [azithromycin dihydrate] Rash 07/03/2013     Current Medications:  Current Outpatient Medications   Medication Sig Dispense Refill     acyclovir (ZOVIRAX) 400 MG tablet Take 1 tablet (400 mg) by mouth every 8 hours for 5 days 15 tablet 11     amLODIPine (NORVASC) 5 MG tablet Take 1 tablet by mouth       bimatoprost (LUMIGAN) 0.01 % SOLN Place 1 drop into both eyes daily        Multiple Vitamin (MULTIVITAMIN PO) Take by mouth daily       Omega-3 Fatty Acids (OMEGA 3 PO) Take by mouth daily       tamoxifen (NOLVADEX) 20 MG tablet Take 1 tablet (20 mg) by mouth daily 90 tablet 0     traZODone (DESYREL) 50 MG tablet Take 1 tablet (50 mg) by mouth At Bedtime Take 1/2 pill (25 mg) for a few nights, if tolerable OK to take full pill for sleep (Patient not taking: Reported on 10/12/2021) 30 tablet 3      Genetics  9/25/2020 Breast Actionable Panel was negative.    Physical Exam:  There were no vitals taken for this visit.  Video physical exam  General: Patient appears well in no acute distress.   Skin: No visualized rash or lesions on visualized skin  Eyes: EOMI, no erythema, sclera  icterus or discharge noted  Resp: Appears to be breathing comfortably without accessory muscle usage, speaking in full sentences, no cough  MSK: Appears to have normal range of motion based on visualized movements  Neurologic: No apparent tremors, facial movements symmetric  Psych: affect and mood congruent, alert and oriented    The rest of a comprehensive physical examination is deferred due to PHE (public health emergency) video restrictions        Laboratory/Imaging Studies  Breast MRI 12/7  FINDINGS:  Breast composition: Heterogeneous fibroglandular tissue  Background parenchymal enhancement: Minimal     There is no suspicious enhancement or lymphadenopathy.  Posttreatment  changes in the right breast.                                                                      IMPRESSION: BI-RADS CATEGORY: 2 - Benign.    ASSESSMENT/PLAN:  Ms. June is a 57 yo female with a stage Ia, [id number], grade 1, ER positive, MA positive, HER2 negative invasive ductal carcinoma of the right breast.  She is s/p treatment with right breast lumpectomy and radiation.     1.  Right breast cancer:  She is nearly 15 months out from excision of a right breast cancer.  She is currently on treatment with Tamoxifen.  She is currently on the 20 mg dose. She has fatigue and insomnia on the medication. She is willing to continue it. See below for side effect management.     She is asymptomatic of disease recurrence on history taken today. Her breast MRI from last week was benign. Given increased breast density and that her breast cancer was not initially seen on screening mammogram, we are performing high risk breast screening with both annual mammograms and breast MRI, spacing the two studies so that she is having some form of breast imaging once every 6 months.  Plan to obtain mammograms in 06/2022.      I completed her treatment plan summary and sent this via [employer].     2.  Bone health:  DEXA in 08/2021 with a lowest T-score of -2.0  which is c/w osteopenia and encroaching on osteoporosis. Zometa for prevention of bone loss and also prevention of breast cancer bone metastases was started 7/29/2021. She had fever following the first dose. She will have her second dose in January. Usually subsequent doses of Zometa are better tolerated. If she experiences fever and body aches with the second dose, plan will be to change treatment to Prolia.    3.  Hot flashes/insomnia: Will try venlafaxine 37.5 mg daily. Update me in 3-4 weeks with response in case titration is needed.     4.  Fatigue: Hopefully insomnia will improve with venlafaxine over time. Discussed starting daily exercise, good sleep hygiene. She may continue low dose trazodone 25 mg if she would like. She has tried melatonin and lavender without relief.     5.  RUE/lateral chest wall/breast lymphedema and radiation changes:  Ongoing stretching, massage, and compression. She has seen PMR too.     7.  COVID vaccination:  Received second vaccination (Moderna) on 2/27/2021, of note had fever, chills, body aches, and injection site reaction. She has received a booster vaccination. She is practicing safe precautions at work.     45 minutes spent on the date of the encounter doing chart review, review of test results, interpretation of tests, patient visit and documentation     Briana Burgos PA-C             Again, thank you for allowing me to participate in the care of your patient.        Sincerely,        Briana Burgos PA-C

## 2021-12-16 ENCOUNTER — MYC MEDICAL ADVICE (OUTPATIENT)
Dept: ONCOLOGY | Facility: CLINIC | Age: 56
End: 2021-12-16
Payer: COMMERCIAL

## 2021-12-17 DIAGNOSIS — N95.1 MENOPAUSAL SYNDROME (HOT FLASHES): ICD-10-CM

## 2021-12-17 RX ORDER — VENLAFAXINE 37.5 MG/1
37.5 TABLET ORAL ONCE
Qty: 30 TABLET | Refills: 11 | Status: SHIPPED | OUTPATIENT
Start: 2021-12-17 | End: 2022-09-19

## 2021-12-17 NOTE — TELEPHONE ENCOUNTER
[facility] Triage    MyChart:  Patient states she only received 1 pill of the venlafaxine (effexor) and would like to know if this is an error.    Copied from Briana Burgos's note of 12/15/21  3.  Hot flashes/insomnia: Will try venlafaxine 37.5 mg daily. Update me in 3-4 weeks with response in case titration is needed.     If a RX is needed - [facility] [place] [address] and [address]y Calderón

## 2022-01-04 ENCOUNTER — VIRTUAL VISIT (OUTPATIENT)
Dept: ENDOCRINOLOGY | Facility: CLINIC | Age: 57
End: 2022-01-04
Payer: COMMERCIAL

## 2022-01-04 DIAGNOSIS — M85.80 OSTEOPENIA, UNSPECIFIED LOCATION: Primary | ICD-10-CM

## 2022-01-04 PROCEDURE — 99214 OFFICE O/P EST MOD 30 MIN: CPT | Mod: GT | Performed by: INTERNAL MEDICINE

## 2022-01-04 NOTE — PATIENT INSTRUCTIONS
Crispin,    We will repeat bone density in 2023.   Continue current calcium and vitamin D supplement.     Weight bearing Exercises    Fall prevention

## 2022-01-04 NOTE — PROGRESS NOTES
Crispin is a 56 year old who is being evaluated via a billable video visit.      How would you like to obtain your AVS? MyChart  If the video visit is dropped, the invitation should be resent by: Send to e-mail at: [email or web address]  Will anyone else be joining your video visit? No        Marine MERAZ MA   [facility]

## 2022-01-04 NOTE — PROGRESS NOTES
Endocrinology virtual Visit    Chief Complaint: Follow / osteopenia      Information obtained from:Patient    Subjective:         HPI: Maribel June is a 56 year old female with history of osteopenia who is here for a follow up.      Diagnosed with breast cancer s/p radiation therapy, currently on tamoxifen and ZOMETA.  Here for further discussion of osteopenia.  No fractures since we saw her last.     Fracture risk and osteoporosis  No previous history of fracture after the age of 50.  No loss of height.  Her weight has always been in the range of 109-110 pounds  No family history or parental history of hip fracture or osteoporosis.  She is a non-smoker.  No history of current or past use of glucocorticoid treatment  No history of excessive alcohol intake  She has had hysterectomy but she tells me that ovaries are present. Now on tamoxifen.   No history of prolonged immobility, transplant history, diabetes, hyperthyroidism, GI disease including inflammatory bowel disease or COPD.    She has recurrent history of nephrolithiasis.  Previously had not been taking any calcium supplement for fear of nephrolithiasis.  Currently on MVT. Does not take any additional calcium supplement. Takes at least two diary products per day.     In terms of exercise; she walks every day.   at the [employer].  No dental procedures planned.    She is lactose intolerant.  She has a strong family history of kidney stone in her dad, brother, uncle.  She had kidney stones frequently over the last 10 years and she is closely following with urology.  Kidney stones were calcium containing per report. Last kidney stone was 4 years ago.     Allergies   Allergen Reactions     Flagyl [Metronidazole] Nausea and Vomiting     Lisinopril      Other reaction(s): Headaches     Oxycodone-Acetaminophen Nausea and Vomiting     States Oxycodone is fine     Sulfamethoxazole-Trimethoprim      Other reaction(s): Headache     Zithromax  [Azithromycin Dihydrate] Rash       Current Outpatient Medications   Medication Sig Dispense Refill     amLODIPine (NORVASC) 5 MG tablet Take 1 tablet by mouth       bimatoprost (LUMIGAN) 0.01 % SOLN Place 1 drop into both eyes daily        Multiple Vitamin (MULTIVITAMIN PO) Take by mouth daily       Omega-3 Fatty Acids (OMEGA 3 PO) Take by mouth daily       tamoxifen (NOLVADEX) 20 MG tablet TAKE 1 TABLET(20 MG) BY MOUTH DAILY 90 tablet 0     acyclovir (ZOVIRAX) 400 MG tablet Take 1 tablet (400 mg) by mouth every 8 hours for 5 days 15 tablet 11     traZODone (DESYREL) 50 MG tablet Take 1 tablet (50 mg) by mouth At Bedtime Take 1/2 pill (25 mg) for a few nights, if tolerable OK to take full pill for sleep (Patient not taking: Reported on 10/12/2021) 30 tablet 3     venlafaxine (EFFEXOR) 37.5 MG tablet Take 1 tablet (37.5 mg) by mouth once for 1 dose 30 tablet 11       Review of Systems   Answers for HPI/ROS submitted by the patient on 1/1/2022  General Symptoms: No  Skin Symptoms: No  HENT Symptoms: No  EYE SYMPTOMS: No  HEART SYMPTOMS: No  LUNG SYMPTOMS: No  INTESTINAL SYMPTOMS: No  URINARY SYMPTOMS: No  GYNECOLOGIC SYMPTOMS: No  BREAST SYMPTOMS: No  SKELETAL SYMPTOMS: No  BLOOD SYMPTOMS: No  NERVOUS SYSTEM SYMPTOMS: No  MENTAL HEALTH SYMPTOMS: No      Objective:   There were no vitals taken for this visit.  Constitutional: Pleasant no acute cardiopulmonary distress.   EYES: anicteric, normal extra-ocular movements.  Neurological: Alert and oriented.    Psychological: appropriate mood and affect     In House Labs:     TSH   Date Value Ref Range Status   08/17/2021 2.49 0.40 - 4.00 mU/L Final   12/14/2020 1.88 0.40 - 4.00 mU/L Final   10/28/2019 2.18 0.40 - 4.00 mU/L Final   08/05/2019 1.75 0.40 - 4.00 mU/L Final   07/31/2018 3.54 0.40 - 4.00 mU/L Final   09/19/2017 2.68 0.40 - 4.00 mU/L Final       Creatinine   Date Value Ref Range Status   10/12/2021 0.62 0.52 - 1.04 mg/dL Final   10/28/2019 0.50 (L) 0.52 - 1.04  mg/dL Final     ENDO CALCIUM LABS-[facility] Latest Ref Rng & Units 10/12/2021   VITAMIN D DEFICIENCY SCREENING 20 - 75 ug/L    ALBUMIN 3.4 - 5.0 g/dL    ALKPHOS 40 - 150 U/L    CALCIUM 8.5 - 10.1 mg/dL 8.8   CREATININE 0.52 - 1.04 mg/dL 0.62     ENDO CALCIUM LABS-[facility] Latest Ref Rng & Units 7/30/2021   VITAMIN D DEFICIENCY SCREENING 20 - 75 ug/L    ALBUMIN 3.4 - 5.0 g/dL 3.5   ALKPHOS 40 - 150 U/L 85   CALCIUM 8.5 - 10.1 mg/dL 8.3 (L)   CREATININE 0.52 - 1.04 mg/dL 0.67     ENDO CALCIUM LABS-[facility] Latest Ref Rng & Units 7/29/2021   VITAMIN D DEFICIENCY SCREENING 20 - 75 ug/L    ALBUMIN 3.4 - 5.0 g/dL 3.5   ALKPHOS 40 - 150 U/L    CALCIUM 8.5 - 10.1 mg/dL 9.1   CREATININE 0.52 - 1.04 mg/dL 0.56     ENDO CALCIUM LABS-[facility] Latest Ref Rng & Units 12/14/2020   VITAMIN D DEFICIENCY SCREENING 20 - 75 ug/L 52   ALBUMIN 3.4 - 5.0 g/dL 4.2   ALKPHOS 40 - 150 U/L    CALCIUM 8.5 - 10.1 mg/dL 9.7   CREATININE 0.52 - 1.04 mg/dL    8/2021  Lumbar spine T-score in region of L1-L4= -1.6   L1-L4 percent change: -4.9%      HIPS:  Mean total hip T-score: -1.6  Mean total hip percent change: -4.2%      Left femoral neck T-score = -2.0  Right femoral neck T-score= -2.0      COMPARISON TO PRIOR:  Percent change in the spine and hips is significant accounting to the  precision errors for this facility.      FRAX:  10 year probability of major osteoporotic fracture: 7.3%  10 year probability of hip fracture: 1.0%          Assessment/Treatment Plan:      Osteopenia; I have personally reviewed her latest DEXA scan from 8/5/2021  Agree with decline in BMD at the hips and spine. Starting tamoxifen over the last one year might have contributed.  To identify other possible secondary causes of osteopenia screening for primary hyperparathyroidism and celiac disease were done and results were negative or normal. Adequate calcium via diet and supplement. Check vitamin d level to assess if current dose is adequate.    Based on the current DEXA scan - FRAX score  of 7.3% for 10-year probability of major osteoporotic fracture and a FRAX score of 1 for a 10-year probability of hip fracture; bone specific therapy with bisphosphonate is not indicated at this point in time. In addition, currently undergoing treatment with zoledronic acid 4 mg q 6 months from oncology stand point.   Follow up DEXA scan two years from the last study.          Patient Instructions   Crispin,    We will repeat bone density in 2023.   Continue current calcium and vitamin D supplement.     Weight bearing Exercises    Fall prevention        I will contact the patient with the test results.  Return to clinic in 18-24 months.    Test and/or medications prescribed today:  Orders Placed This Encounter   Procedures     Dexa hip/pelvis/spine     Vitamin D Deficiency (D3 Only)         Cornel Briseno MD  Staff Endocrinologist    Division of Endocrinology and Diabetes    Video-Visit Details    Type of service:  Video Visit  1st VideoStart: 01/04/2022 02:30 pm  Stop: 01/04/2022 02:51 pm  Platform used for Video Visit: [employer]  34 minutes spent on the date of the encounter doing chart review, history, documentation and further activities per the note.

## 2022-01-04 NOTE — LETTER
1/4/2022         RE: Maribel June  [address]  [facility] [address]        Dear Colleague,    Thank you for referring your patient, Maribel June, to the [facility]. Please see a copy of my visit note below.    Crispin is a 56 year old who is being evaluated via a billable video visit.      How would you like to obtain your AVS? MyChart  If the video visit is dropped, the invitation should be resent by: Send to e-mail at: [email or web address]  Will anyone else be joining your video visit? No        Marine MERAZ MA   Adult Endocrine   [facility]      Endocrinology virtual Visit    Chief Complaint: Follow / osteopenia      Information obtained from:Patient    Subjective:         HPI: Maribel June is a 56 year old female with history of osteopenia who is here for a follow up.      Diagnosed with breast cancer s/p radiation therapy, currently on tamoxifen and ZOMETA.  Here for further discussion of osteopenia.  No fractures since we saw her last.     Fracture risk and osteoporosis  No previous history of fracture after the age of 50.  No loss of height.  Her weight has always been in the range of 109-110 pounds  No family history or parental history of hip fracture or osteoporosis.  She is a non-smoker.  No history of current or past use of glucocorticoid treatment  No history of excessive alcohol intake  She has had hysterectomy but she tells me that ovaries are present. Now on tamoxifen.   No history of prolonged immobility, transplant history, diabetes, hyperthyroidism, GI disease including inflammatory bowel disease or COPD.    She has recurrent history of nephrolithiasis.  Previously had not been taking any calcium supplement for fear of nephrolithiasis.  Currently on MVT. Does not take any additional calcium supplement. Takes at least two diary products per day.     In terms of exercise; she walks every day.   at the [employer].  No dental procedures planned.    She is lactose intolerant.  She has a strong family history of kidney stone in her dad, brother, uncle.  She had kidney stones frequently over the last 10 years and she is closely following with urology.  Kidney stones were calcium containing per report. Last kidney stone was 4 years ago.     Allergies   Allergen Reactions     Flagyl [Metronidazole] Nausea and Vomiting     Lisinopril      Other reaction(s): Headaches     Oxycodone-Acetaminophen Nausea and Vomiting     States Oxycodone is fine     Sulfamethoxazole-Trimethoprim      Other reaction(s): Headache     Zithromax [Azithromycin Dihydrate] Rash       Current Outpatient Medications   Medication Sig Dispense Refill     amLODIPine (NORVASC) 5 MG tablet Take 1 tablet by mouth       bimatoprost (LUMIGAN) 0.01 % SOLN Place 1 drop into both eyes daily        Multiple Vitamin (MULTIVITAMIN PO) Take by mouth daily       Omega-3 Fatty Acids (OMEGA 3 PO) Take by mouth daily       tamoxifen (NOLVADEX) 20 MG tablet TAKE 1 TABLET(20 MG) BY MOUTH DAILY 90 tablet 0     acyclovir (ZOVIRAX) 400 MG tablet Take 1 tablet (400 mg) by mouth every 8 hours for 5 days 15 tablet 11     traZODone (DESYREL) 50 MG tablet Take 1 tablet (50 mg) by mouth At Bedtime Take 1/2 pill (25 mg) for a few nights, if tolerable OK to take full pill for sleep (Patient not taking: Reported on 10/12/2021) 30 tablet 3     venlafaxine (EFFEXOR) 37.5 MG tablet Take 1 tablet (37.5 mg) by mouth once for 1 dose 30 tablet 11       Review of Systems   Answers for HPI/ROS submitted by the patient on 1/1/2022  General Symptoms: No  Skin Symptoms: No  HENT Symptoms: No  EYE SYMPTOMS: No  HEART SYMPTOMS: No  LUNG SYMPTOMS: No  INTESTINAL SYMPTOMS: No  URINARY SYMPTOMS: No  GYNECOLOGIC SYMPTOMS: No  BREAST SYMPTOMS: No  SKELETAL SYMPTOMS: No  BLOOD SYMPTOMS: No  NERVOUS SYSTEM SYMPTOMS: No  MENTAL HEALTH SYMPTOMS: No      Objective:   There were no vitals taken for this  visit.  Constitutional: Pleasant no acute cardiopulmonary distress.   EYES: anicteric, normal extra-ocular movements.  Neurological: Alert and oriented.    Psychological: appropriate mood and affect     In House Labs:     TSH   Date Value Ref Range Status   08/17/2021 2.49 0.40 - 4.00 mU/L Final   12/14/2020 1.88 0.40 - 4.00 mU/L Final   10/28/2019 2.18 0.40 - 4.00 mU/L Final   08/05/2019 1.75 0.40 - 4.00 mU/L Final   07/31/2018 3.54 0.40 - 4.00 mU/L Final   09/19/2017 2.68 0.40 - 4.00 mU/L Final       Creatinine   Date Value Ref Range Status   10/12/2021 0.62 0.52 - 1.04 mg/dL Final   10/28/2019 0.50 (L) 0.52 - 1.04 mg/dL Final     ENDO CALCIUM LABS-[facility] Latest Ref Rng & Units 10/12/2021   VITAMIN D DEFICIENCY SCREENING 20 - 75 ug/L    ALBUMIN 3.4 - 5.0 g/dL    ALKPHOS 40 - 150 U/L    CALCIUM 8.5 - 10.1 mg/dL 8.8   CREATININE 0.52 - 1.04 mg/dL 0.62     ENDO CALCIUM LABS-[facility] Latest Ref Rng & Units 7/30/2021   VITAMIN D DEFICIENCY SCREENING 20 - 75 ug/L    ALBUMIN 3.4 - 5.0 g/dL 3.5   ALKPHOS 40 - 150 U/L 85   CALCIUM 8.5 - 10.1 mg/dL 8.3 (L)   CREATININE 0.52 - 1.04 mg/dL 0.67     ENDO CALCIUM LABS-[facility] Latest Ref Rng & Units 7/29/2021   VITAMIN D DEFICIENCY SCREENING 20 - 75 ug/L    ALBUMIN 3.4 - 5.0 g/dL 3.5   ALKPHOS 40 - 150 U/L    CALCIUM 8.5 - 10.1 mg/dL 9.1   CREATININE 0.52 - 1.04 mg/dL 0.56     ENDO CALCIUM LABS-[facility] Latest Ref Rng & Units 12/14/2020   VITAMIN D DEFICIENCY SCREENING 20 - 75 ug/L 52   ALBUMIN 3.4 - 5.0 g/dL 4.2   ALKPHOS 40 - 150 U/L    CALCIUM 8.5 - 10.1 mg/dL 9.7   CREATININE 0.52 - 1.04 mg/dL    8/2021  Lumbar spine T-score in region of L1-L4= -1.6   L1-L4 percent change: -4.9%      HIPS:  Mean total hip T-score: -1.6  Mean total hip percent change: -4.2%      Left femoral neck T-score = -2.0  Right femoral neck T-score= -2.0      COMPARISON TO PRIOR:  Percent change in the spine and hips is significant accounting to the  precision errors for this facility.      FRAX:  10 year probability of  major osteoporotic fracture: 7.3%  10 year probability of hip fracture: 1.0%          Assessment/Treatment Plan:      Osteopenia; I have personally reviewed her latest DEXA scan from 8/5/2021  Agree with decline in BMD at the hips and spine. Starting tamoxifen over the last one year might have contributed.  To identify other possible secondary causes of osteopenia screening for primary hyperparathyroidism and celiac disease were done and results were negative or normal. Adequate calcium via diet and supplement. Check vitamin d level to assess if current dose is adequate.    Based on the current DEXA scan - FRAX score of 7.3% for 10-year probability of major osteoporotic fracture and a FRAX score of 1 for a 10-year probability of hip fracture; bone specific therapy with bisphosphonate is not indicated at this point in time. In addition, currently undergoing treatment with zoledronic acid 4 mg q 6 months from oncology stand point.   Follow up DEXA scan two years from the last study.          Patient Instructions   Crispin,    We will repeat bone density in 2023.   Continue current calcium and vitamin D supplement.     Weight bearing Exercises    Fall prevention        I will contact the patient with the test results.  Return to clinic in 18-24 months.    Test and/or medications prescribed today:  Orders Placed This Encounter   Procedures     Dexa hip/pelvis/spine     Vitamin D Deficiency (D3 Only)         Cornel Briseno MD  Staff Endocrinologist    Division of Endocrinology and Diabetes    Video-Visit Details    Type of service:  Video Visit  1st VideoStart: 01/04/2022 02:30 pm  Stop: 01/04/2022 02:51 pm  Platform used for Video Visit: [employer]  34 minutes spent on the date of the encounter doing chart review, history, documentation and further activities per the note.        Again, thank you for allowing me to participate in the care of your patient.        Sincerely,        Cornel Briseno MD

## 2022-01-12 DIAGNOSIS — G47.00 PERSISTENT INSOMNIA: ICD-10-CM

## 2022-01-12 DIAGNOSIS — C50.511 MALIGNANT NEOPLASM OF LOWER-OUTER QUADRANT OF RIGHT BREAST OF FEMALE, ESTROGEN RECEPTOR POSITIVE (H): ICD-10-CM

## 2022-01-12 DIAGNOSIS — M85.80 OSTEOPENIA, UNSPECIFIED LOCATION: Primary | ICD-10-CM

## 2022-01-12 DIAGNOSIS — Z17.0 MALIGNANT NEOPLASM OF LOWER-OUTER QUADRANT OF RIGHT BREAST OF FEMALE, ESTROGEN RECEPTOR POSITIVE (H): ICD-10-CM

## 2022-01-12 RX ORDER — HEPARIN SODIUM (PORCINE) LOCK FLUSH IV SOLN 100 UNIT/ML 100 UNIT/ML
5 SOLUTION INTRAVENOUS
Status: CANCELLED | OUTPATIENT
Start: 2022-01-14

## 2022-01-12 RX ORDER — ZOLEDRONIC ACID 0.04 MG/ML
4 INJECTION, SOLUTION INTRAVENOUS ONCE
Status: CANCELLED
Start: 2022-01-14 | End: 2022-01-29

## 2022-01-12 RX ORDER — TRAZODONE HYDROCHLORIDE 50 MG/1
50 TABLET, FILM COATED ORAL AT BEDTIME
Qty: 30 TABLET | Refills: 3 | Status: SHIPPED | OUTPATIENT
Start: 2022-01-12 | End: 2022-06-02

## 2022-01-12 RX ORDER — HEPARIN SODIUM,PORCINE 10 UNIT/ML
5 VIAL (ML) INTRAVENOUS
Status: CANCELLED | OUTPATIENT
Start: 2022-01-14

## 2022-01-12 NOTE — TELEPHONE ENCOUNTER
Medication: Trazodone   Last prescribing provider:Dr. Eli 9/13/21    Last clinic visit date: 12/15/21 Briana Burgos PA-C  Recommendations for requested medication:     Any missed appointments or no-shows since last clinic visit?: none   Next clinic visit date: 3/14/22 Dr. Eli    Any other pertinent information:  Routed to Briana Burgos PA-C

## 2022-01-14 ENCOUNTER — APPOINTMENT (OUTPATIENT)
Dept: LAB | Facility: CLINIC | Age: 57
End: 2022-01-14
Attending: INTERNAL MEDICINE
Payer: COMMERCIAL

## 2022-01-14 ENCOUNTER — INFUSION THERAPY VISIT (OUTPATIENT)
Dept: ONCOLOGY | Facility: CLINIC | Age: 57
End: 2022-01-14
Attending: INTERNAL MEDICINE
Payer: COMMERCIAL

## 2022-01-14 VITALS
HEART RATE: 65 BPM | DIASTOLIC BLOOD PRESSURE: 67 MMHG | RESPIRATION RATE: 16 BRPM | WEIGHT: 108 LBS | BODY MASS INDEX: 21.34 KG/M2 | TEMPERATURE: 98.2 F | OXYGEN SATURATION: 98 % | SYSTOLIC BLOOD PRESSURE: 125 MMHG

## 2022-01-14 DIAGNOSIS — M85.80 OSTEOPENIA, UNSPECIFIED LOCATION: Primary | ICD-10-CM

## 2022-01-14 DIAGNOSIS — Z17.0 MALIGNANT NEOPLASM OF LOWER-OUTER QUADRANT OF RIGHT BREAST OF FEMALE, ESTROGEN RECEPTOR POSITIVE (H): ICD-10-CM

## 2022-01-14 DIAGNOSIS — C50.511 MALIGNANT NEOPLASM OF LOWER-OUTER QUADRANT OF RIGHT BREAST OF FEMALE, ESTROGEN RECEPTOR POSITIVE (H): ICD-10-CM

## 2022-01-14 LAB
ALBUMIN SERPL-MCNC: 4.1 G/DL (ref 3.4–5)
CALCIUM SERPL-MCNC: 9 MG/DL (ref 8.5–10.1)
CREAT SERPL-MCNC: 0.57 MG/DL (ref 0.52–1.04)
DEPRECATED CALCIDIOL+CALCIFEROL SERPL-MC: 40 UG/L (ref 20–75)
GFR SERPL CREATININE-BSD FRML MDRD: >90 ML/MIN/1.73M2

## 2022-01-14 PROCEDURE — 82310 ASSAY OF CALCIUM: CPT | Performed by: INTERNAL MEDICINE

## 2022-01-14 PROCEDURE — 36415 COLL VENOUS BLD VENIPUNCTURE: CPT | Performed by: INTERNAL MEDICINE

## 2022-01-14 PROCEDURE — 250N000011 HC RX IP 250 OP 636: Performed by: INTERNAL MEDICINE

## 2022-01-14 PROCEDURE — 82040 ASSAY OF SERUM ALBUMIN: CPT | Performed by: INTERNAL MEDICINE

## 2022-01-14 PROCEDURE — 258N000003 HC RX IP 258 OP 636: Performed by: INTERNAL MEDICINE

## 2022-01-14 PROCEDURE — 82565 ASSAY OF CREATININE: CPT | Performed by: INTERNAL MEDICINE

## 2022-01-14 PROCEDURE — 82306 VITAMIN D 25 HYDROXY: CPT

## 2022-01-14 PROCEDURE — 96365 THER/PROPH/DIAG IV INF INIT: CPT

## 2022-01-14 RX ORDER — ZOLEDRONIC ACID 0.04 MG/ML
4 INJECTION, SOLUTION INTRAVENOUS ONCE
Status: COMPLETED | OUTPATIENT
Start: 2022-01-14 | End: 2022-01-14

## 2022-01-14 RX ADMIN — ZOLEDRONIC ACID 4 MG: 4 SOLUTION INTRAVENOUS at 15:48

## 2022-01-14 RX ADMIN — SODIUM CHLORIDE 250 ML: 9 INJECTION, SOLUTION INTRAVENOUS at 15:48

## 2022-01-14 ASSESSMENT — PAIN SCALES - GENERAL: PAINLEVEL: NO PAIN (0)

## 2022-01-14 NOTE — PROGRESS NOTES
Infusion Nursing Note:  Maribel June presents today for Cycle 2 Zometa.    Patient seen by provider today: No   present during visit today: Not Applicable.    Note: Pt arrives to infusion today feeling well. Offers no new concerns or complaints.     Pt confirms she is taking calcium and Vitamin D.     Intravenous Access:  Peripheral IV placed.    Treatment Conditions:  Lab Results   Component Value Date     10/12/2021    POTASSIUM 3.8 10/12/2021    CR 0.57 01/14/2022    KVNG 9.0 01/14/2022    BILITOTAL 0.6 07/30/2021    ALBUMIN 4.1 01/14/2022    ALT 33 07/30/2021    AST 27 07/30/2021     Results reviewed, labs MET treatment parameters, ok to proceed with treatment.    Post Infusion Assessment:  Patient tolerated infusion without incident.  Blood return noted pre and post infusion.  Site patent and intact, free from redness, edema or discomfort.  No evidence of extravasations.  Access discontinued per protocol.     Discharge Plan:   Patient declined prescription refills.  AVS to patient via [employer].  Patient will return 3/14 for next appointment.   Patient discharged in stable condition accompanied by: self.  Departure Mode: Ambulatory.  Face to Face time: 0.      Violeta Doe RN

## 2022-01-14 NOTE — NURSING NOTE
Chief Complaint   Patient presents with     Blood Draw     Labs drawn via PIV placed by RN in lab. VS taken     Milly Hodge RN

## 2022-03-13 DIAGNOSIS — Z17.0 MALIGNANT NEOPLASM OF LOWER-OUTER QUADRANT OF RIGHT BREAST OF FEMALE, ESTROGEN RECEPTOR POSITIVE (H): ICD-10-CM

## 2022-03-13 DIAGNOSIS — C50.511 MALIGNANT NEOPLASM OF LOWER-OUTER QUADRANT OF RIGHT BREAST OF FEMALE, ESTROGEN RECEPTOR POSITIVE (H): ICD-10-CM

## 2022-03-13 RX ORDER — HEPARIN SODIUM (PORCINE) LOCK FLUSH IV SOLN 100 UNIT/ML 100 UNIT/ML
5 SOLUTION INTRAVENOUS
Status: CANCELLED | OUTPATIENT
Start: 2022-07-13

## 2022-03-13 RX ORDER — HEPARIN SODIUM,PORCINE 10 UNIT/ML
5 VIAL (ML) INTRAVENOUS
Status: CANCELLED | OUTPATIENT
Start: 2022-07-13

## 2022-03-13 RX ORDER — ZOLEDRONIC ACID 0.04 MG/ML
4 INJECTION, SOLUTION INTRAVENOUS ONCE
Status: CANCELLED
Start: 2022-07-13 | End: 2022-07-13

## 2022-03-13 NOTE — PROGRESS NOTES
Oncology Visit:  Date on this visit: 3/14/2022    Diagnosis: Stage Ia, [id number], grade 1, ER positive, AR positive, HER-2 negative invasive carcinoma of the right breast. Oncotype dx recurrence score = 16.    Primary Physician: Juwan Perry     History Of Present Illness:  Ms. June is a 56 year old female with right breast cancer.  Routine screening mammogram on 8/6/2020 showed heterogeneously dense breasts but no concerning findings.  A physician then palpated a mass in the right breast.  Diagnostic mammogram and ultrasound showed a 2.2 cm mass at 8:00, 5 cm from the nipple.  Right breast biopsy showed a grade 1 invasive mammary carcinoma, ER strong in 100%, AR strong in 100%, HER2 negative.  She had a right breast lumpectomy and sentinel lymph node procedure with Dr. Watson on 9/29/2020.  Pathology showed a grade 1 invasive mammary carcinoma measuring 1.6 cm.  There was associated intermediate grade DCIS.  Surgical margins were negative.  A single lymph node was benign.  Oncotype dx recurrence score was 16.  She completed radiation to the right breast, a total of 5256 cGy in 20 fractions, on 12/21/2020.  She started treatment with letrozole in 1/2021.  The medication was poorly tolerated with arthralgias, insomnia, vaginal dryness and fatigue.  Treatment was changed to Tamoxifen in 03/2021.  This caused nausea and so was dose reduced to 10 mg daily in 05/2021. This was slowly increased back to 20 mg daily.  She received a dose of Zometa 7/29/2021 with subsequent fever.    Interval History:  Ms. June comes into clinic today for routine breast cancer follow-up.  She continues on treatment with tamoxifen.  She reports ongoing hot flashes.  These occur both day and night.  She reports they are tolerable.  She keeps fans most places that she goes.  Most bothersome is insomnia and fatigue.  She tried taking Effexor, however had significant nausea and was unable to tolerate the medication.  She is not  interested in similar type medications.  She is taking a 1/2 tablet of trazodone, 1 hour before she goes to sleep.  She can get a good amount of sleep with this medication.  She cannot tolerate a full tablet of trazodone and has had no relief with prior melatonin.  She reports around 2:00 in the afternoon, she starts to feel very tired.  She spends the evenings on the couch.  She denies symptoms of depression or anxiety.      She reports stiffness that occurs in her bilateral feet after long periods of sitting.  She denies other new bone or joint aches or pains.  At the end of the day she will have firmness over the inferior aspect of her breast.  She will do massage before going to bed and when she awakens it seems to be much softer.  She also reports some lymphedema in her right lateral chest wall in the axillary area.  This too improves with massage.  She has not needed to wear her compression sleeve for a number of months as she feels she has had it under good control.  She denies concerning breast lumps or pain.  She has had no cough, shortness of breath, or chest pain.  She denies abdominal complaints.  The remainder of a complete 12 point review of systems is reviewed with the patient was negative with exception that mentioned above.    Past Medical/Surgical History:  Past Medical History:   Diagnosis Date     Depressive disorder      Endometriosis      Herpes genitalis in women      History of major depression     situational with first .      Kidney stone      Malignant neoplasm of right breast in female, estrogen receptor positive (H) 2020     S/P radiation therapy     5,256 cGy to right breast completed 2020 - [facility]   - Hyperlipidemia.  - Osteopenia    Past Surgical History:   Procedure Laterality Date     BREAST BIOPSY, RT/LT Right 2020     BREAST SURGERY Right     Right Breast - Benign      SECTION      x1     COLONOSCOPY N/A 2016     Procedure: COMBINED COLONOSCOPY, SINGLE OR MULTIPLE BIOPSY/POLYPECTOMY BY BIOPSY;  Surgeon: Duane, William Charles, MD;  Location: [facility] OR     COLONOSCOPY WITH CO2 INSUFFLATION N/A 08/16/2016    Procedure: COLONOSCOPY WITH CO2 INSUFFLATION;  Surgeon: Duane, William Charles, MD;  Location: [facility] OR     CYSTOSCOPY  11/13/2009    left stent placement (C-ARM)     GENITOURINARY SURGERY      surgery for kidney stone     GYN SURGERY      Partial Hysterectomy at age 29     LUMPECTOMY BREAST WITH SENTINEL NODE, COMBINED Right 09/29/2020    Procedure: Right breast lumpectomy with [place] node biopsy;  Surgeon: Jose Watson MD;  [facility] OR     Allergies:  Allergies as of 03/14/2022 - Reviewed 01/04/2022   Allergen Reaction Noted     Flagyl [metronidazole] Nausea and Vomiting 07/30/2015     Lisinopril  06/13/2019     Oxycodone-acetaminophen Nausea and Vomiting 06/13/2019     Sulfamethoxazole-trimethoprim  12/29/2011     Zithromax [azithromycin dihydrate] Rash 07/03/2013     Current Medications:  Current Outpatient Medications   Medication Sig Dispense Refill     traZODone (DESYREL) 50 MG tablet Take 1 tablet (50 mg) by mouth At Bedtime Take 1/2 pill (25 mg) for a few nights, if tolerable OK to take full pill for sleep 30 tablet 3     acyclovir (ZOVIRAX) 400 MG tablet Take 1 tablet (400 mg) by mouth every 8 hours for 5 days 15 tablet 11     amLODIPine (NORVASC) 5 MG tablet Take 1 tablet by mouth       bimatoprost (LUMIGAN) 0.01 % SOLN Place 1 drop into both eyes daily        Multiple Vitamin (MULTIVITAMIN PO) Take by mouth daily       Omega-3 Fatty Acids (OMEGA 3 PO) Take by mouth daily       tamoxifen (NOLVADEX) 20 MG tablet TAKE 1 TABLET(20 MG) BY MOUTH DAILY 90 tablet 0     venlafaxine (EFFEXOR) 37.5 MG tablet Take 1 tablet (37.5 mg) by mouth once for 1 dose 30 tablet 11      Family and Social History:  Please see initial Oncology consultation dated 10/27/2020 for details.  9/25/2020 Breast Actionable Panel was  "negative.    Physical Exam:  /77 (BP Location: Left arm, Patient Position: Sitting, Cuff Size: Adult Small)   Pulse 68   Temp 98.3  F (36.8  C) (Oral)   Ht 1.515 m (4' 11.65\")   Wt 48.4 kg (106 lb 12.8 oz)   SpO2 97%   BMI 21.11 kg/m    General:  Well appearing adult female in NAD.  Alert and oriented.  HEENT:  Normocephalic.  Sclera anicteric.  MMM.  No lesions of the oropharynx.  Lymph:  No palpable cervical, supraclavicular, or axillary LAD.  Chest:  CTA bilaterally.  No wheezes or crackles.  CV:  RRR.  Nl S1 and S2.    Breast:  Bilateral breasts are of increased fibroglandular density.  There are no discretely palpable masses in either breast. Right breast incision is well healed without significant underlying fibrosis.  Bilateral nipples are everted.  There is lymphedema over both lower quadrants of the right breast.  Abd:  Soft/ND.   Ext:  No pitting edema of the bilateral lower extremities.   Musculo:  Full ROM of the bilateral upper extremities.    Neuro:  Cranial nerves grossly intact.  Psych:  Mood and affect appear normal.    Laboratory/Imaging Studies  12/7/2021 Breast MRI:  No suspicious enhancement or lymphadenopathy.    ASSESSMENT/PLAN:  Ms. June is a 55 yo female with a stage Ia, [id number], grade 1, ER positive, [place] positive, HER2 negative invasive ductal carcinoma of the right breast.  She is s/p treatment with right breast lumpectomy and radiation.     1.  Right breast cancer:  She is nearly 1.5 years out from excision of a right breast cancer.  She is on treatment with Tamoxifen.  She has fatigue and insomnia.  Despite this, she is willing to continue taking the medication.    She is asymptomatic of disease recurrence on history taken today. Breast MRI performed in 12/2021 was reviewed and was without concerning findings. Given increased breast density and that her breast cancer was not initially seen on screening mammogram, we are performing high risk breast screening with both annual " "mammograms and breast MRI, spacing the two studies so that she is having some form of breast imaging once every 6 months.  Will obtain mammograms in 06/2022.    2.  Bone health:  DEXA in 08/2021 with a lowest T-score of -2.0 which is c/w osteopenia and encroaching on osteoporosis. Zometa for prevention of bone loss and also prevention of breast cancer bone metastases was started 7/29/2021.  Plan to treat for a minimum of 2 years.  Next dose will be due around 7/13/2022.      3.  Arthralgias:  Secondary to menopause and exacerbated by endocrine therapy.  Continue daily vitamin D supplementation and daily walking.  Recommend that she try running her feet over a body roller twice daily.    4.  Insomnia:  Persistent.  She is taking a half tab (25 mg) PO at bedtime prn.  She has previously tried melatonin without relief.   I am worried with her sensitivity to medications, she will not tolerate sleep aides such as insomnia.  Recommend Cognitive behavioral sleep therapy and referred her to the \"[facility]\" program.    5.  Fatigue:  Continue daily exercise, eat a diet high in variety and try to obtain 7-9 hours of sleep per night.      6.  RUE/lateral chest wall/breast lymphedema and radiation changes:  Increased lymphedema right lower breast.  Recommend massage and compression.  Avoid bras with an underwire.  Return to lymphedema clinic prn.    7. Follow Up:    Bilateral screening mammograms with tomosynthesis 6/2/2022 or later (patient will be out of town 6/10-6/22).  In person visit with Briana Burgos in 3 months.  Labs and Zometa infusion around 7/13/2022.  In person visit with me in 6 months.    35 minutes spent on the date of the encounter doing chart review, review of test results, interpretation of tests, patient visit and documentation         "

## 2022-03-14 ENCOUNTER — ONCOLOGY VISIT (OUTPATIENT)
Dept: ONCOLOGY | Facility: CLINIC | Age: 57
End: 2022-03-14
Attending: INTERNAL MEDICINE
Payer: COMMERCIAL

## 2022-03-14 VITALS
TEMPERATURE: 98.3 F | WEIGHT: 106.8 LBS | SYSTOLIC BLOOD PRESSURE: 119 MMHG | HEART RATE: 68 BPM | BODY MASS INDEX: 20.97 KG/M2 | OXYGEN SATURATION: 97 % | HEIGHT: 60 IN | DIASTOLIC BLOOD PRESSURE: 77 MMHG

## 2022-03-14 DIAGNOSIS — R53.82 CHRONIC FATIGUE: ICD-10-CM

## 2022-03-14 DIAGNOSIS — Z12.31 VISIT FOR SCREENING MAMMOGRAM: ICD-10-CM

## 2022-03-14 DIAGNOSIS — G47.00 INSOMNIA, UNSPECIFIED TYPE: ICD-10-CM

## 2022-03-14 DIAGNOSIS — C50.511 MALIGNANT NEOPLASM OF LOWER-OUTER QUADRANT OF RIGHT BREAST OF FEMALE, ESTROGEN RECEPTOR POSITIVE (H): Primary | ICD-10-CM

## 2022-03-14 DIAGNOSIS — M85.80 OSTEOPENIA, UNSPECIFIED LOCATION: ICD-10-CM

## 2022-03-14 DIAGNOSIS — Z17.0 MALIGNANT NEOPLASM OF LOWER-OUTER QUADRANT OF RIGHT BREAST OF FEMALE, ESTROGEN RECEPTOR POSITIVE (H): Primary | ICD-10-CM

## 2022-03-14 DIAGNOSIS — I89.0 LYMPHEDEMA OF BREAST: ICD-10-CM

## 2022-03-14 PROCEDURE — G0463 HOSPITAL OUTPT CLINIC VISIT: HCPCS

## 2022-03-14 PROCEDURE — 99214 OFFICE O/P EST MOD 30 MIN: CPT | Performed by: INTERNAL MEDICINE

## 2022-03-14 RX ORDER — TAMOXIFEN CITRATE 20 MG/1
TABLET ORAL
Qty: 90 TABLET | Refills: 0 | Status: SHIPPED | OUTPATIENT
Start: 2022-03-14 | End: 2022-06-09

## 2022-03-14 ASSESSMENT — PAIN SCALES - GENERAL: PAINLEVEL: NO PAIN (0)

## 2022-03-14 NOTE — LETTER
3/14/2022         RE: Maribel June  [address]  [place] [address]        Dear Colleague,    Thank you for referring your patient, Maribel June, to the [facility]. Please see a copy of my visit note below.      Oncology Visit:  Date on this visit: 3/14/2022    Diagnosis: Stage Ia, [id number], grade 1, ER positive, SD positive, HER-2 negative invasive carcinoma of the right breast. Oncotype dx recurrence score = 16.    Primary Physician: Juwan Perry     History Of Present Illness:  Ms. June is a 56 year old female with right breast cancer.  Routine screening mammogram on 8/6/2020 showed heterogeneously dense breasts but no concerning findings.  A physician then palpated a mass in the right breast.  Diagnostic mammogram and ultrasound showed a 2.2 cm mass at 8:00, 5 cm from the nipple.  Right breast biopsy showed a grade 1 invasive mammary carcinoma, ER strong in 100%, SD strong in 100%, HER2 negative.  She had a right breast lumpectomy and sentinel lymph node procedure with Dr. Watson on 9/29/2020.  Pathology showed a grade 1 invasive mammary carcinoma measuring 1.6 cm.  There was associated intermediate grade DCIS.  Surgical margins were negative.  A single lymph node was benign.  Oncotype dx recurrence score was 16.  She completed radiation to the right breast, a total of 5256 cGy in 20 fractions, on 12/21/2020.  She started treatment with letrozole in 1/2021.  The medication was poorly tolerated with arthralgias, insomnia, vaginal dryness and fatigue.  Treatment was changed to Tamoxifen in 03/2021.  This caused nausea and so was dose reduced to 10 mg daily in 05/2021. This was slowly increased back to 20 mg daily.  She received a dose of Zometa 7/29/2021 with subsequent fever.    Interval History:  Ms. June comes into clinic today for routine breast cancer follow-up.  She continues on treatment with tamoxifen.  She reports ongoing hot flashes.  These occur  both day and night.  She reports they are tolerable.  She keeps fans most places that she goes.  Most bothersome is insomnia and fatigue.  She tried taking Effexor, however had significant nausea and was unable to tolerate the medication.  She is not interested in similar type medications.  She is taking a 1/2 tablet of trazodone, 1 hour before she goes to sleep.  She can get a good amount of sleep with this medication.  She cannot tolerate a full tablet of trazodone and has had no relief with prior melatonin.  She reports around 2:00 in the afternoon, she starts to feel very tired.  She spends the evenings on the couch.  She denies symptoms of depression or anxiety.      She reports stiffness that occurs in her bilateral feet after long periods of sitting.  She denies other new bone or joint aches or pains.  At the end of the day she will have firmness over the inferior aspect of her breast.  She will do massage before going to bed and when she awakens it seems to be much softer.  She also reports some lymphedema in her right lateral chest wall in the axillary area.  This too improves with massage.  She has not needed to wear her compression sleeve for a number of months as she feels she has had it under good control.  She denies concerning breast lumps or pain.  She has had no cough, shortness of breath, or chest pain.  She denies abdominal complaints.  The remainder of a complete 12 point review of systems is reviewed with the patient was negative with exception that mentioned above.    Past Medical/Surgical History:  Past Medical History:   Diagnosis Date     Depressive disorder      Endometriosis      Herpes genitalis in women      History of major depression 2007    situational with first .      Kidney stone      Malignant neoplasm of right breast in female, estrogen receptor positive (H) 08/27/2020     S/P radiation therapy     5,256 cGy to right breast completed 12/21/2020 [facility]  [place]   - Hyperlipidemia.  - Osteopenia    Past Surgical History:   Procedure Laterality Date     BREAST BIOPSY, RT/LT Right 2020     BREAST SURGERY Right     Right Breast - Benign      SECTION      x1     COLONOSCOPY N/A 2016    Procedure: COMBINED COLONOSCOPY, SINGLE OR MULTIPLE BIOPSY/POLYPECTOMY BY BIOPSY;  Surgeon: Duane, William Charles, MD;  Location: [facility] OR     COLONOSCOPY WITH CO2 INSUFFLATION N/A 2016    Procedure: COLONOSCOPY WITH CO2 INSUFFLATION;  Surgeon: Duane, William Charles, MD;  Location: [facility] OR     CYSTOSCOPY  2009    left stent placement (C-ARM)     GENITOURINARY SURGERY      surgery for kidney stone     GYN SURGERY      Partial Hysterectomy at age 29     LUMPECTOMY BREAST WITH SENTINEL NODE, COMBINED Right 2020    Procedure: Right breast lumpectomy with [place] node biopsy;  Surgeon: Jose Watson MD;  [facility]: [facility] OR     Allergies:  Allergies as of 2022 - Reviewed 2022   Allergen Reaction Noted     Flagyl [metronidazole] Nausea and Vomiting 2015     Lisinopril  2019     Oxycodone-acetaminophen Nausea and Vomiting 2019     Sulfamethoxazole-trimethoprim  2011     Zithromax [azithromycin dihydrate] Rash 2013     Current Medications:  Current Outpatient Medications   Medication Sig Dispense Refill     traZODone (DESYREL) 50 MG tablet Take 1 tablet (50 mg) by mouth At Bedtime Take 1/2 pill (25 mg) for a few nights, if tolerable OK to take full pill for sleep 30 tablet 3     acyclovir (ZOVIRAX) 400 MG tablet Take 1 tablet (400 mg) by mouth every 8 hours for 5 days 15 tablet 11     amLODIPine (NORVASC) 5 MG tablet Take 1 tablet by mouth       bimatoprost (LUMIGAN) 0.01 % SOLN Place 1 drop into both eyes daily        Multiple Vitamin (MULTIVITAMIN PO) Take by mouth daily       Omega-3 Fatty Acids (OMEGA 3 PO) Take by mouth daily       tamoxifen (NOLVADEX) 20 MG tablet TAKE 1 TABLET(20 MG) BY MOUTH DAILY 90 tablet 0  "    venlafaxine (EFFEXOR) 37.5 MG tablet Take 1 tablet (37.5 mg) by mouth once for 1 dose 30 tablet 11      Family and Social History:  Please see initial Oncology consultation dated 10/27/2020 for details.  9/25/2020 Breast Actionable Panel was negative.    Physical Exam:  /77 (BP Location: Left arm, Patient Position: Sitting, Cuff Size: Adult Small)   Pulse 68   Temp 98.3  F (36.8  C) (Oral)   Ht 1.515 m (4' 11.65\")   Wt 48.4 kg (106 lb 12.8 oz)   SpO2 97%   BMI 21.11 kg/m    General:  Well appearing adult female in NAD.  Alert and oriented.  HEENT:  Normocephalic.  Sclera anicteric.  MMM.  No lesions of the oropharynx.  Lymph:  No palpable cervical, supraclavicular, or axillary LAD.  Chest:  CTA bilaterally.  No wheezes or crackles.  CV:  RRR.  Nl S1 and S2.    Breast:  Bilateral breasts are of increased fibroglandular density.  There are no discretely palpable masses in either breast. Right breast incision is well healed without significant underlying fibrosis.  Bilateral nipples are everted.  There is lymphedema over both lower quadrants of the right breast.  Abd:  Soft/ND.   Ext:  No pitting edema of the bilateral lower extremities.   Musculo:  Full ROM of the bilateral upper extremities.    Neuro:  Cranial nerves grossly intact.  Psych:  Mood and affect appear normal.    Laboratory/Imaging Studies  12/7/2021 Breast MRI:  No suspicious enhancement or lymphadenopathy.    ASSESSMENT/PLAN:  Ms. June is a 57 yo female with a stage Ia, [id number], grade 1, ER positive, [place] positive, HER2 negative invasive ductal carcinoma of the right breast.  She is s/p treatment with right breast lumpectomy and radiation.     1.  Right breast cancer:  She is nearly 1.5 years out from excision of a right breast cancer.  She is on treatment with Tamoxifen.  She has fatigue and insomnia.  Despite this, she is willing to continue taking the medication.    She is asymptomatic of disease recurrence on history taken today. " "Breast MRI performed in 12/2021 was reviewed and was without concerning findings. Given increased breast density and that her breast cancer was not initially seen on screening mammogram, we are performing high risk breast screening with both annual mammograms and breast MRI, spacing the two studies so that she is having some form of breast imaging once every 6 months.  Will obtain mammograms in 06/2022.    2.  Bone health:  DEXA in 08/2021 with a lowest T-score of -2.0 which is c/w osteopenia and encroaching on osteoporosis. Zometa for prevention of bone loss and also prevention of breast cancer bone metastases was started 7/29/2021.  Plan to treat for a minimum of 2 years.  Next dose will be due around 7/13/2022.      3.  Arthralgias:  Secondary to menopause and exacerbated by endocrine therapy.  Continue daily vitamin D supplementation and daily walking.  Recommend that she try running her feet over a body roller twice daily.    4.  Insomnia:  Persistent.  She is taking a half tab (25 mg) PO at bedtime prn.  She has previously tried melatonin without relief.   I am worried with her sensitivity to medications, she will not tolerate sleep aides such as insomnia.  Recommend Cognitive behavioral sleep therapy and referred her to the \"[facility]\" program.    5.  Fatigue:  Continue daily exercise, eat a diet high in variety and try to obtain 7-9 hours of sleep per night.      6.  RUE/lateral chest wall/breast lymphedema and radiation changes:  Increased lymphedema right lower breast.  Recommend massage and compression.  Avoid bras with an underwire.  Return to lymphedema clinic prn.    7. Follow Up:    Bilateral screening mammograms with tomosynthesis 6/2/2022 or later (patient will be out of town 6/10-6/22).  In person visit with Briana Burgos in 3 months.  Labs and Zometa infusion around 7/13/2022.  In person visit with me in 6 months.    35 minutes spent on the date of the encounter doing chart review, " review of test results, interpretation of tests, patient visit and documentation           Again, thank you for allowing me to participate in the care of your patient.      Sincerely,    Saige Eli MD

## 2022-03-14 NOTE — NURSING NOTE
"Oncology Rooming Note    March 14, 2022 3:42 PM   Maribel June is a 56 year old female who presents for:    Chief Complaint   Patient presents with     Oncology Clinic Visit     Breast Cancer      Initial Vitals: /77 (BP Location: Left arm, Patient Position: Sitting, Cuff Size: Adult Small)   Pulse 68   Temp 98.3  F (36.8  C) (Oral)   Ht 1.515 m (4' 11.65\")   Wt 48.4 kg (106 lb 12.8 oz)   SpO2 97%   BMI 21.11 kg/m   Estimated body mass index is 21.11 kg/m  as calculated from the following:    Height as of this encounter: 1.515 m (4' 11.65\").    Weight as of this encounter: 48.4 kg (106 lb 12.8 oz). Body surface area is 1.43 meters squared.  No Pain (0) Comment: Data Unavailable   No LMP recorded. Patient has had a hysterectomy.  Allergies reviewed: Yes  Medications reviewed: Yes    Medications: MEDICATION REFILLS NEEDED TODAY. Provider was notified.     Pt needs refill on TAMOXIFEN     Pharmacy name entered into [employer]: [id number] - [place], [place] - [address] AT [facility] & [facility]    Clinical concerns: None    Ben Ruby            "

## 2022-06-02 DIAGNOSIS — G47.00 PERSISTENT INSOMNIA: ICD-10-CM

## 2022-06-02 RX ORDER — TRAZODONE HYDROCHLORIDE 50 MG/1
50 TABLET, FILM COATED ORAL AT BEDTIME
Qty: 30 TABLET | Refills: 3 | Status: SHIPPED | OUTPATIENT
Start: 2022-06-02 | End: 2023-05-15

## 2022-06-02 NOTE — CONFIDENTIAL NOTE
Trazodone Refill   Last prescribing provider: Briana Burgos     Last clinic visit date: 3/14/22 Dr Eli     Any missed appointments or no-shows since last clinic visit?: No     Recommendations for requested medication (if none, N/A): Copied from chart note 3/14/22 Briana Burgos  . She is taking a 1/2 tablet of trazodone, 1 hour before she goes to sleep. She can get a good amount of sleep with this medication. She cannot tolerate a full tablet of trazodone and has had no relief with prior melatonin. She reports around 2:00 in the afternoon, she starts to feel very tired. She spends the evenings on the couch. She denies symptoms of depression or anxiety.       Next clinic visit date: 6/9/22 Briana Burgos     Any other pertinent information (if none, N/A): N/A

## 2022-06-08 NOTE — PROGRESS NOTES
Oncology Visit:  Date on this visit: Jun 9, 2022    Diagnosis: Stage Ia, [id number], grade 1, ER positive, AL positive, HER-2 negative invasive carcinoma of the right breast. Oncotype dx recurrence score = 16.    Primary Physician: Juwan Perry     History Of Present Illness:  Ms. June is a 57 year old female with right breast cancer.  Routine screening mammogram on 8/6/2020 showed heterogeneously dense breasts but no concerning findings.  A physician then palpated a mass in the right breast.  Diagnostic mammogram and ultrasound showed a 2.2 cm mass at 8:00, 5 cm from the nipple.  Right breast biopsy showed a grade 1 invasive mammary carcinoma, ER strong in 100%, AL strong in 100%, HER2 negative.  She had a right breast lumpectomy and sentinel lymph node procedure with Dr. Watson on 9/29/2020.  Pathology showed a grade 1 invasive mammary carcinoma measuring 1.6 cm.  There was associated intermediate grade DCIS.  Surgical margins were negative.  A single lymph node was benign.  Oncotype dx recurrence score was 16.  She completed radiation to the right breast, a total of 5256 cGy in 20 fractions, on 12/21/2020.  She started treatment with letrozole in 1/2021.  The medication was poorly tolerated with arthralgias, insomnia, vaginal dryness and fatigue.  Treatment was changed to Tamoxifen in 03/2021.  This caused nausea and so was dose reduced to 10 mg daily in 05/2021. This was slowly increased back to 20 mg daily.  She received a dose of Zometa 7/29/2021 with subsequent fever.    Interval History: Crispin has been feeling okay. Work is busy. She is fatigued at the end of the work day. She is sleeping better however with trazodone. Sometimes she will wake up at 2 or 4 pm and then it will take her a while to fall back asleep but she is able to fall asleep easily with trazodone. Her hot flashes have improved. Lymphedema is better after pump treatment and using compression.     No fevers or recent infections. She is  walking everyday for exercise. No issues with appetite or digestion. No lumps/bumps or new or different pain.       Past Medical/Surgical History:  Past Medical History:   Diagnosis Date     Depressive disorder      Endometriosis      Herpes genitalis in women      History of major depression     situational with first .      Kidney stone      Malignant neoplasm of right breast in female, estrogen receptor positive (H) 2020     S/P radiation therapy     5,256 cGy to right breast completed 2020 - [facility]   - Hyperlipidemia.  - Osteopenia    Past Surgical History:   Procedure Laterality Date     BREAST BIOPSY, RT/LT Right 2020     BREAST SURGERY Right     Right Breast - Benign      SECTION      x1     COLONOSCOPY N/A 2016    Procedure: COMBINED COLONOSCOPY, SINGLE OR MULTIPLE BIOPSY/POLYPECTOMY BY BIOPSY;  Surgeon: Duane, William Charles, MD;  Location: [facility] OR     COLONOSCOPY WITH CO2 INSUFFLATION N/A 2016    Procedure: COLONOSCOPY WITH CO2 INSUFFLATION;  Surgeon: Duane, William Charles, MD;  Location: [facility] OR     CYSTOSCOPY  2009    left stent placement (C-ARM)     GENITOURINARY SURGERY      surgery for kidney stone     GYN SURGERY      Partial Hysterectomy at age 29     LUMPECTOMY BREAST WITH SENTINEL NODE, COMBINED Right 2020    Procedure: Right breast lumpectomy with [place] node biopsy;  Surgeon: Jose Watson MD;  [facility]     Allergies:  Allergies as of 2022 - Reviewed 2022   Allergen Reaction Noted     Flagyl [metronidazole] Nausea and Vomiting 2015     Lisinopril  2019     Oxycodone-acetaminophen Nausea and Vomiting 2019     Sulfamethoxazole-trimethoprim  2011     Zithromax [azithromycin dihydrate] Rash 2013     Current Medications:  Current Outpatient Medications   Medication Sig Dispense Refill     acyclovir (ZOVIRAX) 400 MG tablet Take 1 tablet (400 mg) by mouth every 8  hours for 5 days 15 tablet 11     amLODIPine (NORVASC) 5 MG tablet Take 1 tablet by mouth       bimatoprost (LUMIGAN) 0.01 % SOLN Place 1 drop into both eyes daily        Multiple Vitamin (MULTIVITAMIN PO) Take by mouth daily       Omega-3 Fatty Acids (OMEGA 3 PO) Take by mouth daily       tamoxifen (NOLVADEX) 20 MG tablet TAKE 1 TABLET(20 MG) BY MOUTH DAILY 90 tablet 0     traZODone (DESYREL) 50 MG tablet Take 1 tablet (50 mg) by mouth At Bedtime Take 1/2 pill (25 mg) for a few nights, if tolerable OK to take full pill for sleep 30 tablet 3     venlafaxine (EFFEXOR) 37.5 MG tablet Take 1 tablet (37.5 mg) by mouth once for 1 dose 30 tablet 11          Physical Exam:  /76 (BP Location: Left arm, Patient Position: Sitting, Cuff Size: Adult Regular)   Pulse 65   Temp 98.3  F (36.8  C) (Oral)   Resp 16   Wt 48.3 kg (106 lb 6.4 oz)   SpO2 98%   BMI 21.03 kg/m     Wt Readings from Last 4 Encounters:   06/09/22 48.3 kg (106 lb 6.4 oz)   03/14/22 48.4 kg (106 lb 12.8 oz)   01/14/22 49 kg (108 lb)   10/12/21 48.7 kg (107 lb 6.4 oz)     General:  Well appearing adult female in NAD.  Alert and oriented.  HEENT:  Normocephalic.  Sclera anicteric.   Lymph:  No palpable cervical, supraclavicular, or axillary LAD.  Chest:  CTA bilaterally.  No wheezes or crackles.  CV:  RRR.  Nl S1 and S2.    Breast: Palpable lymphedema with skin thickening and tenderness in inner lower quadrant of R breast as well as outer lower quadrant. Bilateral nipples are everted. No dominant mass.   Abd:  Soft/ND/NT. +BS   Ext:  No pitting edema of the bilateral lower extremities.   Neuro:  Cranial nerves grossly intact.  Psych:  Mood and affect appear normal.    Laboratory/Imaging Studies  Mammogram read pending but stable per my read     ASSESSMENT/PLAN:  Ms. June is a 57 yo female with a stage Ia, [id number], grade 1, ER positive, [place] positive, HER2 negative invasive ductal carcinoma of the right breast.  She is s/p treatment with right breast  lumpectomy and radiation.     1.  Right breast cancer:  She is about 21 months out from excision of a right breast cancer.  She is on treatment with Tamoxifen.  She has hot flashes and insomnia but these have both improved. No clinical concerns for recurrence at this time. Follow-up in 3 months.     Her mammogram official read is pending but appears benign. Given increased breast density and that her breast cancer was not initially seen on screening mammogram, we are performing high risk breast screening with both annual mammograms and breast MRI, spacing the two studies so that she is having some form of breast imaging once every 6 months. Breast MRI due December 2022.     2.  Bone health:  DEXA in 08/2021 with a lowest T-score of -2.0 which is c/w osteopenia and encroaching on osteoporosis. Zometa for prevention of bone loss and also prevention of breast cancer bone metastases was started 7/29/2021.  Plan to treat for a minimum of 2 years.  Next dose will be due around 7/13/2022.      3. Insomnia: Better on trazodone. She is taking a half tab (25 mg) PO at bedtime prn.      4. RUE/lateral chest wall/breast lymphedema and radiation changes: Improved with lymphedema PT treatment and pump. She is managing well with compression devices and massage.     5. Hot flashes: Improved.     Briana Burgos PA-C

## 2022-06-09 ENCOUNTER — ONCOLOGY VISIT (OUTPATIENT)
Dept: ONCOLOGY | Facility: CLINIC | Age: 57
End: 2022-06-09
Attending: PHYSICIAN ASSISTANT
Payer: COMMERCIAL

## 2022-06-09 ENCOUNTER — ANCILLARY PROCEDURE (OUTPATIENT)
Dept: MAMMOGRAPHY | Facility: CLINIC | Age: 57
End: 2022-06-09
Attending: INTERNAL MEDICINE
Payer: COMMERCIAL

## 2022-06-09 VITALS
HEART RATE: 65 BPM | DIASTOLIC BLOOD PRESSURE: 76 MMHG | SYSTOLIC BLOOD PRESSURE: 121 MMHG | WEIGHT: 106.4 LBS | TEMPERATURE: 98.3 F | OXYGEN SATURATION: 98 % | BODY MASS INDEX: 21.03 KG/M2 | RESPIRATION RATE: 16 BRPM

## 2022-06-09 DIAGNOSIS — Z17.0 MALIGNANT NEOPLASM OF LOWER-OUTER QUADRANT OF RIGHT BREAST OF FEMALE, ESTROGEN RECEPTOR POSITIVE (H): Primary | ICD-10-CM

## 2022-06-09 DIAGNOSIS — C50.511 MALIGNANT NEOPLASM OF LOWER-OUTER QUADRANT OF RIGHT BREAST OF FEMALE, ESTROGEN RECEPTOR POSITIVE (H): Primary | ICD-10-CM

## 2022-06-09 DIAGNOSIS — I89.0 LYMPHEDEMA OF BREAST: ICD-10-CM

## 2022-06-09 DIAGNOSIS — G47.00 INSOMNIA, UNSPECIFIED TYPE: ICD-10-CM

## 2022-06-09 DIAGNOSIS — Z12.31 VISIT FOR SCREENING MAMMOGRAM: ICD-10-CM

## 2022-06-09 DIAGNOSIS — N95.1 MENOPAUSAL SYNDROME (HOT FLASHES): ICD-10-CM

## 2022-06-09 PROCEDURE — 77063 BREAST TOMOSYNTHESIS BI: CPT | Mod: GC | Performed by: RADIOLOGY

## 2022-06-09 PROCEDURE — 99214 OFFICE O/P EST MOD 30 MIN: CPT | Performed by: PHYSICIAN ASSISTANT

## 2022-06-09 PROCEDURE — G0463 HOSPITAL OUTPT CLINIC VISIT: HCPCS

## 2022-06-09 PROCEDURE — 77067 SCR MAMMO BI INCL CAD: CPT | Mod: GC | Performed by: RADIOLOGY

## 2022-06-09 RX ORDER — TAMOXIFEN CITRATE 20 MG/1
20 TABLET ORAL DAILY
Qty: 90 TABLET | Refills: 3 | Status: SHIPPED | OUTPATIENT
Start: 2022-06-09 | End: 2023-05-15

## 2022-06-09 ASSESSMENT — PAIN SCALES - GENERAL: PAINLEVEL: NO PAIN (0)

## 2022-06-09 NOTE — NURSING NOTE
"Oncology Rooming Note    June 9, 2022 10:40 AM   Maribel June is a 57 year old female who presents for:    Chief Complaint   Patient presents with     Oncology Clinic Visit     Breast Cancer     Initial Vitals: /76 (BP Location: Left arm, Patient Position: Sitting, Cuff Size: Adult Regular)   Pulse 65   Temp 98.3  F (36.8  C) (Oral)   Resp 16   Wt 48.3 kg (106 lb 6.4 oz)   SpO2 98%   BMI 21.03 kg/m   Estimated body mass index is 21.03 kg/m  as calculated from the following:    Height as of 3/14/22: 1.515 m (4' 11.65\").    Weight as of this encounter: 48.3 kg (106 lb 6.4 oz). Body surface area is 1.43 meters squared.  No Pain (0) Comment: Data Unavailable   No LMP recorded. Patient has had a hysterectomy.  Allergies reviewed: Yes  Medications reviewed: Yes    Medications: MEDICATION REFILLS NEEDED TODAY. Provider was notified.  Pharmacy name entered into [employer]: [id number] - [facility] [address] AT [facility] [address]    Clinical concerns: Pt presents today for breast Ca f/u.  Pt is requesting a refill f Tamoxifen.  [place].       Evie Jones LPN  6/9/2022              "

## 2022-06-09 NOTE — LETTER
6/9/2022         RE: Maribel June  [address]  [facility] [address]        Oncology Visit:  Date on this visit: Jun 9, 2022    Diagnosis: Stage Ia, [id number], grade 1, ER positive, [place] positive, HER-2 negative invasive carcinoma of the right breast. Oncotype dx recurrence score = 16.    Primary Physician: Juwan Perry     History Of Present Illness:  Ms. June is a 57 year old female with right breast cancer.  Routine screening mammogram on 8/6/2020 showed heterogeneously dense breasts but no concerning findings.  A physician then palpated a mass in the right breast.  Diagnostic mammogram and ultrasound showed a 2.2 cm mass at 8:00, 5 cm from the nipple.  Right breast biopsy showed a grade 1 invasive mammary carcinoma, ER strong in 100%, UT strong in 100%, HER2 negative.  She had a right breast lumpectomy and sentinel lymph node procedure with Dr. Watson on 9/29/2020.  Pathology showed a grade 1 invasive mammary carcinoma measuring 1.6 cm.  There was associated intermediate grade DCIS.  Surgical margins were negative.  A single lymph node was benign.  Oncotype dx recurrence score was 16.  She completed radiation to the right breast, a total of 5256 cGy in 20 fractions, on 12/21/2020.  She started treatment with letrozole in 1/2021.  The medication was poorly tolerated with arthralgias, insomnia, vaginal dryness and fatigue.  Treatment was changed to Tamoxifen in 03/2021.  This caused nausea and so was dose reduced to 10 mg daily in 05/2021. This was slowly increased back to 20 mg daily.  She received a dose of Zometa 7/29/2021 with subsequent fever.    Interval History: Crispin has been feeling okay. Work is busy. She is fatigued at the end of the work day. She is sleeping better however with trazodone. Sometimes she will wake up at 2 or 4 pm and then it will take her a while to fall back asleep but she is able to fall asleep easily with trazodone. Her hot flashes have improved. Lymphedema is better  after pump treatment and using compression.     No fevers or recent infections. She is walking everyday for exercise. No issues with appetite or digestion. No lumps/bumps or new or different pain.       Past Medical/Surgical History:  Past Medical History:   Diagnosis Date     Depressive disorder      Endometriosis      Herpes genitalis in women      History of major depression     situational with first .      Kidney stone      Malignant neoplasm of right breast in female, estrogen receptor positive (H) 2020     S/P radiation therapy     5,256 cGy to right breast completed 2020 - [facility]   - Hyperlipidemia.  - Osteopenia    Past Surgical History:   Procedure Laterality Date     BREAST BIOPSY, RT/LT Right 2020     BREAST SURGERY Right     Right Breast - Benign      SECTION      x1     COLONOSCOPY N/A 2016    Procedure: COMBINED COLONOSCOPY, SINGLE OR MULTIPLE BIOPSY/POLYPECTOMY BY BIOPSY;  Surgeon: Duane, William Charles, MD;  Location: [facility] OR     COLONOSCOPY WITH CO2 INSUFFLATION N/A 2016    Procedure: COLONOSCOPY WITH CO2 INSUFFLATION;  Surgeon: Duane, William Charles, MD;  Location: [facility] OR     CYSTOSCOPY  2009    left stent placement (C-ARM)     GENITOURINARY SURGERY      surgery for kidney stone     GYN SURGERY      Partial Hysterectomy at age 29     LUMPECTOMY BREAST WITH SENTINEL NODE, COMBINED Right 2020    Procedure: Right breast lumpectomy with [place] node biopsy;  Surgeon: Jose Watson MD;  [facility] OR     Allergies:  Allergies as of 2022 - Reviewed 2022   Allergen Reaction Noted     Flagyl [metronidazole] Nausea and Vomiting 2015     Lisinopril  2019     Oxycodone-acetaminophen Nausea and Vomiting 2019     Sulfamethoxazole-trimethoprim  2011     Zithromax [azithromycin dihydrate] Rash 2013     Current Medications:  Current Outpatient Medications   Medication Sig Dispense  Refill     acyclovir (ZOVIRAX) 400 MG tablet Take 1 tablet (400 mg) by mouth every 8 hours for 5 days 15 tablet 11     amLODIPine (NORVASC) 5 MG tablet Take 1 tablet by mouth       bimatoprost (LUMIGAN) 0.01 % SOLN Place 1 drop into both eyes daily        Multiple Vitamin (MULTIVITAMIN PO) Take by mouth daily       Omega-3 Fatty Acids (OMEGA 3 PO) Take by mouth daily       tamoxifen (NOLVADEX) 20 MG tablet TAKE 1 TABLET(20 MG) BY MOUTH DAILY 90 tablet 0     traZODone (DESYREL) 50 MG tablet Take 1 tablet (50 mg) by mouth At Bedtime Take 1/2 pill (25 mg) for a few nights, if tolerable OK to take full pill for sleep 30 tablet 3     venlafaxine (EFFEXOR) 37.5 MG tablet Take 1 tablet (37.5 mg) by mouth once for 1 dose 30 tablet 11          Physical Exam:  /76 (BP Location: Left arm, Patient Position: Sitting, Cuff Size: Adult Regular)   Pulse 65   Temp 98.3  F (36.8  C) (Oral)   Resp 16   Wt 48.3 kg (106 lb 6.4 oz)   SpO2 98%   BMI 21.03 kg/m     Wt Readings from Last 4 Encounters:   06/09/22 48.3 kg (106 lb 6.4 oz)   03/14/22 48.4 kg (106 lb 12.8 oz)   01/14/22 49 kg (108 lb)   10/12/21 48.7 kg (107 lb 6.4 oz)     General:  Well appearing adult female in NAD.  Alert and oriented.  HEENT:  Normocephalic.  Sclera anicteric.   Lymph:  No palpable cervical, supraclavicular, or axillary LAD.  Chest:  CTA bilaterally.  No wheezes or crackles.  CV:  RRR.  Nl S1 and S2.    Breast: Palpable lymphedema with skin thickening and tenderness in inner lower quadrant of R breast as well as outer lower quadrant. Bilateral nipples are everted. No dominant mass.   Abd:  Soft/ND/NT. +BS   Ext:  No pitting edema of the bilateral lower extremities.   Neuro:  Cranial nerves grossly intact.  Psych:  Mood and affect appear normal.    Laboratory/Imaging Studies  Mammogram read pending but stable per my read     ASSESSMENT/PLAN:  Ms. June is a 55 yo female with a stage Ia, [id number], grade 1, ER positive, [place] positive, HER2 negative  invasive ductal carcinoma of the right breast.  She is s/p treatment with right breast lumpectomy and radiation.     1.  Right breast cancer:  She is about 21 months out from excision of a right breast cancer.  She is on treatment with Tamoxifen.  She has hot flashes and insomnia but these have both improved. No clinical concerns for recurrence at this time. Follow-up in 3 months.     Her mammogram official read is pending but appears benign. Given increased breast density and that her breast cancer was not initially seen on screening mammogram, we are performing high risk breast screening with both annual mammograms and breast MRI, spacing the two studies so that she is having some form of breast imaging once every 6 months. Breast MRI due December 2022.     2.  Bone health:  DEXA in 08/2021 with a lowest T-score of -2.0 which is c/w osteopenia and encroaching on osteoporosis. Zometa for prevention of bone loss and also prevention of breast cancer bone metastases was started 7/29/2021.  Plan to treat for a minimum of 2 years.  Next dose will be due around 7/13/2022.      3. Insomnia: Better on trazodone. She is taking a half tab (25 mg) PO at bedtime prn.      4. RUE/lateral chest wall/breast lymphedema and radiation changes: Improved with lymphedema PT treatment and pump. She is managing well with compression devices and massage.     5. Hot flashes: Improved.     IGOR Loja PA-C

## 2022-06-11 DIAGNOSIS — Z17.0 MALIGNANT NEOPLASM OF LOWER-OUTER QUADRANT OF RIGHT BREAST OF FEMALE, ESTROGEN RECEPTOR POSITIVE (H): ICD-10-CM

## 2022-06-11 DIAGNOSIS — C50.511 MALIGNANT NEOPLASM OF LOWER-OUTER QUADRANT OF RIGHT BREAST OF FEMALE, ESTROGEN RECEPTOR POSITIVE (H): ICD-10-CM

## 2022-06-13 RX ORDER — TAMOXIFEN CITRATE 20 MG/1
TABLET ORAL
Qty: 90 TABLET | Refills: 3 | OUTPATIENT
Start: 2022-06-13

## 2022-06-23 ENCOUNTER — TRANSFERRED RECORDS (OUTPATIENT)
Dept: HEALTH INFORMATION MANAGEMENT | Facility: CLINIC | Age: 57
End: 2022-06-23

## 2022-06-23 ENCOUNTER — TELEPHONE (OUTPATIENT)
Dept: DERMATOLOGY | Facility: CLINIC | Age: 57
End: 2022-06-23

## 2022-06-23 NOTE — TELEPHONE ENCOUNTER
Connected with patient and successfully moved their 9/27/22 appointment to 9/29/22.    Merlin Tomas  In Clinic Visit Facilitator

## 2022-07-13 ENCOUNTER — APPOINTMENT (OUTPATIENT)
Dept: LAB | Facility: CLINIC | Age: 57
End: 2022-07-13
Attending: INTERNAL MEDICINE
Payer: COMMERCIAL

## 2022-07-13 ENCOUNTER — INFUSION THERAPY VISIT (OUTPATIENT)
Dept: ONCOLOGY | Facility: CLINIC | Age: 57
End: 2022-07-13
Attending: INTERNAL MEDICINE
Payer: COMMERCIAL

## 2022-07-13 VITALS
BODY MASS INDEX: 21.42 KG/M2 | RESPIRATION RATE: 16 BRPM | HEART RATE: 63 BPM | OXYGEN SATURATION: 98 % | WEIGHT: 108.4 LBS | DIASTOLIC BLOOD PRESSURE: 69 MMHG | TEMPERATURE: 98.2 F | SYSTOLIC BLOOD PRESSURE: 124 MMHG

## 2022-07-13 DIAGNOSIS — C50.511 MALIGNANT NEOPLASM OF LOWER-OUTER QUADRANT OF RIGHT BREAST OF FEMALE, ESTROGEN RECEPTOR POSITIVE (H): ICD-10-CM

## 2022-07-13 DIAGNOSIS — Z17.0 MALIGNANT NEOPLASM OF LOWER-OUTER QUADRANT OF RIGHT BREAST OF FEMALE, ESTROGEN RECEPTOR POSITIVE (H): ICD-10-CM

## 2022-07-13 DIAGNOSIS — M85.80 OSTEOPENIA, UNSPECIFIED LOCATION: Primary | ICD-10-CM

## 2022-07-13 LAB
ALBUMIN SERPL-MCNC: 3.7 G/DL (ref 3.4–5)
CALCIUM SERPL-MCNC: 9.1 MG/DL (ref 8.5–10.1)
CREAT SERPL-MCNC: 0.48 MG/DL (ref 0.52–1.04)
GFR SERPL CREATININE-BSD FRML MDRD: >90 ML/MIN/1.73M2

## 2022-07-13 PROCEDURE — 82040 ASSAY OF SERUM ALBUMIN: CPT | Performed by: INTERNAL MEDICINE

## 2022-07-13 PROCEDURE — 96365 THER/PROPH/DIAG IV INF INIT: CPT

## 2022-07-13 PROCEDURE — 82310 ASSAY OF CALCIUM: CPT | Performed by: INTERNAL MEDICINE

## 2022-07-13 PROCEDURE — 258N000003 HC RX IP 258 OP 636: Performed by: INTERNAL MEDICINE

## 2022-07-13 PROCEDURE — 250N000011 HC RX IP 250 OP 636: Performed by: INTERNAL MEDICINE

## 2022-07-13 PROCEDURE — 82565 ASSAY OF CREATININE: CPT | Performed by: INTERNAL MEDICINE

## 2022-07-13 PROCEDURE — 36415 COLL VENOUS BLD VENIPUNCTURE: CPT | Performed by: INTERNAL MEDICINE

## 2022-07-13 RX ORDER — ZOLEDRONIC ACID 0.04 MG/ML
4 INJECTION, SOLUTION INTRAVENOUS ONCE
Status: COMPLETED | OUTPATIENT
Start: 2022-07-13 | End: 2022-07-13

## 2022-07-13 RX ADMIN — SODIUM CHLORIDE 250 ML: 9 INJECTION, SOLUTION INTRAVENOUS at 11:22

## 2022-07-13 RX ADMIN — ZOLEDRONIC ACID 4 MG: 0.04 INJECTION, SOLUTION INTRAVENOUS at 11:22

## 2022-07-13 ASSESSMENT — PAIN SCALES - GENERAL: PAINLEVEL: NO PAIN (0)

## 2022-07-13 NOTE — NURSING NOTE
Chief Complaint   Patient presents with     Blood Draw     Vitals, blood drawn and PIV placed by KG, VAT. Pt checked into appt.      YOVANNY Ramirez LPN

## 2022-07-13 NOTE — PROGRESS NOTES
Infusion Nursing Note:  Maribel June presents today for Cycle 3 Day 1 Zometa.    Patient seen by provider today: No   present during visit today: Not Applicable.    Note: Patient presents to infusion today doing well. Denies any recent fevers, chills, shortness of breath, chest pains or cough. Offers no new changes or concerns today.     Patient confirms she is taking Calcium and Vitamin D.     Intravenous Access:  Peripheral IV placed.    Treatment Conditions:  Lab Results   Component Value Date     10/12/2021    POTASSIUM 3.8 10/12/2021    CR 0.48 (L) 07/13/2022    KVNG 9.1 07/13/2022    BILITOTAL 0.6 07/30/2021    ALBUMIN 3.7 07/13/2022    ALT 33 07/30/2021    AST 27 07/30/2021     Results reviewed, labs MET treatment parameters, ok to proceed with treatment.    Post Infusion Assessment:  Patient tolerated infusion without incident.  Blood return noted pre and post infusion.  Site patent and intact, free from redness, edema or discomfort.  No evidence of extravasations.  Access discontinued per protocol.     Discharge Plan:   Patient declined prescription refills.  Discharge instructions reviewed with: Patient.  Patient and/or family verbalized understanding of discharge instructions and all questions answered.  AVS to patient via [employer].  Patient will return 9/1 for next appointment with Dr. Eli.   Patient discharged in stable condition accompanied by: self.  Departure Mode: Ambulatory.      Poppy Germain RN

## 2022-07-13 NOTE — PATIENT INSTRUCTIONS
Contact Numbers  [facility]: [phone] (for symptom and scheduling needs)    Please call the [facility] Triage line if you experience a temperature greater than or equal to 100.4, shaking chills, have uncontrolled nausea, vomiting and/or diarrhea, dizziness, shortness of breath, chest pain, bleeding, unexplained bruising, or if you have any other new/concerning symptoms, questions or concerns.     If you are having any concerning symptoms or wish to speak to a provider before your next infusion visit, please call your care coordinator or triage to notify them so we can adequately serve you.     If you need a refill on a narcotic prescription or other medication, please call triage before your infusion appointment.           July 2022 Sunday Monday Tuesday Wednesday Thursday Friday Saturday                            1     2       3     4     5     6     7     8     9       10     11     12     13    LAB PERIPHERAL  10:00 AM   (15 min.)   [facility] LAB DRAW   [facility]    ONC INFUSION 0.5 HR (30 MIN)  10:30 AM   (30 min.)    ONC INFUSION NURSE   [facility] 14     15     16       17     18     19     20     21     22     23       24     25     26     27     28     29     30       31                                                 August 2022 Sunday Monday Tuesday Wednesday Thursday Friday Saturday        1     2     3     4     5     6       7     8     9     10     11     12     13       14     15     16     17     18     19    MYCHART OB GYN ANNUAL PHYSICAL   3:30 PM   (30 min.)   Laurita Damon DO   [facility] [place] 20       21     22     23     24     25     26     27       28     29     30     31                                      Lab Results:  Recent Results (from the past 12 hour(s))   Calcium    Collection Time: 07/13/22 10:25 AM   Result Value Ref Range    Calcium 9.1 8.5 - 10.1 mg/dL   Creatinine     Collection Time: 07/13/22 10:25 AM   Result Value Ref Range    Creatinine 0.48 (L) 0.52 - 1.04 mg/dL    GFR Estimate >90 >60 mL/min/1.73m2   Albumin level    Collection Time: 07/13/22 10:25 AM   Result Value Ref Range    Albumin 3.7 3.4 - 5.0 g/dL

## 2022-08-19 ENCOUNTER — OFFICE VISIT (OUTPATIENT)
Dept: OBGYN | Facility: CLINIC | Age: 57
End: 2022-08-19
Payer: COMMERCIAL

## 2022-08-19 VITALS
HEIGHT: 60 IN | BODY MASS INDEX: 21.36 KG/M2 | HEART RATE: 62 BPM | DIASTOLIC BLOOD PRESSURE: 77 MMHG | SYSTOLIC BLOOD PRESSURE: 123 MMHG | WEIGHT: 108.8 LBS | OXYGEN SATURATION: 97 %

## 2022-08-19 DIAGNOSIS — B00.9 HERPES SIMPLEX VIRUS INFECTION: ICD-10-CM

## 2022-08-19 DIAGNOSIS — Z01.419 ENCOUNTER FOR GYNECOLOGICAL EXAMINATION WITHOUT ABNORMAL FINDING: Primary | ICD-10-CM

## 2022-08-19 PROCEDURE — 99213 OFFICE O/P EST LOW 20 MIN: CPT | Performed by: OBSTETRICS & GYNECOLOGY

## 2022-08-19 RX ORDER — ACYCLOVIR 400 MG/1
400 TABLET ORAL EVERY 8 HOURS
Qty: 15 TABLET | Refills: 11 | Status: SHIPPED | OUTPATIENT
Start: 2022-08-19 | End: 2023-09-05

## 2022-08-19 NOTE — PROGRESS NOTES
"This 58 y/o female, , s/p hysterectomy for benign dx but still retains both ovaries, presents for just the breast and pelvic portions of her annual exam today.  She denies any gyn issues but requests a refill of Acyclovir for prevention of herpetic outbreaks.  She is s/p treatment for breast cancer - had a right breast lumpectomy on 2020.  She has a Dermatology appt for a mole check and will be seeing her Endocrinologist next February for f/u of osteopenia issues.  Her last DEXA scan was performed on 2021.  /77 (BP Location: Left arm, Patient Position: Sitting, Cuff Size: Adult Regular)   Pulse 62   Ht 1.518 m (4' 11.75\")   Wt 49.4 kg (108 lb 12.8 oz)   SpO2 97%   BMI 21.43 kg/m    ROS:  10 systems were reviewed and the positives were listed under problems.  The breast exam was performed and no masses or nipple discharge were noted.  Scars from her previous breast surgery were noted.  A bi-valve speculum was placed and the vaginal cuff appears intact without any defects.  The vaginal mucosa appears normal and not dry.  A pelvic exam was then performed and no uterine nor adnexal mass or tenderness were noted.    Assessment - Breast and pelvic exam portions of annual exam, hx of breast cancer, hx of herpes  Plan - A refill for Acyclovir was sent to her pharmacy.  She will continue to f/u with Derm for mole skin checks, and with Endocrine for osteopenia concerns.  She is to follow up with her oncologist for breast cancer hx.  Her next colonoscopy is due in 2026.  She will f/u with her PCP for the remainder of her annual exam.  20 minutes were spent today in chart review, the patient visit, review of tests, and documentation in regard to the issues noted above.  "

## 2022-08-19 NOTE — PATIENT INSTRUCTIONS
If you have any questions regarding your visit, Please contact your care team.    [facility]: [phone]    To Schedule an Appointment 24/7  Call: [phone]LE[facility]ONE [facility]   Laurita Damon DO.    OMEGA Arvizu -Surgery Scheduler  Shira - Surgery Scheduler    Marjorie, DONALD Carson, RN  [place], DONALD   [place]  Wednesday and Friday  8:30 a.m-5:00 p.m    [place]-Temporary  Monday 8:30 a.m-5:00 p.m  Typical Surgery day:  Tuesday [facility]  [address] [place] [address] [phone] Phone  [phone] Fax    Imaging Scheduling-All Locations [phone]    [facility]  [address] [place] [address]     Urgent Care locations:  [facility] Monday-Friday   10 am - 8 pm  Saturday and Sunday   9 am - 5 pm [phone]   **Surgeries** Our Surgery Schedulers will contact you to schedule. If you do not receive a call within 3 business days, please call [phone].    [facility] Labor and Delivery:  [phone]    If you need a medication refill, please contact your pharmacy. Please allow 3 business days for your refill to be completed.  As always, Thank you for trusting us with your healthcare needs!  see additional instructions from your care team below

## 2022-09-04 ENCOUNTER — HEALTH MAINTENANCE LETTER (OUTPATIENT)
Age: 57
End: 2022-09-04

## 2022-09-10 NOTE — PROGRESS NOTES
Oncology Visit:  Date on this visit: 9/12/2022    Diagnosis: Stage Ia, [id number], grade 1, ER positive, PA positive, HER-2 negative invasive carcinoma of the right breast. Oncotype dx recurrence score = 16.    Primary Physician: Juwan Perry     History Of Present Illness:  Ms. June is a 57 year old female with right breast cancer.  Routine screening mammogram on 8/6/2020 showed heterogeneously dense breasts but no concerning findings.  A physician then palpated a mass in the right breast.  Diagnostic mammogram and ultrasound showed a 2.2 cm mass at 8:00, 5 cm from the nipple.  Right breast biopsy showed a grade 1 invasive mammary carcinoma, ER strong in 100%, PA strong in 100%, HER2 negative.  She had a right breast lumpectomy and sentinel lymph node procedure with Dr. Watson on 9/29/2020.  Pathology showed a grade 1 invasive mammary carcinoma measuring 1.6 cm.  There was associated intermediate grade DCIS.  Surgical margins were negative.  A single lymph node was benign.  Oncotype dx recurrence score was 16.  She completed radiation to the right breast, a total of 5256 cGy in 20 fractions, on 12/21/2020.  She started treatment with letrozole in 1/2021.  The medication was poorly tolerated with arthralgias, insomnia, vaginal dryness and fatigue.  Treatment was changed to Tamoxifen in 03/2021.  This caused nausea and so was dose reduced to 10 mg daily in 05/2021. This was slowly increased back to 20 mg daily.  She received a dose of Zometa 7/29/2021 with subsequent fever.      Interval History:  Ms. June comes into clinic today for routine breast cancer follow-up.  She is on tamoxifen 20 mg daily.  Most bothersome to her is joint pains on the medication.  These are present after periods of immobility.  She notes it most in her bilateral hands and in her feet.  For example, if she sits in a chair and watches some TV, then gets up to walk to the kitchen, her feet are extremely stiff and it takes 15-20 steps  for them to loosen up.  She notes similar type symptoms in her hands.  She confirms that she is taking vitamin D and is obtaining routine cardiovascular exercise.  In fact, she and her  recently got a new puppy.  She is walking the dog multiple times per day.  She denies concerning lumps or swelling of the chest.  She has intermittent shooting pains along the inframammary area of the right breast.  None of these are persistent and she reports they have been present since her breast cancer treatment.  She has no current cough, shortness of breath, or chest pain.  She has no new bone or joint aches or pains.  She continues to get intermittent hot flashes and night sweats, however describes these as tolerable.  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  The remainder of a complete 12 point review of systems was reviewed with the patient was negative with exception that mentioned above.    Past Medical/Surgical History:  Past Medical History:   Diagnosis Date     Depressive disorder      Endometriosis      Herpes genitalis in women      History of major depression     situational with first .      Kidney stone      Malignant neoplasm of right breast in female, estrogen receptor positive (H) 2020     S/P radiation therapy     5,256 cGy to right breast completed 2020 - [facility]   - Hyperlipidemia.  - Osteopenia    Past Surgical History:   Procedure Laterality Date     BREAST BIOPSY, RT/LT Right 2020     BREAST SURGERY Right     Right Breast - Benign      SECTION      x1     COLONOSCOPY N/A 2016    Procedure: COMBINED COLONOSCOPY, SINGLE OR MULTIPLE BIOPSY/POLYPECTOMY BY BIOPSY;  Surgeon: Duane, William Charles, MD;  Location: [facility] OR     COLONOSCOPY WITH CO2 INSUFFLATION N/A 2016    Procedure: COLONOSCOPY WITH CO2 INSUFFLATION;  Surgeon: Duane, William Charles, MD;  Location:  OR      "CYSTOSCOPY  11/13/2009    left stent placement ([facility])     GENITOURINARY SURGERY      surgery for kidney stone     GYN SURGERY      Partial Hysterectomy at age 29     LUMPECTOMY BREAST WITH SENTINEL NODE, COMBINED Right 09/29/2020    Procedure: Right breast lumpectomy with [place] node biopsy;  Surgeon: Jose Watson MD;  Location: [facility] OR     Allergies:  Allergies as of 09/12/2022 - Reviewed 08/19/2022   Allergen Reaction Noted     Flagyl [metronidazole] Nausea and Vomiting 07/30/2015     Lisinopril  06/13/2019     Oxycodone-acetaminophen Nausea and Vomiting 06/13/2019     Sulfamethoxazole-trimethoprim  12/29/2011     Zithromax [azithromycin dihydrate] Rash 07/03/2013     Current Medications:  Current Outpatient Medications   Medication Sig Dispense Refill     acyclovir (ZOVIRAX) 400 MG tablet Take 1 tablet (400 mg) by mouth every 8 hours for 5 days 15 tablet 11     acyclovir (ZOVIRAX) 400 MG tablet Take 1 tablet (400 mg) by mouth every 8 hours for 5 days 15 tablet 11     amLODIPine (NORVASC) 5 MG tablet Take 1 tablet by mouth       bimatoprost (LUMIGAN) 0.01 % SOLN Place 1 drop into both eyes daily        Multiple Vitamin (MULTIVITAMIN PO) Take by mouth daily       Omega-3 Fatty Acids (OMEGA 3 PO) Take by mouth daily       tamoxifen (NOLVADEX) 20 MG tablet Take 1 tablet (20 mg) by mouth daily 90 tablet 3     traZODone (DESYREL) 50 MG tablet Take 1 tablet (50 mg) by mouth At Bedtime Take 1/2 pill (25 mg) for a few nights, if tolerable OK to take full pill for sleep 30 tablet 3     venlafaxine (EFFEXOR) 37.5 MG tablet Take 1 tablet (37.5 mg) by mouth once for 1 dose 30 tablet 11      Family and Social History:  Please see initial Oncology consultation dated 10/27/2020 for details.  9/25/2020 Breast Actionable Panel was negative.    Physical Exam:  /82   Pulse 67   Temp 98.2  F (36.8  C)   Resp 16   Ht 1.518 m (4' 11.76\")   Wt 49.6 kg (109 lb 4.8 oz)   SpO2 100%   BMI 21.52 kg/m    General:  Well " appearing adult female in NAD.  Alert and oriented x 3.  HEENT:  Normocephalic.  Sclera anicteric.  MMM.  No lesions of the oropharynx.  Lymph:  No palpable cervical, supraclavicular, or axillary LAD.  Chest:  CTA bilaterally.  No wheezes or crackles.  CV:  RRR.  Nl S1 and S2.    Breast:  Bilateral breasts are of increased fibroglandular density.  There are no discretely palpable masses in either breast. Lower outer right breast incision with moderate underlying fibrosis.  Right breast is overall tender to palpation.  Bilateral nipples are everted.   Abd:  Soft/ND.   Ext:  No pitting edema of the bilateral lower extremities.   Musculo:  Full ROM of the bilateral upper extremities.    Neuro:  Cranial nerves grossly intact.  Gait stable.  Psych:  Mood and affect appear normal.  Skin:  No visible concerning skin rashes or lesions.    Laboratory/Imaging Studies  6/9/2022 Bilateral screening mammograms:  Heterogeneously dense breasts.  No radiographic evidence of malignancy.    ASSESSMENT/PLAN:  Ms. June is a 57 yo female with a stage Ia, [id number], grade 1, ER positive, CO positive, HER2 negative invasive ductal carcinoma of the right breast.  She is s/p treatment with right breast lumpectomy and radiation.     1.  Right breast cancer:  She is approximately 2 years out from excision of a right breast cancer.  She is on treatment with Tamoxifen.  Of note, she did not previously tolerate aromatase inhibitor therapy due to intolerable joint pains, insomnia, and fatigue.  She continues to have joint pains and hot flashes on tamoxifen.  Despite these side effects, she is willing to continue it.  Plan is to treat with a total 5-7 years of endocrine therapy, as long as she is able to tolerate it.    She is asymptomatic of disease recurrence on history taken today. I reassured her that intermittent sharp pains stemming from her lumpectomy site are likely a result of surgical change and scar tissue.  Given increased breast  density and that her breast cancer was not initially seen on screening mammogram, we are performing high risk breast screening with both annual mammograms and breast MRI, spacing the two studies so that she is having some form of breast imaging once every 6 months.  I personally reviewed the image of bilateral screening mammograms performed 6/9/2022 which is without concerning findings.  Will obtain a breast MRI in 12/2022.    2.  Bone health:  DEXA in 08/2021 with a lowest T-score of -2.0 which is c/w osteopenia and encroaching on osteoporosis. Zometa for prevention of bone loss and also prevention of breast cancer bone metastases was started 7/29/2021.  She had fever following the first dose.  C2 and 3 were better tolerated.  Plan to treat for a minimum of 3 years.  Next dose will be due around 1/9/2023.      3.  Arthralgias:  Secondary to menopause and exacerbated by endocrine therapy.  Continue daily vitamin D supplementation and daily walking.  She declines a prescription for Cymbalta.    4.  Insomnia:  Persistent.  She is taking trazodone a half tab (25 mg) PO at bedtime prn.  She has previously tried melatonin without relief.       5. Follow Up:    Breast MRI around 12/2/2022.  Labs, visit with Briana Burgos, and Zometa infusion in 4 months.  In person visit with me in 8 months.    30 minutes spent on the date of the encounter doing chart review, review of test results, interpretation of tests, patient visit, documentation and discussion with family.

## 2022-09-12 ENCOUNTER — ONCOLOGY VISIT (OUTPATIENT)
Dept: ONCOLOGY | Facility: CLINIC | Age: 57
End: 2022-09-12
Attending: INTERNAL MEDICINE
Payer: COMMERCIAL

## 2022-09-12 VITALS
BODY MASS INDEX: 21.46 KG/M2 | DIASTOLIC BLOOD PRESSURE: 82 MMHG | SYSTOLIC BLOOD PRESSURE: 134 MMHG | HEART RATE: 67 BPM | HEIGHT: 60 IN | WEIGHT: 109.3 LBS | RESPIRATION RATE: 16 BRPM | TEMPERATURE: 98.2 F | OXYGEN SATURATION: 100 %

## 2022-09-12 DIAGNOSIS — Z17.0 MALIGNANT NEOPLASM OF LOWER-OUTER QUADRANT OF RIGHT BREAST OF FEMALE, ESTROGEN RECEPTOR POSITIVE (H): Primary | ICD-10-CM

## 2022-09-12 DIAGNOSIS — Z12.39 BREAST CANCER SCREENING, HIGH RISK PATIENT: ICD-10-CM

## 2022-09-12 DIAGNOSIS — Z79.810 LONG-TERM CURRENT USE OF TAMOXIFEN: ICD-10-CM

## 2022-09-12 DIAGNOSIS — Z12.31 VISIT FOR SCREENING MAMMOGRAM: ICD-10-CM

## 2022-09-12 DIAGNOSIS — C50.511 MALIGNANT NEOPLASM OF LOWER-OUTER QUADRANT OF RIGHT BREAST OF FEMALE, ESTROGEN RECEPTOR POSITIVE (H): Primary | ICD-10-CM

## 2022-09-12 DIAGNOSIS — M25.50 DIFFUSE ARTHRALGIA: ICD-10-CM

## 2022-09-12 DIAGNOSIS — Z85.3 PERSONAL HISTORY OF MALIGNANT NEOPLASM OF BREAST: ICD-10-CM

## 2022-09-12 PROCEDURE — G0463 HOSPITAL OUTPT CLINIC VISIT: HCPCS

## 2022-09-12 PROCEDURE — 99214 OFFICE O/P EST MOD 30 MIN: CPT | Performed by: INTERNAL MEDICINE

## 2022-09-12 ASSESSMENT — ENCOUNTER SYMPTOMS
HEMATURIA: 0
EYE PAIN: 0
NERVOUS/ANXIOUS: 0
WEAKNESS: 0
COUGH: 0
BREAST MASS: 0
DYSURIA: 0
PARESTHESIAS: 0
CHILLS: 0
FREQUENCY: 0
ABDOMINAL PAIN: 0
NAUSEA: 0
JOINT SWELLING: 0
DIZZINESS: 0
HEADACHES: 0
SHORTNESS OF BREATH: 0
FEVER: 0
ARTHRALGIAS: 1
PALPITATIONS: 0
HEMATOCHEZIA: 0
DIARRHEA: 0
MYALGIAS: 0
HEARTBURN: 1
SORE THROAT: 0
CONSTIPATION: 0

## 2022-09-12 ASSESSMENT — PAIN SCALES - GENERAL: PAINLEVEL: NO PAIN (0)

## 2022-09-12 NOTE — LETTER
9/12/2022         RE: Maribel June  [address]  [place] [address]        Dear Colleague,    Thank you for referring your patient, Maribel June, to the [facility]. Please see a copy of my visit note below.      Oncology Visit:  Date on this visit: 9/12/2022    Diagnosis: Stage Ia, [id number], grade 1, ER positive, AL positive, HER-2 negative invasive carcinoma of the right breast. Oncotype dx recurrence score = 16.    Primary Physician: Juwan Perry     History Of Present Illness:  Ms. June is a 57 year old female with right breast cancer.  Routine screening mammogram on 8/6/2020 showed heterogeneously dense breasts but no concerning findings.  A physician then palpated a mass in the right breast.  Diagnostic mammogram and ultrasound showed a 2.2 cm mass at 8:00, 5 cm from the nipple.  Right breast biopsy showed a grade 1 invasive mammary carcinoma, ER strong in 100%, AL strong in 100%, HER2 negative.  She had a right breast lumpectomy and sentinel lymph node procedure with Dr. Watson on 9/29/2020.  Pathology showed a grade 1 invasive mammary carcinoma measuring 1.6 cm.  There was associated intermediate grade DCIS.  Surgical margins were negative.  A single lymph node was benign.  Oncotype dx recurrence score was 16.  She completed radiation to the right breast, a total of 5256 cGy in 20 fractions, on 12/21/2020.  She started treatment with letrozole in 1/2021.  The medication was poorly tolerated with arthralgias, insomnia, vaginal dryness and fatigue.  Treatment was changed to Tamoxifen in 03/2021.  This caused nausea and so was dose reduced to 10 mg daily in 05/2021. This was slowly increased back to 20 mg daily.  She received a dose of Zometa 7/29/2021 with subsequent fever.      Interval History:  Ms. June comes into clinic today for routine breast cancer follow-up.  She is on tamoxifen 20 mg daily.  Most bothersome to her is joint pains on the  medication.  These are present after periods of immobility.  She notes it most in her bilateral hands and in her feet.  For example, if she sits in a chair and watches some TV, then gets up to walk to the kitchen, her feet are extremely stiff and it takes 15-20 steps for them to loosen up.  She notes similar type symptoms in her hands.  She confirms that she is taking vitamin D and is obtaining routine cardiovascular exercise.  In fact, she and her  recently got a new puppy.  She is walking the dog multiple times per day.  She denies concerning lumps or swelling of the chest.  She has intermittent shooting pains along the inframammary area of the right breast.  None of these are persistent and she reports they have been present since her breast cancer treatment.  She has no current cough, shortness of breath, or chest pain.  She has no new bone or joint aches or pains.  She continues to get intermittent hot flashes and night sweats, however describes these as tolerable.  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  The remainder of a complete 12 point review of systems was reviewed with the patient was negative with exception that mentioned above.    Past Medical/Surgical History:  Past Medical History:   Diagnosis Date     Depressive disorder      Endometriosis      Herpes genitalis in women      History of major depression     situational with first .      Kidney stone      Malignant neoplasm of right breast in female, estrogen receptor positive (H) 2020     S/P radiation therapy     5,256 cGy to right breast completed 2020 - [facility]   - Hyperlipidemia.  - Osteopenia    Past Surgical History:   Procedure Laterality Date     BREAST BIOPSY, RT/LT Right 2020     BREAST SURGERY Right     Right Breast - Benign      SECTION      x1     COLONOSCOPY N/A 2016    Procedure: COMBINED COLONOSCOPY,  SINGLE OR MULTIPLE BIOPSY/POLYPECTOMY BY BIOPSY;  Surgeon: Duane, William Charles, MD;  Location: [facility] OR     COLONOSCOPY WITH CO2 INSUFFLATION N/A 08/16/2016    Procedure: COLONOSCOPY WITH CO2 INSUFFLATION;  Surgeon: Duane, William Charles, MD;  Location: [facility] OR     CYSTOSCOPY  11/13/2009    left stent placement ([facility])     GENITOURINARY SURGERY      surgery for kidney stone     GYN SURGERY      Partial Hysterectomy at age 29     LUMPECTOMY BREAST WITH SENTINEL NODE, COMBINED Right 09/29/2020    Procedure: Right breast lumpectomy with [place] node biopsy;  Surgeon: Jose Watson MD;  Location: [facility] OR     Allergies:  Allergies as of 09/12/2022 - Reviewed 08/19/2022   Allergen Reaction Noted     Flagyl [metronidazole] Nausea and Vomiting 07/30/2015     Lisinopril  06/13/2019     Oxycodone-acetaminophen Nausea and Vomiting 06/13/2019     Sulfamethoxazole-trimethoprim  12/29/2011     Zithromax [azithromycin dihydrate] Rash 07/03/2013     Current Medications:  Current Outpatient Medications   Medication Sig Dispense Refill     acyclovir (ZOVIRAX) 400 MG tablet Take 1 tablet (400 mg) by mouth every 8 hours for 5 days 15 tablet 11     acyclovir (ZOVIRAX) 400 MG tablet Take 1 tablet (400 mg) by mouth every 8 hours for 5 days 15 tablet 11     amLODIPine (NORVASC) 5 MG tablet Take 1 tablet by mouth       bimatoprost (LUMIGAN) 0.01 % SOLN Place 1 drop into both eyes daily        Multiple Vitamin (MULTIVITAMIN PO) Take by mouth daily       Omega-3 Fatty Acids (OMEGA 3 PO) Take by mouth daily       tamoxifen (NOLVADEX) 20 MG tablet Take 1 tablet (20 mg) by mouth daily 90 tablet 3     traZODone (DESYREL) 50 MG tablet Take 1 tablet (50 mg) by mouth At Bedtime Take 1/2 pill (25 mg) for a few nights, if tolerable OK to take full pill for sleep 30 tablet 3     venlafaxine (EFFEXOR) 37.5 MG tablet Take 1 tablet (37.5 mg) by mouth once for 1 dose 30 tablet 11      Family and Social History:  Please see initial Oncology consultation  "dated 10/27/2020 for details.  9/25/2020 Breast Actionable Panel was negative.    Physical Exam:  /82   Pulse 67   Temp 98.2  F (36.8  C)   Resp 16   Ht 1.518 m (4' 11.76\")   Wt 49.6 kg (109 lb 4.8 oz)   SpO2 100%   BMI 21.52 kg/m    General:  Well appearing adult female in NAD.  Alert and oriented x 3.  HEENT:  Normocephalic.  Sclera anicteric.  MMM.  No lesions of the oropharynx.  Lymph:  No palpable cervical, supraclavicular, or axillary LAD.  Chest:  CTA bilaterally.  No wheezes or crackles.  CV:  RRR.  Nl S1 and S2.    Breast:  Bilateral breasts are of increased fibroglandular density.  There are no discretely palpable masses in either breast. Lower outer right breast incision with moderate underlying fibrosis.  Right breast is overall tender to palpation.  Bilateral nipples are everted.   Abd:  Soft/ND.   Ext:  No pitting edema of the bilateral lower extremities.   Musculo:  Full ROM of the bilateral upper extremities.    Neuro:  Cranial nerves grossly intact.  Gait stable.  Psych:  Mood and affect appear normal.  Skin:  No visible concerning skin rashes or lesions.    Laboratory/Imaging Studies  6/9/2022 Bilateral screening mammograms:  Heterogeneously dense breasts.  No radiographic evidence of malignancy.    ASSESSMENT/PLAN:  Ms. June is a 57 yo female with a stage Ia, [id number], grade 1, ER positive, MN positive, HER2 negative invasive ductal carcinoma of the right breast.  She is s/p treatment with right breast lumpectomy and radiation.     1.  Right breast cancer:  She is approximately 2 years out from excision of a right breast cancer.  She is on treatment with Tamoxifen.  Of note, she did not previously tolerate aromatase inhibitor therapy due to intolerable joint pains, insomnia, and fatigue.  She continues to have joint pains and hot flashes on tamoxifen.  Despite these side effects, she is willing to continue it.  Plan is to treat with a total 5-7 years of endocrine therapy, as long as " she is able to tolerate it.    She is asymptomatic of disease recurrence on history taken today. I reassured her that intermittent sharp pains stemming from her lumpectomy site are likely a result of surgical change and scar tissue.  Given increased breast density and that her breast cancer was not initially seen on screening mammogram, we are performing high risk breast screening with both annual mammograms and breast MRI, spacing the two studies so that she is having some form of breast imaging once every 6 months.  I personally reviewed the image of bilateral screening mammograms performed 6/9/2022 which is without concerning findings.  Will obtain a breast MRI in 12/2022.    2.  Bone health:  DEXA in 08/2021 with a lowest T-score of -2.0 which is c/w osteopenia and encroaching on osteoporosis. Zometa for prevention of bone loss and also prevention of breast cancer bone metastases was started 7/29/2021.  She had fever following the first dose.  C2 and 3 were better tolerated.  Plan to treat for a minimum of 3 years.  Next dose will be due around 1/9/2023.      3.  Arthralgias:  Secondary to menopause and exacerbated by endocrine therapy.  Continue daily vitamin D supplementation and daily walking.  She declines a prescription for Cymbalta.    4.  Insomnia:  Persistent.  She is taking trazodone a half tab (25 mg) PO at bedtime prn.  She has previously tried melatonin without relief.       5. Follow Up:    Breast MRI around 12/2/2022.  Labs, visit with Briana Burgos, and Zometa infusion in 4 months.  In person visit with me in 8 months.    30 minutes spent on the date of the encounter doing chart review, review of test results, interpretation of tests, patient visit, documentation and discussion with family.           Again, thank you for allowing me to participate in the care of your patient.      Sincerely,    Saige Eli MD

## 2022-09-13 NOTE — NURSING NOTE
"Oncology Rooming Note    September 13, 2022 8:21 AM   Maribel Jnue is a 57 year old female who presents for:    Chief Complaint   Patient presents with     Oncology Clinic Visit     [facility] RETURN - BREAST CANCER     Initial Vitals: /82   Pulse 67   Temp 98.2  F (36.8  C)   Resp 16   Ht 1.518 m (4' 11.76\")   Wt 49.6 kg (109 lb 4.8 oz)   SpO2 100%   BMI 21.52 kg/m   Estimated body mass index is 21.52 kg/m  as calculated from the following:    Height as of this encounter: 1.518 m (4' 11.76\").    Weight as of this encounter: 49.6 kg (109 lb 4.8 oz). Body surface area is 1.45 meters squared.  No Pain (0) Comment: Data Unavailable   No LMP recorded. Patient has had a hysterectomy.  Allergies reviewed: Yes  Medications reviewed: Yes    Medications: Medication refills not needed today.  Pharmacy name entered into [employer]: [facility] #[id number] [facility] [address] AT [facility]    Clinical concerns: No new concerns. Alcira was notified.      Steve High LPN            "

## 2022-09-19 ENCOUNTER — OFFICE VISIT (OUTPATIENT)
Dept: FAMILY MEDICINE | Facility: CLINIC | Age: 57
End: 2022-09-19
Payer: COMMERCIAL

## 2022-09-19 VITALS
BODY MASS INDEX: 21.38 KG/M2 | SYSTOLIC BLOOD PRESSURE: 108 MMHG | TEMPERATURE: 98.6 F | DIASTOLIC BLOOD PRESSURE: 74 MMHG | OXYGEN SATURATION: 97 % | RESPIRATION RATE: 19 BRPM | WEIGHT: 108.6 LBS | HEART RATE: 74 BPM

## 2022-09-19 DIAGNOSIS — R11.0 NAUSEA: ICD-10-CM

## 2022-09-19 DIAGNOSIS — Z00.00 ROUTINE GENERAL MEDICAL EXAMINATION AT A HEALTH CARE FACILITY: Primary | ICD-10-CM

## 2022-09-19 DIAGNOSIS — K21.9 GASTROESOPHAGEAL REFLUX DISEASE WITHOUT ESOPHAGITIS: ICD-10-CM

## 2022-09-19 DIAGNOSIS — R10.11 ABDOMINAL PAIN, RIGHT UPPER QUADRANT: ICD-10-CM

## 2022-09-19 DIAGNOSIS — I10 PRIMARY HYPERTENSION: ICD-10-CM

## 2022-09-19 DIAGNOSIS — Z13.220 LIPID SCREENING: ICD-10-CM

## 2022-09-19 DIAGNOSIS — M72.2 PLANTAR FASCIITIS: ICD-10-CM

## 2022-09-19 PROCEDURE — 99214 OFFICE O/P EST MOD 30 MIN: CPT | Mod: 25 | Performed by: FAMILY MEDICINE

## 2022-09-19 PROCEDURE — 99386 PREV VISIT NEW AGE 40-64: CPT | Performed by: FAMILY MEDICINE

## 2022-09-19 RX ORDER — AMLODIPINE BESYLATE 5 MG/1
2.5 TABLET ORAL DAILY
COMMUNITY
Start: 2022-09-19

## 2022-09-19 ASSESSMENT — ENCOUNTER SYMPTOMS
DIZZINESS: 0
SHORTNESS OF BREATH: 0
NERVOUS/ANXIOUS: 0
ARTHRALGIAS: 1
DYSURIA: 0
COUGH: 0
CHILLS: 0
ABDOMINAL PAIN: 0
PARESTHESIAS: 0
FEVER: 0
NAUSEA: 0
HEARTBURN: 1
WEAKNESS: 0
JOINT SWELLING: 0
HEMATOCHEZIA: 0
FREQUENCY: 0
SORE THROAT: 0
PALPITATIONS: 0
HEMATURIA: 0
BREAST MASS: 0
DIARRHEA: 0
EYE PAIN: 0
HEADACHES: 0
CONSTIPATION: 0
MYALGIAS: 0

## 2022-09-19 ASSESSMENT — PAIN SCALES - GENERAL: PAINLEVEL: MODERATE PAIN (5)

## 2022-09-19 NOTE — PATIENT INSTRUCTIONS
Return to clinic for lab - cholesterol testing.    Release of information for records from [facility]     For the stomach symptoms - change the famotidine to 20 mg twice a day for 2-3 weeks - if symptoms are not resolved with this treatment, send [facility] message to determine next steps in treatment/evaluation.      I would recommend ultrasound of the abdomen -  You can call [phone] to schedule this at the ealth building in [place]         Preventive Health Recommendations  Female Ages 50 - 64    Yearly exam: See your health care provider every year in order to  Review health changes.   Discuss preventive care.    Review your medicines if your doctor has prescribed any.    Get a Pap test every three years (unless you have an abnormal result and your provider advises testing more often).  If you get Pap tests with HPV test, you only need to test every 5 years, unless you have an abnormal result.   You do not need a Pap test if your uterus was removed (hysterectomy) and you have not had cancer.  You should be tested each year for STDs (sexually transmitted diseases) if you're at risk.   Have a mammogram every 1 to 2 years.  Have a colonoscopy at age 50, or have a yearly FIT test (stool test). These exams screen for colon cancer.    Have a cholesterol test every 5 years, or more often if advised.  Have a diabetes test (fasting glucose) every three years. If you are at risk for diabetes, you should have this test more often.   If you are at risk for osteoporosis (brittle bone disease), think about having a bone density scan (DEXA).    Shots: Get a flu shot each year. Get a tetanus shot every 10 years.    Nutrition:   Eat at least 5 servings of fruits and vegetables each day.  Eat whole-grain bread, whole-wheat pasta and brown rice instead of white grains and rice.  Get adequate Calcium and Vitamin D.     Lifestyle  Exercise at least 150 minutes a week (30 minutes a day, 5 days a week). This will help you  control your weight and prevent disease.  Limit alcohol to one drink per day.  No smoking.   Wear sunscreen to prevent skin cancer.   See your dentist every six months for an exam and cleaning.  See your eye doctor every 1 to 2 years.
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
## 2022-09-26 ENCOUNTER — ANCILLARY PROCEDURE (OUTPATIENT)
Dept: ULTRASOUND IMAGING | Facility: CLINIC | Age: 57
End: 2022-09-26
Attending: FAMILY MEDICINE
Payer: COMMERCIAL

## 2022-09-26 DIAGNOSIS — R10.11 ABDOMINAL PAIN, RIGHT UPPER QUADRANT: ICD-10-CM

## 2022-09-26 DIAGNOSIS — R11.0 NAUSEA: ICD-10-CM

## 2022-09-26 DIAGNOSIS — K21.9 GASTROESOPHAGEAL REFLUX DISEASE WITHOUT ESOPHAGITIS: ICD-10-CM

## 2022-09-26 PROCEDURE — 76700 US EXAM ABDOM COMPLETE: CPT | Mod: GC | Performed by: STUDENT IN AN ORGANIZED HEALTH CARE EDUCATION/TRAINING PROGRAM

## 2022-09-29 ENCOUNTER — OFFICE VISIT (OUTPATIENT)
Dept: DERMATOLOGY | Facility: CLINIC | Age: 57
End: 2022-09-29
Payer: COMMERCIAL

## 2022-09-29 DIAGNOSIS — Z87.2 HISTORY OF ACTINIC KERATOSES: ICD-10-CM

## 2022-09-29 DIAGNOSIS — L81.4 SOLAR LENTIGO: Primary | ICD-10-CM
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
## 2022-09-29 PROCEDURE — 99214 OFFICE O/P EST MOD 30 MIN: CPT | Performed by: DERMATOLOGY

## 2022-09-29 RX ORDER — TRIAMCINOLONE ACETONIDE 1 MG/G
CREAM TOPICAL 2 TIMES DAILY
Qty: 60 G | Refills: 1 | Status: SHIPPED | OUTPATIENT
Start: 2022-09-29 | End: 2024-04-25
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
## 2022-09-29 NOTE — NURSING NOTE
Maribel June's goals for this visit include:   Chief Complaint   Patient presents with     Skin Check     Hx ak's/ areas of concern R cheek near prev bx site     Derm Problem     Several bug bite like areas     She requests these members of her care team be copied on today's visit information:     PCP: Gina Concepcion    Referring Provider:  Referred Self, MD  No address on file    There were no vitals taken for this visit.    Do you need any medication refills at today's visit? No  Haley Crawford CMA on 9/29/2022 at 3:48 PM
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
## 2022-09-29 NOTE — PATIENT INSTRUCTIONS
Patient Education     Checking for Skin Cancer  You can find cancer early by checking your skin each month. There are 3 kinds of skin cancer. They are melanoma, basal cell carcinoma, and squamous cell carcinoma. Doing monthly skin checks is the best way to find new marks or skin changes. Follow the instructions below for checking your skin.   The ABCDEs of checking moles for melanoma   Check your moles or growths for signs of melanoma using ABCDE:   Asymmetry: the sides of the mole or growth don t match  Border: the edges are ragged, notched, or blurred  Color: the color within the mole or growth varies  Diameter: the mole or growth is larger than 6 mm (size of a pencil eraser)  Evolving: the size, shape, or color of the mole or growth is changing (evolving is not shown in the images below)    Checking for other types of skin cancer  Basal cell carcinoma or squamous cell carcinoma have symptoms such as:     A spot or mole that looks different from all other marks on your skin  Changes in how an area feels, such as itching, tenderness, or pain  Changes in the skin's surface, such as oozing, bleeding, or scaliness  A sore that does not heal  New swelling or redness beyond the border of a mole    Who s at risk?  Anyone can get skin cancer. But you are at greater risk if you have:   Fair skin, light-colored hair, or light-colored eyes  Many moles or abnormal moles on your skin  A history of sunburns from sunlight or tanning beds  A family history of skin cancer  A history of exposure to radiation or chemicals  A weakened immune system  If you have had skin cancer in the past, you are at risk for recurring skin cancer.   How to check your skin  Do your monthly skin checkups in front of a full-length mirror. Check all parts of your body, including your:   Head (ears, face, neck, and scalp)  Torso (front, back, and sides)  Arms (tops, undersides, upper, and lower armpits)  Hands (palms, backs, and fingers, including  under the nails)  Buttocks and genitals  Legs (front, back, and sides)  Feet (tops, soles, toes, including under the nails, and between toes)  If you have a lot of moles, take digital photos of them each month. Make sure to take photos both up close and from a distance. These can help you see if any moles change over time.   Most skin changes are not cancer. But if you see any changes in your skin, call your doctor right away. Only he or she can diagnose a problem. If you have skin cancer, seeing your doctor can be the first step toward getting the treatment that could save your life.   [employer] last reviewed this educational content on 4/1/2019 2000-2020 [employer]. [address] [place] [address], [place] [address]. All rights reserved. This information is not intended as a substitute for professional medical care. Always follow your healthcare professional's instructions.       When should I call my doctor?  If you are worsening or not improving, please, contact us or seek urgent care as noted below.     Who should I call with questions (adults)?  [facility] (adult and pediatric): [phone]  [facility] (adult): [phone]  For urgent needs outside of business hours call the [facility] at [phone] and ask for the dermatology resident on call to be paged  If this is a medical emergency and you are unable to reach an ER, Call [phone]    Who should I call with questions (pediatric)?  [facility]- Pediatric Dermatology  Dr. Lily López, Dr. Marla Alfredo, Dr. Sigrid Mcnamara, MALLORY Lawler, Dr. Rose Birch, Dr. Michelle Olivas & Dr. Ramiro Hamilton  Non-urgent nurse triage line; [phone]- Humaira and Sandra BENNETT Care Coordinatorjean carlos Salinas (/Complex ) [phone]    If you need a prescription refill, please contact your pharmacy. Refills are approved or denied by our  Physicians during normal business hours, Monday through Fridays  Per office policy, refills will not be granted if you have not been seen within the past year (or sooner depending on your child's condition)    Scheduling Information:  Pediatric Appointment Scheduling and Call Center [phone]  Radiology Scheduling- [phone]  Sedation Unit Scheduling- [phone]  [facility] [place] Scheduling- General [phone]; Pediatric Dermatology [phone]  Main  Services: [phone]  Hebrew: [phone]  Vincentian: [phone]  Hmong/Ukrainian/Lithuanian: [phone]  Preadmission Nursing Department Fax Number: [phone] (Fax all pre-operative paperwork to this number)    For urgent matters arising during evenings, weekends, or holidays that cannot wait for normal business hours please call [phone] and ask for the dermatology resident on call to be paged.

## 2022-10-20 ENCOUNTER — LAB (OUTPATIENT)
Dept: LAB | Facility: CLINIC | Age: 57
End: 2022-10-20
Payer: COMMERCIAL

## 2022-10-20 DIAGNOSIS — R10.11 ABDOMINAL PAIN, RIGHT UPPER QUADRANT: ICD-10-CM

## 2022-10-20 DIAGNOSIS — Z13.220 LIPID SCREENING: ICD-10-CM

## 2022-10-20 DIAGNOSIS — R11.0 NAUSEA: ICD-10-CM

## 2022-10-20 LAB
ALBUMIN SERPL-MCNC: 3.8 G/DL (ref 3.4–5)
ALP SERPL-CCNC: 44 U/L (ref 40–150)
ALT SERPL W P-5'-P-CCNC: 29 U/L (ref 0–50)
AMYLASE SERPL-CCNC: 43 U/L (ref 30–110)
ANION GAP SERPL CALCULATED.3IONS-SCNC: 7 MMOL/L (ref 3–14)
AST SERPL W P-5'-P-CCNC: 25 U/L (ref 0–45)
BILIRUB SERPL-MCNC: 0.7 MG/DL (ref 0.2–1.3)
BUN SERPL-MCNC: 14 MG/DL (ref 7–30)
CALCIUM SERPL-MCNC: 9.1 MG/DL (ref 8.5–10.1)
CHLORIDE BLD-SCNC: 107 MMOL/L (ref 94–109)
CHOLEST SERPL-MCNC: 239 MG/DL
CO2 SERPL-SCNC: 27 MMOL/L (ref 20–32)
CREAT SERPL-MCNC: 0.57 MG/DL (ref 0.52–1.04)
ERYTHROCYTE [DISTWIDTH] IN BLOOD BY AUTOMATED COUNT: 12.9 % (ref 10–15)
FASTING STATUS PATIENT QL REPORTED: YES
GFR SERPL CREATININE-BSD FRML MDRD: >90 ML/MIN/1.73M2
GLUCOSE BLD-MCNC: 99 MG/DL (ref 70–99)
HCT VFR BLD AUTO: 37.1 % (ref 35–47)
HDLC SERPL-MCNC: 70 MG/DL
HGB BLD-MCNC: 12.3 G/DL (ref 11.7–15.7)
LDLC SERPL CALC-MCNC: 137 MG/DL
LIPASE SERPL-CCNC: 86 U/L (ref 73–393)
MCH RBC QN AUTO: 29.4 PG (ref 26.5–33)
MCHC RBC AUTO-ENTMCNC: 33.2 G/DL (ref 31.5–36.5)
MCV RBC AUTO: 89 FL (ref 78–100)
NONHDLC SERPL-MCNC: 169 MG/DL
PLATELET # BLD AUTO: 320 10E3/UL (ref 150–450)
POTASSIUM BLD-SCNC: 4.1 MMOL/L (ref 3.4–5.3)
PROT SERPL-MCNC: 7.7 G/DL (ref 6.8–8.8)
RBC # BLD AUTO: 4.19 10E6/UL (ref 3.8–5.2)
SODIUM SERPL-SCNC: 141 MMOL/L (ref 133–144)
TRIGL SERPL-MCNC: 162 MG/DL
WBC # BLD AUTO: 6.8 10E3/UL (ref 4–11)

## 2022-10-20 PROCEDURE — 82150 ASSAY OF AMYLASE: CPT

## 2022-10-20 PROCEDURE — 83690 ASSAY OF LIPASE: CPT

## 2022-10-20 PROCEDURE — 80061 LIPID PANEL: CPT

## 2022-10-20 PROCEDURE — 36415 COLL VENOUS BLD VENIPUNCTURE: CPT

## 2022-10-20 PROCEDURE — 85027 COMPLETE CBC AUTOMATED: CPT

## 2022-10-20 PROCEDURE — 80053 COMPREHEN METABOLIC PANEL: CPT

## 2022-10-20 NOTE — RESULT ENCOUNTER NOTE
The 10-year ASCVD risk score (Evy SMITH, et al., 2019) is: 2%    Values used to calculate the score:      Age: 57 years      Sex: Female      Is Non- : No      Diabetic: No      Tobacco smoker: No      Systolic Blood Pressure: 108 mmHg      Is BP treated: Yes      HDL Cholesterol: 70 mg/dL      Total Cholesterol: 239 mg/dL    Your blood sugar, liver testing and kidney function testing are all normal.  Your cholesterol testing is similar to previous.  Your over all risk for heart disease is low based on assessment of these results and other risk factors.  Your pancreas testing is normal.  The blood cell counts are normal.  Please call or Complixhart message me if you have any questions.      JERARDO

## 2022-10-21 ENCOUNTER — VIRTUAL VISIT (OUTPATIENT)
Dept: URGENT CARE | Facility: CLINIC | Age: 57
End: 2022-10-21
Payer: COMMERCIAL

## 2022-10-21 DIAGNOSIS — J32.9 OTHER SINUSITIS, UNSPECIFIED CHRONICITY: Primary | ICD-10-CM

## 2022-10-21 PROCEDURE — 99213 OFFICE O/P EST LOW 20 MIN: CPT | Mod: CS | Performed by: EMERGENCY MEDICINE

## 2022-10-21 NOTE — PROGRESS NOTES
Video visit:  Start time: 4:31 PM  Stop time: 4:38 PM  Time: 7 minutes  Patient location: Home  Provider location: [facility] virtual provider (remote).  Platform used for video visit: Pedro    CHIEF COMPLAINT: Possible sinus infection.      HPI: Patient is a 57-year-old female whose been ill for 4 days with initial symptoms of sinus congestion facial pain headache and dry cough.  COVID test was negative only performed once 3 days ago on Tuesday.  She now feels as if the sinus symptoms of gotten worse.  No shortness of breath.      ROS: See HPI otherwise normal.    Allergies   Allergen Reactions     Flagyl [Metronidazole] Nausea and Vomiting     Lisinopril      Other reaction(s): Headaches     Oxycodone-Acetaminophen Nausea and Vomiting     States Oxycodone is fine     Sulfamethoxazole-Trimethoprim      Other reaction(s): Headache     Zithromax [Azithromycin Dihydrate] Rash      Current Outpatient Medications   Medication Sig Dispense Refill     amoxicillin-clavulanate (AUGMENTIN) 875-125 MG tablet Take 1 tablet by mouth 2 times daily for 7 days 14 tablet 0     acyclovir (ZOVIRAX) 400 MG tablet Take 1 tablet (400 mg) by mouth every 8 hours for 5 days 15 tablet 11     amLODIPine (NORVASC) 5 MG tablet Take 0.5 tablets (2.5 mg) by mouth daily       bimatoprost (LUMIGAN) 0.01 % SOLN Place 1 drop into both eyes daily        Multiple Vitamin (MULTIVITAMIN PO) Take by mouth daily       Omega-3 Fatty Acids (OMEGA 3 PO) Take by mouth daily       pantoprazole (PROTONIX) 40 MG EC tablet Take 1 tablet (40 mg) by mouth daily 30-60 min prior to a meal. 30 tablet 0     tamoxifen (NOLVADEX) 20 MG tablet Take 1 tablet (20 mg) by mouth daily 90 tablet 3     traZODone (DESYREL) 50 MG tablet Take 1 tablet (50 mg) by mouth At Bedtime Take 1/2 pill (25 mg) for a few nights, if tolerable OK to take full pill for sleep 30 tablet 3     triamcinolone (KENALOG) 0.1 % external cream Apply topically 2 times daily Apply topically to  bites twice daily for one week. 60 g 1         PE: No acute distress on video visit.  Nondyspneic appearing speaking in full sentences.        TREATMENT: None.      ASSESSMENT: Worsening sinus infection symptoms in the face of viral URI.  Repeat COVID test this evening was negative.    DIAGNOSIS: Sinusitis.      PLAN: Augmentin for sinusitis.  Symptomatic medications as needed.

## 2022-12-23 ENCOUNTER — ANCILLARY PROCEDURE (OUTPATIENT)
Dept: MRI IMAGING | Facility: CLINIC | Age: 57
End: 2022-12-23
Attending: INTERNAL MEDICINE
Payer: COMMERCIAL

## 2022-12-23 DIAGNOSIS — Z12.39 BREAST CANCER SCREENING, HIGH RISK PATIENT: ICD-10-CM

## 2022-12-23 DIAGNOSIS — Z85.3 PERSONAL HISTORY OF MALIGNANT NEOPLASM OF BREAST: ICD-10-CM

## 2022-12-23 PROCEDURE — 77049 MRI BREAST C-+ W/CAD BI: CPT | Performed by: RADIOLOGY

## 2022-12-23 PROCEDURE — A9585 GADOBUTROL INJECTION: HCPCS | Performed by: RADIOLOGY

## 2022-12-23 RX ORDER — GADOBUTROL 604.72 MG/ML
7.5 INJECTION INTRAVENOUS ONCE
Status: COMPLETED | OUTPATIENT
Start: 2022-12-23 | End: 2022-12-23

## 2022-12-23 RX ADMIN — GADOBUTROL 5 ML: 604.72 INJECTION INTRAVENOUS at 11:15

## 2022-12-29 ENCOUNTER — ANCILLARY PROCEDURE (OUTPATIENT)
Dept: BONE DENSITY | Facility: CLINIC | Age: 57
End: 2022-12-29
Attending: INTERNAL MEDICINE
Payer: COMMERCIAL

## 2022-12-29 DIAGNOSIS — M85.80 OSTEOPENIA, UNSPECIFIED LOCATION: ICD-10-CM

## 2022-12-29 PROCEDURE — 77080 DXA BONE DENSITY AXIAL: CPT | Performed by: RADIOLOGY

## 2023-01-04 NOTE — RESULT ENCOUNTER NOTE
Dear Crispin,     Here are your recent DEXA scan results. The bone density test shows osteopenia. This is an intermediate category that is in between normal and osteoporosis.  People with osteopenia should work on taking in 1200 mg of calcium with vitamin D 1000 IU daily.  Compared to previous results the overall finding is a stable.  Please refer to the detailed report for additional information.    Please let us know if you have any questions or concerns.    Regards,  Cornel Briseno MD

## 2023-01-13 ENCOUNTER — APPOINTMENT (OUTPATIENT)
Dept: LAB | Facility: CLINIC | Age: 58
End: 2023-01-13
Attending: PHYSICIAN ASSISTANT
Payer: COMMERCIAL

## 2023-01-13 ENCOUNTER — ONCOLOGY VISIT (OUTPATIENT)
Dept: ONCOLOGY | Facility: CLINIC | Age: 58
End: 2023-01-13
Attending: PHYSICIAN ASSISTANT
Payer: COMMERCIAL

## 2023-01-13 VITALS
RESPIRATION RATE: 18 BRPM | DIASTOLIC BLOOD PRESSURE: 70 MMHG | SYSTOLIC BLOOD PRESSURE: 126 MMHG | BODY MASS INDEX: 21.73 KG/M2 | HEART RATE: 64 BPM | TEMPERATURE: 97.7 F | WEIGHT: 110.4 LBS | OXYGEN SATURATION: 97 %

## 2023-01-13 DIAGNOSIS — I89.0 LYMPHEDEMA OF BREAST: ICD-10-CM

## 2023-01-13 DIAGNOSIS — Z17.0 MALIGNANT NEOPLASM OF LOWER-OUTER QUADRANT OF RIGHT BREAST OF FEMALE, ESTROGEN RECEPTOR POSITIVE (H): ICD-10-CM

## 2023-01-13 DIAGNOSIS — N95.2 VAGINAL ATROPHY: ICD-10-CM

## 2023-01-13 DIAGNOSIS — Z79.810 LONG-TERM CURRENT USE OF TAMOXIFEN: ICD-10-CM

## 2023-01-13 DIAGNOSIS — C50.511 MALIGNANT NEOPLASM OF LOWER-OUTER QUADRANT OF RIGHT BREAST OF FEMALE, ESTROGEN RECEPTOR POSITIVE (H): ICD-10-CM

## 2023-01-13 DIAGNOSIS — M85.80 OSTEOPENIA, UNSPECIFIED LOCATION: Primary | ICD-10-CM

## 2023-01-13 LAB
ALBUMIN SERPL BCG-MCNC: 4.6 G/DL (ref 3.5–5.2)
CALCIUM SERPL-MCNC: 9.1 MG/DL (ref 8.6–10)
CREAT SERPL-MCNC: 0.6 MG/DL (ref 0.51–0.95)
GFR SERPL CREATININE-BSD FRML MDRD: >90 ML/MIN/1.73M2

## 2023-01-13 PROCEDURE — 258N000003 HC RX IP 258 OP 636: Performed by: PHYSICIAN ASSISTANT

## 2023-01-13 PROCEDURE — 82565 ASSAY OF CREATININE: CPT | Performed by: PHYSICIAN ASSISTANT

## 2023-01-13 PROCEDURE — 250N000011 HC RX IP 250 OP 636: Performed by: PHYSICIAN ASSISTANT

## 2023-01-13 PROCEDURE — 82310 ASSAY OF CALCIUM: CPT | Performed by: PHYSICIAN ASSISTANT

## 2023-01-13 PROCEDURE — 96365 THER/PROPH/DIAG IV INF INIT: CPT

## 2023-01-13 PROCEDURE — 82040 ASSAY OF SERUM ALBUMIN: CPT | Performed by: PHYSICIAN ASSISTANT

## 2023-01-13 PROCEDURE — 99214 OFFICE O/P EST MOD 30 MIN: CPT | Performed by: PHYSICIAN ASSISTANT

## 2023-01-13 PROCEDURE — 36415 COLL VENOUS BLD VENIPUNCTURE: CPT | Performed by: PHYSICIAN ASSISTANT

## 2023-01-13 PROCEDURE — G0463 HOSPITAL OUTPT CLINIC VISIT: HCPCS | Mod: 25 | Performed by: PHYSICIAN ASSISTANT

## 2023-01-13 RX ORDER — HEPARIN SODIUM (PORCINE) LOCK FLUSH IV SOLN 100 UNIT/ML 100 UNIT/ML
5 SOLUTION INTRAVENOUS
Status: CANCELLED | OUTPATIENT
Start: 2023-01-13

## 2023-01-13 RX ORDER — HEPARIN SODIUM,PORCINE 10 UNIT/ML
5 VIAL (ML) INTRAVENOUS
Status: CANCELLED | OUTPATIENT
Start: 2023-01-13

## 2023-01-13 RX ORDER — LATANOPROST 50 UG/ML
SOLUTION/ DROPS OPHTHALMIC
COMMUNITY
Start: 2022-08-08

## 2023-01-13 RX ORDER — ZOLEDRONIC ACID 0.04 MG/ML
4 INJECTION, SOLUTION INTRAVENOUS ONCE
Status: CANCELLED
Start: 2023-01-13 | End: 2023-01-13

## 2023-01-13 RX ORDER — HEPARIN SODIUM (PORCINE) LOCK FLUSH IV SOLN 100 UNIT/ML 100 UNIT/ML
5 SOLUTION INTRAVENOUS
Status: DISCONTINUED | OUTPATIENT
Start: 2023-01-13 | End: 2023-01-13 | Stop reason: HOSPADM

## 2023-01-13 RX ORDER — ZOLEDRONIC ACID 0.04 MG/ML
4 INJECTION, SOLUTION INTRAVENOUS ONCE
Status: COMPLETED | OUTPATIENT
Start: 2023-01-13 | End: 2023-01-13

## 2023-01-13 RX ORDER — OMEPRAZOLE 40 MG/1
CAPSULE, DELAYED RELEASE ORAL
COMMUNITY
Start: 2022-12-28 | End: 2023-05-15

## 2023-01-13 RX ADMIN — SODIUM CHLORIDE 250 ML: 9 INJECTION, SOLUTION INTRAVENOUS at 14:51

## 2023-01-13 RX ADMIN — ZOLEDRONIC ACID 4 MG: 0.04 INJECTION, SOLUTION INTRAVENOUS at 14:51

## 2023-01-13 ASSESSMENT — PAIN SCALES - GENERAL: PAINLEVEL: NO PAIN (0)

## 2023-01-13 NOTE — NURSING NOTE
"Oncology Rooming Note    January 13, 2023 1:34 PM   Maribel June is a 57 year old female who presents for:    Chief Complaint   Patient presents with     Blood Draw     Labs drawn via piv placed by EMT in lab. VS taken.      Oncology Clinic Visit     Breast Ca     Initial Vitals: /70 (BP Location: Right arm, Patient Position: Sitting, Cuff Size: Adult Regular)   Pulse 64   Temp 97.7  F (36.5  C) (Oral)   Resp 18   Wt 50.1 kg (110 lb 6.4 oz)   SpO2 97%   BMI 21.73 kg/m   Estimated body mass index is 21.73 kg/m  as calculated from the following:    Height as of 9/12/22: 1.518 m (4' 11.76\").    Weight as of this encounter: 50.1 kg (110 lb 6.4 oz). Body surface area is 1.45 meters squared.  No Pain (0) Comment: Data Unavailable   No LMP recorded. Patient has had a hysterectomy.  Allergies reviewed: Yes  Medications reviewed: Yes    Medications: Medication refills not needed today.  Pharmacy name entered into TESARO: [id number] - [place], [place] [address] [facility]    Clinical concerns: Patient states there are no new concerns to discuss with provider.  Amanda was not notified.         Virginia Jo CMA              "

## 2023-01-13 NOTE — LETTER
1/13/2023         RE: Maribel June  [address]  [facility] [address]        Oncology Visit:  Date on this visit: Jan 13, 2023      Diagnosis: Stage Ia, [id number], grade 1, ER positive, OR positive, HER-2 negative invasive carcinoma of the right breast. Oncotype dx recurrence score = 16.    Primary Physician: Juwan Perry     History Of Present Illness:  Ms. June is a 57 year old female with right breast cancer.  Routine screening mammogram on 8/6/2020 showed heterogeneously dense breasts but no concerning findings.  A physician then palpated a mass in the right breast.  Diagnostic mammogram and ultrasound showed a 2.2 cm mass at 8:00, 5 cm from the nipple.  Right breast biopsy showed a grade 1 invasive mammary carcinoma, ER strong in 100%, OR strong in 100%, HER2 negative.  She had a right breast lumpectomy and sentinel lymph node procedure with Dr. Watson on 9/29/2020.  Pathology showed a grade 1 invasive mammary carcinoma measuring 1.6 cm.  There was associated intermediate grade DCIS.  Surgical margins were negative.  A single lymph node was benign.  Oncotype dx recurrence score was 16.  She completed radiation to the right breast, a total of 5256 cGy in 20 fractions, on 12/21/2020.  She started treatment with letrozole in 1/2021.  The medication was poorly tolerated with arthralgias, insomnia, vaginal dryness and fatigue.  Treatment was changed to Tamoxifen in 03/2021.  This caused nausea and so was dose reduced to 10 mg daily in 05/2021. This was slowly increased back to 20 mg daily.  She received a dose of Zometa 7/29/2021 with subsequent fever.      Interval History:  Ms. June comes into clinic today for routine breast cancer follow-up.  She is on tamoxifen 20 mg daily.  She continues to have stiffness and soreness in her bilateral hands and in her feet, worse in the morning or after prolonged sitting.     She confirms that she is taking vitamin D and is obtaining routine cardiovascular  exercise.    She has noticed some fullness in her R breast w/ tenderness at the end of the day.  Has a h/o lymphedema in the R arm, has not been routinely using her pump or sleeve.     She has no current cough, shortness of breath, or chest pain.  She has no new bone or joint aches or pains.  She continues to get intermittent hot flashes and night sweats, however describes these as tolerable.  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      She has been seeing PCP recently for GERD management and is now on PPI BID.      The remainder of a complete 12 point review of systems was reviewed with the patient was negative with exception that mentioned above.    Past Medical/Surgical History:  Past Medical History:   Diagnosis Date     Depressive disorder      Endometriosis      Herpes genitalis in women      History of major depression     situational with first .      Hypertension 2018     Kidney stone      Malignant neoplasm of right breast in female, estrogen receptor positive (H) 2020     S/P radiation therapy     5,256 cGy to right breast completed 2020 - [facility]   - Hyperlipidemia.  - Osteopenia    Past Surgical History:   Procedure Laterality Date     BREAST BIOPSY, RT/LT Right 2020     BREAST SURGERY Right     Right Breast - Benign      SECTION      x1     COLONOSCOPY N/A 2016    Procedure: COMBINED COLONOSCOPY, SINGLE OR MULTIPLE BIOPSY/POLYPECTOMY BY BIOPSY;  Surgeon: Duane, William Charles, MD;  Location: [facility] OR     COLONOSCOPY WITH CO2 INSUFFLATION N/A 2016    Procedure: COLONOSCOPY WITH CO2 INSUFFLATION;  Surgeon: Duane, William Charles, MD;  Location: [facility] OR     CYSTOSCOPY  2009    left stent placement ([facility]-ARM)     GENITOURINARY SURGERY      surgery for kidney stone     GYN SURGERY      Partial Hysterectomy at age 29     LUMPECTOMY BREAST WITH SENTINEL NODE, COMBINED Right  09/29/2020    Procedure: Right breast lumpectomy with [place] node biopsy;  Surgeon: Jose Watson MD;  Location: [facility] OR     Allergies:  Allergies as of 01/13/2023 - Reviewed 01/13/2023   Allergen Reaction Noted     Flagyl [metronidazole] Nausea and Vomiting 07/30/2015     Lisinopril  06/13/2019     Oxycodone-acetaminophen Nausea and Vomiting 06/13/2019     Sulfamethoxazole-trimethoprim  12/29/2011     Zithromax [azithromycin dihydrate] Rash 07/03/2013     Current Medications:  Current Outpatient Medications   Medication Sig Dispense Refill     amLODIPine (NORVASC) 5 MG tablet Take 0.5 tablets (2.5 mg) by mouth daily       bimatoprost (LUMIGAN) 0.01 % SOLN Place 1 drop into both eyes daily        latanoprost (XALATAN) 0.005 % ophthalmic solution INSTILL 1 DROP IN BOTH EYES EVERY DAY       Multiple Vitamin (MULTIVITAMIN PO) Take by mouth daily       Omega-3 Fatty Acids (OMEGA 3 PO) Take by mouth daily       omeprazole (PRILOSEC) 40 MG DR capsule TAKE 1 CAPSULE BY MOUTH TWICE DAILY BEFORE A MEAL       pantoprazole (PROTONIX) 40 MG EC tablet Take 1 tablet (40 mg) by mouth daily 30-60 min prior to a meal. 30 tablet 0     tamoxifen (NOLVADEX) 20 MG tablet Take 1 tablet (20 mg) by mouth daily 90 tablet 3     traZODone (DESYREL) 50 MG tablet Take 1 tablet (50 mg) by mouth At Bedtime Take 1/2 pill (25 mg) for a few nights, if tolerable OK to take full pill for sleep 30 tablet 3     acyclovir (ZOVIRAX) 400 MG tablet Take 1 tablet (400 mg) by mouth every 8 hours for 5 days 15 tablet 11     triamcinolone (KENALOG) 0.1 % external cream Apply topically 2 times daily Apply topically to bites twice daily for one week. (Patient not taking: Reported on 1/13/2023) 60 g 1      Family and Social History:  Please see initial Oncology consultation dated 10/27/2020 for details.  9/25/2020 Breast Actionable Panel was negative.    Physical Exam:  /70 (BP Location: Right arm, Patient Position: Sitting, Cuff Size: Adult Regular)    Pulse 64   Temp 97.7  F (36.5  C) (Oral)   Resp 18   Wt 50.1 kg (110 lb 6.4 oz)   SpO2 97%   BMI 21.73 kg/m    General:  Well appearing adult female in NAD.  Alert and oriented x 3.  HEENT:  Normocephalic.  Sclera anicteric.  MMM.  No lesions of the oropharynx.  Lymph:  No palpable cervical, supraclavicular, or axillary LAD.  Chest:  CTA bilaterally.  No wheezes or crackles.  CV:  RRR.  Nl S1 and S2.    Breast:  Bilateral breasts are of increased fibroglandular density.  There are no discretely palpable masses in either breast. Lower outer right breast incision with moderate underlying fibrosis.  Right breast is overall tender to palpation.  Bilateral nipples are everted.   Abd:  Soft/ND.   Ext:  No pitting edema of the bilateral lower extremities.   Musculo:  Full ROM of the bilateral upper extremities.    Neuro:  Cranial nerves grossly intact.  Gait stable.  Psych:  Mood and affect appear normal.  Skin:  No visible concerning skin rashes or lesions.    Laboratory/Imaging Studies  6/9/2022 Bilateral screening mammograms:  Heterogeneously dense breasts.  No radiographic evidence of malignancy.    ASSESSMENT/PLAN:  Ms. June is a 58 yo female with a stage Ia, [id number], grade 1, ER positive, WA positive, HER2 negative invasive ductal carcinoma of the right breast.  She is s/p treatment with right breast lumpectomy and radiation.     1.  Right breast cancer:  She is approximately 2 +years out from excision of a right breast cancer.  She is on treatment with Tamoxifen.  Of note, she did not previously tolerate aromatase inhibitor therapy due to intolerable joint pains, insomnia, and fatigue.  She continues to have joint pains and hot flashes on tamoxifen.  Despite these side effects, she is willing to continue it.  Plan is to treat with a total 5-7 years of endocrine therapy, as long as she is able to tolerate it.    She is asymptomatic of disease recurrence on history taken today. Given increased breast density  and that her breast cancer was not initially seen on screening mammogram, we are performing high risk breast screening with both annual mammograms and breast MRI, spacing the two studies so that she is having some form of breast imaging once every 6 months.     She will be due for a mammogram in June, due to travels to [place] early June we will schedule for 6/27.  She will then be traveling to [place] for a month with her family and we will see her back early August for her Zometa and exam.     2.  Bone health:  DEXA in 08/2021 with a lowest T-score of -2.0 which is c/w osteopenia and encroaching on osteoporosis. Zometa for prevention of bone loss and also prevention of breast cancer bone metastases was started 7/29/2021.  She had fever following the first dose.  C2 and 3 were better tolerated.  Plan to treat for a minimum of 3 years.  Dose #4 today.  DEXA from December showed improvement.     3.  Arthralgias:  Secondary to menopause and exacerbated by endocrine therapy.  Continue daily vitamin D supplementation and daily walking.  She declines a prescription for Cymbalta.    4.  Insomnia:  Persistent.  She is taking trazodone at bedtime.  This is helping     5. Lymphedema prn in the R arm and more notably in the R breast at the end of the day. Encouraged her to get back to routinely using her pump.     6. Vaginal atrophy. Has been trying OTC vaginal moisturizers.  Discussed trying [facility] or other hyaluronic acid suppositories.     35 minutes spent on the date of the encounter doing chart review, review of test results, interpretation of tests, patient visit, documentation and discussion with family.             Nelda Shirley PA-C

## 2023-01-13 NOTE — PROGRESS NOTES
Oncology Visit:  Date on this visit: Jan 13, 2023      Diagnosis: Stage Ia, [id number], grade 1, ER positive, ME positive, HER-2 negative invasive carcinoma of the right breast. Oncotype dx recurrence score = 16.    Primary Physician: Juwan Perry     History Of Present Illness:  Ms. June is a 57 year old female with right breast cancer.  Routine screening mammogram on 8/6/2020 showed heterogeneously dense breasts but no concerning findings.  A physician then palpated a mass in the right breast.  Diagnostic mammogram and ultrasound showed a 2.2 cm mass at 8:00, 5 cm from the nipple.  Right breast biopsy showed a grade 1 invasive mammary carcinoma, ER strong in 100%, ME strong in 100%, HER2 negative.  She had a right breast lumpectomy and sentinel lymph node procedure with Dr. Watson on 9/29/2020.  Pathology showed a grade 1 invasive mammary carcinoma measuring 1.6 cm.  There was associated intermediate grade DCIS.  Surgical margins were negative.  A single lymph node was benign.  Oncotype dx recurrence score was 16.  She completed radiation to the right breast, a total of 5256 cGy in 20 fractions, on 12/21/2020.  She started treatment with letrozole in 1/2021.  The medication was poorly tolerated with arthralgias, insomnia, vaginal dryness and fatigue.  Treatment was changed to Tamoxifen in 03/2021.  This caused nausea and so was dose reduced to 10 mg daily in 05/2021. This was slowly increased back to 20 mg daily.  She received a dose of Zometa 7/29/2021 with subsequent fever.      Interval History:  Ms. June comes into clinic today for routine breast cancer follow-up.  She is on tamoxifen 20 mg daily.  She continues to have stiffness and soreness in her bilateral hands and in her feet, worse in the morning or after prolonged sitting.     She confirms that she is taking vitamin D and is obtaining routine cardiovascular exercise.    She has noticed some fullness in her R breast w/ tenderness at the end of  the day.  Has a h/o lymphedema in the R arm, has not been routinely using her pump or sleeve.     She has no current cough, shortness of breath, or chest pain.  She has no new bone or joint aches or pains.  She continues to get intermittent hot flashes and night sweats, however describes these as tolerable.  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      She has been seeing PCP recently for GERD management and is now on PPI BID.      The remainder of a complete 12 point review of systems was reviewed with the patient was negative with exception that mentioned above.    Past Medical/Surgical History:  Past Medical History:   Diagnosis Date     Depressive disorder      Endometriosis      Herpes genitalis in women      History of major depression     situational with first .      Hypertension 2018     Kidney stone      Malignant neoplasm of right breast in female, estrogen receptor positive (H) 2020     S/P radiation therapy     5,256 cGy to right breast completed 2020 - [facility]   - Hyperlipidemia.  - Osteopenia    Past Surgical History:   Procedure Laterality Date     BREAST BIOPSY, RT/LT Right 2020     BREAST SURGERY Right     Right Breast - Benign      SECTION      x1     COLONOSCOPY N/A 2016    Procedure: COMBINED COLONOSCOPY, SINGLE OR MULTIPLE BIOPSY/POLYPECTOMY BY BIOPSY;  Surgeon: Duane, William Charles, MD;  Location: [facility] OR     COLONOSCOPY WITH CO2 INSUFFLATION N/A 2016    Procedure: COLONOSCOPY WITH CO2 INSUFFLATION;  Surgeon: Duane, William Charles, MD;  Location: [facility] OR     CYSTOSCOPY  2009    left stent placement (C-ARM)     GENITOURINARY SURGERY      surgery for kidney stone     GYN SURGERY      Partial Hysterectomy at age 29     LUMPECTOMY BREAST WITH SENTINEL NODE, COMBINED Right 2020    Procedure: Right breast lumpectomy with [place] node biopsy;  Surgeon:  Jose Watson MD;  Location: [facility] OR     Allergies:  Allergies as of 01/13/2023 - Reviewed 01/13/2023   Allergen Reaction Noted     Flagyl [metronidazole] Nausea and Vomiting 07/30/2015     Lisinopril  06/13/2019     Oxycodone-acetaminophen Nausea and Vomiting 06/13/2019     Sulfamethoxazole-trimethoprim  12/29/2011     Zithromax [azithromycin dihydrate] Rash 07/03/2013     Current Medications:  Current Outpatient Medications   Medication Sig Dispense Refill     amLODIPine (NORVASC) 5 MG tablet Take 0.5 tablets (2.5 mg) by mouth daily       bimatoprost (LUMIGAN) 0.01 % SOLN Place 1 drop into both eyes daily        latanoprost (XALATAN) 0.005 % ophthalmic solution INSTILL 1 DROP IN BOTH EYES EVERY DAY       Multiple Vitamin (MULTIVITAMIN PO) Take by mouth daily       Omega-3 Fatty Acids (OMEGA 3 PO) Take by mouth daily       omeprazole (PRILOSEC) 40 MG DR capsule TAKE 1 CAPSULE BY MOUTH TWICE DAILY BEFORE A MEAL       pantoprazole (PROTONIX) 40 MG EC tablet Take 1 tablet (40 mg) by mouth daily 30-60 min prior to a meal. 30 tablet 0     tamoxifen (NOLVADEX) 20 MG tablet Take 1 tablet (20 mg) by mouth daily 90 tablet 3     traZODone (DESYREL) 50 MG tablet Take 1 tablet (50 mg) by mouth At Bedtime Take 1/2 pill (25 mg) for a few nights, if tolerable OK to take full pill for sleep 30 tablet 3     acyclovir (ZOVIRAX) 400 MG tablet Take 1 tablet (400 mg) by mouth every 8 hours for 5 days 15 tablet 11     triamcinolone (KENALOG) 0.1 % external cream Apply topically 2 times daily Apply topically to bites twice daily for one week. (Patient not taking: Reported on 1/13/2023) 60 g 1      Family and Social History:  Please see initial Oncology consultation dated 10/27/2020 for details.  9/25/2020 Breast Actionable Panel was negative.    Physical Exam:  /70 (BP Location: Right arm, Patient Position: Sitting, Cuff Size: Adult Regular)   Pulse 64   Temp 97.7  F (36.5  C) (Oral)   Resp 18   Wt 50.1 kg (110 lb 6.4 oz)    SpO2 97%   BMI 21.73 kg/m    General:  Well appearing adult female in NAD.  Alert and oriented x 3.  HEENT:  Normocephalic.  Sclera anicteric.  MMM.  No lesions of the oropharynx.  Lymph:  No palpable cervical, supraclavicular, or axillary LAD.  Chest:  CTA bilaterally.  No wheezes or crackles.  CV:  RRR.  Nl S1 and S2.    Breast:  Bilateral breasts are of increased fibroglandular density.  There are no discretely palpable masses in either breast. Lower outer right breast incision with moderate underlying fibrosis.  Right breast is overall tender to palpation.  Bilateral nipples are everted.   Abd:  Soft/ND.   Ext:  No pitting edema of the bilateral lower extremities.   Musculo:  Full ROM of the bilateral upper extremities.    Neuro:  Cranial nerves grossly intact.  Gait stable.  Psych:  Mood and affect appear normal.  Skin:  No visible concerning skin rashes or lesions.    Laboratory/Imaging Studies  6/9/2022 Bilateral screening mammograms:  Heterogeneously dense breasts.  No radiographic evidence of malignancy.    ASSESSMENT/PLAN:  Ms. June is a 58 yo female with a stage Ia, C3pA2R7, grade 1, ER positive, TN positive, HER2 negative invasive ductal carcinoma of the right breast.  She is s/p treatment with right breast lumpectomy and radiation.     1.  Right breast cancer:  She is approximately 2 +years out from excision of a right breast cancer.  She is on treatment with Tamoxifen.  Of note, she did not previously tolerate aromatase inhibitor therapy due to intolerable joint pains, insomnia, and fatigue.  She continues to have joint pains and hot flashes on tamoxifen.  Despite these side effects, she is willing to continue it.  Plan is to treat with a total 5-7 years of endocrine therapy, as long as she is able to tolerate it.    She is asymptomatic of disease recurrence on history taken today. Given increased breast density and that her breast cancer was not initially seen on screening mammogram, we are  performing high risk breast screening with both annual mammograms and breast MRI, spacing the two studies so that she is having some form of breast imaging once every 6 months.     She will be due for a mammogram in June, due to travels to [place] early June we will schedule for 6/27.  She will then be traveling to [place] for a month with her family and we will see her back early August for her Zometa and exam.     2.  Bone health:  DEXA in 08/2021 with a lowest T-score of -2.0 which is c/w osteopenia and encroaching on osteoporosis. Zometa for prevention of bone loss and also prevention of breast cancer bone metastases was started 7/29/2021.  She had fever following the first dose.  C2 and 3 were better tolerated.  Plan to treat for a minimum of 3 years.  Dose #4 today.  DEXA from December showed improvement.     3.  Arthralgias:  Secondary to menopause and exacerbated by endocrine therapy.  Continue daily vitamin D supplementation and daily walking.  She declines a prescription for Cymbalta.    4.  Insomnia:  Persistent.  She is taking trazodone at bedtime.  This is helping     5. Lymphedema prn in the R arm and more notably in the R breast at the end of the day. Encouraged her to get back to routinely using her pump.     6. Vaginal atrophy. Has been trying OTC vaginal moisturizers.  Discussed trying [facility] or other hyaluronic acid suppositories.     35 minutes spent on the date of the encounter doing chart review, review of test results, interpretation of tests, patient visit, documentation and discussion with family.

## 2023-01-13 NOTE — PROGRESS NOTES
Infusion Nursing Note:  Maribel June presents today for Zometa.    Patient seen and examined by Amanda Shirley prior to infuison    Note: Pt states she is taking her calcium and vitamin d as ordered.    Intravenous Access:  Peripheral IV placed in lab    Treatment Conditions:  Component      Latest Ref Rng & Units 1/13/2023   Creatinine      0.51 - 0.95 mg/dL 0.60   GFR Estimate      >60 mL/min/1.73m2 >90   Calcium      8.6 - 10.0 mg/dL 9.1   Albumin      3.5 - 5.2 g/dL 4.6       Post Infusion Assessment:  Patient tolerated infusion without incident.     Discharge Plan:   .  AVS to patient via [employer].  Patient will return in July to see Dr Eli in clinic and is due for her next zometa infusion in August.  Rochelle Galvez RN

## 2023-01-13 NOTE — NURSING NOTE
Chief Complaint   Patient presents with     Blood Draw     Labs drawn via piv placed by EMT in lab. VS taken.      Labs drawn from PIV placed by EMT. Line flushed with saline. Vitals taken. Pt checked in for appointment(s).    Lynette Rodríguez RN

## 2023-02-01 ASSESSMENT — ENCOUNTER SYMPTOMS
ABDOMINAL PAIN: 0
NAUSEA: 0
FATIGUE: 1
INCREASED ENERGY: 1
DECREASED APPETITE: 0
BOWEL INCONTINENCE: 0
DECREASED LIBIDO: 1
HOT FLASHES: 1
EYE PAIN: 0
WEIGHT LOSS: 0
CONSTIPATION: 0
EYE IRRITATION: 1
JAUNDICE: 0
EYE REDNESS: 1
POLYPHAGIA: 0
POLYDIPSIA: 0
DOUBLE VISION: 0
WEIGHT GAIN: 0
EYE WATERING: 0
CHILLS: 0
FEVER: 0
RECTAL PAIN: 0
HEARTBURN: 1
NIGHT SWEATS: 0
BLOATING: 1
DIARRHEA: 0
HALLUCINATIONS: 0
BLOOD IN STOOL: 0
VOMITING: 0
ALTERED TEMPERATURE REGULATION: 0

## 2023-02-08 ENCOUNTER — VIRTUAL VISIT (OUTPATIENT)
Dept: ENDOCRINOLOGY | Facility: CLINIC | Age: 58
End: 2023-02-08
Payer: COMMERCIAL

## 2023-02-08 DIAGNOSIS — R53.83 OTHER FATIGUE: ICD-10-CM

## 2023-02-08 DIAGNOSIS — M85.80 OSTEOPENIA, UNSPECIFIED LOCATION: Primary | ICD-10-CM

## 2023-02-08 PROCEDURE — 99214 OFFICE O/P EST MOD 30 MIN: CPT | Mod: VID | Performed by: INTERNAL MEDICINE

## 2023-02-08 NOTE — NURSING NOTE
Is the patient currently in the state of [place]? NO    Visit mode:VIDEO    If the visit is dropped, the patient can be reconnected by: VIDEO VISIT: Text to cell phone: [phone]    Will anyone else be joining the visit? NO      How would you like to obtain your AVS? MyChart    Are changes needed to the allergy or medication list? NO    Comments or concerns related to today's visit: N/A. Would like to make sure calcium labs are normal.

## 2023-02-08 NOTE — LETTER
2/8/2023       RE: Maribel June  [address]  [facility] [address]     Dear Colleague,    Thank you for referring your patient, Maribel June, to the [facility] [place] [facility]. Please see a copy of my visit note below.    Endocrinology virtual Visit    Chief Complaint: Follow     Information obtained from:Patient    Subjective:         HPI: Maribel June is a 57 year old female with history of osteopenia who is here for a follow up.      Diagnosed with breast cancer s/p radiation therapy, currently on tamoxifen and ZOMETA.  Here for further discussion of osteopenia.  No fractures since we saw her last.     Fracture risk and osteoporosis  No previous history of fracture after the age of 50.  No loss of height.  Her weight has always been in the range of 109-110 pounds  No family history or parental history of hip fracture or osteoporosis.  She is a non-smoker.  No history of current or past use of glucocorticoid treatment  No history of excessive alcohol intake  She has had hysterectomy but she tells me that ovaries are present. Now on tamoxifen.   No history of prolonged immobility, transplant history, diabetes, hyperthyroidism, GI disease including inflammatory bowel disease or COPD.    She has recurrent history of nephrolithiasis.  Previously had not been taking any calcium supplement for fear of nephrolithiasis.  Currently on MVT. Does not take any additional calcium supplement. Takes at least two diary products per day.     In terms of exercise; she walks every day.   at the [employer].  No dental procedures planned.    She is lactose intolerant.  She has a strong family history of kidney stone in her dad, brother, uncle.  She had kidney stones frequently over the last 10 years and she is closely following with urology.  Kidney stones were calcium containing per report. Last kidney  stone was 4+ years ago.   Answers for HPI/ROS submitted by the patient on 2/1/2023  General Symptoms: Yes  Skin Symptoms: No  HENT Symptoms: No  EYE SYMPTOMS: Yes  HEART SYMPTOMS: No  LUNG SYMPTOMS: No  INTESTINAL SYMPTOMS: Yes  URINARY SYMPTOMS: No  GYNECOLOGIC SYMPTOMS: Yes  BREAST SYMPTOMS: No  SKELETAL SYMPTOMS: No  BLOOD SYMPTOMS: No  NERVOUS SYSTEM SYMPTOMS: No  MENTAL HEALTH SYMPTOMS: No  Fever: No  Loss of appetite: No  Weight loss: No  Weight gain: No  Fatigue: Yes  Night sweats: No  Chills: No  Increased stress: No  Excessive hunger: No  Excessive thirst: No  Feeling hot or cold when others believe the temperature is normal: No  Loss of height: No  Post-operative complications: No  Surgical site pain: No  Hallucinations: No  Change in or Loss of Energy: Yes  Hyperactivity: No  Confusion: No  Eye pain: No  Vision loss: No  Dry eyes: Yes  Watery eyes: No  Eye bulging: No  Double vision: No  Flashing of lights: No  Spots: No  Floaters: No  Redness: Yes  Crossed eyes: No  Tunnel Vision: No  Yellowing of eyes: No  Eye irritation: Yes  Heart burn or indigestion: Yes  Nausea: No  Vomiting: No  Abdominal pain: No  Bloating: Yes  Constipation: No  Diarrhea: No  Blood in stool: No  Black stools: No  Rectal or Anal pain: No  Fecal incontinence: No  Yellowing of skin or eyes: No  Vomit with blood: No  Change in stools: No  Bleeding or spotting between periods: No  Heavy or painful periods: No  Irregular periods: No  Vaginal discharge: No  Hot flashes: Yes  Vaginal dryness: Yes  Genital ulcers: No  Reduced libido: Yes  Painful intercourse: Yes  Difficulty with sexual arousal: No  Post-menopausal bleeding: No       Allergies   Allergen Reactions     Flagyl [Metronidazole] Nausea and Vomiting     Lisinopril      Other reaction(s): Headaches     Oxycodone-Acetaminophen Nausea and Vomiting     States Oxycodone is fine     Sulfamethoxazole-Trimethoprim      Other reaction(s): Headache     Zithromax [Azithromycin Dihydrate]  Rash       Current Outpatient Medications   Medication Sig Dispense Refill     amLODIPine (NORVASC) 5 MG tablet Take 0.5 tablets (2.5 mg) by mouth daily       bimatoprost (LUMIGAN) 0.01 % SOLN Place 1 drop into both eyes daily        latanoprost (XALATAN) 0.005 % ophthalmic solution INSTILL 1 DROP IN BOTH EYES EVERY DAY       Multiple Vitamin (MULTIVITAMIN PO) Take by mouth daily       Omega-3 Fatty Acids (OMEGA 3 PO) Take by mouth daily       omeprazole (PRILOSEC) 40 MG DR capsule TAKE 1 CAPSULE BY MOUTH TWICE DAILY BEFORE A MEAL       pantoprazole (PROTONIX) 40 MG EC tablet Take 1 tablet (40 mg) by mouth daily 30-60 min prior to a meal. 30 tablet 0     tamoxifen (NOLVADEX) 20 MG tablet Take 1 tablet (20 mg) by mouth daily 90 tablet 3     traZODone (DESYREL) 50 MG tablet Take 1 tablet (50 mg) by mouth At Bedtime Take 1/2 pill (25 mg) for a few nights, if tolerable OK to take full pill for sleep 30 tablet 3     acyclovir (ZOVIRAX) 400 MG tablet Take 1 tablet (400 mg) by mouth every 8 hours for 5 days 15 tablet 11     triamcinolone (KENALOG) 0.1 % external cream Apply topically 2 times daily Apply topically to bites twice daily for one week. (Patient not taking: Reported on 1/13/2023) 60 g 1       Review of Systems    ROS: 11 point ROS neg other than the symptoms noted above in the HPI.      Objective:   There were no vitals taken for this visit.  Constitutional: Pleasant no acute cardiopulmonary distress.   EYES: anicteric, normal extra-ocular movements.  Neurological: Alert and oriented.    Psychological: appropriate mood and affect     In House Labs:   ENDO CALCIUM LABS-UMP Latest Ref Rng & Units 1/13/2023   VITAMIN D DEFICIENCY SCREENING 20 - 75 ug/L    ALBUMIN 3.4 - 5.0 g/dL    ALKPHOS 40 - 150 U/L    AMYLASE 30 - 110 U/L    CALCIUM 8.6 - 10.0 mg/dL 9.1   CREATININE 0.51 - 0.95 mg/dL 0.60     ENDO CALCIUM LABS-UMP Latest Ref Rng & Units 10/20/2022   VITAMIN D DEFICIENCY SCREENING 20 - 75 ug/L    ALBUMIN 3.4 -  "5.0 g/dL 3.8   ALKPHOS 40 - 150 U/L 44   AMYLASE 30 - 110 U/L 43   CALCIUM 8.6 - 10.0 mg/dL 9.1   CREATININE 0.51 - 0.95 mg/dL 0.57     ENDO CALCIUM LABS-UMP Latest Ref Rng & Units 7/13/2022   VITAMIN D DEFICIENCY SCREENING 20 - 75 ug/L    ALBUMIN 3.4 - 5.0 g/dL 3.7   ALKPHOS 40 - 150 U/L    AMYLASE 30 - 110 U/L    CALCIUM 8.6 - 10.0 mg/dL 9.1   CREATININE 0.51 - 0.95 mg/dL 0.48 (L)     ENDO CALCIUM LABS-UMP Latest Ref Rng & Units 1/14/2022   VITAMIN D DEFICIENCY SCREENING 20 - 75 ug/L 40   ALBUMIN 3.4 - 5.0 g/dL 4.1   ALKPHOS 40 - 150 U/L    AMYLASE 30 - 110 U/L    CALCIUM 8.6 - 10.0 mg/dL 9.0   CREATININE 0.51 - 0.95 mg/dL 0.57     TSH   Date Value Ref Range Status   08/17/2021 2.49 0.40 - 4.00 mU/L Final   12/14/2020 1.88 0.40 - 4.00 mU/L Final   10/28/2019 2.18 0.40 - 4.00 mU/L Final   08/05/2019 1.75 0.40 - 4.00 mU/L Final   07/31/2018 3.54 0.40 - 4.00 mU/L Final   09/19/2017 2.68 0.40 - 4.00 mU/L Final       Creatinine   Date Value Ref Range Status   01/13/2023 0.60 0.51 - 0.95 mg/dL Final   10/28/2019 0.50 (L) 0.52 - 1.04 mg/dL Final     12/2022  \"Lumbar spine T-score in region of L1-L4 (L3) = -1.5   Lumbar percent change: Not significant%      HIPS:  Mean total hip T-score: -1.4  Mean total hip percent change: Not significant%      Left femoral neck T-score = -1.8  Right femoral neck T-score= -1.9 \"           Assessment/Treatment Plan:      Osteopenia; I have personally reviewed her latest DEXA scan from 12/2022.  Agree with stable BMD at the hips and spine.   Lumbar spine T-score in region of L1-L4 (L3) = -1.5   HIPS:  Mean total hip T-score: -1.4  Mean total hip percent change: Not significant%    Left femoral neck T-score = -1.8  Right femoral neck T-score= -1.9   Currently undergoing treatment with zoledronic acid 4 mg q 6 months from oncology stand point. Additional treatment not indicated.  Follow up DEXA scan two years from the last study.       Chronic fatigue: check TSH. Order placed.       Return " to clinic PRN/2 years after follow up DEXA    Test and/or medications prescribed today:  Orders Placed This Encounter   Procedures     Vitamin D Deficiency (D3 Only)     TSH with free T4 reflex         Cornel Briseno MD  Staff Endocrinologist    Division of Endocrinology and Diabetes    Video-Visit Details    Type of service:  Video Visit    Video Start Time: 1:32 PM    Video End Time:1:46 PM  Distant Location (provider location):  Off-site.     Platform used for Video Visit: Sesar

## 2023-02-08 NOTE — PROGRESS NOTES
Endocrinology virtual Visit    Chief Complaint: Follow     Information obtained from:Patient    Subjective:         HPI: Maribel June is a 57 year old female with history of osteopenia who is here for a follow up.      Diagnosed with breast cancer s/p radiation therapy, currently on tamoxifen and ZOMETA.  Here for further discussion of osteopenia.  No fractures since we saw her last.     Fracture risk and osteoporosis  No previous history of fracture after the age of 50.  No loss of height.  Her weight has always been in the range of 109-110 pounds  No family history or parental history of hip fracture or osteoporosis.  She is a non-smoker.  No history of current or past use of glucocorticoid treatment  No history of excessive alcohol intake  She has had hysterectomy but she tells me that ovaries are present. Now on tamoxifen.   No history of prolonged immobility, transplant history, diabetes, hyperthyroidism, GI disease including inflammatory bowel disease or COPD.    She has recurrent history of nephrolithiasis.  Previously had not been taking any calcium supplement for fear of nephrolithiasis.  Currently on MVT. Does not take any additional calcium supplement. Takes at least two diary products per day.     In terms of exercise; she walks every day.   at the [employer].  No dental procedures planned.    She is lactose intolerant.  She has a strong family history of kidney stone in her dad, brother, uncle.  She had kidney stones frequently over the last 10 years and she is closely following with urology.  Kidney stones were calcium containing per report. Last kidney stone was 4+ years ago.   Answers for HPI/ROS submitted by the patient on 2/1/2023  General Symptoms: Yes  Skin Symptoms: No  HENT Symptoms: No  EYE SYMPTOMS: Yes  HEART SYMPTOMS: No  LUNG SYMPTOMS: No  INTESTINAL SYMPTOMS: Yes  URINARY SYMPTOMS: No  GYNECOLOGIC SYMPTOMS: Yes  BREAST SYMPTOMS: No  SKELETAL SYMPTOMS:  No  BLOOD SYMPTOMS: No  NERVOUS SYSTEM SYMPTOMS: No  MENTAL HEALTH SYMPTOMS: No  Fever: No  Loss of appetite: No  Weight loss: No  Weight gain: No  Fatigue: Yes  Night sweats: No  Chills: No  Increased stress: No  Excessive hunger: No  Excessive thirst: No  Feeling hot or cold when others believe the temperature is normal: No  Loss of height: No  Post-operative complications: No  Surgical site pain: No  Hallucinations: No  Change in or Loss of Energy: Yes  Hyperactivity: No  Confusion: No  Eye pain: No  Vision loss: No  Dry eyes: Yes  Watery eyes: No  Eye bulging: No  Double vision: No  Flashing of lights: No  Spots: No  Floaters: No  Redness: Yes  Crossed eyes: No  Tunnel Vision: No  Yellowing of eyes: No  Eye irritation: Yes  Heart burn or indigestion: Yes  Nausea: No  Vomiting: No  Abdominal pain: No  Bloating: Yes  Constipation: No  Diarrhea: No  Blood in stool: No  Black stools: No  Rectal or Anal pain: No  Fecal incontinence: No  Yellowing of skin or eyes: No  Vomit with blood: No  Change in stools: No  Bleeding or spotting between periods: No  Heavy or painful periods: No  Irregular periods: No  Vaginal discharge: No  Hot flashes: Yes  Vaginal dryness: Yes  Genital ulcers: No  Reduced libido: Yes  Painful intercourse: Yes  Difficulty with sexual arousal: No  Post-menopausal bleeding: No       Allergies   Allergen Reactions     Flagyl [Metronidazole] Nausea and Vomiting     Lisinopril      Other reaction(s): Headaches     Oxycodone-Acetaminophen Nausea and Vomiting     States Oxycodone is fine     Sulfamethoxazole-Trimethoprim      Other reaction(s): Headache     Zithromax [Azithromycin Dihydrate] Rash       Current Outpatient Medications   Medication Sig Dispense Refill     amLODIPine (NORVASC) 5 MG tablet Take 0.5 tablets (2.5 mg) by mouth daily       bimatoprost (LUMIGAN) 0.01 % SOLN Place 1 drop into both eyes daily        latanoprost (XALATAN) 0.005 % ophthalmic solution INSTILL 1 DROP IN BOTH EYES EVERY  DAY       Multiple Vitamin (MULTIVITAMIN PO) Take by mouth daily       Omega-3 Fatty Acids (OMEGA 3 PO) Take by mouth daily       omeprazole (PRILOSEC) 40 MG DR capsule TAKE 1 CAPSULE BY MOUTH TWICE DAILY BEFORE A MEAL       pantoprazole (PROTONIX) 40 MG EC tablet Take 1 tablet (40 mg) by mouth daily 30-60 min prior to a meal. 30 tablet 0     tamoxifen (NOLVADEX) 20 MG tablet Take 1 tablet (20 mg) by mouth daily 90 tablet 3     traZODone (DESYREL) 50 MG tablet Take 1 tablet (50 mg) by mouth At Bedtime Take 1/2 pill (25 mg) for a few nights, if tolerable OK to take full pill for sleep 30 tablet 3     acyclovir (ZOVIRAX) 400 MG tablet Take 1 tablet (400 mg) by mouth every 8 hours for 5 days 15 tablet 11     triamcinolone (KENALOG) 0.1 % external cream Apply topically 2 times daily Apply topically to bites twice daily for one week. (Patient not taking: Reported on 1/13/2023) 60 g 1       Review of Systems    ROS: 11 point ROS neg other than the symptoms noted above in the HPI.      Objective:   There were no vitals taken for this visit.  Constitutional: Pleasant no acute cardiopulmonary distress.   EYES: anicteric, normal extra-ocular movements.  Neurological: Alert and oriented.    Psychological: appropriate mood and affect     In House Labs:   ENDO CALCIUM LABS-UMP Latest Ref Rng & Units 1/13/2023   VITAMIN D DEFICIENCY SCREENING 20 - 75 ug/L    ALBUMIN 3.4 - 5.0 g/dL    ALKPHOS 40 - 150 U/L    AMYLASE 30 - 110 U/L    CALCIUM 8.6 - 10.0 mg/dL 9.1   CREATININE 0.51 - 0.95 mg/dL 0.60     ENDO CALCIUM LABS-UMP Latest Ref Rng & Units 10/20/2022   VITAMIN D DEFICIENCY SCREENING 20 - 75 ug/L    ALBUMIN 3.4 - 5.0 g/dL 3.8   ALKPHOS 40 - 150 U/L 44   AMYLASE 30 - 110 U/L 43   CALCIUM 8.6 - 10.0 mg/dL 9.1   CREATININE 0.51 - 0.95 mg/dL 0.57     ENDO CALCIUM LABS-UMP Latest Ref Rng & Units 7/13/2022   VITAMIN D DEFICIENCY SCREENING 20 - 75 ug/L    ALBUMIN 3.4 - 5.0 g/dL 3.7   ALKPHOS 40 - 150 U/L    AMYLASE 30 - 110 U/L   "  CALCIUM 8.6 - 10.0 mg/dL 9.1   CREATININE 0.51 - 0.95 mg/dL 0.48 (L)     ENDO CALCIUM LABS-UMP Latest Ref Rng & Units 1/14/2022   VITAMIN D DEFICIENCY SCREENING 20 - 75 ug/L 40   ALBUMIN 3.4 - 5.0 g/dL 4.1   ALKPHOS 40 - 150 U/L    AMYLASE 30 - 110 U/L    CALCIUM 8.6 - 10.0 mg/dL 9.0   CREATININE 0.51 - 0.95 mg/dL 0.57     TSH   Date Value Ref Range Status   08/17/2021 2.49 0.40 - 4.00 mU/L Final   12/14/2020 1.88 0.40 - 4.00 mU/L Final   10/28/2019 2.18 0.40 - 4.00 mU/L Final   08/05/2019 1.75 0.40 - 4.00 mU/L Final   07/31/2018 3.54 0.40 - 4.00 mU/L Final   09/19/2017 2.68 0.40 - 4.00 mU/L Final       Creatinine   Date Value Ref Range Status   01/13/2023 0.60 0.51 - 0.95 mg/dL Final   10/28/2019 0.50 (L) 0.52 - 1.04 mg/dL Final     12/2022  \"Lumbar spine T-score in region of L1-L4 (L3) = -1.5   Lumbar percent change: Not significant%      HIPS:  Mean total hip T-score: -1.4  Mean total hip percent change: Not significant%      Left femoral neck T-score = -1.8  Right femoral neck T-score= -1.9 \"           Assessment/Treatment Plan:      Osteopenia; I have personally reviewed her latest DEXA scan from 12/2022.  Agree with stable BMD at the hips and spine.   Lumbar spine T-score in region of L1-L4 (L3) = -1.5   HIPS:  Mean total hip T-score: -1.4  Mean total hip percent change: Not significant%    Left femoral neck T-score = -1.8  Right femoral neck T-score= -1.9   Currently undergoing treatment with zoledronic acid 4 mg q 6 months from oncology stand point. Additional treatment not indicated.  Follow up DEXA scan two years from the last study.       Chronic fatigue: check TSH. Order placed.       Return to clinic PRN/2 years after follow up DEXA    Test and/or medications prescribed today:  Orders Placed This Encounter   Procedures     Vitamin D Deficiency (D3 Only)     TSH with free T4 reflex         Cornel Briseno MD  Staff Endocrinologist    Division of Endocrinology and Diabetes    Video-Visit " Details    Type of service:  Video Visit    Video Start Time: 1:32 PM    Video End Time:1:46 PM  Distant Location (provider location):  Off-site.     Platform used for Video Visit: [employer]

## 2023-02-19 ENCOUNTER — NURSE TRIAGE (OUTPATIENT)
Dept: NURSING | Facility: CLINIC | Age: 58
End: 2023-02-19
Payer: COMMERCIAL

## 2023-02-19 NOTE — TELEPHONE ENCOUNTER
Triage Call:    Caller: Patient    Tested positive for COVID on Wednesday.  She has had symptoms since Sunday.  She tested negative for 2 times before that.   She is on Tamoxifin as a cancer treatment.    She was coughing a lot the first few days and last night she started coughing a lot right away.  There is chest pressure that is intermittent with coughing and then it came back again this morning constant.  Yesterday when walking up stairs, noticed she was short of breath when walking up stairs and this was new.        Protocol Recommended Disposition: ED.  Advised patient that she is outside of the treatment window for Paxlovid, but due to symptoms coming back again and the pressure, she needs to be evaluated.     Caller verbalized understanding of instructions and questions answered.      Rosanne Mccormack RN on 2/19/2023 at 8:40 AM        Reason for Disposition    SEVERE or constant chest pain or pressure  (Exception: Mild central chest pain, present only when coughing.)    Additional Information    Negative: SEVERE difficulty breathing (e.g., struggling for each breath, speaks in single words)    Negative: Difficult to awaken or acting confused (e.g., disoriented, slurred speech)    Negative: Bluish (or gray) lips or face now    Negative: Shock suspected (e.g., cold/pale/clammy skin, too weak to stand, low BP, rapid pulse)    Negative: Sounds like a life-threatening emergency to the triager    Protocols used: CORONAVIRUS (COVID-19) DIAGNOSED OR [id number]

## 2023-03-10 ENCOUNTER — OFFICE VISIT (OUTPATIENT)
Dept: OBGYN | Facility: CLINIC | Age: 58
End: 2023-03-10
Payer: COMMERCIAL

## 2023-03-10 VITALS
SYSTOLIC BLOOD PRESSURE: 112 MMHG | WEIGHT: 112 LBS | DIASTOLIC BLOOD PRESSURE: 71 MMHG | HEART RATE: 64 BPM | OXYGEN SATURATION: 97 % | BODY MASS INDEX: 22.05 KG/M2

## 2023-03-10 DIAGNOSIS — B37.31 YEAST INFECTION OF THE VAGINA: Primary | ICD-10-CM

## 2023-03-10 DIAGNOSIS — N89.8 VAGINAL ITCHING: ICD-10-CM

## 2023-03-10 DIAGNOSIS — N89.8 VAGINAL DISCHARGE: Primary | ICD-10-CM

## 2023-03-10 LAB
CLUE CELLS: ABNORMAL
TRICHOMONAS, WET PREP: ABNORMAL
WBC'S/HIGH POWER FIELD, WET PREP: ABNORMAL
YEAST, WET PREP: PRESENT

## 2023-03-10 PROCEDURE — 87102 FUNGUS ISOLATION CULTURE: CPT | Performed by: OBSTETRICS & GYNECOLOGY

## 2023-03-10 PROCEDURE — 87210 SMEAR WET MOUNT SALINE/INK: CPT | Performed by: OBSTETRICS & GYNECOLOGY

## 2023-03-10 PROCEDURE — 99213 OFFICE O/P EST LOW 20 MIN: CPT | Performed by: OBSTETRICS & GYNECOLOGY

## 2023-03-10 PROCEDURE — 87106 FUNGI IDENTIFICATION YEAST: CPT | Performed by: OBSTETRICS & GYNECOLOGY

## 2023-03-10 RX ORDER — FLUCONAZOLE 150 MG/1
150 TABLET ORAL
Qty: 3 TABLET | Refills: 0 | Status: SHIPPED | OUTPATIENT
Start: 2023-03-10 | End: 2023-03-17

## 2023-03-10 NOTE — PATIENT INSTRUCTIONS
If you have any questions regarding your visit, Please contact your care team.    [employer]: [phone]    To Schedule an Appointment 24/7  Call: [phone] TELEPHONE [facility]   DO. Cassidy Solis -Surgery Scheduler  Shira - Surgery Scheduler    DONALD Amador, DONALD Reynoso, DONALD   [place]  Wednesday and Friday  8:30 a.m-5:00 p.m  [place]-Temporary  Monday 8:30 a.m-5:00 p.m  Typical Surgery day:  Tuesday [facility]  [address] [place] [address] [phone] Phone  [phone] Fax    Imaging Scheduling-All Locations [phone]    [facility]  [address] [place] [address]     Urgent Care locations:  [facility] Monday-Friday   10 am - 8 pm  Saturday and Sunday   9 am - 5 pm [phone]   **Surgeries** Our Surgery Schedulers will contact you to schedule. If you do not receive a call within 3 business days, please call [phone].    [facility] Labor and Delivery:  [phone]    If you need a medication refill, please contact your pharmacy. Please allow 3 business days for your refill to be completed.  As always, Thank you for trusting us with your healthcare needs!  see additional instructions from your care team below

## 2023-03-10 NOTE — PROGRESS NOTES
"This 58 y/o female, , s/p hysterectomy but still retains both ovaries, presents today c/o a vaginal burning and itching sensation which began about 2-3 months ago.  She feels that these symptoms are getting worse so would like to be evaluated.  She admits that the Tamoxifen appears to cause more vaginal dryness and is using an OTC Halo product but this has not resolved the issue.  She also c/o painful intercourse because of the dryness.  She has a herpetic outbreak about 3x/year but not currently.  She is not a candidate for estrogen use, due to her hx of breast cancer which is estrogen receptor +.  She denies any hx of STD exposure so does not need testing.  /71 (BP Location: Right arm, Patient Position: Chair, Cuff Size: Adult Regular)   Pulse 64   Wt 50.8 kg (112 lb)   SpO2 97%   Breastfeeding No   BMI 22.05 kg/m    ROS:  10 systems were reviewed and the positives were listed under problems.  In the dorsal lithotomy position, a bi-valve speculum was placed and vaginal atrophy is noted with what appears to be shallow \"papercut\" areas at the introitus and vaginal cuff.  There are no sores or growths noted, however.  A wet prep and yeast culture were collected and submitted to lab.  Her pelvic exam was otherwise unremarkable.  Assessment - Vaginal itching and burning, atrophic change, hx of breast ca with + estrogen receptors  Plan - Submit the wet prep and yeast culture and treat if +.  If these results are negative, then she is to switch from use of Halo to Replens to see if this vaginal moisturizer will provided better symptomatic relief.  All her questions and concerns were addressed.  20 minutes were spent today in chart review, that patient visit, review of tests, and documentation in regard to the issues noted above.    "

## 2023-03-13 LAB — BACTERIA VAG AEROBE CULT: ABNORMAL

## 2023-05-14 RX ORDER — HEPARIN SODIUM,PORCINE 10 UNIT/ML
5 VIAL (ML) INTRAVENOUS
Status: CANCELLED | OUTPATIENT
Start: 2023-08-07

## 2023-05-14 RX ORDER — ZOLEDRONIC ACID 0.04 MG/ML
4 INJECTION, SOLUTION INTRAVENOUS ONCE
Status: CANCELLED | OUTPATIENT
Start: 2023-08-07 | End: 2023-07-12

## 2023-05-14 RX ORDER — HEPARIN SODIUM (PORCINE) LOCK FLUSH IV SOLN 100 UNIT/ML 100 UNIT/ML
5 SOLUTION INTRAVENOUS
Status: CANCELLED | OUTPATIENT
Start: 2023-08-07

## 2023-05-14 NOTE — PROGRESS NOTES
Oncology Visit:  Date on this visit: 5/15/2023    Diagnosis: Stage Ia, [id number], grade 1, ER positive, IL positive, HER-2 negative invasive carcinoma of the right breast. Oncotype dx recurrence score = 16.    Primary Physician: Juwan Perry     History Of Present Illness:  Ms. June is a 58 year old female with right breast cancer.  Routine screening mammogram on 8/6/2020 showed heterogeneously dense breasts but no concerning findings.  A physician then palpated a mass in the right breast.  Diagnostic mammogram and ultrasound showed a 2.2 cm mass at 8:00, 5 cm from the nipple.  Right breast biopsy showed a grade 1 invasive mammary carcinoma, ER strong in 100%, IL strong in 100%, HER2 negative.  She had a right breast lumpectomy and sentinel lymph node procedure with Dr. Watson on 9/29/2020.  Pathology showed a grade 1 invasive mammary carcinoma measuring 1.6 cm.  There was associated intermediate grade DCIS.  Surgical margins were negative.  A single lymph node was benign.  Oncotype dx recurrence score was 16.  She completed radiation to the right breast, a total of 5256 cGy in 20 fractions, on 12/21/2020.  She started treatment with letrozole in 1/2021.  The medication was poorly tolerated with arthralgias, insomnia, vaginal dryness and fatigue.  Treatment was changed to Tamoxifen in 03/2021.  This caused nausea and so was dose reduced to 10 mg daily in 05/2021. This was slowly increased back to 20 mg daily.  She received a dose of Zometa 7/29/2021 with subsequent fever.      Interval History:  Ms. June comes into clinic today, alongside her spouse, for routine breast cancer follow-up.  She continues on treatment with tamoxifen.  She continues to have a number of side effects on the medication.  Amongst these, she reports ongoing hot flashes.  She has approximately 2/day.  She dresses in layers and uses fans.  Mood has been stable.  She has vaginal dryness.  She has been using alternating hyaluronic  suppositories and Replens vaginal moisturizer.  She feels with this, her symptoms have been well controlled.  She notices significant joint stiffness in her hands and feet especially in the evenings when she has not moved for a while.  During the day, she tends to move more often and it is less of an issue.  She has ongoing fatigue citing significant tiredness at about 2:00 in the afternoon.  Despite the fatigue she is still able to perform all of her daily activities.  School will let out on .  On Cori 15, she will be traveling with a group of students to [place], [facility], and [facility].  A couple days after she returns from that trip, she and her  will be traveling to [place] for approximately a month.  She and her  are building a home [place] northwest of [place].  She denies new bone or joint aches or pains.  She has no cough shortness of breath or chest pain.  She has no current abdominal complaints.    Past Medical/Surgical History:  Past Medical History:   Diagnosis Date     Depressive disorder      Endometriosis      Herpes genitalis in women      History of major depression     situational with first .      Hypertension 2018     Kidney stone      Malignant neoplasm of right breast in female, estrogen receptor positive (H) 2020     S/P radiation therapy     5,256 cGy to right breast completed 2020 - [facility]   - Hyperlipidemia.  - Osteopenia    Past Surgical History:   Procedure Laterality Date     BREAST BIOPSY, RT/LT Right 2020     BREAST SURGERY Right     Right Breast - Benign      SECTION      x1     COLONOSCOPY N/A 2016    Procedure: COMBINED COLONOSCOPY, SINGLE OR MULTIPLE BIOPSY/POLYPECTOMY BY BIOPSY;  Surgeon: Duane, William Charles, MD;  Location: [facility] OR     COLONOSCOPY WITH CO2 INSUFFLATION N/A 2016    Procedure: COLONOSCOPY WITH CO2 INSUFFLATION;  Surgeon: Duane, William Charles, MD;  Location:  OR      CYSTOSCOPY  11/13/2009    left stent placement ([facility])     GENITOURINARY SURGERY      surgery for kidney stone     GYN SURGERY      Partial Hysterectomy at age 29     LUMPECTOMY BREAST WITH SENTINEL NODE, COMBINED Right 09/29/2020    Procedure: Right breast lumpectomy with [place] node biopsy;  Surgeon: Jose Watson MD;  Location: [facility] OR     Allergies:  Allergies as of 05/15/2023 - Reviewed 03/10/2023   Allergen Reaction Noted     Flagyl [metronidazole] Nausea and Vomiting 07/30/2015     Lisinopril  06/13/2019     Oxycodone-acetaminophen Nausea and Vomiting 06/13/2019     Sulfamethoxazole-trimethoprim  12/29/2011     Zithromax [azithromycin dihydrate] Rash 07/03/2013     Current Medications:  Current Outpatient Medications   Medication Sig Dispense Refill     acyclovir (ZOVIRAX) 400 MG tablet Take 1 tablet (400 mg) by mouth every 8 hours for 5 days 15 tablet 11     amLODIPine (NORVASC) 5 MG tablet Take 0.5 tablets (2.5 mg) by mouth daily       bimatoprost (LUMIGAN) 0.01 % SOLN Place 1 drop into both eyes daily        latanoprost (XALATAN) 0.005 % ophthalmic solution INSTILL 1 DROP IN BOTH EYES EVERY DAY       Multiple Vitamin (MULTIVITAMIN PO) Take by mouth daily       Omega-3 Fatty Acids (OMEGA 3 PO) Take by mouth daily       omeprazole (PRILOSEC) 40 MG DR capsule TAKE 1 CAPSULE BY MOUTH TWICE DAILY BEFORE A MEAL       pantoprazole (PROTONIX) 40 MG EC tablet Take 1 tablet (40 mg) by mouth daily 30-60 min prior to a meal. 30 tablet 0     tamoxifen (NOLVADEX) 20 MG tablet Take 1 tablet (20 mg) by mouth daily 90 tablet 3     traZODone (DESYREL) 50 MG tablet Take 1 tablet (50 mg) by mouth At Bedtime Take 1/2 pill (25 mg) for a few nights, if tolerable OK to take full pill for sleep 30 tablet 3     triamcinolone (KENALOG) 0.1 % external cream Apply topically 2 times daily Apply topically to bites twice daily for one week. (Patient not taking: Reported on 1/13/2023) 60 g 1      Family and Social History:  Please see  initial Oncology consultation dated 10/27/2020 for details.  9/25/2020 Breast Actionable Panel was negative.    Physical Exam:  /77 (BP Location: Left arm, Patient Position: Sitting, Cuff Size: Adult Regular)   Pulse 68   Temp 98.1  F (36.7  C) (Oral)   Resp 16   Wt 49.2 kg (108 lb 8 oz)   SpO2 97%   BMI 21.36 kg/m    General:  Well appearing adult female in NAD.  Alert and oriented x 3.  HEENT:  Normocephalic.  Sclera anicteric.  MMM.  No lesions of the oropharynx.  Lymph:  No palpable cervical, supraclavicular, or axillary LAD.  Chest:  CTA bilaterally.  No wheezes or crackles.  CV:  RRR.  Nl S1 and S2.    Breast:  Bilateral breasts are of increased fibroglandular density.  There are no discretely palpable masses in either breast. Lower outer right breast incision with moderate underlying fibrosis.  Right breast is overall tender to palpation.  Bilateral nipples are everted.   Abd:  Soft/ND.   Ext:  No pitting edema of the bilateral lower extremities.   Musculo:  Full ROM of the bilateral upper extremities.    Neuro:  Cranial nerves grossly intact.  Gait stable.  Psych:  Mood and affect appear normal.  Skin:  No visible concerning skin rashes or lesions.    Laboratory/Imaging Studies  12/29/2022 DEXA bone density scan (lowest T-scores):  Lumbar spine = -1.5  Total hip = -1.4  Left femoral neck = -1.8  Right femoral neck = -1.9    12/23/2022 Breast MRI (I personally reviewed these images):  FINDINGS: Posttreatment changes right breast. No suspicious  enhancement in either breast. No lymphadenopathy.                                                             IMPRESSION: BI-RADS CATEGORY: 2 - Benign.    ASSESSMENT/PLAN:  Ms. June is a 57 yo female with a stage Ia, [id number], grade 1, ER positive, SD positive, HER2 negative invasive ductal carcinoma of the right breast.  She is s/p treatment with right breast lumpectomy and radiation. She did not tolerate adjuvant AI due to arthralgias, insomnia, and  fatigue.  On tamoxifen.    1.  Right breast cancer:  She is approximately 2 years, 7.5 months out from excision of a right breast cancer.  She is on treatment with Tamoxifen.  She has joint pains, hot flashes, and vaginal dryness on tamoxifen.  Despite these side effects, she is willing to continue taking it.  Plan is to treat with a total 7 years of endocrine therapy, as long as she is able to tolerate it.    She is asymptomatic of disease recurrence on history taken today.  Given increased breast density and that her breast cancer was not initially seen on screening mammogram, we are performing high risk breast screening with both annual mammograms and breast MRI, spacing the two studies so that she is having some form of breast imaging once every 6 months.  Breast MRI performed in December was reviewed and was without suspicious enhancement of lymphadenopathy.  - bilateral screening mammograms 6/9/2023 or later.  - Return to clinic in 4 months    2.  Bone health:  DEXA in 12/2022 was reviewed and shows a lowest T-score of -1.9 in the right femoral neck.  Overall, bone density was relatively stable from 2 years prior.  She has been on Zometa for prevention of bone loss on aromatase inhibitor therapy and also prevention of breast cancer bone metastases since 7/29/2021.  Plan to treat for a minimum of 3 years.   - C5 Zometa is due around 7/12/2023    3.  Arthralgias:  Secondary to menopause and exacerbated by endocrine therapy.  Continue daily vitamin D supplementation and daily walking.  We discussed the [employer] study, a study of topical cannabis for treatment of endocrine therapy induced joint pains.  I will check to see if she is eligible, however, enrollment may not include patients on tamoxifen (i.e. may be limited to enrollment of patients on an aromatase inhibitor).  - encouraged ongoing vitamin D 1000 IUs supplement daily as well as routine cardiovascular activity.    4.  Hot flashes:  She declines medical  management at this time.  Will continue natural measures such as fans, dressing in layers, drinking ice water, etc.    5.  Vaginal dryness:  Will continue both hyaluronic suppositories and replens vaginal moisturizer.    6.  Insomnia:  Persistent.  She is taking trazodone a half tab (25 mg) PO at bedtime prn.  She has previously tried melatonin without relief.   Prescription for trazodone was refilled today.    7. Follow Up:    - Please move mammograms scheduled at [facility] on 6/27 to before 6/15 (has to be done between 6/9 and 6/15) - prefers [facility] if possible.  - Labs and Zometa infusion scheduled for 8/7  - Visit with Briana Burgos in 4 months.    35 minutes spent on the date of the encounter doing chart review, review of test results, interpretation of tests, patient visit, documentation and discussion with family

## 2023-05-15 ENCOUNTER — ONCOLOGY VISIT (OUTPATIENT)
Dept: ONCOLOGY | Facility: CLINIC | Age: 58
End: 2023-05-15
Attending: INTERNAL MEDICINE
Payer: COMMERCIAL

## 2023-05-15 VITALS
DIASTOLIC BLOOD PRESSURE: 77 MMHG | WEIGHT: 108.5 LBS | TEMPERATURE: 98.1 F | RESPIRATION RATE: 16 BRPM | SYSTOLIC BLOOD PRESSURE: 116 MMHG | OXYGEN SATURATION: 97 % | HEART RATE: 68 BPM | BODY MASS INDEX: 21.36 KG/M2

## 2023-05-15 DIAGNOSIS — Z17.0 MALIGNANT NEOPLASM OF LOWER-OUTER QUADRANT OF RIGHT BREAST OF FEMALE, ESTROGEN RECEPTOR POSITIVE (H): Primary | ICD-10-CM

## 2023-05-15 DIAGNOSIS — Z12.31 VISIT FOR SCREENING MAMMOGRAM: ICD-10-CM

## 2023-05-15 DIAGNOSIS — C50.511 MALIGNANT NEOPLASM OF LOWER-OUTER QUADRANT OF RIGHT BREAST OF FEMALE, ESTROGEN RECEPTOR POSITIVE (H): Primary | ICD-10-CM

## 2023-05-15 DIAGNOSIS — G47.00 PERSISTENT INSOMNIA: ICD-10-CM

## 2023-05-15 DIAGNOSIS — M25.50 AROMATASE INHIBITOR-ASSOCIATED ARTHRALGIA: ICD-10-CM

## 2023-05-15 DIAGNOSIS — M85.80 OSTEOPENIA, UNSPECIFIED LOCATION: ICD-10-CM

## 2023-05-15 DIAGNOSIS — Z85.3 PERSONAL HISTORY OF MALIGNANT NEOPLASM OF BREAST: ICD-10-CM

## 2023-05-15 DIAGNOSIS — N89.8 VAGINAL DRYNESS: ICD-10-CM

## 2023-05-15 DIAGNOSIS — T45.1X5A AROMATASE INHIBITOR-ASSOCIATED ARTHRALGIA: ICD-10-CM

## 2023-05-15 DIAGNOSIS — R92.30 DENSE BREAST TISSUE ON MAMMOGRAM: ICD-10-CM

## 2023-05-15 PROCEDURE — G0463 HOSPITAL OUTPT CLINIC VISIT: HCPCS | Performed by: INTERNAL MEDICINE

## 2023-05-15 PROCEDURE — 99214 OFFICE O/P EST MOD 30 MIN: CPT | Performed by: INTERNAL MEDICINE

## 2023-05-15 RX ORDER — TRAZODONE HYDROCHLORIDE 50 MG/1
50 TABLET, FILM COATED ORAL AT BEDTIME
Qty: 30 TABLET | Refills: 3 | Status: SHIPPED | OUTPATIENT
Start: 2023-05-15 | End: 2024-02-19

## 2023-05-15 RX ORDER — IBUPROFEN 200 MG
TABLET ORAL
COMMUNITY

## 2023-05-15 RX ORDER — TAMOXIFEN CITRATE 20 MG/1
20 TABLET ORAL DAILY
Qty: 90 TABLET | Refills: 3 | Status: SHIPPED | OUTPATIENT
Start: 2023-05-15 | End: 2024-02-19

## 2023-05-15 ASSESSMENT — PAIN SCALES - GENERAL: PAINLEVEL: NO PAIN (0)

## 2023-05-15 NOTE — NURSING NOTE
"Oncology Rooming Note    May 15, 2023 4:22 PM   Maribel June is a 58 year old female who presents for:    Chief Complaint   Patient presents with     Oncology Clinic Visit     Return- breast cancer     Initial Vitals: /77 (BP Location: Left arm, Patient Position: Sitting, Cuff Size: Adult Regular)   Pulse 68   Temp 98.1  F (36.7  C) (Oral)   Resp 16   Wt 49.2 kg (108 lb 8 oz)   SpO2 97%   BMI 21.36 kg/m   Estimated body mass index is 21.36 kg/m  as calculated from the following:    Height as of 9/12/22: 1.518 m (4' 11.76\").    Weight as of this encounter: 49.2 kg (108 lb 8 oz). Body surface area is 1.44 meters squared.  No Pain (0) Comment: Data Unavailable   No LMP recorded. Patient has had a hysterectomy.  Allergies reviewed: Yes  Medications reviewed: Yes    Medications: MEDICATION REFILLS NEEDED TODAY. Provider was notified.  Pharmacy name entered into [facility]: [id number] - [place], [place] - [address] AT [facility] [phone]    Clinical concerns: The patient needs a refill of Tamoxifen. She is requesting a three month's supply as she is planning to be in and out of the country and wants to make sure she has enough. She is also looking for a refill of Trazodone.      Lucinda Soler            "

## 2023-05-15 NOTE — LETTER
5/15/2023         RE: Maribel June  [address]  [facility] [address]        Dear Colleague,    Thank you for referring your patient, Maribel June, to the [facility]. Please see a copy of my visit note below.      Oncology Visit:  Date on this visit: 5/15/2023    Diagnosis: Stage Ia, H1bA0L4, grade 1, ER positive, OK positive, HER-2 negative invasive carcinoma of the right breast. Oncotype dx recurrence score = 16.    Primary Physician: Juwan Perry     History Of Present Illness:  Ms. June is a 58 year old female with right breast cancer.  Routine screening mammogram on 8/6/2020 showed heterogeneously dense breasts but no concerning findings.  A physician then palpated a mass in the right breast.  Diagnostic mammogram and ultrasound showed a 2.2 cm mass at 8:00, 5 cm from the nipple.  Right breast biopsy showed a grade 1 invasive mammary carcinoma, ER strong in 100%, OK strong in 100%, HER2 negative.  She had a right breast lumpectomy and sentinel lymph node procedure with Dr. Watson on 9/29/2020.  Pathology showed a grade 1 invasive mammary carcinoma measuring 1.6 cm.  There was associated intermediate grade DCIS.  Surgical margins were negative.  A single lymph node was benign.  Oncotype dx recurrence score was 16.  She completed radiation to the right breast, a total of 5256 cGy in 20 fractions, on 12/21/2020.  She started treatment with letrozole in 1/2021.  The medication was poorly tolerated with arthralgias, insomnia, vaginal dryness and fatigue.  Treatment was changed to Tamoxifen in 03/2021.  This caused nausea and so was dose reduced to 10 mg daily in 05/2021. This was slowly increased back to 20 mg daily.  She received a dose of Zometa 7/29/2021 with subsequent fever.      Interval History:  Ms. June comes into clinic today, alongside her spouse, for routine breast cancer follow-up.  She continues on treatment with tamoxifen.  She continues to  have a number of side effects on the medication.  Amongst these, she reports ongoing hot flashes.  She has approximately 2/day.  She dresses in layers and uses fans.  Mood has been stable.  She has vaginal dryness.  She has been using alternating hyaluronic suppositories and Replens vaginal moisturizer.  She feels with this, her symptoms have been well controlled.  She notices significant joint stiffness in her hands and feet especially in the evenings when she has not moved for a while.  During the day, she tends to move more often and it is less of an issue.  She has ongoing fatigue citing significant tiredness at about 2:00 in the afternoon.  Despite the fatigue she is still able to perform all of her daily activities.  School will let out on .  On Cori 15, she will be traveling with a group of students to [place], [facility], and [facility].  A couple days after she returns from that trip, she and her  will be traveling to [place] for approximately a month.  She and her  are building a home [place] of [place].  She denies new bone or joint aches or pains.  She has no cough shortness of breath or chest pain.  She has no current abdominal complaints.    Past Medical/Surgical History:  Past Medical History:   Diagnosis Date    Depressive disorder     Endometriosis     Herpes genitalis in women     History of major depression     situational with first .     Hypertension 2018    Kidney stone     Malignant neoplasm of right breast in female, estrogen receptor positive (H) 2020    S/P radiation therapy     5,256 cGy to right breast completed 2020 - [facility]   - Hyperlipidemia.  - Osteopenia    Past Surgical History:   Procedure Laterality Date    BREAST BIOPSY, RT/LT Right 2020    BREAST SURGERY Right     Right Breast - Benign     SECTION      x1    COLONOSCOPY N/A 2016    Procedure: COMBINED COLONOSCOPY, SINGLE OR MULTIPLE  BIOPSY/POLYPECTOMY BY BIOPSY;  Surgeon: Duane, William Charles, MD;  Location: [facility] OR    COLONOSCOPY WITH CO2 INSUFFLATION N/A 08/16/2016    Procedure: COLONOSCOPY WITH CO2 INSUFFLATION;  Surgeon: Duane, William Charles, MD;  Location: [facility] OR    CYSTOSCOPY  11/13/2009    left stent placement ([facility])    GENITOURINARY SURGERY      surgery for kidney stone    GYN SURGERY      Partial Hysterectomy at age 29    LUMPECTOMY BREAST WITH SENTINEL NODE, COMBINED Right 09/29/2020    Procedure: Right breast lumpectomy with [place] node biopsy;  Surgeon: Jose Watson MD;  [facility]: [facility] OR     Allergies:  Allergies as of 05/15/2023 - Reviewed 03/10/2023   Allergen Reaction Noted    Flagyl [metronidazole] Nausea and Vomiting 07/30/2015    Lisinopril  06/13/2019    Oxycodone-acetaminophen Nausea and Vomiting 06/13/2019    Sulfamethoxazole-trimethoprim  12/29/2011    Zithromax [azithromycin dihydrate] Rash 07/03/2013     Current Medications:  Current Outpatient Medications   Medication Sig Dispense Refill    acyclovir (ZOVIRAX) 400 MG tablet Take 1 tablet (400 mg) by mouth every 8 hours for 5 days 15 tablet 11    amLODIPine (NORVASC) 5 MG tablet Take 0.5 tablets (2.5 mg) by mouth daily      bimatoprost (LUMIGAN) 0.01 % SOLN Place 1 drop into both eyes daily       latanoprost (XALATAN) 0.005 % ophthalmic solution INSTILL 1 DROP IN BOTH EYES EVERY DAY      Multiple Vitamin (MULTIVITAMIN PO) Take by mouth daily      Omega-3 Fatty Acids (OMEGA 3 PO) Take by mouth daily      omeprazole (PRILOSEC) 40 MG DR capsule TAKE 1 CAPSULE BY MOUTH TWICE DAILY BEFORE A MEAL      pantoprazole (PROTONIX) 40 MG EC tablet Take 1 tablet (40 mg) by mouth daily 30-60 min prior to a meal. 30 tablet 0    tamoxifen (NOLVADEX) 20 MG tablet Take 1 tablet (20 mg) by mouth daily 90 tablet 3    traZODone (DESYREL) 50 MG tablet Take 1 tablet (50 mg) by mouth At Bedtime Take 1/2 pill (25 mg) for a few nights, if tolerable OK to take full pill for sleep 30 tablet 3     triamcinolone (KENALOG) 0.1 % external cream Apply topically 2 times daily Apply topically to bites twice daily for one week. (Patient not taking: Reported on 1/13/2023) 60 g 1      Family and Social History:  Please see initial Oncology consultation dated 10/27/2020 for details.  9/25/2020 Breast Actionable Panel was negative.    Physical Exam:  /77 (BP Location: Left arm, Patient Position: Sitting, Cuff Size: Adult Regular)   Pulse 68   Temp 98.1  F (36.7  C) (Oral)   Resp 16   Wt 49.2 kg (108 lb 8 oz)   SpO2 97%   BMI 21.36 kg/m    General:  Well appearing adult female in NAD.  Alert and oriented x 3.  HEENT:  Normocephalic.  Sclera anicteric.  MMM.  No lesions of the oropharynx.  Lymph:  No palpable cervical, supraclavicular, or axillary LAD.  Chest:  CTA bilaterally.  No wheezes or crackles.  CV:  RRR.  Nl S1 and S2.    Breast:  Bilateral breasts are of increased fibroglandular density.  There are no discretely palpable masses in either breast. Lower outer right breast incision with moderate underlying fibrosis.  Right breast is overall tender to palpation.  Bilateral nipples are everted.   Abd:  Soft/ND.   Ext:  No pitting edema of the bilateral lower extremities.   Musculo:  Full ROM of the bilateral upper extremities.    Neuro:  Cranial nerves grossly intact.  Gait stable.  Psych:  Mood and affect appear normal.  Skin:  No visible concerning skin rashes or lesions.    Laboratory/Imaging Studies  12/29/2022 DEXA bone density scan (lowest T-scores):  Lumbar spine = -1.5  Total hip = -1.4  Left femoral neck = -1.8  Right femoral neck = -1.9    12/23/2022 Breast MRI (I personally reviewed these images):  FINDINGS: Posttreatment changes right breast. No suspicious  enhancement in either breast. No lymphadenopathy.                                                             IMPRESSION: BI-RADS CATEGORY: 2 - Benign.    ASSESSMENT/PLAN:  Ms. June is a 59 yo female with a stage Ia, [id number], grade 1,  ER positive, SC positive, HER2 negative invasive ductal carcinoma of the right breast.  She is s/p treatment with right breast lumpectomy and radiation. She did not tolerate adjuvant AI due to arthralgias, insomnia, and fatigue.  On tamoxifen.    1.  Right breast cancer:  She is approximately 2 years, 7.5 months out from excision of a right breast cancer.  She is on treatment with Tamoxifen.  She has joint pains, hot flashes, and vaginal dryness on tamoxifen.  Despite these side effects, she is willing to continue taking it.  Plan is to treat with a total 7 years of endocrine therapy, as long as she is able to tolerate it.    She is asymptomatic of disease recurrence on history taken today.  Given increased breast density and that her breast cancer was not initially seen on screening mammogram, we are performing high risk breast screening with both annual mammograms and breast MRI, spacing the two studies so that she is having some form of breast imaging once every 6 months.  Breast MRI performed in December was reviewed and was without suspicious enhancement of lymphadenopathy.  - bilateral screening mammograms 6/9/2023 or later.  - Return to clinic in 4 months    2.  Bone health:  DEXA in 12/2022 was reviewed and shows a lowest T-score of -1.9 in the right femoral neck.  Overall, bone density was relatively stable from 2 years prior.  She has been on Zometa for prevention of bone loss on aromatase inhibitor therapy and also prevention of breast cancer bone metastases since 7/29/2021.  Plan to treat for a minimum of 3 years.   - C5 Zometa is due around 7/12/2023    3.  Arthralgias:  Secondary to menopause and exacerbated by endocrine therapy.  Continue daily vitamin D supplementation and daily walking.  We discussed the CanAroma study, a study of topical cannabis for treatment of endocrine therapy induced joint pains.  I will check to see if she is eligible, however, enrollment may not include patients on tamoxifen  (i.e. may be limited to enrollment of patients on an aromatase inhibitor).  - encouraged ongoing vitamin D 1000 IUs supplement daily as well as routine cardiovascular activity.    4.  Hot flashes:  She declines medical management at this time.  Will continue natural measures such as fans, dressing in layers, drinking ice water, etc.    5.  Vaginal dryness:  Will continue both hyaluronic suppositories and replens vaginal moisturizer.    6.  Insomnia:  Persistent.  She is taking trazodone a half tab (25 mg) PO at bedtime prn.  She has previously tried melatonin without relief.   Prescription for trazodone was refilled today.    7. Follow Up:    - Please move mammograms scheduled at [facility] on 6/27 to before 6/15 (has to be done between 6/9 and 6/15) - prefers [place] [facility] if possible.  - Labs and Zometa infusion scheduled for 8/7  - Visit with Briana Burgos in 4 months.    35 minutes spent on the date of the encounter doing chart review, review of test results, interpretation of tests, patient visit, documentation and discussion with family               Again, thank you for allowing me to participate in the care of your patient.        Sincerely,        Saige Eli MD

## 2023-06-27 ENCOUNTER — ANCILLARY PROCEDURE (OUTPATIENT)
Dept: MAMMOGRAPHY | Facility: CLINIC | Age: 58
End: 2023-06-27
Payer: COMMERCIAL

## 2023-06-27 DIAGNOSIS — Z17.0 MALIGNANT NEOPLASM OF LOWER-OUTER QUADRANT OF RIGHT BREAST OF FEMALE, ESTROGEN RECEPTOR POSITIVE (H): ICD-10-CM

## 2023-06-27 DIAGNOSIS — C50.511 MALIGNANT NEOPLASM OF LOWER-OUTER QUADRANT OF RIGHT BREAST OF FEMALE, ESTROGEN RECEPTOR POSITIVE (H): ICD-10-CM

## 2023-06-27 PROCEDURE — 77067 SCR MAMMO BI INCL CAD: CPT | Mod: GC | Performed by: RADIOLOGY

## 2023-06-27 PROCEDURE — 77063 BREAST TOMOSYNTHESIS BI: CPT | Mod: GC | Performed by: RADIOLOGY

## 2023-08-04 ENCOUNTER — OFFICE VISIT (OUTPATIENT)
Dept: OBGYN | Facility: CLINIC | Age: 58
End: 2023-08-04
Payer: COMMERCIAL

## 2023-08-04 VITALS
DIASTOLIC BLOOD PRESSURE: 73 MMHG | BODY MASS INDEX: 21.26 KG/M2 | OXYGEN SATURATION: 97 % | WEIGHT: 108 LBS | SYSTOLIC BLOOD PRESSURE: 125 MMHG | HEART RATE: 57 BPM

## 2023-08-04 DIAGNOSIS — Z00.00 ANNUAL PHYSICAL EXAM: Primary | ICD-10-CM

## 2023-08-04 PROCEDURE — 99396 PREV VISIT EST AGE 40-64: CPT | Performed by: OBSTETRICS & GYNECOLOGY

## 2023-08-04 NOTE — PROGRESS NOTES
Maribel is a 58 year old female, , who is here for her annual exam.  She underwent a hysterectomy (still retains both ovaries) at age 29 due to benign endometriosis so no longer needs a pap smear collection.  Her medical hx is significant for breast cancer of her right breast which is estrogen receptor + so she is currently taking Tamoxifen.  She recalls that she has 2 1/2 years more of treatment but dislikes the side effect of vaginal dryness.  She switched from HALO to Replens but would like to try something stronger since she still experiences vaginal dryness.  She uses KY lubricant with intercourse but still has vaginal pain.  She will check with her oncologist to determine if she would be a candidate for Osphena daily po.  She is taking calcium replacement therapy due to her hx of osteopenia per recent DEXA scan on 2022.  She will return for needed labwork since she is not in a fasting state this morning.      ROS: Ten point review of systems was reviewed and negative except the above.    Health Maintenance   Topic Date Due    HEPATITIS B IMMUNIZATION (1 of 3 - 3-dose series) Never done    Pneumococcal Vaccine: Pediatrics (0 to 5 Years) and At-Risk Patients (6 to 64 Years) (1 - PCV) Never done    ZOSTER IMMUNIZATION (1 of 2) Never done    HEPATITIS C SCREENING  2023 (Originally 5/3/1984)    HIV SCREENING  2023 (Originally 5/3/1981)    INFLUENZA VACCINE (1) 2023    YEARLY PREVENTIVE VISIT  2023    LIPID  10/20/2023    MAMMO SCREENING  2024    COLORECTAL CANCER SCREENING  2026    ADVANCE CARE PLANNING  2027    DTAP/TDAP/TD IMMUNIZATION (3 - Td or Tdap) 2030    PHQ-2 (once per calendar year)  Completed    COVID-19 Vaccine  Completed    IPV IMMUNIZATION  Aged Out    MENINGITIS IMMUNIZATION  Aged Out    PAP  Discontinued      Last pap: not due since she is s/p hysterectomy for benign disease at age 29  Last Mammogram: not due  Last Dexa: due in  2027  Last Colonoscopy: due in  (every 5 years per GI)    Lab Results   Component Value Date    CHOL 239 10/20/2022    CHOL 266 2020     Lab Results   Component Value Date    HDL 70 10/20/2022    HDL 72 2020     Lab Results   Component Value Date     10/20/2022     2020     Lab Results   Component Value Date    TRIG 162 10/20/2022    TRIG 205 2020     Lab Results   Component Value Date    CHOLHDLRATIO 2.9 2013     OBHX:      PSH:   Past Surgical History:   Procedure Laterality Date    BREAST BIOPSY, RT/LT Right 2020    BREAST SURGERY Right     Right Breast - Benign     SECTION      x1    COLONOSCOPY N/A 2016    Procedure: COMBINED COLONOSCOPY, SINGLE OR MULTIPLE BIOPSY/POLYPECTOMY BY BIOPSY;  Surgeon: Duane, William Charles, MD;  Location: [facility] OR    COLONOSCOPY WITH CO2 INSUFFLATION N/A 2016    Procedure: COLONOSCOPY WITH CO2 INSUFFLATION;  Surgeon: Duane, William Charles, MD;  Location: [facility] OR    CYSTOSCOPY  2009    left stent placement (C-ARM)    GENITOURINARY SURGERY      surgery for kidney stone    GYN SURGERY      Partial Hysterectomy at age 29    LUMPECTOMY BREAST WITH SENTINEL NODE, COMBINED Right 2020    Procedure: Right breast lumpectomy with [place] node biopsy;  Surgeon: Jose Watson MD;  Location:  OR     [facility]: Her past medical, surgical, and obstetric histories were reviewed and are documented in their appropriate chart areas.    ALL/Meds: Her medication and allergy histories were reviewed and are documented in their appropriate chart areas.    SH/FMH: Her social and family history was reviewed and documented in its appropriate chart area.    PE: /73 (BP Location: Right arm, Patient Position: Chair, Cuff Size: Adult Regular)   Pulse 57   Wt 49 kg (108 lb)   SpO2 97%   Breastfeeding No   BMI 21.26 kg/m    Body mass index is 21.26 kg/m .    General Appearance:  healthy, alert, active, no  distress  Cardiovascular:  Regular rate and Rhythm without murmur  Neck: Supple, no adenopathy, and thyroid normal  Lungs:  Clear, without wheeze, rale or rhonchi  Breast: Scars involving right breast consistent with previous surgery, no palpable mass or nipple discharge bilaterally  Abdomen: Benign, Soft, flat, non-tender, No masses, organomegaly, No inguinal nodes, and Bowel sounds normoactive.   Pelvic:       - Ext: Vulva and perineum are normal without lesion, mass or discharge        - Urethra: normal without discharge        - Urethral Meatus: normal appearance       - Bladder: no tenderness, no masses       - Vagina: Normal mucosa, no discharge        - Cervix: Surgically absent       - Uterus: Surgically absent        - Adnexa: Non palpable       - Rectal: deferred    A/P:  Well Woman Exam, Hx of Right Breast Cancer, Symptomatic Vaginal Dryness, and Osteopenia     -  I discussed the new pap recommendations regarding screening.  Explained the rationale for increased intervals between paps.  Questions asked and answered.  She does agree to this regiment.  Pap was not collected since her cervix has been surgically removed for benign pathology   -  BC: postmenopausal and s/p hysterectomy   -  Future orders placed for needed labwork - glucose and lipid profile (outside physician has already placed future orders for a TSH and vitamin D level)   -  Next DEXA due in 2027   -  Next colonoscopy due in 2028   -  Patient will check with oncology to see if she would be a candidate for daily po Osphena treatment, given her hx of breast cancer.   -  Encouraged self-breast exam   -  Encouraged low fat diet, regular exercise, and adequate calcium intake.    Laurita Damon,   FACOG, FACS

## 2023-08-04 NOTE — PATIENT INSTRUCTIONS
If you have any questions regarding your visit, Please contact your care team.    [employer] [facility]: [phone]    To Schedule an Appointment 24/7  Call: [phone] TELEPHONE NUM[facility]   DO. Cassidy Solis -Surgery Scheduler  Shira - Surgery Scheduler    DONALD Amador, DONALD Reynoso, DONALD   [place]  Wednesday and Friday  8:30 a.m-5:00 p.m  [place]-Temporary  Monday 8:30 a.m-5:00 p.m  Typical Surgery day:  Tuesday [facility]  [address] [place] [address] [phone] Phone  [phone] Fax    Imaging Scheduling-All Locations [phone]    [facility]  [address] [place] [address]     Urgent Care locations:  [facility] Monday-Friday   10 am - 8 pm  Saturday and Sunday   9 am - 5 pm [phone]   **Surgeries** Our Surgery Schedulers will contact you to schedule. If you do not receive a call within 3 business days, please call [phone].    [facility] Labor and Delivery:  [phone]    If you need a medication refill, please contact your pharmacy. Please allow 3 business days for your refill to be completed.  As always, Thank you for trusting us with your healthcare needs!  see additional instructions from your care team below

## 2023-08-06 NOTE — PROGRESS NOTES
Oncology Visit:  Date on this visit: 8/7/2023    Diagnosis: Stage Ia, [id number], grade 1, ER positive, NH positive, HER-2 negative invasive carcinoma of the right breast. Oncotype dx recurrence score = 16.    Primary Physician: Juwan Perry     History Of Present Illness:  Ms. June is a 58 year old female with right breast cancer.  Routine screening mammogram on 8/6/2020 showed heterogeneously dense breasts but no concerning findings.  A physician then palpated a mass in the right breast.  Diagnostic mammogram and ultrasound showed a 2.2 cm mass at 8:00, 5 cm from the nipple.  Right breast biopsy showed a grade 1 invasive mammary carcinoma, ER strong in 100%, NH strong in 100%, HER2 negative.  She had a right breast lumpectomy and sentinel lymph node procedure with Dr. Watson on 9/29/2020.  Pathology showed a grade 1 invasive mammary carcinoma measuring 1.6 cm.  There was associated intermediate grade DCIS.  Surgical margins were negative.  A single lymph node was benign.  Oncotype dx recurrence score was 16.  She completed radiation to the right breast, a total of 5256 cGy in 20 fractions, on 12/21/2020.  She started treatment with letrozole in 1/2021.  The medication was poorly tolerated with arthralgias, insomnia, vaginal dryness and fatigue.  Treatment was changed to Tamoxifen in 03/2021.  This caused nausea and so was dose reduced to 10 mg daily in 05/2021. This was slowly increased back to 20 mg daily.  On Zometa since 7/29/2021.      Interval History:  Ms. June presents to clinic alongside a friend for routine breast cancer follow up.  She continues on treatment with tamoxifen.  Crispin states that she has been doing well since her last appointment. She still is experiencing several side effects from tamoxifen, but states that she has been managing them. States that she has a lot of fatigue, and usually needs to nap once a day in the afternoon. Joint pain is largely manageable. She still has vaginal  dryness. She has been using Replens for vaginal dryness which seems to help. Recently went to OB/GYN and was recommended possibly using Osphena for vaginal dryness. Following up with her ophthalmologist soon for hx of glaucoma.     Denies new abdominal pain, bone pain, or headaches.     Past Medical/Surgical History:  Past Medical History:   Diagnosis Date     Depressive disorder      Endometriosis      Herpes genitalis in women      History of major depression     situational with first .      Hypertension 2018     Kidney stone      Malignant neoplasm of right breast in female, estrogen receptor positive (H) 2020     S/P radiation therapy     5,256 cGy to right breast completed 2020 [facility]   - Hyperlipidemia.  - Osteopenia    Past Surgical History:   Procedure Laterality Date     BREAST BIOPSY, RT/LT Right 2020     BREAST SURGERY Right     Right Breast - Benign      SECTION      x1     COLONOSCOPY N/A 2016    Procedure: COMBINED COLONOSCOPY, SINGLE OR MULTIPLE BIOPSY/POLYPECTOMY BY BIOPSY;  Surgeon: Duane, William Charles, MD;  Location: [facility] OR     COLONOSCOPY WITH CO2 INSUFFLATION N/A 2016    Procedure: COLONOSCOPY WITH CO2 INSUFFLATION;  Surgeon: Duane, William Charles, MD;  Location: [facility] OR     CYSTOSCOPY  2009    left stent placement (C-ARM)     GENITOURINARY SURGERY      surgery for kidney stone     GYN SURGERY      Partial Hysterectomy at age 29     LUMPECTOMY BREAST WITH SENTINEL NODE, COMBINED Right 2020    Procedure: Right breast lumpectomy with [place] node biopsy;  Surgeon: Jose Watson MD;  Location: [facility] OR     Allergies:  Allergies as of 2023 - Reviewed 2023   Allergen Reaction Noted     Flagyl [metronidazole] Nausea and Vomiting 2015     Lisinopril  2019     Oxycodone-acetaminophen Nausea and Vomiting 2019     Sulfamethoxazole-trimethoprim  2011     Zithromax [azithromycin  dihydrate] Rash 07/03/2013     Current Medications:  Current Outpatient Medications   Medication Sig Dispense Refill     acyclovir (ZOVIRAX) 400 MG tablet Take 1 tablet (400 mg) by mouth every 8 hours for 5 days 15 tablet 11     amLODIPine (NORVASC) 5 MG tablet Take 0.5 tablets (2.5 mg) by mouth daily       bimatoprost (LUMIGAN) 0.01 % SOLN Place 1 drop into both eyes daily  (Patient not taking: Reported on 5/15/2023)       ibuprofen (ADVIL/MOTRIN) 200 MG tablet 3 tablets with food or milk as needed Orally every 6 hrs       latanoprost (XALATAN) 0.005 % ophthalmic solution INSTILL 1 DROP IN BOTH EYES EVERY DAY       Multiple Vitamin (MULTIVITAMIN PO) Take by mouth daily       Omega-3 Fatty Acids (OMEGA 3 PO) Take by mouth daily       pantoprazole (PROTONIX) 40 MG EC tablet Take 1 tablet (40 mg) by mouth daily 30-60 min prior to a meal. 30 tablet 0     tamoxifen (NOLVADEX) 20 MG tablet Take 1 tablet (20 mg) by mouth daily 90 tablet 3     traZODone (DESYREL) 50 MG tablet Take 1 tablet (50 mg) by mouth At Bedtime Take 1/2 pill (25 mg) for a few nights, if tolerable OK to take full pill for sleep 30 tablet 3     triamcinolone (KENALOG) 0.1 % external cream Apply topically 2 times daily Apply topically to bites twice daily for one week. (Patient not taking: Reported on 5/15/2023) 60 g 1     vitamin B-12 (CYANOCOBALAMIN) 1000 MCG tablet 1 tablet Orally Once a day        Family and Social History:  Please see initial Oncology consultation dated 10/27/2020 for details.  9/25/2020 Breast Actionable Panel was negative.    Physical Exam:  BP 99/63 (BP Location: Left arm, Patient Position: Sitting, Cuff Size: Adult Regular)   Pulse 59   Temp 97.8  F (36.6  C) (Oral)   Resp 18   Wt 49.3 kg (108 lb 11.2 oz)   SpO2 97%   BMI 21.40 kg/m    General:  Well appearing adult female in NAD.  Alert and oriented x 3.  HEENT:  Normocephalic.  Sclera anicteric.  MMM.    Lymph:  No palpable cervical, supraclavicular, or axillary  LAD.  Chest:  CTA bilaterally.  No wheezes or crackles.  CV:  RRR.  Nl S1 and S2.    Breast:  Bilateral breasts are of increased fibroglandular density.  There are no discretely palpable masses in either breast. Lower outer right breast incision with moderate underlying fibrosis. Mild tenderness to palpation of left breast, with increased tenderness around incision  Right breast is overall tender to palpation.  Bilateral nipples are everted and without discharge.  Abd:  Soft/ND.   Ext:  No edema of the bilateral lower extremities.   Musculo:  Full ROM of the bilateral upper extremities.    Neuro:  Cranial nerves grossly intact.  Gait stable.  Psych:  Mood and affect appear normal.  Skin:  No visible concerning skin rashes or lesions.    Laboratory/Imaging Studies  I personally reviewed the below images and laboratory studies:    6/27/2023 Bilateral screening mammograms:  Findings: The breasts are heterogeneously dense, which may obscure small masses.  There are breast conservation changes in the right breast.  There is no radiographic evidence of malignancy.                                                                    IMPRESSION: ACR BI-RADS Category 2: Benign    8/7/2023 Labs:  Creatinine is wnl.  Calcium is wnl.  Albumin is wnl.    ASSESSMENT/PLAN:  Ms. June is a 59 yo female with a stage Ia, [id number], grade 1, ER positive, IA positive, HER2 negative invasive ductal carcinoma of the right breast.  She is s/p treatment with right breast lumpectomy and radiation. She did not tolerate adjuvant AI due to arthralgias, insomnia, and fatigue.  On tamoxifen.    1.  Right breast cancer:  She is approximately 2 years, 10 months out from excision of a right breast cancer.  She is on treatment with Tamoxifen 20 mg daily.  She has joint pains, hot flashes, and vaginal dryness on tamoxifen.  Despite these side effects, she is willing to continue taking it.  Plan is to complete a total 7 years of endocrine therapy, as  long as she is able to tolerate it.    She is asymptomatic of disease recurrence on history taken today.  Given increased breast density and that her breast cancer was not initially seen on screening mammogram, we are performing high risk breast screening with both annual mammograms and breast MRI, spacing the two studies so that she is having some form of breast imaging once every 6 months.    - I personally reviewed the images of bilateral screening mammograms performed 6/27/2023 which are without concerning findings.  - high risk screening breast MRI in 12/2023  - Return to clinic in 6 months  - Continue tamoxifen therapy      2.  Bone health:  DEXA in 12/2022 with a lowest T-score of -1.9 in the right femoral neck.  She has been on Zometa for prevention of bone loss on aromatase inhibitor therapy and also prevention of breast cancer bone metastases since 7/29/2021.  Plan to treat for a minimum of 3 years.   - C5 Zometa today  - Final planned cycle of Zometa to be administered around the time of her return visit in 6 months.    3.  Arthralgias:  Secondary to menopause and exacerbated by endocrine therapy.  Continue daily vitamin D supplementation and daily walking.    - encouraged ongoing vitamin D 1000 IUs supplement daily as well as routine cardiovascular activity.    4.  Hot flashes:  She declines medical management at this time.  Will continue natural measures such as fans, dressing in layers, drinking ice water, etc.    5.  Vaginal dryness:  Will continue both hyaluronic suppositories and replens vaginal moisturizer.  - Discussed use of vagifem 10 mcg intravaginal estradiol tabs inserted twice weekly.  Reviewed data shows that use this wa y does not increase serum estradiol levels and is therefore safe to use in patients with a h/o hormone sensitive breast cancer.  - Discussed that osphenais a SERM, similar to tamoxifen.  Osphena reportedly does not cause vaginal dryness and in fact may treatment vaginal  dryness, however, it  has not been studied in patients undergoing active breast cancer treatment.  It is therefore not recommend for treatment of vaginal dryness in women with a h/o hormone sensitive breast cancer still on endocrine therapy, but can be considered for use upon completion of endocrine therapy.    6.  Insomnia:  Persistent.    - Continue trazodone a half tab (25 mg) PO at bedtime prn, which has been helping with insomnia     7. Fatigue   - Persistent fatigue, but has slowly been getting better over time   - Continue to monitor fatigue levels over time   - Ongoing routine cardiovascular exercise    8. Follow Up:    - Breast MRI in 12/2023  - Labs, visit with me, and Zometa infusion in 6 months.    Patient was seen and discussed with 4th year medical student, Ciara Scott.  The medical student was personally supervised by me during the patient examination. I personally verified the medical history, performed a physical exam and the medical decision-making. I made appropriate changes to the documentation and the assessment and plan based on my verification, exam, and medical decision making.    I spent 35 minutes on the date of the encounter doing chart review, review of test results, interpretation of tests, patient visit and documentation

## 2023-08-07 ENCOUNTER — LAB (OUTPATIENT)
Dept: LAB | Facility: CLINIC | Age: 58
End: 2023-08-07
Attending: INTERNAL MEDICINE
Payer: COMMERCIAL

## 2023-08-07 ENCOUNTER — ONCOLOGY VISIT (OUTPATIENT)
Dept: ONCOLOGY | Facility: CLINIC | Age: 58
End: 2023-08-07
Attending: INTERNAL MEDICINE
Payer: COMMERCIAL

## 2023-08-07 VITALS
WEIGHT: 108.7 LBS | TEMPERATURE: 97.8 F | HEART RATE: 59 BPM | RESPIRATION RATE: 18 BRPM | OXYGEN SATURATION: 97 % | BODY MASS INDEX: 21.4 KG/M2 | SYSTOLIC BLOOD PRESSURE: 99 MMHG | DIASTOLIC BLOOD PRESSURE: 63 MMHG

## 2023-08-07 DIAGNOSIS — M85.80 OSTEOPENIA, UNSPECIFIED LOCATION: ICD-10-CM

## 2023-08-07 DIAGNOSIS — M85.80 OSTEOPENIA, UNSPECIFIED LOCATION: Primary | ICD-10-CM

## 2023-08-07 DIAGNOSIS — R53.83 OTHER FATIGUE: ICD-10-CM

## 2023-08-07 DIAGNOSIS — Z85.3 PERSONAL HISTORY OF MALIGNANT NEOPLASM OF BREAST: ICD-10-CM

## 2023-08-07 DIAGNOSIS — C50.511 MALIGNANT NEOPLASM OF LOWER-OUTER QUADRANT OF RIGHT BREAST OF FEMALE, ESTROGEN RECEPTOR POSITIVE (H): ICD-10-CM

## 2023-08-07 DIAGNOSIS — C50.511 MALIGNANT NEOPLASM OF LOWER-OUTER QUADRANT OF RIGHT BREAST OF FEMALE, ESTROGEN RECEPTOR POSITIVE (H): Primary | ICD-10-CM

## 2023-08-07 DIAGNOSIS — Z79.810 LONG-TERM CURRENT USE OF TAMOXIFEN: ICD-10-CM

## 2023-08-07 DIAGNOSIS — Z17.0 MALIGNANT NEOPLASM OF LOWER-OUTER QUADRANT OF RIGHT BREAST OF FEMALE, ESTROGEN RECEPTOR POSITIVE (H): Primary | ICD-10-CM

## 2023-08-07 DIAGNOSIS — N95.2 VAGINAL ATROPHY: ICD-10-CM

## 2023-08-07 DIAGNOSIS — Z12.39 BREAST CANCER SCREENING, HIGH RISK PATIENT: ICD-10-CM

## 2023-08-07 DIAGNOSIS — Z17.0 MALIGNANT NEOPLASM OF LOWER-OUTER QUADRANT OF RIGHT BREAST OF FEMALE, ESTROGEN RECEPTOR POSITIVE (H): ICD-10-CM

## 2023-08-07 DIAGNOSIS — R92.30 DENSE BREAST TISSUE ON MAMMOGRAM: ICD-10-CM

## 2023-08-07 LAB
ALBUMIN SERPL BCG-MCNC: 4.8 G/DL (ref 3.5–5.2)
CALCIUM SERPL-MCNC: 9.1 MG/DL (ref 8.6–10)
CREAT SERPL-MCNC: 0.62 MG/DL (ref 0.51–0.95)
DEPRECATED CALCIDIOL+CALCIFEROL SERPL-MC: 43 UG/L (ref 20–75)
GFR SERPL CREATININE-BSD FRML MDRD: >90 ML/MIN/1.73M2
TSH SERPL DL<=0.005 MIU/L-ACNC: 2.29 UIU/ML (ref 0.3–4.2)

## 2023-08-07 PROCEDURE — 82565 ASSAY OF CREATININE: CPT | Performed by: INTERNAL MEDICINE

## 2023-08-07 PROCEDURE — 258N000003 HC RX IP 258 OP 636: Performed by: INTERNAL MEDICINE

## 2023-08-07 PROCEDURE — 82310 ASSAY OF CALCIUM: CPT | Performed by: INTERNAL MEDICINE

## 2023-08-07 PROCEDURE — 36415 COLL VENOUS BLD VENIPUNCTURE: CPT | Performed by: INTERNAL MEDICINE

## 2023-08-07 PROCEDURE — G0463 HOSPITAL OUTPT CLINIC VISIT: HCPCS | Mod: 25 | Performed by: INTERNAL MEDICINE

## 2023-08-07 PROCEDURE — 999N000127 HC STATISTIC PERIPHERAL IV START W US GUIDANCE

## 2023-08-07 PROCEDURE — 96365 THER/PROPH/DIAG IV INF INIT: CPT

## 2023-08-07 PROCEDURE — 250N000011 HC RX IP 250 OP 636: Mod: JZ | Performed by: INTERNAL MEDICINE

## 2023-08-07 PROCEDURE — 82306 VITAMIN D 25 HYDROXY: CPT | Performed by: INTERNAL MEDICINE

## 2023-08-07 PROCEDURE — 999N000285 HC STATISTIC VASC ACCESS LAB DRAW WITH PIV START

## 2023-08-07 PROCEDURE — 82040 ASSAY OF SERUM ALBUMIN: CPT | Performed by: INTERNAL MEDICINE

## 2023-08-07 PROCEDURE — 99214 OFFICE O/P EST MOD 30 MIN: CPT | Performed by: INTERNAL MEDICINE

## 2023-08-07 PROCEDURE — 84443 ASSAY THYROID STIM HORMONE: CPT | Performed by: INTERNAL MEDICINE

## 2023-08-07 RX ORDER — ZOLEDRONIC ACID 0.04 MG/ML
4 INJECTION, SOLUTION INTRAVENOUS ONCE
Status: COMPLETED | OUTPATIENT
Start: 2023-08-07 | End: 2023-08-07

## 2023-08-07 RX ADMIN — SODIUM CHLORIDE 250 ML: 9 INJECTION, SOLUTION INTRAVENOUS at 12:11

## 2023-08-07 RX ADMIN — ZOLEDRONIC ACID 4 MG: 0.04 INJECTION, SOLUTION INTRAVENOUS at 12:11

## 2023-08-07 ASSESSMENT — PAIN SCALES - GENERAL: PAINLEVEL: NO PAIN (0)

## 2023-08-07 NOTE — LETTER
8/7/2023         RE: Maribel June  [address]  [facility] [address]        Dear Colleague,    Thank you for referring your patient, Maribel June, to the [facility]. Please see a copy of my visit note below.      Oncology Visit:  Date on this visit: 8/7/2023    Diagnosis: Stage Ia, [id number], grade 1, ER positive, MN positive, HER-2 negative invasive carcinoma of the right breast. Oncotype dx recurrence score = 16.    Primary Physician: Juwan Perry     History Of Present Illness:  Ms. June is a 58 year old female with right breast cancer.  Routine screening mammogram on 8/6/2020 showed heterogeneously dense breasts but no concerning findings.  A physician then palpated a mass in the right breast.  Diagnostic mammogram and ultrasound showed a 2.2 cm mass at 8:00, 5 cm from the nipple.  Right breast biopsy showed a grade 1 invasive mammary carcinoma, ER strong in 100%, MN strong in 100%, HER2 negative.  She had a right breast lumpectomy and sentinel lymph node procedure with Dr. Watson on 9/29/2020.  Pathology showed a grade 1 invasive mammary carcinoma measuring 1.6 cm.  There was associated intermediate grade DCIS.  Surgical margins were negative.  A single lymph node was benign.  Oncotype dx recurrence score was 16.  She completed radiation to the right breast, a total of 5256 cGy in 20 fractions, on 12/21/2020.  She started treatment with letrozole in 1/2021.  The medication was poorly tolerated with arthralgias, insomnia, vaginal dryness and fatigue.  Treatment was changed to Tamoxifen in 03/2021.  This caused nausea and so was dose reduced to 10 mg daily in 05/2021. This was slowly increased back to 20 mg daily.  On Zometa since 7/29/2021.      Interval History:  Ms. June presents to clinic alongside a friend for routine breast cancer follow up.  She continues on treatment with tamoxifen.  Crispin states that she has been doing well since her last  appointment. She still is experiencing several side effects from tamoxifen, but states that she has been managing them. States that she has a lot of fatigue, and usually needs to nap once a day in the afternoon. Joint pain is largely manageable. She still has vaginal dryness. She has been using Replens for vaginal dryness which seems to help. Recently went to OB/GYN and was recommended possibly using Osphena for vaginal dryness. Following up with her ophthalmologist soon for hx of glaucoma.     Denies new abdominal pain, bone pain, or headaches.     Past Medical/Surgical History:  Past Medical History:   Diagnosis Date    Depressive disorder     Endometriosis     Herpes genitalis in women     History of major depression     situational with first .     Hypertension 2018    Kidney stone     Malignant neoplasm of right breast in female, estrogen receptor positive (H) 2020    S/P radiation therapy     5,256 cGy to right breast completed 2020 - [facility]   - Hyperlipidemia.  - Osteopenia    Past Surgical History:   Procedure Laterality Date    BREAST BIOPSY, RT/LT Right 2020    BREAST SURGERY Right     Right Breast - Benign     SECTION      x1    COLONOSCOPY N/A 2016    Procedure: COMBINED COLONOSCOPY, SINGLE OR MULTIPLE BIOPSY/POLYPECTOMY BY BIOPSY;  Surgeon: Duane, William Charles, MD;  Location: [facility] OR    COLONOSCOPY WITH CO2 INSUFFLATION N/A 2016    Procedure: COLONOSCOPY WITH CO2 INSUFFLATION;  Surgeon: Duane, William Charles, MD;  Location: [facility] OR    CYSTOSCOPY  2009    left stent placement (C-ARM)    GENITOURINARY SURGERY      surgery for kidney stone    GYN SURGERY      Partial Hysterectomy at age 29    LUMPECTOMY BREAST WITH SENTINEL NODE, COMBINED Right 2020    Procedure: Right breast lumpectomy with [place] node biopsy;  Surgeon: Jose Watson MD;  Location: [facility] OR     Allergies:  Allergies as of 2023 - Reviewed  08/04/2023   Allergen Reaction Noted    Flagyl [metronidazole] Nausea and Vomiting 07/30/2015    Lisinopril  06/13/2019    Oxycodone-acetaminophen Nausea and Vomiting 06/13/2019    Sulfamethoxazole-trimethoprim  12/29/2011    Zithromax [azithromycin dihydrate] Rash 07/03/2013     Current Medications:  Current Outpatient Medications   Medication Sig Dispense Refill    acyclovir (ZOVIRAX) 400 MG tablet Take 1 tablet (400 mg) by mouth every 8 hours for 5 days 15 tablet 11    amLODIPine (NORVASC) 5 MG tablet Take 0.5 tablets (2.5 mg) by mouth daily      bimatoprost (LUMIGAN) 0.01 % SOLN Place 1 drop into both eyes daily  (Patient not taking: Reported on 5/15/2023)      ibuprofen (ADVIL/MOTRIN) 200 MG tablet 3 tablets with food or milk as needed Orally every 6 hrs      latanoprost (XALATAN) 0.005 % ophthalmic solution INSTILL 1 DROP IN BOTH EYES EVERY DAY      Multiple Vitamin (MULTIVITAMIN PO) Take by mouth daily      Omega-3 Fatty Acids (OMEGA 3 PO) Take by mouth daily      pantoprazole (PROTONIX) 40 MG EC tablet Take 1 tablet (40 mg) by mouth daily 30-60 min prior to a meal. 30 tablet 0    tamoxifen (NOLVADEX) 20 MG tablet Take 1 tablet (20 mg) by mouth daily 90 tablet 3    traZODone (DESYREL) 50 MG tablet Take 1 tablet (50 mg) by mouth At Bedtime Take 1/2 pill (25 mg) for a few nights, if tolerable OK to take full pill for sleep 30 tablet 3    triamcinolone (KENALOG) 0.1 % external cream Apply topically 2 times daily Apply topically to bites twice daily for one week. (Patient not taking: Reported on 5/15/2023) 60 g 1    vitamin B-12 (CYANOCOBALAMIN) 1000 MCG tablet 1 tablet Orally Once a day        Family and Social History:  Please see initial Oncology consultation dated 10/27/2020 for details.  9/25/2020 Breast Actionable Panel was negative.    Physical Exam:  BP 99/63 (BP Location: Left arm, Patient Position: Sitting, Cuff Size: Adult Regular)   Pulse 59   Temp 97.8  F (36.6  C) (Oral)   Resp 18   Wt 49.3 kg  (108 lb 11.2 oz)   SpO2 97%   BMI 21.40 kg/m    General:  Well appearing adult female in NAD.  Alert and oriented x 3.  HEENT:  Normocephalic.  Sclera anicteric.  MMM.    Lymph:  No palpable cervical, supraclavicular, or axillary LAD.  Chest:  CTA bilaterally.  No wheezes or crackles.  CV:  RRR.  Nl S1 and S2.    Breast:  Bilateral breasts are of increased fibroglandular density.  There are no discretely palpable masses in either breast. Lower outer right breast incision with moderate underlying fibrosis. Mild tenderness to palpation of left breast, with increased tenderness around incision  Right breast is overall tender to palpation.  Bilateral nipples are everted and without discharge.  Abd:  Soft/ND.   Ext:  No edema of the bilateral lower extremities.   Musculo:  Full ROM of the bilateral upper extremities.    Neuro:  Cranial nerves grossly intact.  Gait stable.  Psych:  Mood and affect appear normal.  Skin:  No visible concerning skin rashes or lesions.    Laboratory/Imaging Studies  I personally reviewed the below images and laboratory studies:    6/27/2023 Bilateral screening mammograms:  Findings: The breasts are heterogeneously dense, which may obscure small masses.  There are breast conservation changes in the right breast.  There is no radiographic evidence of malignancy.                                                                    IMPRESSION: ACR BI-RADS Category 2: Benign    8/7/2023 Labs:  Creatinine is wnl.  Calcium is wnl.  Albumin is wnl.    ASSESSMENT/PLAN:  Ms. June is a 57 yo female with a stage Ia, [id number], grade 1, ER positive, [place] positive, HER2 negative invasive ductal carcinoma of the right breast.  She is s/p treatment with right breast lumpectomy and radiation. She did not tolerate adjuvant AI due to arthralgias, insomnia, and fatigue.  On tamoxifen.    1.  Right breast cancer:  She is approximately 2 years, 10 months out from excision of a right breast cancer.  She is on  treatment with Tamoxifen 20 mg daily.  She has joint pains, hot flashes, and vaginal dryness on tamoxifen.  Despite these side effects, she is willing to continue taking it.  Plan is to complete a total 7 years of endocrine therapy, as long as she is able to tolerate it.    She is asymptomatic of disease recurrence on history taken today.  Given increased breast density and that her breast cancer was not initially seen on screening mammogram, we are performing high risk breast screening with both annual mammograms and breast MRI, spacing the two studies so that she is having some form of breast imaging once every 6 months.    - I personally reviewed the images of bilateral screening mammograms performed 6/27/2023 which are without concerning findings.  - high risk screening breast MRI in 12/2023  - Return to clinic in 6 months  - Continue tamoxifen therapy      2.  Bone health:  DEXA in 12/2022 with a lowest T-score of -1.9 in the right femoral neck.  She has been on Zometa for prevention of bone loss on aromatase inhibitor therapy and also prevention of breast cancer bone metastases since 7/29/2021.  Plan to treat for a minimum of 3 years.   - C5 Zometa today  - Final planned cycle of Zometa to be administered around the time of her return visit in 6 months.    3.  Arthralgias:  Secondary to menopause and exacerbated by endocrine therapy.  Continue daily vitamin D supplementation and daily walking.    - encouraged ongoing vitamin D 1000 IUs supplement daily as well as routine cardiovascular activity.    4.  Hot flashes:  She declines medical management at this time.  Will continue natural measures such as fans, dressing in layers, drinking ice water, etc.    5.  Vaginal dryness:  Will continue both hyaluronic suppositories and replens vaginal moisturizer.  - Discussed use of vagifem 10 mcg intravaginal estradiol tabs inserted twice weekly.  Reviewed data shows that use this wa y does not increase serum estradiol  levels and is therefore safe to use in patients with a h/o hormone sensitive breast cancer.  - Discussed that osphenais a SERM, similar to tamoxifen.  Osphena reportedly does not cause vaginal dryness and in fact may treatment vaginal dryness, however, it  has not been studied in patients undergoing active breast cancer treatment.  It is therefore not recommend for treatment of vaginal dryness in women with a h/o hormone sensitive breast cancer still on endocrine therapy, but can be considered for use upon completion of endocrine therapy.    6.  Insomnia:  Persistent.    - Continue trazodone a half tab (25 mg) PO at bedtime prn, which has been helping with insomnia     7. Fatigue   - Persistent fatigue, but has slowly been getting better over time   - Continue to monitor fatigue levels over time   - Ongoing routine cardiovascular exercise    8. Follow Up:    - Breast MRI in 12/2023  - Labs, visit with me, and Zometa infusion in 6 months.    Patient was seen and discussed with 4th year medical student, Ciara Scott.  The medical student was personally supervised by me during the patient examination. I personally verified the medical history, performed a physical exam and the medical decision-making. I made appropriate changes to the documentation and the assessment and plan based on my verification, exam, and medical decision making.    I spent 35 minutes on the date of the encounter doing chart review, review of test results, interpretation of tests, patient visit and documentation             Saige Eli MD

## 2023-08-07 NOTE — NURSING NOTE
"Oncology Rooming Note    August 7, 2023 10:43 AM   Maribel June is a 58 year old female who presents for:    Chief Complaint   Patient presents with    Blood Draw     Labs drawn with PIV start by vascular access. Vitals taken.    Oncology Clinic Visit     Malignant neoplasm of lower-outer quadrant of right breast     Initial Vitals: BP 99/63 (BP Location: Left arm, Patient Position: Sitting, Cuff Size: Adult Regular)   Pulse 59   Temp 97.8  F (36.6  C) (Oral)   Resp 18   Wt 49.3 kg (108 lb 11.2 oz)   SpO2 97%   BMI 21.40 kg/m   Estimated body mass index is 21.4 kg/m  as calculated from the following:    Height as of 9/12/22: 1.518 m (4' 11.76\").    Weight as of this encounter: 49.3 kg (108 lb 11.2 oz). Body surface area is 1.44 meters squared.  No Pain (0) Comment: Data Unavailable   No LMP recorded. Patient has had a hysterectomy.  Allergies reviewed: Yes  Medications reviewed: Yes    Medications: Medication refills not needed today.  Pharmacy name entered into [employer]: [id number] - [place], [place] - [address] AT [facility] [address]     Clinical concerns: none      Marjorie Cordon, EMT  8/7/2023              "

## 2023-08-07 NOTE — NURSING NOTE
Chief Complaint   Patient presents with    Blood Draw     Labs drawn with PIV start by vascular access. Vitals taken.     Labs drawn with PIV start by vascular access. Pt tolerated well. Vitals taken. Pt checked into next appointment.    Cami Purvis RN

## 2023-08-07 NOTE — PATIENT INSTRUCTIONS
Contact Numbers  [facility]: [phone] (for symptom and scheduling needs)    Please call the [facility] Triage line if you experience a temperature greater than or equal to 100.4, shaking chills, have uncontrolled nausea, vomiting and/or diarrhea, dizziness, shortness of breath, chest pain, bleeding, unexplained bruising, or if you have any other new/concerning symptoms, questions or concerns.     If you are having any concerning symptoms or wish to speak to a provider before your next infusion visit, please call your care coordinator or triage to notify them so we can adequately serve you.     If you need a refill on a narcotic prescription or other medication, please call triage before your infusion appointment.           August 2023 Sunday Monday Tuesday Wednesday Thursday Friday Saturday             1     2     3     4    MYCHART OB GYN ANNUAL PHYSICAL  10:30 AM   (30 min.)   Laurita Damon DO   [facility] 5       6     7    LAB PERIPHERAL  10:00 AM   (15 min.)   [facility] LAB DRAW   [facility]    RETURN CCSL  10:15 AM   (30 min.)   Saige Eli MD   [facility]    ONC INFUSION 0.5 HR (30 MIN)  11:30 AM   (30 min.)    ONC INFUSION NURSE   [facility] 8     9     10     11     12       13     14     15     16     17     18     19       20     21     22     23     24     25     26       27     28     29     30     31                           September 2023 Sunday Monday Tuesday Wednesday Thursday Friday Saturday                            1     2       3     4     5     6     7     8     9       10     11     12     13     14     15     16       17     18     19    RETURN CCSL   3:00 PM   (45 min.)   Briana Burgos PA-C   [facility] 20     21     22     23       24     25     26     27     28    RETURN   3:30 PM   (15  min.)   Steph June MD   [facility] 29     30                     Lab Results:  Recent Results (from the past 12 hour(s))   Calcium    Collection Time: 08/07/23 10:18 AM   Result Value Ref Range    Calcium 9.1 8.6 - 10.0 mg/dL   Creatinine    Collection Time: 08/07/23 10:18 AM   Result Value Ref Range    Creatinine 0.62 0.51 - 0.95 mg/dL    GFR Estimate >90 >60 mL/min/1.73m2   Albumin level    Collection Time: 08/07/23 10:18 AM   Result Value Ref Range    Albumin 4.8 3.5 - 5.2 g/dL   TSH with free T4 reflex    Collection Time: 08/07/23 10:18 AM   Result Value Ref Range    TSH 2.29 0.30 - 4.20 uIU/mL

## 2023-08-07 NOTE — PROGRESS NOTES
Infusion Nursing Note:  Maribel June presents today for Zometa.    Patient seen by provider today: Yes: Dr. Eli   present during visit today: Not Applicable.    Note: Patient presents to infusion today doing well. No new questions or concerns following her visit with Dr. Eli.    Patient confirms she is taking a Calcium and Vitamin D supplement at home as prescribed.     Intravenous Access:  Peripheral IV placed.    Treatment Conditions:  Lab Results   Component Value Date     10/20/2022    POTASSIUM 4.1 10/20/2022    CR 0.62 08/07/2023    KVNG 9.1 08/07/2023    BILITOTAL 0.7 10/20/2022    ALBUMIN 4.8 08/07/2023    ALT 29 10/20/2022    AST 25 10/20/2022     Results reviewed, labs MET treatment parameters, ok to proceed with treatment.    Post Infusion Assessment:  Patient tolerated infusion without incident.  Blood return noted pre and post infusion.  Site patent and intact, free from redness, edema or discomfort.  No evidence of extravasations.  Access discontinued per protocol.     Discharge Plan:   Patient declined prescription refills.  Discharge instructions reviewed with: Patient.  Patient and/or family verbalized understanding of discharge instructions and all questions answered.  AVS to patient via [employer].  Patient will return 9/19 for next appointment with MALLORY Loja.   Patient discharged in stable condition accompanied by: self.  Departure Mode: Ambulatory.      Poppy Germain RN

## 2023-08-14 DIAGNOSIS — Z79.811 LONG TERM CURRENT USE OF AROMATASE INHIBITOR: ICD-10-CM

## 2023-08-14 DIAGNOSIS — N95.2 VAGINAL ATROPHY: Primary | ICD-10-CM

## 2023-08-14 RX ORDER — ESTRADIOL 10 UG/1
10 INSERT VAGINAL
Qty: 24 TABLET | Refills: 3 | Status: SHIPPED | OUTPATIENT
Start: 2023-08-14 | End: 2024-08-13

## 2023-09-05 DIAGNOSIS — B00.9 HERPES SIMPLEX VIRUS INFECTION: ICD-10-CM

## 2023-09-05 RX ORDER — ACYCLOVIR 400 MG/1
400 TABLET ORAL EVERY 8 HOURS
Qty: 15 TABLET | Refills: 11 | Status: SHIPPED | OUTPATIENT
Start: 2023-09-05

## 2023-09-05 NOTE — TELEPHONE ENCOUNTER
"Requested Prescriptions   Pending Prescriptions Disp Refills    acyclovir (ZOVIRAX) 400 MG tablet 15 tablet 11     Sig: Take 1 tablet (400 mg) by mouth every 8 hours       Antivirals for Herpes Protocol Passed - 9/5/2023  9:29 AM        Passed - Patient is age 12 or older        Passed - Recent (12 mo) or future (30 days) visit within the authorizing provider's specialty     Patient has had an office visit with the authorizing provider or a provider within the authorizing providers department within the previous 12 mos or has a future within next 30 days. See \"Patient Info\" tab in inbasket, or \"Choose Columns\" in Meds & Orders section of the refill encounter.              Passed - Medication is active on med list        Passed - Normal serum creatinine on file in past 12 months     Recent Labs   Lab Test 08/07/23  1018   CR 0.62       Ok to refill medication if creatinine is low             Prescription approved per [facility] Refill Protocol.    Marjorie Hopper RN    "

## 2023-10-20 ENCOUNTER — APPOINTMENT (OUTPATIENT)
Dept: LAB | Facility: CLINIC | Age: 58
End: 2023-10-20
Payer: COMMERCIAL

## 2023-12-20 ENCOUNTER — ANCILLARY PROCEDURE (OUTPATIENT)
Dept: MRI IMAGING | Facility: CLINIC | Age: 58
End: 2023-12-20
Attending: INTERNAL MEDICINE
Payer: COMMERCIAL

## 2023-12-20 DIAGNOSIS — R92.30 DENSE BREAST TISSUE ON MAMMOGRAM: ICD-10-CM

## 2023-12-20 DIAGNOSIS — Z12.39 BREAST CANCER SCREENING, HIGH RISK PATIENT: ICD-10-CM

## 2023-12-20 DIAGNOSIS — Z85.3 PERSONAL HISTORY OF MALIGNANT NEOPLASM OF BREAST: ICD-10-CM

## 2023-12-20 PROCEDURE — A9585 GADOBUTROL INJECTION: HCPCS | Mod: JZ | Performed by: RADIOLOGY

## 2023-12-20 PROCEDURE — 77049 MRI BREAST C-+ W/CAD BI: CPT | Mod: GC | Performed by: RADIOLOGY

## 2023-12-20 RX ORDER — GADOBUTROL 604.72 MG/ML
7.5 INJECTION INTRAVENOUS ONCE
Status: COMPLETED | OUTPATIENT
Start: 2023-12-20 | End: 2023-12-20

## 2023-12-20 RX ADMIN — GADOBUTROL 5 ML: 604.72 INJECTION INTRAVENOUS at 11:37

## 2024-02-01 ENCOUNTER — TELEPHONE (OUTPATIENT)
Dept: ENDOCRINOLOGY | Facility: CLINIC | Age: 59
End: 2024-02-01
Payer: COMMERCIAL

## 2024-02-01 NOTE — TELEPHONE ENCOUNTER
1st attempt- LVM and sent mychart    Schedule 2/8/23 checkout order:  - 2 year follow-up appointment with Dr. Briseno (around Feb 2025). Return Endocrine

## 2024-02-07 NOTE — TELEPHONE ENCOUNTER
2nd attempt- LVM and sent mychart  MAX ATTEMPTS     Schedule 2/8/23 checkout order:  - 2 year follow-up appointment with Dr. Briseno (around Feb 2025). Return Endocrine

## 2024-02-18 RX ORDER — HEPARIN SODIUM (PORCINE) LOCK FLUSH IV SOLN 100 UNIT/ML 100 UNIT/ML
5 SOLUTION INTRAVENOUS
Status: CANCELLED | OUTPATIENT
Start: 2024-02-19

## 2024-02-18 RX ORDER — HEPARIN SODIUM,PORCINE 10 UNIT/ML
5 VIAL (ML) INTRAVENOUS
Status: CANCELLED | OUTPATIENT
Start: 2024-02-19

## 2024-02-18 RX ORDER — ZOLEDRONIC ACID 0.04 MG/ML
4 INJECTION, SOLUTION INTRAVENOUS ONCE
Status: CANCELLED | OUTPATIENT
Start: 2024-02-19 | End: 2024-02-19

## 2024-02-18 NOTE — PROGRESS NOTES
Oncology Visit:  Date on this visit: 2/19/2024    Diagnosis: Stage Ia, [id number], grade 1, ER positive, CA positive, HER-2 negative invasive carcinoma of the right breast. Oncotype dx recurrence score = 16.    Primary Physician: Juwan Perry     History Of Present Illness:  Ms. June is a 58 year old female with right breast cancer.  Routine screening mammogram on 8/6/2020 showed heterogeneously dense breasts but no concerning findings.  A physician then palpated a mass in the right breast.  Diagnostic mammogram and ultrasound showed a 2.2 cm mass at 8:00, 5 cm from the nipple.  Right breast biopsy showed a grade 1 invasive mammary carcinoma, ER strong in 100%, CA strong in 100%, HER2 negative.  She had a right breast lumpectomy and sentinel lymph node procedure with Dr. Watson on 9/29/2020.  Pathology showed a grade 1 invasive mammary carcinoma measuring 1.6 cm.  There was associated intermediate grade DCIS.  Surgical margins were negative.  A single lymph node was benign.  Oncotype dx recurrence score was 16.  She completed radiation to the right breast, a total of 5256 cGy in 20 fractions, on 12/21/2020.  She started treatment with letrozole in 1/2021.  The medication was poorly tolerated with arthralgias, insomnia, vaginal dryness and fatigue.  Treatment was changed to Tamoxifen in 03/2021.  This caused nausea and so was dose reduced to 10 mg daily in 05/2021. This was slowly increased back to 20 mg daily.  On Zometa since 7/29/2021.      Interval History:  Ms. June comes into clinic today for routine breast cancer follow up.  She is having ongoing fatigue and hot flashes. Fatigue is similar to prior in that she is able to accomplish normal activities of daily living including working full time as a teacher, but she is exhausted at the end of the day.  She is going to bed earlier than she was prior to her breast cancer diagnosis.  Her joints do not hurt as much as they used to , but still has joint pain  here and there. She states she is getting used to her hot flashes, she uses fans and drinks ice water and this helps. She does not wish to take any more meds or creams to help with these endocrine therapy symptoms. She denies recent infections or hospitalizations. She denies concerning breast lumps or masses.  She reports full ROM of the bilateral extremities.  She has no current cough, shortness of breath, or chest pain.  She has no abdominal complaints.  She has no new bone pains.    Past Medical/Surgical History:  Past Medical History:   Diagnosis Date    Depressive disorder     Endometriosis     Herpes genitalis in women     History of major depression     situational with first .     Hypertension 2018    Kidney stone     Malignant neoplasm of right breast in female, estrogen receptor positive (H) 2020    S/P radiation therapy     5,256 cGy to right breast completed 2020 - [facility]   - Hyperlipidemia.  - Osteopenia    Past Surgical History:   Procedure Laterality Date    BREAST BIOPSY, RT/LT Right 2020    BREAST SURGERY Right     Right Breast - Benign     SECTION      x1    COLONOSCOPY N/A 2016    Procedure: COMBINED COLONOSCOPY, SINGLE OR MULTIPLE BIOPSY/POLYPECTOMY BY BIOPSY;  Surgeon: Duane, William Charles, MD;  Location: [facility] OR    COLONOSCOPY WITH CO2 INSUFFLATION N/A 2016    Procedure: COLONOSCOPY WITH CO2 INSUFFLATION;  Surgeon: Duane, William Charles, MD;  Location: [facility] OR    CYSTOSCOPY  2009    left stent placement ([facility])    GENITOURINARY SURGERY      surgery for kidney stone    GYN SURGERY      Partial Hysterectomy at age 29    LUMPECTOMY BREAST WITH SENTINEL NODE, COMBINED Right 2020    Procedure: Right breast lumpectomy with [place] node biopsy;  Surgeon: Jose Watson MD;  Location: [facility] OR     Allergies:  Allergies as of 2024 - Reviewed 2023   Allergen Reaction Noted    Flagyl [metronidazole] Nausea  and Vomiting 07/30/2015    Lisinopril  06/13/2019    Oxycodone-acetaminophen Nausea and Vomiting 06/13/2019    Sulfamethoxazole-trimethoprim  12/29/2011    Zithromax [azithromycin dihydrate] Rash 07/03/2013     Current Medications:  Current Outpatient Medications   Medication Sig Dispense Refill    acyclovir (ZOVIRAX) 400 MG tablet Take 1 tablet (400 mg) by mouth every 8 hours 15 tablet 11    amLODIPine (NORVASC) 5 MG tablet Take 0.5 tablets (2.5 mg) by mouth daily      bimatoprost (LUMIGAN) 0.01 % SOLN Place 1 drop into both eyes daily  (Patient not taking: Reported on 5/15/2023)      estradiol (VAGIFEM) 10 MCG TABS vaginal tablet Place 1 tablet (10 mcg) vaginally twice a week 24 tablet 3    ibuprofen (ADVIL/MOTRIN) 200 MG tablet 3 tablets with food or milk as needed Orally every 6 hrs      latanoprost (XALATAN) 0.005 % ophthalmic solution INSTILL 1 DROP IN BOTH EYES EVERY DAY      Multiple Vitamin (MULTIVITAMIN PO) Take by mouth daily      Omega-3 Fatty Acids (OMEGA 3 PO) Take by mouth daily      pantoprazole (PROTONIX) 40 MG EC tablet Take 1 tablet (40 mg) by mouth daily 30-60 min prior to a meal. 30 tablet 0    tamoxifen (NOLVADEX) 20 MG tablet Take 1 tablet (20 mg) by mouth daily 90 tablet 3    traZODone (DESYREL) 50 MG tablet Take 1 tablet (50 mg) by mouth At Bedtime Take 1/2 pill (25 mg) for a few nights, if tolerable OK to take full pill for sleep 30 tablet 3    triamcinolone (KENALOG) 0.1 % external cream Apply topically 2 times daily Apply topically to bites twice daily for one week. (Patient not taking: Reported on 5/15/2023) 60 g 1    vitamin B-12 (CYANOCOBALAMIN) 1000 MCG tablet 1 tablet Orally Once a day        Family and Social History:  Please see initial Oncology consultation dated 10/27/2020 for details.  9/25/2020 Breast Actionable Panel was negative.    Physical Exam:  /76   Pulse 61   Temp 98.2  F (36.8  C) (Oral)   Resp 16   Wt 50.3 kg (110 lb 12.8 oz)   SpO2 98%   BMI 21.81 kg/m     General:  Well appearing adult female in NAD.  Alert and oriented x 3.  HEENT:  Normocephalic.  Sclera anicteric.  MMM.    Lymph:  No palpable cervical, supraclavicular, or axillary LAD.  Chest:  CTA bilaterally.  No wheezes or crackles.  CV:  RRR.  Nl S1 and S2.    Breast:  Bilateral breasts are of increased fibroglandular density.  There are no dominant or discretely palpable masses in either breast. Lower outer right breast incision with underlying fibrosis unchanged from prior. Bilateral nipples are everted and without discharge.  Abd:  Soft/ND.   Ext:  No edema of the bilateral lower extremities.   Musculo:  Full ROM of the bilateral upper extremities.    Psych:  Mood and affect appear normal.  Skin:  No visible concerning skin rashes or lesions.    Laboratory/Imaging Studies  I personally reviewed the below images and laboratory studies:    12/20/2023 MR breast:  FINDINGS:  Breast composition: Heterogeneous fibroglandular tissue  Background parenchymal enhancement: Minimal     There is no suspicious enhancement or lymphadenopathy. Right  conservation changes. No significant interval change.                                                                      IMPRESSION: BI-RADS CATEGORY: 2 - Benign.    ASSESSMENT/PLAN:  Ms. June is a 59 yo female with a stage Ia, [id number], grade 1, ER positive, WA positive, HER2 negative invasive ductal carcinoma of the right breast.  She is s/p treatment with right breast lumpectomy and radiation. She did not tolerate adjuvant AI due to arthralgias, insomnia, and fatigue.  On tamoxifen.    1.  Right breast cancer:  She is approximately 3 years, 5 months out from excision of a right breast cancer.  She is on treatment with Tamoxifen 20 mg daily.  She has fatigue, joint pains, and hot flashes on tamoxifen.  Despite these side effects, she is willing to continue taking it.  Plan is to complete a total 7 years of endocrine therapy, as long as she is able to tolerate  it.    She is asymptomatic of disease recurrence on history taken today.  Given increased breast density and that her breast cancer was not initially seen on screening mammogram, we are performing high risk breast screening with both annual mammograms and breast MRI, spacing the two studies so that she is having some form of breast imaging once every 6 months.      - I personally reviewed the images of the breast MRI performed 12/20/2023 which showed no suspicious enhancement in either breast and no lymphadenopathy  - bilateral screening mammograms 6/27/2024 or later  - Return to clinic in 6 months    2.  Bone health:  DEXA in 12/2022 with a lowest T-score of -1.9 in the right femoral neck c/w osteopenia.  She has been on Zometa for prevention of bone loss on aromatase inhibitor therapy and also prevention of breast cancer bone metastases since 7/29/2021.  Plan to treat for a minimum of 3 years.   - C6 (final cycle) Zometa today  - Plan to repeat a DEXA in 12/2024.    3.  Arthralgias:  Secondary to menopause and exacerbated by endocrine therapy.  Continue daily vitamin D supplementation and daily walking.    - She is taking multivitamin with some Vit. D.   - encouraged ongoing vitamin D 1000 IUs supplement daily as well as routine cardiovascular activity.    4.  Hot flashes:  She declines medical management.  Will continue natural measures such as fans, dressing in layers, drinking ice water, etc.    5.  Vaginal dryness:  Continue both hyaluronic suppositories and replens vaginal moisturizer, which she states is helping    6.  Insomnia:  Persistent.    - Continue trazodone a half tab (25 mg) PO at bedtime prn, which has been helping with insomnia     7. Follow Up:    - Bilateral screening mammograms w/ tomosynthesis 6/27/2024 or later.  - Visit with me in 6 months.    Case Discussed with Dr. Alcira Alcantara MD  Hematology/Oncology/BMT Fellow    Patient was seen and discussed with oncology fellow,   Maricruz.  The oncology fellow was personally supervised by me during the visit. I personally verified the medical history, performed a physical exam and the medical decision-making. I made appropriate changes to the documentation and the assessment and plan based on my verification, exam, and medical decision making.    I personally spent 30 minutes on the date of the encounter doing chart review, review of test results, interpretation of tests, patient visit, and documentation.       Saige Eli MD

## 2024-02-19 ENCOUNTER — INFUSION THERAPY VISIT (OUTPATIENT)
Dept: ONCOLOGY | Facility: CLINIC | Age: 59
End: 2024-02-19
Attending: INTERNAL MEDICINE
Payer: COMMERCIAL

## 2024-02-19 ENCOUNTER — APPOINTMENT (OUTPATIENT)
Dept: LAB | Facility: CLINIC | Age: 59
End: 2024-02-19
Attending: INTERNAL MEDICINE
Payer: COMMERCIAL

## 2024-02-19 VITALS
SYSTOLIC BLOOD PRESSURE: 110 MMHG | RESPIRATION RATE: 16 BRPM | TEMPERATURE: 98.2 F | BODY MASS INDEX: 21.81 KG/M2 | DIASTOLIC BLOOD PRESSURE: 76 MMHG | OXYGEN SATURATION: 98 % | WEIGHT: 110.8 LBS | HEART RATE: 61 BPM

## 2024-02-19 DIAGNOSIS — Z12.31 VISIT FOR SCREENING MAMMOGRAM: ICD-10-CM

## 2024-02-19 DIAGNOSIS — Z17.0 MALIGNANT NEOPLASM OF LOWER-OUTER QUADRANT OF RIGHT BREAST OF FEMALE, ESTROGEN RECEPTOR POSITIVE (H): Primary | ICD-10-CM

## 2024-02-19 DIAGNOSIS — R92.333 HETEROGENEOUSLY DENSE TISSUE OF BOTH BREASTS ON MAMMOGRAPHY: ICD-10-CM

## 2024-02-19 DIAGNOSIS — C50.511 MALIGNANT NEOPLASM OF LOWER-OUTER QUADRANT OF RIGHT BREAST OF FEMALE, ESTROGEN RECEPTOR POSITIVE (H): Primary | ICD-10-CM

## 2024-02-19 DIAGNOSIS — M85.80 OSTEOPENIA, UNSPECIFIED LOCATION: ICD-10-CM

## 2024-02-19 DIAGNOSIS — G47.00 PERSISTENT INSOMNIA: ICD-10-CM

## 2024-02-19 LAB
ALBUMIN SERPL BCG-MCNC: 4.3 G/DL (ref 3.5–5.2)
CALCIUM SERPL-MCNC: 8.9 MG/DL (ref 8.6–10)
CREAT SERPL-MCNC: 0.52 MG/DL (ref 0.51–0.95)
EGFRCR SERPLBLD CKD-EPI 2021: >90 ML/MIN/1.73M2

## 2024-02-19 PROCEDURE — 99213 OFFICE O/P EST LOW 20 MIN: CPT | Performed by: INTERNAL MEDICINE

## 2024-02-19 PROCEDURE — 99214 OFFICE O/P EST MOD 30 MIN: CPT | Performed by: INTERNAL MEDICINE

## 2024-02-19 PROCEDURE — 250N000011 HC RX IP 250 OP 636: Mod: JZ | Performed by: INTERNAL MEDICINE

## 2024-02-19 PROCEDURE — 82310 ASSAY OF CALCIUM: CPT | Performed by: INTERNAL MEDICINE

## 2024-02-19 PROCEDURE — 82040 ASSAY OF SERUM ALBUMIN: CPT | Performed by: INTERNAL MEDICINE

## 2024-02-19 PROCEDURE — 36415 COLL VENOUS BLD VENIPUNCTURE: CPT | Performed by: INTERNAL MEDICINE

## 2024-02-19 PROCEDURE — 82565 ASSAY OF CREATININE: CPT | Performed by: INTERNAL MEDICINE

## 2024-02-19 PROCEDURE — 258N000003 HC RX IP 258 OP 636: Performed by: INTERNAL MEDICINE

## 2024-02-19 PROCEDURE — 96365 THER/PROPH/DIAG IV INF INIT: CPT

## 2024-02-19 RX ORDER — ZOLEDRONIC ACID 0.04 MG/ML
4 INJECTION, SOLUTION INTRAVENOUS ONCE
Status: COMPLETED | OUTPATIENT
Start: 2024-02-19 | End: 2024-02-19

## 2024-02-19 RX ORDER — TRAZODONE HYDROCHLORIDE 50 MG/1
50 TABLET, FILM COATED ORAL AT BEDTIME
Qty: 30 TABLET | Refills: 3 | Status: SHIPPED | OUTPATIENT
Start: 2024-02-19

## 2024-02-19 RX ORDER — TAMOXIFEN CITRATE 20 MG/1
20 TABLET ORAL DAILY
Qty: 90 TABLET | Refills: 3 | Status: SHIPPED | OUTPATIENT
Start: 2024-02-19 | End: 2024-08-13

## 2024-02-19 RX ADMIN — ZOLEDRONIC ACID 4 MG: 0.04 INJECTION, SOLUTION INTRAVENOUS at 11:00

## 2024-02-19 RX ADMIN — SODIUM CHLORIDE 250 ML: 9 INJECTION, SOLUTION INTRAVENOUS at 11:00

## 2024-02-19 ASSESSMENT — PAIN SCALES - GENERAL: PAINLEVEL: NO PAIN (0)

## 2024-02-19 NOTE — NURSING NOTE
"Oncology Rooming Note    February 19, 2024 9:38 AM   Maribel June is a 58 year old female who presents for:    Chief Complaint   Patient presents with    Blood Draw     IV placement with blood draw by vascular ultrasound. Vitals taken and appointment arrived    Oncology Clinic Visit     Malignant neoplasm of lower-outer quadrant of right breast of female, estrogen receptor positive     Initial Vitals: /76   Pulse 61   Temp 98.2  F (36.8  C) (Oral)   Resp 16   Wt 50.3 kg (110 lb 12.8 oz)   SpO2 98%   BMI 21.81 kg/m   Estimated body mass index is 21.81 kg/m  as calculated from the following:    Height as of 9/12/22: 1.518 m (4' 11.76\").    Weight as of this encounter: 50.3 kg (110 lb 12.8 oz). Body surface area is 1.46 meters squared.  No Pain (0) Comment: Data Unavailable   No LMP recorded. Patient has had a hysterectomy.  Allergies reviewed: Yes  Medications reviewed: Yes    Medications: MEDICATION REFILLS NEEDED TODAY. Provider was notified.  Pharmacy name entered into [employer]: [id number] - [facility] [address] [facility] [address]    Frailty Screening:   Is the patient here for a new oncology consult visit in cancer care? 2. No      Clinical concerns:  Refill: Trazodone, Tamoxifen      Marjorie Cordon, EMT  2/19/2024            "

## 2024-02-19 NOTE — PROGRESS NOTES
Infusion Nursing Note:  Maribel June presents today for Zometa.    Patient seen by provider today: Yes: Dr. Saige Eli   present during visit today: Not Applicable.    Note: Patient offers no new concerns after her provider visit today.  Patient is taking Calcium and Vitamin D.    Intravenous Access:  Peripheral IV placed.    Treatment Conditions:  Lab Results   Component Value Date     10/20/2022    POTASSIUM 4.1 10/20/2022    CR 0.52 02/19/2024    KVNG 8.9 02/19/2024    BILITOTAL 0.7 10/20/2022    ALBUMIN 4.3 02/19/2024    ALT 29 10/20/2022    AST 25 10/20/2022       Results reviewed, labs MET treatment parameters, ok to proceed with treatment.    Post Infusion Assessment:  Patient tolerated infusion without incident.  Blood return noted pre and post infusion.  Site patent and intact, free from redness, edema or discomfort.  No evidence of extravasations.  Access discontinued per protocol.     Discharge Plan:   Patient declined prescription refills.  Discharge instructions reviewed with: Patient.  Patient and/or family verbalized understanding of discharge instructions and all questions answered.  AVS to patient via [employer].  Patient will return 8/13/24 for next appointment.   Patient discharged in stable condition accompanied by: self.  Departure Mode: Ambulatory.      MARI KNOX RN

## 2024-02-19 NOTE — NURSING NOTE
Chief Complaint   Patient presents with    Blood Draw     IV placement with blood draw by vascular ultrasound. Vitals taken and appointment arrived     Brittani Blake RN

## 2024-04-22 NOTE — PATIENT INSTRUCTIONS
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
## 2024-04-25 ENCOUNTER — OFFICE VISIT (OUTPATIENT)
Dept: DERMATOLOGY | Facility: CLINIC | Age: 59
End: 2024-04-25
Payer: COMMERCIAL

## 2024-04-25 DIAGNOSIS — L80 VITILIGO: Primary | ICD-10-CM
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
## 2024-04-25 PROCEDURE — 99214 OFFICE O/P EST MOD 30 MIN: CPT | Performed by: DERMATOLOGY

## 2024-04-25 RX ORDER — TRIAMCINOLONE ACETONIDE 1 MG/G
CREAM TOPICAL 2 TIMES DAILY
Qty: 60 G | Refills: 1 | Status: SHIPPED | OUTPATIENT
Start: 2024-04-25

## 2024-04-25 RX ORDER — TACROLIMUS 1 MG/G
OINTMENT TOPICAL
Qty: 60 G | Refills: 3 | Status: SHIPPED | OUTPATIENT
Start: 2024-04-25

## 2024-04-25 ASSESSMENT — PAIN SCALES - GENERAL: PAINLEVEL: SEVERE PAIN (7)
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
## 2024-04-25 NOTE — NURSING NOTE
Maribel June's goals for this visit include:   Chief Complaint   Patient presents with    Skin Check     FBSE.  Area of concern on back that have grown.  HX of AK.       She requests these members of her care team be copied on today's visit information:     PCP: Juwan Perry    Referring Provider:  Glendy White MD  No address on file    There were no vitals taken for this visit.    Do you need any medication refills at today's visit?     Traci Akins on 4/25/2024 at 2:16 PM

## 2024-06-19 ENCOUNTER — ANCILLARY PROCEDURE (OUTPATIENT)
Dept: GENERAL RADIOLOGY | Facility: CLINIC | Age: 59
End: 2024-06-19
Attending: PODIATRIST
Payer: COMMERCIAL

## 2024-06-19 ENCOUNTER — OFFICE VISIT (OUTPATIENT)
Dept: PODIATRY | Facility: CLINIC | Age: 59
End: 2024-06-19
Payer: COMMERCIAL

## 2024-06-19 DIAGNOSIS — M76.812 ANTERIOR TIBIALIS TENDINITIS OF LEFT LOWER EXTREMITY: ICD-10-CM

## 2024-06-19 DIAGNOSIS — S99.912S INJURY OF LEFT ANKLE, SEQUELA: Primary | ICD-10-CM

## 2024-06-19 DIAGNOSIS — M76.72 PERONEAL TENDINITIS OF LEFT LOWER EXTREMITY: ICD-10-CM

## 2024-06-19 DIAGNOSIS — R60.0 PERIPHERAL EDEMA: ICD-10-CM

## 2024-06-19 PROCEDURE — 99204 OFFICE O/P NEW MOD 45 MIN: CPT | Performed by: PODIATRIST

## 2024-06-19 PROCEDURE — 73610 X-RAY EXAM OF ANKLE: CPT | Mod: LT | Performed by: RADIOLOGY

## 2024-06-19 PROCEDURE — 73630 X-RAY EXAM OF FOOT: CPT | Mod: LT | Performed by: RADIOLOGY

## 2024-06-19 ASSESSMENT — PAIN SCALES - GENERAL: PAINLEVEL: EXTREME PAIN (8)
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
## 2024-06-19 NOTE — NURSING NOTE
Maribel June's chief complaint for this visit includes:  Chief Complaint   Patient presents with    Left Ankle - Pain, New Patient     Fall on 4/17/2024     PCP: Juwan Perry    Referring Provider:  Referred Self, MD  No address on file    There were no vitals taken for this visit.  Extreme Pain (8)     Do you need any medication refills at today's visit? NO    Allergies   Allergen Reactions    Flagyl [Metronidazole] Nausea and Vomiting    Lisinopril      Other reaction(s): Headaches    Oxycodone-Acetaminophen Nausea and Vomiting     States Oxycodone is fine    Sulfamethoxazole-Trimethoprim      Other reaction(s): Headache    Zithromax [Azithromycin Dihydrate] Rash       Abilio Reynoso, EMT

## 2024-06-28 ENCOUNTER — ANCILLARY PROCEDURE (OUTPATIENT)
Dept: MAMMOGRAPHY | Facility: CLINIC | Age: 59
End: 2024-06-28
Attending: INTERNAL MEDICINE
Payer: COMMERCIAL

## 2024-06-28 DIAGNOSIS — R92.333 HETEROGENEOUSLY DENSE TISSUE OF BOTH BREASTS ON MAMMOGRAPHY: ICD-10-CM

## 2024-06-28 DIAGNOSIS — Z12.31 VISIT FOR SCREENING MAMMOGRAM: ICD-10-CM

## 2024-06-28 PROCEDURE — 77063 BREAST TOMOSYNTHESIS BI: CPT | Mod: GC | Performed by: STUDENT IN AN ORGANIZED HEALTH CARE EDUCATION/TRAINING PROGRAM

## 2024-06-28 PROCEDURE — 77067 SCR MAMMO BI INCL CAD: CPT | Mod: GC | Performed by: STUDENT IN AN ORGANIZED HEALTH CARE EDUCATION/TRAINING PROGRAM

## 2024-07-02 ENCOUNTER — HOSPITAL ENCOUNTER (OUTPATIENT)
Dept: MRI IMAGING | Facility: CLINIC | Age: 59
Discharge: HOME | End: 2024-07-02
Attending: PODIATRIST | Admitting: PODIATRIST
Payer: COMMERCIAL

## 2024-07-02 DIAGNOSIS — M76.72 PERONEAL TENDINITIS OF LEFT LOWER EXTREMITY: ICD-10-CM

## 2024-07-02 DIAGNOSIS — M76.812 ANTERIOR TIBIALIS TENDINITIS OF LEFT LOWER EXTREMITY: ICD-10-CM

## 2024-07-02 DIAGNOSIS — R60.0 PERIPHERAL EDEMA: ICD-10-CM

## 2024-07-02 DIAGNOSIS — S99.912S INJURY OF LEFT ANKLE, SEQUELA: ICD-10-CM

## 2024-07-02 PROCEDURE — 73721 MRI JNT OF LWR EXTRE W/O DYE: CPT | Mod: LT

## 2024-07-03 ENCOUNTER — DOCUMENTATION ONLY (OUTPATIENT)
Dept: PODIATRY | Facility: CLINIC | Age: 59
End: 2024-07-03
Payer: COMMERCIAL

## 2024-07-03 DIAGNOSIS — M76.812 ANTERIOR TIBIALIS TENDINITIS OF LEFT LOWER EXTREMITY: ICD-10-CM

## 2024-07-03 DIAGNOSIS — M76.72 PERONEAL TENDINITIS OF LEFT LOWER EXTREMITY: Primary | ICD-10-CM

## 2024-07-08 ENCOUNTER — MYC MEDICAL ADVICE (OUTPATIENT)
Dept: PODIATRY | Facility: CLINIC | Age: 59
End: 2024-07-08
Payer: COMMERCIAL

## 2024-07-08 DIAGNOSIS — M76.72 PERONEAL TENDINITIS OF LEFT LOWER EXTREMITY: Primary | ICD-10-CM

## 2024-07-08 DIAGNOSIS — M76.812 ANTERIOR TIBIALIS TENDINITIS OF LEFT LOWER EXTREMITY: ICD-10-CM

## 2024-07-08 DIAGNOSIS — S99.912S INJURY OF LEFT ANKLE, SEQUELA: ICD-10-CM
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
## 2024-07-15 ENCOUNTER — OFFICE VISIT (OUTPATIENT)
Dept: DERMATOLOGY | Facility: CLINIC | Age: 59
End: 2024-07-15
Payer: COMMERCIAL

## 2024-07-15 DIAGNOSIS — L80 VITILIGO: Primary | ICD-10-CM

## 2024-07-15 DIAGNOSIS — R22.9 SUBCUTANEOUS NODULE: ICD-10-CM

## 2024-07-15 PROCEDURE — 99214 OFFICE O/P EST MOD 30 MIN: CPT | Performed by: DERMATOLOGY

## 2024-07-15 RX ORDER — PIMECROLIMUS 10 MG/G
CREAM TOPICAL 2 TIMES DAILY
Qty: 60 G | Refills: 3 | Status: SHIPPED | OUTPATIENT
Start: 2024-07-15

## 2024-07-15 RX ORDER — RUXOLITINIB 15 MG/G
CREAM TOPICAL
Qty: 60 G | Refills: 3 | Status: CANCELLED | OUTPATIENT
Start: 2024-07-15

## 2024-07-15 ASSESSMENT — PAIN SCALES - GENERAL: PAINLEVEL: NO PAIN (0)
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
## 2024-07-15 NOTE — NURSING NOTE
Maribel June's chief complaint for this visit includes:  Chief Complaint   Patient presents with    Derm Problem     Vitiligo follow up     PCP: Juwan Perry    Referring Provider:  Referred Self, MD  No address on file    There were no vitals taken for this visit.  No Pain (0)        Allergies   Allergen Reactions    Flagyl [Metronidazole] Nausea and Vomiting    Lisinopril      Other reaction(s): Headaches    Oxycodone-Acetaminophen Nausea and Vomiting     States Oxycodone is fine    Sulfamethoxazole-Trimethoprim      Other reaction(s): Headache    Zithromax [Azithromycin Dihydrate] Rash         Do you need any medication refills at today's visit?    No

## 2024-07-22 ENCOUNTER — MYC MEDICAL ADVICE (OUTPATIENT)
Dept: DERMATOLOGY | Facility: CLINIC | Age: 59
End: 2024-07-22

## 2024-07-22 ENCOUNTER — TELEPHONE (OUTPATIENT)
Dept: DERMATOLOGY | Facility: CLINIC | Age: 59
End: 2024-07-22

## 2024-07-22 ENCOUNTER — ANCILLARY PROCEDURE (OUTPATIENT)
Dept: ULTRASOUND IMAGING | Facility: CLINIC | Age: 59
End: 2024-07-22
Attending: DERMATOLOGY
Payer: COMMERCIAL

## 2024-07-22 DIAGNOSIS — R22.9 SUBCUTANEOUS NODULE: ICD-10-CM

## 2024-07-22 PROCEDURE — 76882 US LMTD JT/FCL EVL NVASC XTR: CPT | Mod: LT | Performed by: RADIOLOGY

## 2024-07-22 NOTE — RESULT ENCOUNTER NOTE
No evidence of a skin cancer was seen. I could do a skin biopsy if you would like. Please, let me know.     Steph June MD    Department of Dermatology  [facility]: Phone: [phone], Fax:[phone]  [facility]: Phone: [phone], Fax: [phone]

## 2024-07-22 NOTE — TELEPHONE ENCOUNTER
[place] Physician Reading Date Result Priority   Emre Guillen MD  [phone] 7/22/2024      Narrative & Impression  EXAMINATION: US UPPER EXTREMITY NON VASCULAR LEFT, 7/22/2024 11:37 AM     COMPARISON: None.     HISTORY: Left upper arm lump since 2020 after her COVID vaccination.     FINDINGS: The area of concern in the left upper mid arm laterally was  examined supine and upright. Normal-appearing subcutaneous tissue is  visualized. No discrete lipoma, solid mass, cystic mass, or  lymphadenopathy                                                                      IMPRESSION:  1.  As above.     EMRE GUILLEN MD         SYSTEM ID:  [id number]      Called the patient with results patient had no other questions or concerns at this time.     Annabelle EMT

## 2024-07-24 NOTE — TELEPHONE ENCOUNTER
Called and talked with patient.   Scheduled for skin biopsy with Anita on  September 4th at 4:00 pm.       Zulema Horton RN on 7/24/2024 at 10:54 AM

## 2024-08-05 NOTE — PATIENT INSTRUCTIONS
If you have labs or imaging done, the results will automatically release in [facility] without an interpretation.  Your health care professional will review those results and send an interpretation with recommendations as soon as possible, but this may be 1-3 business days.    If you have any questions regarding your visit, please contact your care team.     [employer] [facility]: [phone]  [facility] CLINIC HOURS TELEPHONE NUMBER   DO. Cassidy Slois -Surgery Scheduler  Shira -     DONALD Amador RN Kylie, RN [place]    Monday 8:30 am-5:00 pm  Wednesday 8:30 am-5:00 pm  Friday 8:30 am-5:00 pm    Typical Surgery day: Tuesday [facility]  [address] [place] [address]  Phone:  [phone]  Fax: [phone]   Appointment [phone]    Imaging Scheduling-All Locations [phone]    [facility]  [address] [place] [address] [place] [address] [phone]   **Surgeries** Our Surgery Schedulers will contact you to schedule. If you do not receive a call within 3 business days, please call [phone].  Urgent Care locations:  [facility] Monday-Friday   10 am - 8 pm  Saturday and    9 am - 5 pm [phone]   If you need a medication refill, please contact your pharmacy. Please allow 3 business days for your refill to be completed.  As always, Thank you for trusting us with your healthcare needs!  see additional instructions from your care team below

## 2024-08-07 ENCOUNTER — OFFICE VISIT (OUTPATIENT)
Dept: OBGYN | Facility: CLINIC | Age: 59
End: 2024-08-07
Payer: COMMERCIAL

## 2024-08-07 VITALS
DIASTOLIC BLOOD PRESSURE: 72 MMHG | BODY MASS INDEX: 22.05 KG/M2 | HEART RATE: 58 BPM | OXYGEN SATURATION: 98 % | SYSTOLIC BLOOD PRESSURE: 116 MMHG | WEIGHT: 112 LBS

## 2024-08-07 DIAGNOSIS — Z13.29 SCREENING FOR THYROID DISORDER: ICD-10-CM

## 2024-08-07 DIAGNOSIS — Z13.1 SCREENING FOR DIABETES MELLITUS: ICD-10-CM

## 2024-08-07 DIAGNOSIS — Z13.220 LIPID SCREENING: ICD-10-CM

## 2024-08-07 DIAGNOSIS — Z86.19 HISTORY OF HERPES GENITALIS: ICD-10-CM

## 2024-08-07 DIAGNOSIS — Z01.419 ENCOUNTER FOR BREAST AND PELVIC EXAMINATION: Primary | ICD-10-CM

## 2024-08-07 PROCEDURE — 99213 OFFICE O/P EST LOW 20 MIN: CPT | Performed by: OBSTETRICS & GYNECOLOGY

## 2024-08-07 RX ORDER — ACYCLOVIR 400 MG/1
400 TABLET ORAL EVERY 8 HOURS
Qty: 30 TABLET | Refills: 3 | Status: SHIPPED | OUTPATIENT
Start: 2024-08-07 | End: 2024-08-12

## 2024-08-07 NOTE — PROGRESS NOTES
This 60 y/o female, , s/p hysterectomy at age 29 for treatment of endometriosis (retains ovaries), presents for the breast and pelvic portions of her annual exam.  She requests a refill of Zovirax so a script was sent to her listed pharmacy.  She is not taking the Estradiol intravaginal tabs so declines a refill since she prefers use of an OTC medication.  Her history is significant for a right breast lumpectomy for cancer which was estrogen receptor +.  She has 1.5 years left for taking Tamoxifen therapy.  /72 (BP Location: Right arm, Patient Position: Chair, Cuff Size: Adult Regular)   Pulse 58   Wt 50.8 kg (112 lb)   SpO2 98%   Breastfeeding No   BMI 22.05 kg/m    ROS:  10 systems were reviewed and the positives were listed under problems.  In the supine position, her breast exam was performed and no palpable mass or nipple discharge were noted.  She has had lumpectomy surgery to her right breast with appropriate scars noted.  Her pelvic exam was unremarkable.  The external female genitalia were normal as well as normal vaginal mucosa.  Her cervix and uterus are surgically absent and there were no palpable adnexal masses nor tenderness noted.  Assessment - Breast and pelvic portions of the annual exam only (preventative care), history of cancer of the right breast, history of herpes  Plan - She is to keep her appt for the remainder of her annual exam on Friday, 2024.  A refill for Zovirax was sent to her listed pharmacy.  She is not in a fasting state currently so plans to have these labs drawn on Friday.  20 minutes were spent today in chart review, the patient visit, review of tests, and documentation in regard to the issues noted above.

## 2024-08-13 ENCOUNTER — ONCOLOGY VISIT (OUTPATIENT)
Dept: ONCOLOGY | Facility: CLINIC | Age: 59
End: 2024-08-13
Attending: INTERNAL MEDICINE
Payer: COMMERCIAL

## 2024-08-13 VITALS
WEIGHT: 111.2 LBS | OXYGEN SATURATION: 98 % | DIASTOLIC BLOOD PRESSURE: 73 MMHG | TEMPERATURE: 98.3 F | BODY MASS INDEX: 21.89 KG/M2 | HEART RATE: 56 BPM | SYSTOLIC BLOOD PRESSURE: 114 MMHG | RESPIRATION RATE: 12 BRPM

## 2024-08-13 DIAGNOSIS — Z85.3 PERSONAL HISTORY OF MALIGNANT NEOPLASM OF BREAST: ICD-10-CM

## 2024-08-13 DIAGNOSIS — R92.333 HETEROGENEOUSLY DENSE TISSUE OF BOTH BREASTS ON MAMMOGRAPHY: ICD-10-CM

## 2024-08-13 DIAGNOSIS — C50.511 MALIGNANT NEOPLASM OF LOWER-OUTER QUADRANT OF RIGHT BREAST OF FEMALE, ESTROGEN RECEPTOR POSITIVE (H): Primary | ICD-10-CM

## 2024-08-13 DIAGNOSIS — Z17.0 MALIGNANT NEOPLASM OF LOWER-OUTER QUADRANT OF RIGHT BREAST OF FEMALE, ESTROGEN RECEPTOR POSITIVE (H): Primary | ICD-10-CM

## 2024-08-13 DIAGNOSIS — N95.2 VAGINAL ATROPHY: ICD-10-CM

## 2024-08-13 DIAGNOSIS — M94.9 DISORDER OF BONE AND CARTILAGE: ICD-10-CM

## 2024-08-13 DIAGNOSIS — Z79.811 LONG TERM CURRENT USE OF AROMATASE INHIBITOR: ICD-10-CM

## 2024-08-13 DIAGNOSIS — Z12.39 BREAST CANCER SCREENING, HIGH RISK PATIENT: ICD-10-CM

## 2024-08-13 DIAGNOSIS — M89.9 DISORDER OF BONE AND CARTILAGE: ICD-10-CM

## 2024-08-13 PROCEDURE — 99214 OFFICE O/P EST MOD 30 MIN: CPT | Mod: GC | Performed by: INTERNAL MEDICINE

## 2024-08-13 PROCEDURE — 99213 OFFICE O/P EST LOW 20 MIN: CPT | Performed by: INTERNAL MEDICINE

## 2024-08-13 RX ORDER — TAMOXIFEN CITRATE 20 MG/1
20 TABLET ORAL DAILY
Qty: 90 TABLET | Refills: 3 | Status: SHIPPED | OUTPATIENT
Start: 2024-08-13

## 2024-08-13 ASSESSMENT — PAIN SCALES - GENERAL: PAINLEVEL: NO PAIN (0)

## 2024-08-13 NOTE — PATIENT INSTRUCTIONS
"Genitourinary syndrome of menopause (ie vaginal dryness, pain with penetration, itching, recurrent urinary tract infections)  -Limit chemicals that touch the vulva/vagina such as soap, feminine washes or fragrance. Water is sufficient for cleansing.   -3-month trial of non-hormonal vaginal moisturizers- these should be used regularly for maintenance.    -Examples:  -Water based: AH! Yes (specialty store). Massage on vulva and introitus daily or every other day. Can order pre-filled one time use applicators.   -Natural oils: organic coconut oil ([employer] store). Massage around the vulva daily after bath or shower. Avoid application into the vagina (can increase risk for infection).   -Hyaluronic acid: Good Clean Love BioNourish Ultra (widely available). Massage onto vulva and apply in vagina with fingers or with re-usable applicator daily for a week then every 2-3 days.   - Use lubricant for sexual activity. Avoid products with high osmolality (i.e.. KY Jelly and Astroglide) or products with parabens, petrochemicals (propylene glycol, benzene, benzoic acid), chlorohexadine gluconate, polyquaternium, glycerine, spermicides, artificial fragrance \"cooling\" or \"tingling: properties\" or artificial flavor.    -Examples:   -Water-based: Good Clean Love Liquid (widely available), AH! YES WB and VIOLA Calle (specialty store or online). Pros: widely available, inexpensive, easy to clean up, non-staining, compatible with condoms and silicone toys, FDA recommended. Cons: more  ingredients to be mindful about, more risk of irritation than silicone, may need to reapply.     -Silicone: UberLube (widely available). Pros: long lasting, non-irritating for most, compatible with condoms, not absorbed, FDA recommended. Cons: Not compatible with silicone toys/dilators, clean-up harder and can stain sheets/clothing  --EROS Clitoral Therapy Device for increased lubrication and arousal-FDA approved (or similar device)  -If non-hormonal therapy " fails to manage symptoms, 10-mcg vaginal estrogen tablet at the lowest frequency or the vaginal ring to alleviate symptoms may be used after informed discussion of risks and benefits.

## 2024-08-13 NOTE — LETTER
8/13/2024      Maribel June  [address]  [place] [address]      Dear Colleague,    Thank you for referring your patient, Maribel June, to the [facility]. Please see a copy of my visit note below.      Oncology Visit:  Date on this visit: 8/13/2024    Diagnosis: Stage Ia, [id number], grade 1, ER positive, TN positive, HER-2 negative invasive carcinoma of the right breast. Oncotype dx recurrence score = 16.    Primary Physician: Juwan Perry     History Of Present Illness:  Ms. June is a 59 year old female with right breast cancer.  Routine screening mammogram on 8/6/2020 showed heterogeneously dense breasts but no concerning findings.  A physician then palpated a mass in the right breast.  Diagnostic mammogram and ultrasound showed a 2.2 cm mass at 8:00, 5 cm from the nipple.  Right breast biopsy showed a grade 1 invasive mammary carcinoma, ER strong in 100%, TN strong in 100%, HER2 negative.  She had a right breast lumpectomy and sentinel lymph node procedure with Dr. Watson on 9/29/2020.  Pathology showed a grade 1 invasive mammary carcinoma measuring 1.6 cm.  There was associated intermediate grade DCIS.  Surgical margins were negative.  A single lymph node was benign.  Oncotype dx recurrence score was 16.  She completed radiation to the right breast, a total of 5256 cGy in 20 fractions, on 12/21/2020.  She started treatment with letrozole in 1/2021.  The medication was poorly tolerated with arthralgias, insomnia, vaginal dryness and fatigue.  Treatment was changed to Tamoxifen in 03/2021.  This caused nausea and so was dose reduced to 10 mg daily in 05/2021. This was slowly increased back to 20 mg daily.  She received Zometa 4 mg IV once every 6 months from 7/29/2021 - 8/16/2024 for  prevention of breast cancer bone metastases.    Interval History:   Ms. June presents today alongside her  for an on treatment visit.  She is on curative intent treatment  with Tamoxifen.  She reports overall doing well.    She traveled to [place] in April for her father-in-law's  when she rolled her left foot and since, has had left ankle pain.  X-ray did not show any fractures, she had physical therapy, was seen by podiatry, and wore a cast for few weeks.  She reports that she also has small tailbone fracture.  He is currently seeing a chiropractor and reports feeling the pain is better.     She is tolerating Tamoxifen okay.  Small joints in the fingers continue to be stiff and painful, especially in the mornings and are exacerbated by movements.  Overall manageable and improves as the day goes on.  She has not been able to exercise or walk due to the ankle pain.  Hot flashes are ongoing, but manageable.  Energy levels are stable.  Mood is good.  No new lumps/ bumps/chest pain shortness of breath/worsening bone pains.    She has ongoing issues with worsening vaginal dryness.  Has tried over-the-counter lubricants and Replens which are helpful somewhat.  Vaginal hyaluronic acid over-the-counter was very expensive and was not helpful so she stopped it.  Previous prescription of Vagifem but her out of pocket cost was >$400 so she did not purchase it.      Past Medical/Surgical History:  Past Medical History:   Diagnosis Date     Depressive disorder      Encounter for breast and pelvic examination      Endometriosis      Herpes genitalis in women      History of major depression     situational with first .      Hypertension 2018     Kidney stone      Malignant neoplasm of right breast in female, estrogen receptor positive (H) 2020     S/P radiation therapy     5,256 cGy to right breast completed 2020 - [facility]   - Hyperlipidemia.  - Osteopenia    Past Surgical History:   Procedure Laterality Date     BREAST BIOPSY, RT/LT Right 2020     BREAST SURGERY Right     Right Breast - Benign      SECTION      x1      COLONOSCOPY N/A 08/16/2016    Procedure: COMBINED COLONOSCOPY, SINGLE OR MULTIPLE BIOPSY/POLYPECTOMY BY BIOPSY;  Surgeon: Duane, William Charles, MD;  Location: [facility] OR     COLONOSCOPY WITH CO2 INSUFFLATION N/A 08/16/2016    Procedure: COLONOSCOPY WITH CO2 INSUFFLATION;  Surgeon: Duane, William Charles, MD;  Location: [facility] OR     CYSTOSCOPY  11/13/2009    left stent placement ([facility])     GENITOURINARY SURGERY      surgery for kidney stone     GYN SURGERY      Partial Hysterectomy at age 29     LUMPECTOMY BREAST WITH SENTINEL NODE, COMBINED Right 09/29/2020    Procedure: Right breast lumpectomy with Holt node biopsy;  Surgeon: Jose Watson MD;  Location: [facility] OR     Allergies:  Allergies as of 08/13/2024 - Reviewed 08/13/2024   Allergen Reaction Noted     Flagyl [metronidazole] Nausea and Vomiting 07/30/2015     Lisinopril  06/13/2019     Oxycodone-acetaminophen Nausea and Vomiting 06/13/2019     Sulfamethoxazole-trimethoprim  12/29/2011     Zithromax [azithromycin dihydrate] Rash 07/03/2013     Current Medications:  Current Outpatient Medications   Medication Sig Dispense Refill     acyclovir (ZOVIRAX) 400 MG tablet Take 1 tablet (400 mg) by mouth every 8 hours 15 tablet 11     amLODIPine (NORVASC) 5 MG tablet Take 0.5 tablets (2.5 mg) by mouth daily       [START ON 8/15/2024] conjugated estrogens (PREMARIN) 0.625 MG/GM vaginal cream Place 0.5 g vaginally twice a week 30 g 1     diclofenac (VOLTAREN) 1 % topical gel 1 g to affected area on feet and ankles 2-3 times a day as needed for pain. 100 g 3     ibuprofen (ADVIL/MOTRIN) 200 MG tablet 3 tablets with food or milk as needed Orally every 6 hrs       latanoprost (XALATAN) 0.005 % ophthalmic solution INSTILL 1 DROP IN BOTH EYES EVERY DAY       Multiple Vitamin (MULTIVITAMIN PO) Take by mouth daily       Omega-3 Fatty Acids (OMEGA 3 PO) Take by mouth daily       pimecrolimus (ELIDEL) 1 % external cream Apply topically 2 times daily 60 g 3     tacrolimus (PROTOPIC)  0.1 % external ointment Apply twice daily for 1 week, take 1 week off, then repeat. 60 g 3     tamoxifen (NOLVADEX) 20 MG tablet Take 1 tablet (20 mg) by mouth daily 90 tablet 3     traZODone (DESYREL) 50 MG tablet Take 1 tablet (50 mg) by mouth at bedtime Take 1/2 pill (25 mg) for a few nights, if tolerable OK to take full pill for sleep 30 tablet 3     triamcinolone (KENALOG) 0.1 % external cream Apply topically 2 times daily Apply topically twice daily for 1 week, take 1 week off, then repeat 60 g 1     acyclovir (ZOVIRAX) 400 MG tablet Take 1 tablet (400 mg) by mouth every 8 hours for 5 days 30 tablet 3     bimatoprost (LUMIGAN) 0.01 % SOLN Place 1 drop into both eyes daily  (Patient not taking: Reported on 5/15/2023)       pantoprazole (PROTONIX) 40 MG EC tablet Take 1 tablet (40 mg) by mouth daily 30-60 min prior to a meal. (Patient not taking: Reported on 7/15/2024) 30 tablet 0     vitamin B-12 (CYANOCOBALAMIN) 1000 MCG tablet 1 tablet Orally Once a day (Patient not taking: Reported on 7/15/2024)        Family and Social History:  Please see initial Oncology consultation dated 10/27/2020 for details.  9/25/2020 Breast Actionable Panel was negative.    Physical Exam:  /73 (BP Location: Right arm, Patient Position: Sitting, Cuff Size: Adult Regular)   Pulse 56   Temp 98.3  F (36.8  C) (Oral)   Resp 12   Wt 50.4 kg (111 lb 3.2 oz)   SpO2 98%   BMI 21.89 kg/m    General:  Well appearing adult female in NAD.  Alert and oriented x 3.  HEENT:  Normocephalic.  Sclera anicteric.  MMM.    Lymph:  No palpable cervical, supraclavicular, or axillary LAD.  Chest:  CTA bilaterally.  No wheezes or crackles.  CV:  RRR.  Nl S1 and S2.    Breast:  Bilateral breasts are of increased fibroglandular density.  There are no dominant or discretely palpable masses in either breast. Lower outer right breast incision with underlying fibrosis unchanged from prior.  Mild tenderness. Bilateral nipples are everted and without  discharge.  Abd:  Soft/ND.   Ext:  No edema of the bilateral lower extremities.   Musculo:  Full ROM of the bilateral upper extremities.    Psych:  Mood and affect appear normal.  Skin:  No visible concerning skin rashes or lesions.    Laboratory/Imaging Studies  I personally reviewed the below images and laboratory studies:    6/8/2024 Bilateral screening mammograms:  Findings: The breasts are heterogeneously dense, which may obscure small masses.  There are breast conservation changes in the right breast.  There is no radiographic evidence of malignancy.                                                                    IMPRESSION: ACR BI-RADS Category 2: Benign    7/2/2024 MRI left ankle:  IMPRESSION:  1.  Attenuation of the anterior talofibular ligament may suggest chronic partial tearing.  2.  Intact ankle ligaments.  3.  No acute osseous abnormality.    7/22/2024 Left upper extremity ultrasound:  FINDINGS: The area of concern in the left upper mid arm laterally was  examined supine and upright. Normal-appearing subcutaneous tissue is  visualized. No discrete lipoma, solid mass, cystic mass, or  lymphadenopathy    ASSESSMENT/PLAN:  Ms. June is a 60 yo female with a stage Ia, [id number], grade 1, ER positive, WA positive, HER2 negative invasive ductal carcinoma of the right breast.  She is s/p treatment with right breast lumpectomy and radiation. She did not tolerate adjuvant AI due to arthralgias, insomnia, and fatigue.  On tamoxifen.    1.  Right breast cancer:  She is approximately 3 years, 10.5 months out from excision of a right breast cancer.  She is on curative intent treatment with Tamoxifen 20 mg daily.  She has fatigue, joint pains, and hot flashes on tamoxifen.  Despite these side effects, she is willing to continue taking it.  Plan is to complete a total 7-10 years of endocrine therapy, as long as she is able to tolerate it.    She is asymptomatic of disease recurrence on history taken today.  Given  increased breast density and that her breast cancer was not initially seen on screening mammogram, we are performing high risk breast screening with both annual mammograms and breast MRI, spacing the two studies so that she is having some form of breast imaging once every 6 months.      - I personally reviewed the images of the bilateral screening mammograms performed 6/8/2024 which are without new or concerning finding when compared to prior.  - Breast MRI around 12/20/2024  - Return to clinic in 6 months    2.  Bone health:  DEXA in 12/2022 with a lowest T-score of -1.9 in the right femoral neck c/w osteopenia.  She received Zometa 4 mg IV once every 6 months from 7/29/2021 - 8/16/2024 for  prevention of breast cancer bone metastases.    - Plan to repeat a DEXA at the time of her return visit.    3.  Arthralgias:  Secondary to menopause and exacerbated by endocrine therapy.    - Continue daily vitamin D supplementation  - Recommend adding back walking 150 minutes per week, when able  - Okay to use topical anesthetics such as Voltaren gel and CBD cream     4.  Hot flashes:  No change since last visit.  Will continue natural measures such as fans, dressing in layers, drinking ice water, etc.    5.  Vaginal dryness:  She is currently using replens vaginal moisturizer. No longer using hyaluronic acid as its expensive and was not helpful. Discussed additional resources.  Vagifem out of pocket cost was >$400.    - Will trial Premarin cream.    6.  Insomnia:  Persistent.    - Continue trazodone a half tab (25 mg) PO at bedtime prn, which has been helping with insomnia.  Provided refills today.    7.  Left ankle pain:  Secondary to fall 4/2024.  MRI showed Attenuation of the anterior talofibular ligament may suggest chronic partial tearing.    - Ongoing follow up with podiatry and chiropractor.    8. Follow Up:    - Breast MRI around 12/20/2024.  - DEXA bone density scan and visit with me in 6 months.    Patient seen and  discussed with .    Anna Manning MD  Hematology and Medical Oncology Fellow, PGY-6  [facility]  Pager: [phone]    Patient was seen and discussed with medical oncology fellow, Dr. Manning. The oncology fellow was personally supervised by me during the patient examination. I personally verified the medical history, performed a physical exam and the medical decision-making. I made appropriate changes to the documentation and the assessment and plan based on my verification, exam, and medical decision making.     I personally spent 35 minutes on the date of the encounter doing chart review, review of test results, interpretation of tests, patient visit, documentation, and discussion with family.       Saige Eli MD            Again, thank you for allowing me to participate in the care of your patient.        Sincerely,        Saige Eli MD

## 2024-08-13 NOTE — PROGRESS NOTES
Oncology Visit:  Date on this visit: 2024    Diagnosis: Stage Ia, [id number], grade 1, ER positive, VA positive, HER-2 negative invasive carcinoma of the right breast. Oncotype dx recurrence score = 16.    Primary Physician: Juwan Perry     History Of Present Illness:  Ms. June is a 59 year old female with right breast cancer.  Routine screening mammogram on 2020 showed heterogeneously dense breasts but no concerning findings.  A physician then palpated a mass in the right breast.  Diagnostic mammogram and ultrasound showed a 2.2 cm mass at 8:00, 5 cm from the nipple.  Right breast biopsy showed a grade 1 invasive mammary carcinoma, ER strong in 100%, VA strong in 100%, HER2 negative.  She had a right breast lumpectomy and sentinel lymph node procedure with Dr. Watson on 2020.  Pathology showed a grade 1 invasive mammary carcinoma measuring 1.6 cm.  There was associated intermediate grade DCIS.  Surgical margins were negative.  A single lymph node was benign.  Oncotype dx recurrence score was 16.  She completed radiation to the right breast, a total of 5256 cGy in 20 fractions, on 2020.  She started treatment with letrozole in 2021.  The medication was poorly tolerated with arthralgias, insomnia, vaginal dryness and fatigue.  Treatment was changed to Tamoxifen in 2021.  This caused nausea and so was dose reduced to 10 mg daily in 2021. This was slowly increased back to 20 mg daily.  She received Zometa 4 mg IV once every 6 months from 2021 - 2024 for  prevention of breast cancer bone metastases.    Interval History:   Ms. June presents today alongside her  for an on treatment visit.  She is on curative intent treatment with Tamoxifen.  She reports overall doing well.    She traveled to [place] in April for her father-in-law's  when she rolled her left foot and since, has had left ankle pain.  X-ray did not show any fractures, she had physical therapy, was  seen by podiatry, and wore a cast for few weeks.  She reports that she also has small tailbone fracture.  He is currently seeing a chiropractor and reports feeling the pain is better.     She is tolerating Tamoxifen okay.  Small joints in the fingers continue to be stiff and painful, especially in the mornings and are exacerbated by movements.  Overall manageable and improves as the day goes on.  She has not been able to exercise or walk due to the ankle pain.  Hot flashes are ongoing, but manageable.  Energy levels are stable.  Mood is good.  No new lumps/ bumps/chest pain shortness of breath/worsening bone pains.    She has ongoing issues with worsening vaginal dryness.  Has tried over-the-counter lubricants and Replens which are helpful somewhat.  Vaginal hyaluronic acid over-the-counter was very expensive and was not helpful so she stopped it.  Previous prescription of Vagifem but her out of pocket cost was >$400 so she did not purchase it.      Past Medical/Surgical History:  Past Medical History:   Diagnosis Date    Depressive disorder     Encounter for breast and pelvic examination     Endometriosis     Herpes genitalis in women     History of major depression     situational with first .     Hypertension 2018    Kidney stone     Malignant neoplasm of right breast in female, estrogen receptor positive (H) 2020    S/P radiation therapy     5,256 cGy to right breast completed 2020 - [facility]   - Hyperlipidemia.  - Osteopenia    Past Surgical History:   Procedure Laterality Date    BREAST BIOPSY, RT/LT Right 2020    BREAST SURGERY Right     Right Breast - Benign     SECTION      x1    COLONOSCOPY N/A 2016    Procedure: COMBINED COLONOSCOPY, SINGLE OR MULTIPLE BIOPSY/POLYPECTOMY BY BIOPSY;  Surgeon: Duane, William Charles, MD;  Location: [facility] OR    COLONOSCOPY WITH CO2 INSUFFLATION N/A 2016    Procedure: COLONOSCOPY WITH CO2 INSUFFLATION;   Surgeon: Duane, William Charles, MD;  Location: [facility] OR    CYSTOSCOPY  11/13/2009    left stent placement ([facility])    GENITOURINARY SURGERY      surgery for kidney stone    GYN SURGERY      Partial Hysterectomy at age 29    LUMPECTOMY BREAST WITH SENTINEL NODE, COMBINED Right 09/29/2020    Procedure: Right breast lumpectomy with [place] node biopsy;  Surgeon: Jose Watson MD;  Location: [facility] OR     Allergies:  Allergies as of 08/13/2024 - Reviewed 08/13/2024   Allergen Reaction Noted    Flagyl [metronidazole] Nausea and Vomiting 07/30/2015    Lisinopril  06/13/2019    Oxycodone-acetaminophen Nausea and Vomiting 06/13/2019    Sulfamethoxazole-trimethoprim  12/29/2011    Zithromax [azithromycin dihydrate] Rash 07/03/2013     Current Medications:  Current Outpatient Medications   Medication Sig Dispense Refill    acyclovir (ZOVIRAX) 400 MG tablet Take 1 tablet (400 mg) by mouth every 8 hours 15 tablet 11    amLODIPine (NORVASC) 5 MG tablet Take 0.5 tablets (2.5 mg) by mouth daily      [START ON 8/15/2024] conjugated estrogens (PREMARIN) 0.625 MG/GM vaginal cream Place 0.5 g vaginally twice a week 30 g 1    diclofenac (VOLTAREN) 1 % topical gel 1 g to affected area on feet and ankles 2-3 times a day as needed for pain. 100 g 3    ibuprofen (ADVIL/MOTRIN) 200 MG tablet 3 tablets with food or milk as needed Orally every 6 hrs      latanoprost (XALATAN) 0.005 % ophthalmic solution INSTILL 1 DROP IN BOTH EYES EVERY DAY      Multiple Vitamin (MULTIVITAMIN PO) Take by mouth daily      Omega-3 Fatty Acids (OMEGA 3 PO) Take by mouth daily      pimecrolimus (ELIDEL) 1 % external cream Apply topically 2 times daily 60 g 3    tacrolimus (PROTOPIC) 0.1 % external ointment Apply twice daily for 1 week, take 1 week off, then repeat. 60 g 3    tamoxifen (NOLVADEX) 20 MG tablet Take 1 tablet (20 mg) by mouth daily 90 tablet 3    traZODone (DESYREL) 50 MG tablet Take 1 tablet (50 mg) by mouth at bedtime Take 1/2 pill (25 mg) for a few  nights, if tolerable OK to take full pill for sleep 30 tablet 3    triamcinolone (KENALOG) 0.1 % external cream Apply topically 2 times daily Apply topically twice daily for 1 week, take 1 week off, then repeat 60 g 1    acyclovir (ZOVIRAX) 400 MG tablet Take 1 tablet (400 mg) by mouth every 8 hours for 5 days 30 tablet 3    bimatoprost (LUMIGAN) 0.01 % SOLN Place 1 drop into both eyes daily  (Patient not taking: Reported on 5/15/2023)      pantoprazole (PROTONIX) 40 MG EC tablet Take 1 tablet (40 mg) by mouth daily 30-60 min prior to a meal. (Patient not taking: Reported on 7/15/2024) 30 tablet 0    vitamin B-12 (CYANOCOBALAMIN) 1000 MCG tablet 1 tablet Orally Once a day (Patient not taking: Reported on 7/15/2024)        Family and Social History:  Please see initial Oncology consultation dated 10/27/2020 for details.  9/25/2020 Breast Actionable Panel was negative.    Physical Exam:  /73 (BP Location: Right arm, Patient Position: Sitting, Cuff Size: Adult Regular)   Pulse 56   Temp 98.3  F (36.8  C) (Oral)   Resp 12   Wt 50.4 kg (111 lb 3.2 oz)   SpO2 98%   BMI 21.89 kg/m    General:  Well appearing adult female in NAD.  Alert and oriented x 3.  HEENT:  Normocephalic.  Sclera anicteric.  MMM.    Lymph:  No palpable cervical, supraclavicular, or axillary LAD.  Chest:  CTA bilaterally.  No wheezes or crackles.  CV:  RRR.  Nl S1 and S2.    Breast:  Bilateral breasts are of increased fibroglandular density.  There are no dominant or discretely palpable masses in either breast. Lower outer right breast incision with underlying fibrosis unchanged from prior.  Mild tenderness. Bilateral nipples are everted and without discharge.  Abd:  Soft/ND.   Ext:  No edema of the bilateral lower extremities.   Musculo:  Full ROM of the bilateral upper extremities.    Psych:  Mood and affect appear normal.  Skin:  No visible concerning skin rashes or lesions.    Laboratory/Imaging Studies  I personally reviewed the below  images and laboratory studies:    6/8/2024 Bilateral screening mammograms:  Findings: The breasts are heterogeneously dense, which may obscure small masses.  There are breast conservation changes in the right breast.  There is no radiographic evidence of malignancy.                                                                    IMPRESSION: ACR BI-RADS Category 2: Benign    7/2/2024 MRI left ankle:  IMPRESSION:  1.  Attenuation of the anterior talofibular ligament may suggest chronic partial tearing.  2.  Intact ankle ligaments.  3.  No acute osseous abnormality.    7/22/2024 Left upper extremity ultrasound:  FINDINGS: The area of concern in the left upper mid arm laterally was  examined supine and upright. Normal-appearing subcutaneous tissue is  visualized. No discrete lipoma, solid mass, cystic mass, or  lymphadenopathy    ASSESSMENT/PLAN:  Ms. June is a 58 yo female with a stage Ia, [id number], grade 1, ER positive, [place] positive, HER2 negative invasive ductal carcinoma of the right breast.  She is s/p treatment with right breast lumpectomy and radiation. She did not tolerate adjuvant AI due to arthralgias, insomnia, and fatigue.  On tamoxifen.    1.  Right breast cancer:  She is approximately 3 years, 10.5 months out from excision of a right breast cancer.  She is on curative intent treatment with Tamoxifen 20 mg daily.  She has fatigue, joint pains, and hot flashes on tamoxifen.  Despite these side effects, she is willing to continue taking it.  Plan is to complete a total 7-10 years of endocrine therapy, as long as she is able to tolerate it.    She is asymptomatic of disease recurrence on history taken today.  Given increased breast density and that her breast cancer was not initially seen on screening mammogram, we are performing high risk breast screening with both annual mammograms and breast MRI, spacing the two studies so that she is having some form of breast imaging once every 6 months.      - I  personally reviewed the images of the bilateral screening mammograms performed 6/8/2024 which are without new or concerning finding when compared to prior.  - Breast MRI around 12/20/2024  - Return to clinic in 6 months    2.  Bone health:  DEXA in 12/2022 with a lowest T-score of -1.9 in the right femoral neck c/w osteopenia.  She received Zometa 4 mg IV once every 6 months from 7/29/2021 - 8/16/2024 for  prevention of breast cancer bone metastases.    - Plan to repeat a DEXA at the time of her return visit.    3.  Arthralgias:  Secondary to menopause and exacerbated by endocrine therapy.    - Continue daily vitamin D supplementation  - Recommend adding back walking 150 minutes per week, when able  - Okay to use topical anesthetics such as Voltaren gel and CBD cream     4.  Hot flashes:  No change since last visit.  Will continue natural measures such as fans, dressing in layers, drinking ice water, etc.    5.  Vaginal dryness:  She is currently using replens vaginal moisturizer. No longer using hyaluronic acid as its expensive and was not helpful. Discussed additional resources.  Vagifem out of pocket cost was >$400.    - Will trial Premarin cream.    6.  Insomnia:  Persistent.    - Continue trazodone a half tab (25 mg) PO at bedtime prn, which has been helping with insomnia.  Provided refills today.    7.  Left ankle pain:  Secondary to fall 4/2024.  MRI showed Attenuation of the anterior talofibular ligament may suggest chronic partial tearing.    - Ongoing follow up with podiatry and chiropractor.    8. Follow Up:    - Breast MRI around 12/20/2024.  - DEXA bone density scan and visit with me in 6 months.    Patient seen and discussed with .    Anna Manning MD  Hematology and Medical Oncology Fellow, PGY-6  [facility]  Pager: [phone]    Patient was seen and discussed with medical oncology fellow, Dr. Manning. The oncology fellow was personally supervised by me during the  patient examination. I personally verified the medical history, performed a physical exam and the medical decision-making. I made appropriate changes to the documentation and the assessment and plan based on my verification, exam, and medical decision making.     I personally spent 35 minutes on the date of the encounter doing chart review, review of test results, interpretation of tests, patient visit, documentation, and discussion with family.       Saige Eli MD

## 2024-08-13 NOTE — NURSING NOTE
"Oncology Rooming Note    August 13, 2024 9:11 AM   Maribel June is a 59 year old female who presents for:    Chief Complaint   Patient presents with    Oncology Clinic Visit     Malignant neoplasm of lower-outer quadrant of right breast     Initial Vitals: /73 (BP Location: Right arm, Patient Position: Sitting, Cuff Size: Adult Regular)   Pulse 56   Temp 98.3  F (36.8  C) (Oral)   Resp 12   Wt 50.4 kg (111 lb 3.2 oz)   SpO2 98%   BMI 21.89 kg/m   Estimated body mass index is 21.89 kg/m  as calculated from the following:    Height as of 9/12/22: 1.518 m (4' 11.76\").    Weight as of this encounter: 50.4 kg (111 lb 3.2 oz). Body surface area is 1.46 meters squared.  No Pain (0) Comment: Data Unavailable   No LMP recorded. Patient has had a hysterectomy.  Allergies reviewed: Yes  Medications reviewed: Yes    Medications: MEDICATION REFILLS NEEDED TODAY. Provider was notified. Via message column  Pharmacy name entered into [employer]: [id number] - [place], [place] - [address] AT [facility]    Frailty Screening:   Is the patient here for a new oncology consult visit in cancer care? 2. No      Clinical concerns:  tamoxifen and trazodone refills needed       Mouna Cummings              "

## 2024-09-04 ENCOUNTER — OFFICE VISIT (OUTPATIENT)
Dept: DERMATOLOGY | Facility: CLINIC | Age: 59
End: 2024-09-04
Payer: COMMERCIAL

## 2024-09-04 DIAGNOSIS — R22.9 SUBCUTANEOUS NODULE: Primary | ICD-10-CM

## 2024-09-04 PROCEDURE — 88305 TISSUE EXAM BY PATHOLOGIST: CPT | Performed by: DERMATOLOGY

## 2024-09-04 PROCEDURE — 11104 PUNCH BX SKIN SINGLE LESION: CPT | Performed by: PHYSICIAN ASSISTANT

## 2024-09-04 NOTE — NURSING NOTE
Maribel June's goals for this visit include:   Chief Complaint   Patient presents with    Biopsy     Left upper arm biopsy       She requests these members of her care team be copied on today's visit information:     PCP: Juwan Perry    Referring Provider:  Referred Self, MD  No address on file    There were no vitals taken for this visit.    Do you need any medication refills at today's visit?     Jenae Witt LPN on 9/4/2024 at 4:43 PM

## 2024-09-04 NOTE — PATIENT INSTRUCTIONS
Patient Education       Proper skin care from [facility]:    -Eliminate harsh soaps as they strip the natural oils from the skin, often resulting in dry itchy skin ( i.e. Dial, Zest, Kinyarwanda Spring)  -Use mild soaps such as Cetaphil or Dove Sensitive Skin in the shower. You do not need to use soap on arms, legs, and trunk every time you shower unless visibly soiled.   -Avoid hot or cold showers.  -After showering, lightly dry off and apply moisturizing within 2-3 minutes. This will help trap moisture in the skin.   -Aggressive use of a moisturizer at least 1-2 times a day to the entire body (including -Vanicream, Cetaphil, Aquaphor or Cerave) and moisturize hands after every washing.  -We recommend using moisturizers that come in a tub that needs to be scooped out, not a pump. This has more of an oil base. It will hold moisture in your skin much better than a water base moisturizer. The above recommended are non-pore clogging.      Wear a sunscreen with at least SPF 30 on your face, ears, neck and V of the chest daily. Wear sunscreen on other areas of the body if those areas are exposed to the sun throughout the day. Sunscreens can contain physical and/or chemical blockers. Physical blockers are less likely to clog pores, these include zinc oxide and titanium dioxide. Reapply every two hour and after swimming.     Sunscreen examples: https://www.ewg.org/sunscreen/    UV radiation  UVA radiation remains constant throughout the day and throughout the year. It is a longer wavelength than UVB and therefore penetrates deeper into the skin leading to immediate and delayed tanning, photoaging, and skin cancer. 70-80% of UVA and UVB radiation occurs between the hours of 10am-2pm.  UVB radiation  UVB radiation causes the most harmful effects and is more significant during the summer months. However, snow and ice can reflect UVB radiation leading to skin damage during the winter months as well. UVB radiation is  responsible for tanning, burning, inflammation, delayed erythema (pinkness), pigmentation (brown spots), and skin cancer.     I recommend self monthly full body exams and yearly full body exams with a dermatology provider. If you develop a new or changing lesion please follow up for examination. Most skin cancers are pink and scaly or pink and pearly. However, we do see blue/brown/black skin cancers.  Consider the ABCDEs of melanoma when giving yourself your monthly full body exam ( don't forget the groin, buttocks, feet, toes, etc). A-asymmetry, B-borders, C-color, D-diameter, E-elevation or evolving. If you see any of these changes please follow up in clinic. If you cannot see your back I recommend purchasing a hand held mirror to use with a larger wall mirror.       Checking for Skin Cancer  You can find cancer early by checking your skin each month. There are 3 kinds of skin cancer. They are melanoma, basal cell carcinoma, and squamous cell carcinoma. Doing monthly skin checks is the best way to find new marks or skin changes. Follow the instructions below for checking your skin.   The ABCDEs of checking moles for melanoma   Check your moles or growths for signs of melanoma using ABCDE:   Asymmetry: the sides of the mole or growth don t match  Border: the edges are ragged, notched, or blurred  Color: the color within the mole or growth varies  Diameter: the mole or growth is larger than 6 mm (size of a pencil eraser)  Evolving: the size, shape, or color of the mole or growth is changing (evolving is not shown in the images below)    Checking for other types of skin cancer  Basal cell carcinoma or squamous cell carcinoma have symptoms such as:     A spot or mole that looks different from all other marks on your skin  Changes in how an area feels, such as itching, tenderness, or pain  Changes in the skin's surface, such as oozing, bleeding, or scaliness  A sore that does not heal  New swelling or redness beyond  the border of a mole    Who s at risk?  Anyone can get skin cancer. But you are at greater risk if you have:   Fair skin, light-colored hair, or light-colored eyes  Many moles or abnormal moles on your skin  A history of sunburns from sunlight or tanning beds  A family history of skin cancer  A history of exposure to radiation or chemicals  A weakened immune system  If you have had skin cancer in the past, you are at risk for recurring skin cancer.   How to check your skin  Do your monthly skin checkups in front of a full-length mirror. Check all parts of your body, including your:   Head (ears, face, neck, and scalp)  Torso (front, back, and sides)  Arms (tops, undersides, upper, and lower armpits)  Hands (palms, backs, and fingers, including under the nails)  Buttocks and genitals  Legs (front, back, and sides)  Feet (tops, soles, toes, including under the nails, and between toes)  If you have a lot of moles, take digital photos of them each month. Make sure to take photos both up close and from a distance. These can help you see if any moles change over time.   Most skin changes are not cancer. But if you see any changes in your skin, call your doctor right away. Only he or she can diagnose a problem. If you have skin cancer, seeing your doctor can be the first step toward getting the treatment that could save your life.   [employer] last reviewed this educational content on 4/1/2019 2000-2020 [employer]. [address] [place] [address], [place] [address]. All rights reserved. This information is not intended as a substitute for professional medical care. Always follow your healthcare professional's instructions.          Wound Care After a Biopsy    What is a skin biopsy?  A skin biopsy allows the doctor to examine a very small piece of tissue under the microscope to determine the diagnosis and the best treatment for the skin condition. A local anesthetic (numbing medicine)  is injected with a very small  needle into the skin area to be tested. A small piece of skin is taken from the area. Sometimes a suture (stitch) is used.     What are the risks of a skin biopsy?  I will experience scar, bleeding, swelling, pain, crusting and redness. I may experience incomplete removal or recurrence. Risks of this procedure are excessive bleeding, bruising, infection, nerve damage, numbness, thick (hypertrophic or keloidal) scar and non-diagnostic biopsy.    How should I care for my wound for the first 24 hours?  Keep the wound dry and covered for 24 hours  If it bleeds, hold direct pressure on the area for 15 minutes. If bleeding does not stop then go to the emergency room  Avoid strenuous exercise the first 1-2 days or as your doctor instructs you    How should I care for the wound after 24 hours?  After 24 hours, remove the bandage  You may bathe or shower as normal  If you had a scalp biopsy, you can shampoo as usual and can use shower water to clean the biopsy site daily  Clean the wound twice a day with gentle soap and water  Do not scrub, be gentle  Apply white petroleum/Vaseline after cleaning the wound with a cotton swab or a clean finger, and keep the site covered with a Bandaid /bandage. Bandages are not necessary with a scalp biopsy  If you are unable to cover the site with a Bandaid /bandage, re-apply ointment 2-3 times a day to keep the site moist. Moisture will help with healing  Avoid strenuous activity for first 1-2 days  Avoid lakes, rivers, pools, and oceans until the stitches are removed or the site is healed    How do I clean my wound?  Wash hands thoroughly with soap or use hand  before all wound care  Clean the wound with gentle soap and water  Apply white petroleum/Vaseline  to wound after it is clean  Replace the Bandaid /bandage to keep the wound covered for the first few days or as instructed by your doctor  If you had a scalp biopsy, warm shower water to the area on a daily basis should  suffice    What should I use to clean my wound?   Cotton-tipped applicators (Qtips )  White petroleum jelly (Vaseline ). Use a clean new container and use Q-tips to apply.  Bandaids   as needed  Gentle soap     How should I care for my wound long term?  Do not get your wound dirty  Keep up with wound care for one week or until the area is healed.  A small scab will form and fall off by itself when the area is completely healed. The area will be red and will become pink in color as it heals. Sun protection is very important for how your scar will turn out. Sunscreen with an SPF 30 or greater is recommended once the area is healed.  If you have stitches, stitches need to be removed in 10 days. You may return to our clinic for this or you may have it done locally at your doctor s office.  You should have some soreness but it should be mild and slowly go away over several days. Talk to your doctor about using tylenol for pain,    When should I call my doctor?  If you have increased:   Pain or swelling  Pus or drainage (clear or slightly yellow drainage is ok)  Temperature over 100F  Spreading redness or warmth around wound    When will I hear about my results?  The biopsy results can take 2-3 weeks to come back. The clinic will call you with the results, send you a [employer] message, or have you schedule a follow-up clinic or phone time to discuss the results. Contact our clinics if you do not hear from us in 3 weeks.     Who should I call with questions?  [facility]: [phone]   [facility]: [phone]  For urgent needs outside of business hours call the [facility] at [phone] and ask for the dermatology resident on call
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
## 2024-09-08 LAB
PATH REPORT.COMMENTS IMP SPEC: NORMAL
PATH REPORT.FINAL DX SPEC: NORMAL
PATH REPORT.GROSS SPEC: NORMAL
PATH REPORT.MICROSCOPIC SPEC OTHER STN: NORMAL
PATH REPORT.RELEVANT HX SPEC: NORMAL

## 2024-09-15 ENCOUNTER — HEALTH MAINTENANCE LETTER (OUTPATIENT)
Age: 59
End: 2024-09-15

## 2024-09-16 ENCOUNTER — ALLIED HEALTH/NURSE VISIT (OUTPATIENT)
Dept: DERMATOLOGY | Facility: CLINIC | Age: 59
End: 2024-09-16
Payer: COMMERCIAL

## 2024-09-16 DIAGNOSIS — Z48.02 VISIT FOR SUTURE REMOVAL: Primary | ICD-10-CM

## 2024-09-16 PROCEDURE — 99207 PR NO CHARGE LOS: CPT

## 2024-09-16 NOTE — PROGRESS NOTES
Maribel June comes into clinic today at the request of Miguelina Howard Ordering Provider for suture removal.        This service provided today was under the supervising provider of the day Dr. Penaloza, who was available if needed.    Roxanna Bonilla RN   Dermatology Suture Removal Record  S: Maribel presents to the clinic today for  removal of suture.  The patient has had the suture in place for 12 days.    There has been no history of infection or drainage.    O: 1 sutures are seen located on the left upper arm.  The wound is healing well with no signs of infection.      A: Suture removal.    P:  All sutures were easily removed today.  Routine wound care discussed.  The patient will follow up as needed.

## 2024-12-23 ENCOUNTER — ANCILLARY PROCEDURE (OUTPATIENT)
Dept: MRI IMAGING | Facility: CLINIC | Age: 59
End: 2024-12-23
Attending: INTERNAL MEDICINE
Payer: COMMERCIAL

## 2024-12-23 DIAGNOSIS — Z85.3 PERSONAL HISTORY OF MALIGNANT NEOPLASM OF BREAST: ICD-10-CM

## 2024-12-23 DIAGNOSIS — R92.333 HETEROGENEOUSLY DENSE TISSUE OF BOTH BREASTS ON MAMMOGRAPHY: ICD-10-CM

## 2024-12-23 DIAGNOSIS — Z12.39 BREAST CANCER SCREENING, HIGH RISK PATIENT: ICD-10-CM

## 2024-12-23 PROCEDURE — 77049 MRI BREAST C-+ W/CAD BI: CPT | Performed by: RADIOLOGY

## 2024-12-23 PROCEDURE — A9585 GADOBUTROL INJECTION: HCPCS | Performed by: RADIOLOGY

## 2024-12-23 RX ORDER — GADOBUTROL 604.72 MG/ML
5 INJECTION INTRAVENOUS ONCE
Status: COMPLETED | OUTPATIENT
Start: 2024-12-23 | End: 2024-12-23

## 2024-12-23 RX ADMIN — GADOBUTROL 5 ML: 604.72 INJECTION INTRAVENOUS at 11:40

## 2025-01-12 ENCOUNTER — MYC MEDICAL ADVICE (OUTPATIENT)
Dept: ONCOLOGY | Facility: CLINIC | Age: 60
End: 2025-01-12
Payer: COMMERCIAL

## 2025-01-14 NOTE — TELEPHONE ENCOUNTER
"Pt reports sx started 01/09/25. She was experiencing \"lots of pain\"/swelling of L armpit, fatigue and headache.  Pt states she was seen at a local clinic yesterday because sx were very bothersome. They told pt that her lymph nodes were swollen and ran test to check for infection. She was prescribed abx and advised get an US.   Pt tried to schedule the US but she was told she also needed a diagnostic mammogram. Pt unsure if insurance will cover imaging. She will be calling insurance today to verify it will be covered.   Pt states if it is covered, the provider who saw her yesterday will order the diagnostic mammogram along with the US.     Pain has improved today but still feeling weak and dizzy. She started abx last night.      "

## 2025-01-14 NOTE — TELEPHONE ENCOUNTER
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 Writer informed will try back around this time.    Situation:   Maribel reporting left armpit pain and swelling via My[facility] message. Triage RN call to pt to follow up.      Background:   Treating Provider:   Dr. Saige Eli    Date of last office visit:  8/13/24    Recent treatments: Yes: tamoxifen

## 2025-01-14 NOTE — TELEPHONE ENCOUNTER
"Call to pt and informed her of provider response:   \"I agree with the outside provider assessment that given constellation of symptoms including fatigue, headache and lymph node swelling, this is likely a viral cause.  It is reassuring that the lymphadenopathy is on the opposite side of her breast cancer, but we recommend evaluation of any palpable lymph node.  Therefore I recommend she proceed with the left breast diagnostic mammogram and left axillary ultrasound as planned.\"    Pt verbalized understanding.  "

## 2025-01-22 ENCOUNTER — ANCILLARY PROCEDURE (OUTPATIENT)
Dept: MAMMOGRAPHY | Facility: CLINIC | Age: 60
End: 2025-01-22
Attending: FAMILY MEDICINE
Payer: COMMERCIAL

## 2025-01-22 ENCOUNTER — ANCILLARY PROCEDURE (OUTPATIENT)
Dept: ULTRASOUND IMAGING | Facility: CLINIC | Age: 60
End: 2025-01-22
Attending: FAMILY MEDICINE
Payer: COMMERCIAL

## 2025-01-22 DIAGNOSIS — Z85.3 HX OF BREAST CANCER: ICD-10-CM

## 2025-01-22 DIAGNOSIS — R22.32 AXILLARY LUMP, LEFT: ICD-10-CM

## 2025-01-22 DIAGNOSIS — L04.2 ACUTE AXILLARY LYMPHADENITIS: ICD-10-CM

## 2025-01-22 PROCEDURE — 76882 US LMTD JT/FCL EVL NVASC XTR: CPT | Mod: 50

## 2025-01-22 PROCEDURE — G0279 TOMOSYNTHESIS, MAMMO: HCPCS

## 2025-01-22 PROCEDURE — 77065 DX MAMMO INCL CAD UNI: CPT | Mod: LT

## 2025-02-06 ENCOUNTER — ANCILLARY PROCEDURE (OUTPATIENT)
Dept: BONE DENSITY | Facility: CLINIC | Age: 60
End: 2025-02-06
Attending: INTERNAL MEDICINE
Payer: COMMERCIAL

## 2025-02-06 DIAGNOSIS — M94.9 DISORDER OF BONE AND CARTILAGE: ICD-10-CM

## 2025-02-06 DIAGNOSIS — Z79.811 LONG TERM CURRENT USE OF AROMATASE INHIBITOR: ICD-10-CM

## 2025-02-06 DIAGNOSIS — M89.9 DISORDER OF BONE AND CARTILAGE: ICD-10-CM

## 2025-02-06 PROCEDURE — 77080 DXA BONE DENSITY AXIAL: CPT | Performed by: RADIOLOGY

## 2025-02-08 NOTE — PROGRESS NOTES
Oncology Visit:  Date on this visit: 2/10/2025    Diagnosis: Stage Ia, [id number], grade 1, ER positive, HI positive, HER-2 negative invasive carcinoma of the right breast. Oncotype dx recurrence score = 16.    Primary Physician: Juwan Perry     History Of Present Illness:  Ms. June is a 59 year old female with right breast cancer.  Routine screening mammogram on 8/6/2020 showed heterogeneously dense breasts but no concerning findings.  A physician then palpated a mass in the right breast.  Diagnostic mammogram and ultrasound showed a 2.2 cm mass at 8:00, 5 cm from the nipple.  Right breast biopsy showed a grade 1 invasive mammary carcinoma, ER strong in 100%, HI strong in 100%, HER2 negative.  She had a right breast lumpectomy and sentinel lymph node procedure with Dr. Watson on 9/29/2020.  Pathology showed a grade 1 invasive mammary carcinoma measuring 1.6 cm.  There was associated intermediate grade DCIS.  Surgical margins were negative.  A single lymph node was benign.  Oncotype dx recurrence score was 16.  She completed radiation to the right breast, a total of 5256 cGy in 20 fractions, on 12/21/2020.  She started treatment with letrozole in 1/2021.  The medication was poorly tolerated with arthralgias, insomnia, vaginal dryness and fatigue.  Treatment was changed to Tamoxifen in 03/2021.  This caused nausea and so was dose reduced to 10 mg daily in 05/2021. This was slowly increased back to 20 mg daily.  She received Zometa 4 mg IV once every 6 months from 7/29/2021 - 8/16/2024 for  prevention of breast cancer bone metastases.    Interval History:   Ms. June comes into clinic today for an on treatment visit.  She is on adjuvant curative intent treatment with tamoxifen.  She has ongoing hot flashes on the medication.  She believes they are decreasing in frequency with time.  She also notes ongoing exhaustion at the end of a day of work.  This past winter, she had sudden onset of fatigue, headache,  Called patient and left message to call back to review final pathology results.   nausea, and pain beneath her left arm.  She notes that this all started after treatment for a vaginal yeast infection.  She was seen in the emergency department due to lightheadedness and dizziness.  She had a complete viral panel which was negative.  She states the left axillary pain finally went away 3 days ago.  The other symptoms resolved previously.  She denies concerning lumps, pain, or swelling of either breast.  She has full range of motion of her bilateral upper extremities.  She has no current cough, shortness of breath, or chest pain.  She has no abdominal complaints.  She denies bone or joint aches or pains.  It has now been a year since her 's parents have passed.  They have resumed construction on their house in [facility].  They are hopeful to move into it in .      Past Medical/Surgical History:  Past Medical History:   Diagnosis Date    Breast cancer (H)     Depressive disorder     Encounter for breast and pelvic examination     Endometriosis     Herpes genitalis in women     History of major depression     situational with first .     Hypertension 2018    Kidney stone     Malignant neoplasm of right breast in female, estrogen receptor positive (H) 2020    S/P radiation therapy     5,256 cGy to right breast completed 2020 - [facility]   - Hyperlipidemia.  - Osteopenia    Past Surgical History:   Procedure Laterality Date    BREAST BIOPSY, RT/LT Right 2020    BREAST SURGERY Right     Right Breast - Benign     SECTION      x1    COLONOSCOPY N/A 2016    Procedure: COMBINED COLONOSCOPY, SINGLE OR MULTIPLE BIOPSY/POLYPECTOMY BY BIOPSY;  Surgeon: Duane, William Charles, MD;  Location:  OR    COLONOSCOPY WITH CO2 INSUFFLATION N/A 2016    Procedure: COLONOSCOPY WITH CO2 INSUFFLATION;  Surgeon: Duane, William Charles, MD;  Location:  OR    CYSTOSCOPY  2009    left stent placement ([facility])    GENITOURINARY SURGERY       surgery for kidney stone    GYN SURGERY      Partial Hysterectomy at age 29    LUMPECTOMY BREAST WITH SENTINEL NODE, COMBINED Right 09/29/2020    Procedure: Right breast lumpectomy with McIntyre node biopsy;  Surgeon: Jose Watson MD;  Location: [facility] OR     Allergies:  Allergies as of 02/10/2025 - Reviewed 09/04/2024   Allergen Reaction Noted    Flagyl [metronidazole] Nausea and Vomiting 07/30/2015    Lisinopril  06/13/2019    Oxycodone-acetaminophen Nausea and Vomiting 06/13/2019    Sulfamethoxazole-trimethoprim  12/29/2011    Zithromax [azithromycin dihydrate] Rash 07/03/2013     Current Medications:  Current Outpatient Medications   Medication Sig Dispense Refill    acyclovir (ZOVIRAX) 400 MG tablet Take 1 tablet (400 mg) by mouth every 8 hours for 5 days 30 tablet 3    acyclovir (ZOVIRAX) 400 MG tablet Take 1 tablet (400 mg) by mouth every 8 hours 15 tablet 11    amLODIPine (NORVASC) 5 MG tablet Take 0.5 tablets (2.5 mg) by mouth daily      bimatoprost (LUMIGAN) 0.01 % SOLN Place 1 drop into both eyes daily.      conjugated estrogens (PREMARIN) 0.625 MG/GM vaginal cream Place 0.5 g vaginally twice a week 30 g 1    diclofenac (VOLTAREN) 1 % topical gel 1 g to affected area on feet and ankles 2-3 times a day as needed for pain. 100 g 3    ibuprofen (ADVIL/MOTRIN) 200 MG tablet 3 tablets with food or milk as needed Orally every 6 hrs      latanoprost (XALATAN) 0.005 % ophthalmic solution INSTILL 1 DROP IN BOTH EYES EVERY DAY      Multiple Vitamin (MULTIVITAMIN PO) Take by mouth daily      Omega-3 Fatty Acids (OMEGA 3 PO) Take by mouth daily      pantoprazole (PROTONIX) 40 MG EC tablet Take 1 tablet (40 mg) by mouth daily 30-60 min prior to a meal. 30 tablet 0    pimecrolimus (ELIDEL) 1 % external cream Apply topically 2 times daily 60 g 3    tacrolimus (PROTOPIC) 0.1 % external ointment Apply twice daily for 1 week, take 1 week off, then repeat. 60 g 3    tamoxifen (NOLVADEX) 20 MG tablet Take 1 tablet (20 mg) by  mouth daily 90 tablet 3    traZODone (DESYREL) 50 MG tablet Take 1 tablet (50 mg) by mouth at bedtime Take 1/2 pill (25 mg) for a few nights, if tolerable OK to take full pill for sleep 30 tablet 3    triamcinolone (KENALOG) 0.1 % external cream Apply topically 2 times daily Apply topically twice daily for 1 week, take 1 week off, then repeat 60 g 1    vitamin B-12 (CYANOCOBALAMIN) 1000 MCG tablet         Family and Social History:  Please see initial Oncology consultation dated 10/27/2020 for details.  9/25/2020 Breast Actionable Panel was negative.    Physical Exam:  /78 (BP Location: Right arm, Patient Position: Sitting, Cuff Size: Adult Regular)   Pulse 62   Temp 98.1  F (36.7  C) (Oral)   Resp 20   Wt 49 kg (108 lb 1.6 oz)   SpO2 96%   BMI 21.28 kg/m    General:  Well appearing adult female in NAD.  Alert and oriented x 3.  HEENT:  Normocephalic.  Sclera anicteric.  MMM.    Lymph:  No palpable cervical, supraclavicular, or axillary LAD.  Chest:  CTA bilaterally.  No wheezes or crackles.  CV:  RRR.  Nl S1 and S2.    Breast:  Bilateral breasts are of increased fibroglandular density.  There are no dominant or discretely palpable masses in either breast. Lower outer right breast incision with underlying fibrosis unchanged from prior.  Mild tenderness. Bilateral nipples are everted and without discharge.  Abd:  Soft/ND.   Ext:  No edema of the bilateral lower extremities.   Musculo:  Full ROM of the bilateral upper extremities.    Psych:  Mood and affect appear normal.  Skin:  No visible concerning skin rashes or lesions.    Laboratory/Imaging Studies  I personally reviewed the below images and laboratory studies:    12/23/2024 Breast MRI:  FINDINGS: Changes of breast conservation treatment on the right. No  suspicious enhancement for malignancy in either breast. Axillary lymph  nodes have a normal appearance                                                                      IMPRESSION: BI-RADS  CATEGORY: 1 -  Negative.    1/22/2025 Left breast diagnostic mammogram and bilateral axillary ultrasounds:  FINDINGS: No suspicious mammographic finding is identified.     Targeted axillary ultrasound demonstrates a few lymph nodes with  diffusely prominent cortices in the lower left axilla. More superiorly  in the left axilla and in the right axilla, normal lymph nodes within  cortices are seen.                                                                   IMPRESSION: BI-RADS CATEGORY: 3 - Probably Benign.    2/6/2025 DEXA bone density scan:  Lumbar spine T-score in region of L1-L4 = -1.2   L1-4 percent change: 1.8%      HIPS:  Mean total hip T-score: -1.5  Mean total hip percent change: -0.8%      Left femoral neck T-score = -1.9  Left femoral neck percent changed = -2.8%  Right femoral neck T-score= -1.9   Right femoral neck percent changed = 0.8%     COMPARISON TO PRIOR:  No significant change based on a 95% confidence interval.    ASSESSMENT/PLAN:  Ms. June is a 58 yo female with a stage Ia, [id number], grade 1, ER positive, OH positive, HER2 negative invasive ductal carcinoma of the right breast.  She is s/p treatment with right breast lumpectomy and radiation. She did not tolerate adjuvant AI due to arthralgias, insomnia, and fatigue.  On tamoxifen.    1.  Right breast cancer:  Ms. June is approximately 4 years, 4.5 months out from excision of a right breast cancer.  She is on curative intent treatment with Tamoxifen 20 mg daily.  She has fatigue, joint pains, and hot flashes on tamoxifen.  Despite these side effects, she is willing to continue taking it.  Plan is to complete a total 7-10 years of endocrine therapy, as long as she is able to tolerate it.    She is asymptomatic of disease recurrence on history taken today.  Given increased breast density and that her breast cancer was not initially seen on screening mammogram, we are performing high risk breast screening with both annual mammograms and  breast MRI, spacing the two studies so that she is having some form of breast imaging once every 6 months.  - Breast MRI 12/23/2024 was without suspicious enhancement or lymphadenopathy.    - She presented with left axillary pain last week.  Of note, this is the contralateral side from her prior breast cancer.  She also had symptoms of a viral prodrome such as headache, nausea, and fatigue at the same time.  Ultrasound showed lymph nodes with prominent cortices in both the right and left axilla, likely benign.  Repeat left axillary ultrasound in 3 months was recommended and has been scheduled for 4/24/2025.  The discomfort in the left axillary lymph nodes is now resolved.  Despite this, I encouraged her to move forward with the follow up imaging.  - Due for bilateral screening mammograms 6/29/2025 or later.  - Return to clinic in 6 months    2.  At risk for osteoporosis:  She received Zometa 4 mg IV once every 6 months from 7/29/2021 - 8/16/2024 for  prevention of breast cancer bone metastases.   - DEXA 2/6/2025 was reviewed and is with a lowest T-score of -1.9 c/w osteopenia and unchanged from prior.   - Recommend daily calcium and vitamin D supplementation.  - No indication for additional Zometa at this time, will repeat a DEXA in two years.    3.  Arthralgias:  Secondary to menopause and exacerbated by endocrine therapy.    - Continue daily vitamin D supplementation  - Recommend intentional walking 150 minutes per week  - Okay to use topical anesthetics such as Voltaren gel and CBD cream     4.  Hot flashes:  Some improvement since last visit.  Will continue natural measures such as fans, dressing in layers, drinking ice water, etc.    5.  Vaginal dryness:  Not discussed today.  She is currently using replens vaginal moisturizer. No longer using hyaluronic acid as its expensive and was not helpful. Vagifem out of pocket cost was >$400.    - Premarin cream twice a week or less.    6.  Fatigue:  Unlikely that  fatigue at this point is related to her cancer treatment.  Differential diagnosis for fatigue is broad and includes anemia, thyroid disorders, vitamin deficiencies, mood disorder, and sleep disorder amongst others.  Recommend she also discuss this symptom with her PCP.    7. Follow Up:    - Left axillary ultrasound 4/24/2025 as already scheduled.  - Bilateral screening mammograms 6/29/2025 or later.  - Return visit with me in 6 months.    I spent 35 minutes on the date of the encounter doing chart review, review of test results, interpretation of tests, patient visit, and documentation

## 2025-02-10 ENCOUNTER — ONCOLOGY VISIT (OUTPATIENT)
Dept: ONCOLOGY | Facility: CLINIC | Age: 60
End: 2025-02-10
Attending: INTERNAL MEDICINE
Payer: COMMERCIAL

## 2025-02-10 VITALS
OXYGEN SATURATION: 96 % | SYSTOLIC BLOOD PRESSURE: 126 MMHG | DIASTOLIC BLOOD PRESSURE: 78 MMHG | BODY MASS INDEX: 21.28 KG/M2 | WEIGHT: 108.1 LBS | TEMPERATURE: 98.1 F | RESPIRATION RATE: 20 BRPM | HEART RATE: 62 BPM

## 2025-02-10 DIAGNOSIS — Z91.89 AT RISK FOR OSTEOPOROSIS: ICD-10-CM

## 2025-02-10 DIAGNOSIS — Z79.811 LONG TERM (CURRENT) USE OF AROMATASE INHIBITORS: ICD-10-CM

## 2025-02-10 DIAGNOSIS — R93.89 ABNORMAL FINDING ON ULTRASOUND: ICD-10-CM

## 2025-02-10 DIAGNOSIS — C50.511 MALIGNANT NEOPLASM OF LOWER-OUTER QUADRANT OF RIGHT BREAST OF FEMALE, ESTROGEN RECEPTOR POSITIVE (H): Primary | ICD-10-CM

## 2025-02-10 DIAGNOSIS — Z17.0 MALIGNANT NEOPLASM OF LOWER-OUTER QUADRANT OF RIGHT BREAST OF FEMALE, ESTROGEN RECEPTOR POSITIVE (H): Primary | ICD-10-CM

## 2025-02-10 DIAGNOSIS — R53.83 FATIGUE, UNSPECIFIED TYPE: ICD-10-CM

## 2025-02-10 PROCEDURE — 99214 OFFICE O/P EST MOD 30 MIN: CPT | Performed by: INTERNAL MEDICINE

## 2025-02-10 PROCEDURE — 99213 OFFICE O/P EST LOW 20 MIN: CPT | Performed by: INTERNAL MEDICINE

## 2025-02-10 ASSESSMENT — PAIN SCALES - GENERAL: PAINLEVEL_OUTOF10: NO PAIN (0)

## 2025-02-10 NOTE — LETTER
2/10/2025      Maribel June  [address]  [facility] [address]      Dear Colleague,    Thank you for referring your patient, Maribel June, to the [facility]. Please see a copy of my visit note below.      Oncology Visit:  Date on this visit: 2/10/2025    Diagnosis: Stage Ia, [id number], grade 1, ER positive, RI positive, HER-2 negative invasive carcinoma of the right breast. Oncotype dx recurrence score = 16.    Primary Physician: Juwan Perry     History Of Present Illness:  Ms. June is a 59 year old female with right breast cancer.  Routine screening mammogram on 8/6/2020 showed heterogeneously dense breasts but no concerning findings.  A physician then palpated a mass in the right breast.  Diagnostic mammogram and ultrasound showed a 2.2 cm mass at 8:00, 5 cm from the nipple.  Right breast biopsy showed a grade 1 invasive mammary carcinoma, ER strong in 100%, RI strong in 100%, HER2 negative.  She had a right breast lumpectomy and sentinel lymph node procedure with Dr. Watson on 9/29/2020.  Pathology showed a grade 1 invasive mammary carcinoma measuring 1.6 cm.  There was associated intermediate grade DCIS.  Surgical margins were negative.  A single lymph node was benign.  Oncotype dx recurrence score was 16.  She completed radiation to the right breast, a total of 5256 cGy in 20 fractions, on 12/21/2020.  She started treatment with letrozole in 1/2021.  The medication was poorly tolerated with arthralgias, insomnia, vaginal dryness and fatigue.  Treatment was changed to Tamoxifen in 03/2021.  This caused nausea and so was dose reduced to 10 mg daily in 05/2021. This was slowly increased back to 20 mg daily.  She received Zometa 4 mg IV once every 6 months from 7/29/2021 - 8/16/2024 for  prevention of breast cancer bone metastases.    Interval History:   Ms. June comes into clinic today for an on treatment visit.  She is on adjuvant curative intent treatment with  tamoxifen.  She has ongoing hot flashes on the medication.  She believes they are decreasing in frequency with time.  She also notes ongoing exhaustion at the end of a day of work.  This past winter, she had sudden onset of fatigue, headache, nausea, and pain beneath her left arm.  She notes that this all started after treatment for a vaginal yeast infection.  She was seen in the emergency department due to lightheadedness and dizziness.  She had a complete viral panel which was negative.  She states the left axillary pain finally went away 3 days ago.  The other symptoms resolved previously.  She denies concerning lumps, pain, or swelling of either breast.  She has full range of motion of her bilateral upper extremities.  She has no current cough, shortness of breath, or chest pain.  She has no abdominal complaints.  She denies bone or joint aches or pains.  It has now been a year since her 's parents have passed.  They have resumed construction on their house in [place].  They are hopeful to move into it in .      Past Medical/Surgical History:  Past Medical History:   Diagnosis Date     Breast cancer (H)      Depressive disorder      Encounter for breast and pelvic examination      Endometriosis      Herpes genitalis in women      History of major depression     situational with first .      Hypertension 2018     Kidney stone      Malignant neoplasm of right breast in female, estrogen receptor positive (H) 2020     S/P radiation therapy     5,256 cGy to right breast completed 2020 - [facility]   - Hyperlipidemia.  - Osteopenia    Past Surgical History:   Procedure Laterality Date     BREAST BIOPSY, RT/LT Right 2020     BREAST SURGERY Right     Right Breast - Benign      SECTION      x1     COLONOSCOPY N/A 2016    Procedure: COMBINED COLONOSCOPY, SINGLE OR MULTIPLE BIOPSY/POLYPECTOMY BY BIOPSY;  Surgeon: Duane, William Charles, MD;   Location: [facility] OR     COLONOSCOPY WITH CO2 INSUFFLATION N/A 08/16/2016    Procedure: COLONOSCOPY WITH CO2 INSUFFLATION;  Surgeon: Duane, William Charles, MD;  Location: [facility] OR     CYSTOSCOPY  11/13/2009    left stent placement ([facility])     GENITOURINARY SURGERY      surgery for kidney stone     GYN SURGERY      Partial Hysterectomy at age 29     LUMPECTOMY BREAST WITH SENTINEL NODE, COMBINED Right 09/29/2020    Procedure: Right breast lumpectomy with [place] node biopsy;  Surgeon: Jose Watson MD;  Location: [facility] OR     Allergies:  Allergies as of 02/10/2025 - Reviewed 09/04/2024   Allergen Reaction Noted     Flagyl [metronidazole] Nausea and Vomiting 07/30/2015     Lisinopril  06/13/2019     Oxycodone-acetaminophen Nausea and Vomiting 06/13/2019     Sulfamethoxazole-trimethoprim  12/29/2011     Zithromax [azithromycin dihydrate] Rash 07/03/2013     Current Medications:  Current Outpatient Medications   Medication Sig Dispense Refill     acyclovir (ZOVIRAX) 400 MG tablet Take 1 tablet (400 mg) by mouth every 8 hours for 5 days 30 tablet 3     acyclovir (ZOVIRAX) 400 MG tablet Take 1 tablet (400 mg) by mouth every 8 hours 15 tablet 11     amLODIPine (NORVASC) 5 MG tablet Take 0.5 tablets (2.5 mg) by mouth daily       bimatoprost (LUMIGAN) 0.01 % SOLN Place 1 drop into both eyes daily.       conjugated estrogens (PREMARIN) 0.625 MG/GM vaginal cream Place 0.5 g vaginally twice a week 30 g 1     diclofenac (VOLTAREN) 1 % topical gel 1 g to affected area on feet and ankles 2-3 times a day as needed for pain. 100 g 3     ibuprofen (ADVIL/MOTRIN) 200 MG tablet 3 tablets with food or milk as needed Orally every 6 hrs       latanoprost (XALATAN) 0.005 % ophthalmic solution INSTILL 1 DROP IN BOTH EYES EVERY DAY       Multiple Vitamin (MULTIVITAMIN PO) Take by mouth daily       Omega-3 Fatty Acids (OMEGA 3 PO) Take by mouth daily       pantoprazole (PROTONIX) 40 MG EC tablet Take 1 tablet (40 mg) by mouth daily 30-60 min prior  to a meal. 30 tablet 0     pimecrolimus (ELIDEL) 1 % external cream Apply topically 2 times daily 60 g 3     tacrolimus (PROTOPIC) 0.1 % external ointment Apply twice daily for 1 week, take 1 week off, then repeat. 60 g 3     tamoxifen (NOLVADEX) 20 MG tablet Take 1 tablet (20 mg) by mouth daily 90 tablet 3     traZODone (DESYREL) 50 MG tablet Take 1 tablet (50 mg) by mouth at bedtime Take 1/2 pill (25 mg) for a few nights, if tolerable OK to take full pill for sleep 30 tablet 3     triamcinolone (KENALOG) 0.1 % external cream Apply topically 2 times daily Apply topically twice daily for 1 week, take 1 week off, then repeat 60 g 1     vitamin B-12 (CYANOCOBALAMIN) 1000 MCG tablet         Family and Social History:  Please see initial Oncology consultation dated 10/27/2020 for details.  9/25/2020 Breast Actionable Panel was negative.    Physical Exam:  /78 (BP Location: Right arm, Patient Position: Sitting, Cuff Size: Adult Regular)   Pulse 62   Temp 98.1  F (36.7  C) (Oral)   Resp 20   Wt 49 kg (108 lb 1.6 oz)   SpO2 96%   BMI 21.28 kg/m    General:  Well appearing adult female in NAD.  Alert and oriented x 3.  HEENT:  Normocephalic.  Sclera anicteric.  MMM.    Lymph:  No palpable cervical, supraclavicular, or axillary LAD.  Chest:  CTA bilaterally.  No wheezes or crackles.  CV:  RRR.  Nl S1 and S2.    Breast:  Bilateral breasts are of increased fibroglandular density.  There are no dominant or discretely palpable masses in either breast. Lower outer right breast incision with underlying fibrosis unchanged from prior.  Mild tenderness. Bilateral nipples are everted and without discharge.  Abd:  Soft/ND.   Ext:  No edema of the bilateral lower extremities.   Musculo:  Full ROM of the bilateral upper extremities.    Psych:  Mood and affect appear normal.  Skin:  No visible concerning skin rashes or lesions.    Laboratory/Imaging Studies  I personally reviewed the below images and laboratory  studies:    12/23/2024 Breast MRI:  FINDINGS: Changes of breast conservation treatment on the right. No  suspicious enhancement for malignancy in either breast. Axillary lymph  nodes have a normal appearance                                                                      IMPRESSION: BI-RADS CATEGORY: 1 -  Negative.    1/22/2025 Left breast diagnostic mammogram and bilateral axillary ultrasounds:  FINDINGS: No suspicious mammographic finding is identified.     Targeted axillary ultrasound demonstrates a few lymph nodes with  diffusely prominent cortices in the lower left axilla. More superiorly  in the left axilla and in the right axilla, normal lymph nodes within  cortices are seen.                                                                   IMPRESSION: BI-RADS CATEGORY: 3 - Probably Benign.    2/6/2025 DEXA bone density scan:  Lumbar spine T-score in region of L1-L4 = -1.2   L1-4 percent change: 1.8%      HIPS:  Mean total hip T-score: -1.5  Mean total hip percent change: -0.8%      Left femoral neck T-score = -1.9  Left femoral neck percent changed = -2.8%  Right femoral neck T-score= -1.9   Right femoral neck percent changed = 0.8%     COMPARISON TO PRIOR:  No significant change based on a 95% confidence interval.    ASSESSMENT/PLAN:  Ms. June is a 58 yo female with a stage Ia, X7pD2K2, grade 1, ER positive, PA positive, HER2 negative invasive ductal carcinoma of the right breast.  She is s/p treatment with right breast lumpectomy and radiation. She did not tolerate adjuvant AI due to arthralgias, insomnia, and fatigue.  On tamoxifen.    1.  Right breast cancer:  Ms. June is approximately 4 years, 4.5 months out from excision of a right breast cancer.  She is on curative intent treatment with Tamoxifen 20 mg daily.  She has fatigue, joint pains, and hot flashes on tamoxifen.  Despite these side effects, she is willing to continue taking it.  Plan is to complete a total 7-10 years of endocrine  therapy, as long as she is able to tolerate it.    She is asymptomatic of disease recurrence on history taken today.  Given increased breast density and that her breast cancer was not initially seen on screening mammogram, we are performing high risk breast screening with both annual mammograms and breast MRI, spacing the two studies so that she is having some form of breast imaging once every 6 months.  - Breast MRI 12/23/2024 was without suspicious enhancement or lymphadenopathy.    - She presented with left axillary pain last week.  Of note, this is the contralateral side from her prior breast cancer.  She also had symptoms of a viral prodrome such as headache, nausea, and fatigue at the same time.  Ultrasound showed lymph nodes with prominent cortices in both the right and left axilla, likely benign.  Repeat left axillary ultrasound in 3 months was recommended and has been scheduled for 4/24/2025.  The discomfort in the left axillary lymph nodes is now resolved.  Despite this, I encouraged her to move forward with the follow up imaging.  - Due for bilateral screening mammograms 6/29/2025 or later.  - Return to clinic in 6 months    2.  At risk for osteoporosis:  She received Zometa 4 mg IV once every 6 months from 7/29/2021 - 8/16/2024 for  prevention of breast cancer bone metastases.   - DEXA 2/6/2025 was reviewed and is with a lowest T-score of -1.9 c/w osteopenia and unchanged from prior.   - Recommend daily calcium and vitamin D supplementation.  - No indication for additional Zometa at this time, will repeat a DEXA in two years.    3.  Arthralgias:  Secondary to menopause and exacerbated by endocrine therapy.    - Continue daily vitamin D supplementation  - Recommend intentional walking 150 minutes per week  - Okay to use topical anesthetics such as Voltaren gel and CBD cream     4.  Hot flashes:  Some improvement since last visit.  Will continue natural measures such as fans, dressing in layers, drinking  ice water, etc.    5.  Vaginal dryness:  Not discussed today.  She is currently using replens vaginal moisturizer. No longer using hyaluronic acid as its expensive and was not helpful. Vagifem out of pocket cost was >$400.    - Premarin cream twice a week or less.    6.  Fatigue:  Unlikely that fatigue at this point is related to her cancer treatment.  Differential diagnosis for fatigue is broad and includes anemia, thyroid disorders, vitamin deficiencies, mood disorder, and sleep disorder amongst others.  Recommend she also discuss this symptom with her PCP.    7. Follow Up:    - Left axillary ultrasound 4/24/2025 as already scheduled.  - Bilateral screening mammograms 6/29/2025 or later.  - Return visit with me in 6 months.    I spent 35 minutes on the date of the encounter doing chart review, review of test results, interpretation of tests, patient visit, and documentation             Again, thank you for allowing me to participate in the care of your patient.        Sincerely,        Saige Eli MD    Electronically signed

## 2025-02-10 NOTE — NURSING NOTE
"Oncology Rooming Note    February 10, 2025 2:59 PM   Maribel June is a 59 year old female who presents for:    Chief Complaint   Patient presents with    Oncology Clinic Visit     Malignant neoplasm of lower-outer quadrant of right breast     Initial Vitals: /78 (BP Location: Right arm, Patient Position: Sitting, Cuff Size: Adult Regular)   Pulse 62   Temp 98.1  F (36.7  C) (Oral)   Resp 20   Wt 49 kg (108 lb 1.6 oz)   SpO2 96%   BMI 21.28 kg/m   Estimated body mass index is 21.28 kg/m  as calculated from the following:    Height as of 9/12/22: 1.518 m (4' 11.76\").    Weight as of this encounter: 49 kg (108 lb 1.6 oz). Body surface area is 1.44 meters squared.  No Pain (0) Comment: Data Unavailable   No LMP recorded. Patient has had a hysterectomy.  Allergies reviewed: Yes  Medications reviewed: Yes    Medications: medication refills needed  Pharmacy name entered into [facility]: [id number] - [place], [place] - [address] AT [facility] [address]    Frailty Screening:   Is the patient here for a new oncology consult visit in cancer care? 2. No      Clinical concerns:  trazadone refills; would like to discuss last ultrasound, mammogram, and dexa scan      Mouna Cummings              "

## 2025-02-12 ENCOUNTER — VIRTUAL VISIT (OUTPATIENT)
Dept: ENDOCRINOLOGY | Facility: CLINIC | Age: 60
End: 2025-02-12
Payer: COMMERCIAL

## 2025-02-12 DIAGNOSIS — M85.852 OSTEOPENIA OF BOTH HIPS: Primary | ICD-10-CM

## 2025-02-12 DIAGNOSIS — N20.0 NEPHROLITHIASIS: ICD-10-CM

## 2025-02-12 DIAGNOSIS — M85.851 OSTEOPENIA OF BOTH HIPS: Primary | ICD-10-CM

## 2025-02-12 NOTE — LETTER
2/12/2025       RE: Maribel June  [address]  [facility] [address]     Dear Colleague,    Thank you for referring your patient, Maribel June, to the [facility]. Please see a copy of my visit note below.    Endocrinology virtual Visit    Chief Complaint: Follow     Information obtained from:Patient      Assessment/Treatment Plan:      Osteopenia; I have personally reviewed her latest DEXA scan from 2/6/2025 and I agree with the interpretation of lumbar spine T-score L1-L4 -1.2 and percentage change of 1.8%, hips mean total hip T-score -1.5, left femoral neck T-score -1.9, right femoral neck T-score -1.9 and overall there has been a -2.8% and 0.8% change at the left and right femoral necks respectively.  FRAX score of 8.2% for major osteoporotic fracture and 1% for hip fracture.      Treatment with zoledronic acid 4 mg from oncology stand point/ongoing.   Based on bone density scan-bone specific therapy not indicated.  Plan-strengthening exercises, calcium 1200 mg daily from the diet mainly from all sources and continue current vitamin D dose.  Will check a follow-up vitamin D level.  Will check a 24-hour urine collection for calcium to assess hypo or hypercalciuria.      Recurrent nephrolithiasis -previous assessment for primary hyperparathyroidism was negative.  We will do additional assessment by checking a follow-up calcium, parathyroid hormone, and 24-hour urine collection for calcium.      Follow-up with endocrinology clinic as needed.  Continue to check bone density every 2 years.  Orders Placed This Encounter   Procedures     Vitamin D Deficiency (D3 Only)     Calcium timed urine     Creatinine timed urine     Calcium     Albumin level     Phosphorus     Parathyroid Hormone Intact     Urea nitrogen     Creatinine            Cornel Briseno MD  Staff Endocrinologist    Division of  Endocrinology and Diabetes  Subjective:         HPI: Maribel June is a 59 year old female with history of osteopenia who is here for a follow up.      Diagnosed with breast cancer s/p radiation therapy, currently on tamoxifen and ZOMETA (last 2/19/24).  Here for further discussion of osteopenia.  No fractures since we saw her last.     Fracture risk and osteoporosis  No previous history of fracture after the age of 50.  No loss of height.  Her weight has always been in the range of 109-110 pounds  No family history or parental history of hip fracture or osteoporosis.  She is a non-smoker.  No history of current or past use of glucocorticoid treatment  No history of excessive alcohol intake  She has had hysterectomy but she tells me that ovaries are present. Now on tamoxifen.   No history of prolonged immobility, transplant history, diabetes, hyperthyroidism, GI disease including inflammatory bowel disease or COPD.    She has recurrent history of nephrolithiasis.  Previously had not been taking any calcium supplement for fear of nephrolithiasis.  Currently on MVT. Does not take any additional calcium supplement. Takes at least two diary products per day.     In terms of exercise; she walks every day.   at the [employer].  No dental procedures planned.    She is lactose intolerant.  She has a strong family history of kidney stone in her dad, brother, uncle.  She had kidney stones frequently over the last 10 years and she is closely following with urology.  Kidney stones were calcium containing per report. Last kidney stone was 4+ years ago.     Allergies   Allergen Reactions     Flagyl [Metronidazole] Nausea and Vomiting     Lisinopril      Other reaction(s): Headaches     Oxycodone-Acetaminophen Nausea and Vomiting     States Oxycodone is fine     Sulfamethoxazole-Trimethoprim      Other reaction(s): Headache     Zithromax [Azithromycin Dihydrate] Rash       Current Outpatient Medications    Medication Sig Dispense Refill     acyclovir (ZOVIRAX) 400 MG tablet Take 1 tablet (400 mg) by mouth every 8 hours for 5 days 30 tablet 3     acyclovir (ZOVIRAX) 400 MG tablet Take 1 tablet (400 mg) by mouth every 8 hours 15 tablet 11     amLODIPine (NORVASC) 5 MG tablet Take 0.5 tablets (2.5 mg) by mouth daily       bimatoprost (LUMIGAN) 0.01 % SOLN Place 1 drop into both eyes daily.       conjugated estrogens (PREMARIN) 0.625 MG/GM vaginal cream Place 0.5 g vaginally twice a week 30 g 1     diclofenac (VOLTAREN) 1 % topical gel 1 g to affected area on feet and ankles 2-3 times a day as needed for pain. 100 g 3     ibuprofen (ADVIL/MOTRIN) 200 MG tablet 3 tablets with food or milk as needed Orally every 6 hrs       latanoprost (XALATAN) 0.005 % ophthalmic solution INSTILL 1 DROP IN BOTH EYES EVERY DAY       Multiple Vitamin (MULTIVITAMIN PO) Take by mouth daily       Omega-3 Fatty Acids (OMEGA 3 PO) Take by mouth daily       pantoprazole (PROTONIX) 40 MG EC tablet Take 1 tablet (40 mg) by mouth daily 30-60 min prior to a meal. 30 tablet 0     pimecrolimus (ELIDEL) 1 % external cream Apply topically 2 times daily 60 g 3     tacrolimus (PROTOPIC) 0.1 % external ointment Apply twice daily for 1 week, take 1 week off, then repeat. 60 g 3     tamoxifen (NOLVADEX) 20 MG tablet Take 1 tablet (20 mg) by mouth daily 90 tablet 3     traZODone (DESYREL) 50 MG tablet Take 1 tablet (50 mg) by mouth at bedtime Take 1/2 pill (25 mg) for a few nights, if tolerable OK to take full pill for sleep 30 tablet 3     triamcinolone (KENALOG) 0.1 % external cream Apply topically 2 times daily Apply topically twice daily for 1 week, take 1 week off, then repeat 60 g 1     vitamin B-12 (CYANOCOBALAMIN) 1000 MCG tablet          Review of Systems    ROS: 11 point ROS neg other than the symptoms noted above in the HPI.      Objective:   There were no vitals taken for this visit.  Constitutional: Pleasant no acute cardiopulmonary distress.  "  EYES: anicteric, normal extra-ocular movements.  Neurological: Alert and oriented.    Psychological: appropriate mood and affect     In House Labs:   ENDO CALCIUM LABS-UMP Latest Ref Rng & Units 1/13/2023   VITAMIN D DEFICIENCY SCREENING 20 - 75 ug/L    ALBUMIN 3.4 - 5.0 g/dL    ALKPHOS 40 - 150 U/L    AMYLASE 30 - 110 U/L    CALCIUM 8.6 - 10.0 mg/dL 9.1   CREATININE 0.51 - 0.95 mg/dL 0.60     ENDO CALCIUM LABS-UMP Latest Ref Rng & Units 10/20/2022   VITAMIN D DEFICIENCY SCREENING 20 - 75 ug/L    ALBUMIN 3.4 - 5.0 g/dL 3.8   ALKPHOS 40 - 150 U/L 44   AMYLASE 30 - 110 U/L 43   CALCIUM 8.6 - 10.0 mg/dL 9.1   CREATININE 0.51 - 0.95 mg/dL 0.57     ENDO CALCIUM LABS-UMP Latest Ref Rng & Units 7/13/2022   VITAMIN D DEFICIENCY SCREENING 20 - 75 ug/L    ALBUMIN 3.4 - 5.0 g/dL 3.7   ALKPHOS 40 - 150 U/L    AMYLASE 30 - 110 U/L    CALCIUM 8.6 - 10.0 mg/dL 9.1   CREATININE 0.51 - 0.95 mg/dL 0.48 (L)     ENDO CALCIUM LABS-UMP Latest Ref Rng & Units 1/14/2022   VITAMIN D DEFICIENCY SCREENING 20 - 75 ug/L 40   ALBUMIN 3.4 - 5.0 g/dL 4.1   ALKPHOS 40 - 150 U/L    AMYLASE 30 - 110 U/L    CALCIUM 8.6 - 10.0 mg/dL 9.0   CREATININE 0.51 - 0.95 mg/dL 0.57     TSH   Date Value Ref Range Status   08/07/2023 2.29 0.30 - 4.20 uIU/mL Final   08/17/2021 2.49 0.40 - 4.00 mU/L Final   12/14/2020 1.88 0.40 - 4.00 mU/L Final   10/28/2019 2.18 0.40 - 4.00 mU/L Final   08/05/2019 1.75 0.40 - 4.00 mU/L Final   07/31/2018 3.54 0.40 - 4.00 mU/L Final   09/19/2017 2.68 0.40 - 4.00 mU/L Final       Creatinine   Date Value Ref Range Status   02/19/2024 0.52 0.51 - 0.95 mg/dL Final   10/28/2019 0.50 (L) 0.52 - 1.04 mg/dL Final       \"HISTORY:    Postmenopausal, aromatase inhibitor     COMPARISON:  12/29/2022     Age: 59 years.  Height: 60 inches  Weight: 110 pounds  Sex: Female  Ethnicity: white  Referring Provider: CM SANFORD     Image quality: Adequate     Lumbar spine T-score in region of L1-L4 = -1.2   L1-4 percent change: 1.8%    " "  HIPS:  Mean total hip T-score: -1.5  Mean total hip percent change: -0.8%      Left femoral neck T-score = -1.9  Left femoral neck percent changed = -2.8%  Right femoral neck T-score= -1.9   Right femoral neck percent changed = 0.8%     COMPARISON TO PRIOR:  No significant change based on a 95% confidence interval.     FRAX:  10 year probability of major osteoporotic fracture: 8.2%  10 year probability of hip fracture: 1.0%  The 10 year probability of fracture may be lower than reported if the  patient has received treatment. FRAX data should be disregarded in  patient's taking bisphosphonates.     World Health Organization definition of osteoporosis and osteopenia  for  women:   Normal: T-score at or above -1.0  Low Bone Mass (Osteopenia): T-score between -1.0 and -2.5.   Osteoporosis: T-score at or below -2.5   T-scores are reported for postmenopausal women and men over 50 years  of age.                                                                      IMPRESSION:  Low bone mass (osteopenia). Consider follow-up DEXA in  approximately 2 years.    \"             Video-Visit Details    Type of service:  Video Visit  Joined the call at 1:30 PM  Left the call at 2/12/2025, 1:49:01 pm.  You were on the call for .  Distant Location (provider location):  Off-site.   Platform used for Video Visit: KelbyUrjanet  The longitudinal plan of care for the diagnosis(es)/condition(s) as documented were addressed during this visit. Due to the added complexity in care, I will continue to support Crispin in the subsequent management and with ongoing continuity of care.        Again, thank you for allowing me to participate in the care of your patient.      Sincerely,    Cornel Briseno MD    "

## 2025-02-12 NOTE — PATIENT INSTRUCTIONS
"Crispin,    Next bone density in 2027.   Continue current calcium and vitamin D supplement.   Weight bearing Exercises  Fall prevention    Brief description of what you need to do:  Do the test on a day off, so you can stay home.  Wake up and flush the first urine.  Then collect all the urine for that day, evening and overnight if you wake to urinate. Plus the first urine of the next morning.    For women,  the lab should also give you a \"hat\" which is a device to help collect the urine and to pour it into the collection jug.   The urine must be kept cool, not frozen.  So, do not put it out on the porch.  Keep it in the fridge.    Orders Placed This Encounter   Procedures    Vitamin D Deficiency (D3 Only)    Calcium timed urine    Creatinine timed urine    Calcium    Albumin level    Phosphorus    Parathyroid Hormone Intact    Urea nitrogen    Creatinine      "

## 2025-02-12 NOTE — NURSING NOTE
Current patient location: [place]    Is the patient currently in the state of [place]? YES    Visit mode: VIDEO    If the visit is dropped, the patient can be reconnected by:VIDEO VISIT: Text to cell phone:   Telephone Information:   Mobile [phone]       Will anyone else be joining the visit? NO  (If patient encounters technical issues they should call [phone]    Are changes needed to the allergy or medication list? Patient completed e-check in for visit prior to appointment. VF did not review e-check in information again with patient due to this.    Are refills needed on medications prescribed by this physician? Discuss with provider    Rooming Documentation:  Follow up DEXA scan    Reason for visit: RECHECK    Sophie HICKEY

## 2025-02-12 NOTE — PROGRESS NOTES
Endocrinology virtual Visit    Chief Complaint: Follow     Information obtained from:Patient      Assessment/Treatment Plan:      Osteopenia; I have personally reviewed her latest DEXA scan from 2/6/2025 and I agree with the interpretation of lumbar spine T-score L1-L4 -1.2 and percentage change of 1.8%, hips mean total hip T-score -1.5, left femoral neck T-score -1.9, right femoral neck T-score -1.9 and overall there has been a -2.8% and 0.8% change at the left and right femoral necks respectively.  FRAX score of 8.2% for major osteoporotic fracture and 1% for hip fracture.      Treatment with zoledronic acid 4 mg from oncology stand point/ongoing.   Based on bone density scan-bone specific therapy not indicated.  Plan-strengthening exercises, calcium 1200 mg daily from the diet mainly from all sources and continue current vitamin D dose.  Will check a follow-up vitamin D level.  Will check a 24-hour urine collection for calcium to assess hypo or hypercalciuria.      Recurrent nephrolithiasis -previous assessment for primary hyperparathyroidism was negative.  We will do additional assessment by checking a follow-up calcium, parathyroid hormone, and 24-hour urine collection for calcium.      Follow-up with endocrinology clinic as needed.  Continue to check bone density every 2 years.  Orders Placed This Encounter   Procedures    Vitamin D Deficiency (D3 Only)    Calcium timed urine    Creatinine timed urine    Calcium    Albumin level    Phosphorus    Parathyroid Hormone Intact    Urea nitrogen    Creatinine            Cornel Briseno MD  Staff Endocrinologist    Division of Endocrinology and Diabetes  Subjective:         HPI: Maribel June is a 59 year old female with history of osteopenia who is here for a follow up.      Diagnosed with breast cancer s/p radiation therapy, currently on tamoxifen and ZOMETA (last 2/19/24).  Here for further discussion of osteopenia.  No fractures since we saw her last.      Fracture risk and osteoporosis  No previous history of fracture after the age of 50.  No loss of height.  Her weight has always been in the range of 109-110 pounds  No family history or parental history of hip fracture or osteoporosis.  She is a non-smoker.  No history of current or past use of glucocorticoid treatment  No history of excessive alcohol intake  She has had hysterectomy but she tells me that ovaries are present. Now on tamoxifen.   No history of prolonged immobility, transplant history, diabetes, hyperthyroidism, GI disease including inflammatory bowel disease or COPD.    She has recurrent history of nephrolithiasis.  Previously had not been taking any calcium supplement for fear of nephrolithiasis.  Currently on MVT. Does not take any additional calcium supplement. Takes at least two diary products per day.     In terms of exercise; she walks every day.   at the [employer].  No dental procedures planned.    She is lactose intolerant.  She has a strong family history of kidney stone in her dad, brother, uncle.  She had kidney stones frequently over the last 10 years and she is closely following with urology.  Kidney stones were calcium containing per report. Last kidney stone was 4+ years ago.     Allergies   Allergen Reactions    Flagyl [Metronidazole] Nausea and Vomiting    Lisinopril      Other reaction(s): Headaches    Oxycodone-Acetaminophen Nausea and Vomiting     States Oxycodone is fine    Sulfamethoxazole-Trimethoprim      Other reaction(s): Headache    Zithromax [Azithromycin Dihydrate] Rash       Current Outpatient Medications   Medication Sig Dispense Refill    acyclovir (ZOVIRAX) 400 MG tablet Take 1 tablet (400 mg) by mouth every 8 hours for 5 days 30 tablet 3    acyclovir (ZOVIRAX) 400 MG tablet Take 1 tablet (400 mg) by mouth every 8 hours 15 tablet 11    amLODIPine (NORVASC) 5 MG tablet Take 0.5 tablets (2.5 mg) by mouth daily      bimatoprost (LUMIGAN) 0.01 %  SOLN Place 1 drop into both eyes daily.      conjugated estrogens (PREMARIN) 0.625 MG/GM vaginal cream Place 0.5 g vaginally twice a week 30 g 1    diclofenac (VOLTAREN) 1 % topical gel 1 g to affected area on feet and ankles 2-3 times a day as needed for pain. 100 g 3    ibuprofen (ADVIL/MOTRIN) 200 MG tablet 3 tablets with food or milk as needed Orally every 6 hrs      latanoprost (XALATAN) 0.005 % ophthalmic solution INSTILL 1 DROP IN BOTH EYES EVERY DAY      Multiple Vitamin (MULTIVITAMIN PO) Take by mouth daily      Omega-3 Fatty Acids (OMEGA 3 PO) Take by mouth daily      pantoprazole (PROTONIX) 40 MG EC tablet Take 1 tablet (40 mg) by mouth daily 30-60 min prior to a meal. 30 tablet 0    pimecrolimus (ELIDEL) 1 % external cream Apply topically 2 times daily 60 g 3    tacrolimus (PROTOPIC) 0.1 % external ointment Apply twice daily for 1 week, take 1 week off, then repeat. 60 g 3    tamoxifen (NOLVADEX) 20 MG tablet Take 1 tablet (20 mg) by mouth daily 90 tablet 3    traZODone (DESYREL) 50 MG tablet Take 1 tablet (50 mg) by mouth at bedtime Take 1/2 pill (25 mg) for a few nights, if tolerable OK to take full pill for sleep 30 tablet 3    triamcinolone (KENALOG) 0.1 % external cream Apply topically 2 times daily Apply topically twice daily for 1 week, take 1 week off, then repeat 60 g 1    vitamin B-12 (CYANOCOBALAMIN) 1000 MCG tablet          Review of Systems    ROS: 11 point ROS neg other than the symptoms noted above in the HPI.      Objective:   There were no vitals taken for this visit.  Constitutional: Pleasant no acute cardiopulmonary distress.   EYES: anicteric, normal extra-ocular movements.  Neurological: Alert and oriented.    Psychological: appropriate mood and affect     In House Labs:   ENDO CALCIUM LABS-[facility] Latest Ref Rng & Units 1/13/2023   VITAMIN D DEFICIENCY SCREENING 20 - 75 ug/L    ALBUMIN 3.4 - 5.0 g/dL    ALKPHOS 40 - 150 U/L    AMYLASE 30 - 110 U/L    CALCIUM 8.6 - 10.0 mg/dL 9.1  "  CREATININE 0.51 - 0.95 mg/dL 0.60     ENDO CALCIUM LABS-[facility] Latest Ref Rng & Units 10/20/2022   VITAMIN D DEFICIENCY SCREENING 20 - 75 ug/L    ALBUMIN 3.4 - 5.0 g/dL 3.8   ALKPHOS 40 - 150 U/L 44   AMYLASE 30 - 110 U/L 43   CALCIUM 8.6 - 10.0 mg/dL 9.1   CREATININE 0.51 - 0.95 mg/dL 0.57     ENDO CALCIUM LABS-[facility] Latest Ref Rng & Units 7/13/2022   VITAMIN D DEFICIENCY SCREENING 20 - 75 ug/L    ALBUMIN 3.4 - 5.0 g/dL 3.7   ALKPHOS 40 - 150 U/L    AMYLASE 30 - 110 U/L    CALCIUM 8.6 - 10.0 mg/dL 9.1   CREATININE 0.51 - 0.95 mg/dL 0.48 (L)     ENDO CALCIUM LABS-[facility] Latest Ref Rng & Units 1/14/2022   VITAMIN D DEFICIENCY SCREENING 20 - 75 ug/L 40   ALBUMIN 3.4 - 5.0 g/dL 4.1   ALKPHOS 40 - 150 U/L    AMYLASE 30 - 110 U/L    CALCIUM 8.6 - 10.0 mg/dL 9.0   CREATININE 0.51 - 0.95 mg/dL 0.57     TSH   Date Value Ref Range Status   08/07/2023 2.29 0.30 - 4.20 uIU/mL Final   08/17/2021 2.49 0.40 - 4.00 mU/L Final   12/14/2020 1.88 0.40 - 4.00 mU/L Final   10/28/2019 2.18 0.40 - 4.00 mU/L Final   08/05/2019 1.75 0.40 - 4.00 mU/L Final   07/31/2018 3.54 0.40 - 4.00 mU/L Final   09/19/2017 2.68 0.40 - 4.00 mU/L Final       Creatinine   Date Value Ref Range Status   02/19/2024 0.52 0.51 - 0.95 mg/dL Final   10/28/2019 0.50 (L) 0.52 - 1.04 mg/dL Final       \"HISTORY:    Postmenopausal, aromatase inhibitor     COMPARISON:  12/29/2022     Age: 59 years.  Height: 60 inches  Weight: 110 pounds  Sex: Female  Ethnicity: white  Referring Provider: CM SANFORD     Image quality: Adequate     Lumbar spine T-score in region of L1-L4 = -1.2   L1-4 percent change: 1.8%      HIPS:  Mean total hip T-score: -1.5  Mean total hip percent change: -0.8%      Left femoral neck T-score = -1.9  Left femoral neck percent changed = -2.8%  Right femoral neck T-score= -1.9   Right femoral neck percent changed = 0.8%     COMPARISON TO PRIOR:  No significant change based on a 95% confidence interval.     FRAX:  10 year probability of major " "osteoporotic fracture: 8.2%  10 year probability of hip fracture: 1.0%  The 10 year probability of fracture may be lower than reported if the  patient has received treatment. FRAX data should be disregarded in  patient's taking bisphosphonates.     World Health Organization definition of osteoporosis and osteopenia  for  women:   Normal: T-score at or above -1.0  Low Bone Mass (Osteopenia): T-score between -1.0 and -2.5.   Osteoporosis: T-score at or below -2.5   T-scores are reported for postmenopausal women and men over 50 years  of age.                                                                      IMPRESSION:  Low bone mass (osteopenia). Consider follow-up DEXA in  approximately 2 years.    \"             Video-Visit Details    Type of service:  Video Visit  Joined the call at 1:30 PM  Left the call at 2/12/2025, 1:49:01 pm.  You were on the call for .  Distant Location (provider location):  Off-site.   Platform used for Video Visit: Sesar  The longitudinal plan of care for the diagnosis(es)/condition(s) as documented were addressed during this visit. Due to the added complexity in care, I will continue to support Crispin in the subsequent management and with ongoing continuity of care.      "

## 2025-03-06 ENCOUNTER — LAB (OUTPATIENT)
Dept: LAB | Facility: CLINIC | Age: 60
End: 2025-03-06
Payer: COMMERCIAL

## 2025-03-06 DIAGNOSIS — M85.851 OSTEOPENIA OF BOTH HIPS: ICD-10-CM

## 2025-03-06 DIAGNOSIS — Z13.220 LIPID SCREENING: ICD-10-CM

## 2025-03-06 DIAGNOSIS — Z13.1 SCREENING FOR DIABETES MELLITUS: ICD-10-CM

## 2025-03-06 DIAGNOSIS — Z13.29 SCREENING FOR THYROID DISORDER: ICD-10-CM

## 2025-03-06 DIAGNOSIS — M85.852 OSTEOPENIA OF BOTH HIPS: ICD-10-CM

## 2025-03-06 DIAGNOSIS — N20.0 NEPHROLITHIASIS: ICD-10-CM

## 2025-03-06 LAB
ALBUMIN SERPL BCG-MCNC: 4.5 G/DL (ref 3.5–5.2)
BUN SERPL-MCNC: 18 MG/DL (ref 6–20)
CALCIUM SERPL-MCNC: 9.4 MG/DL (ref 8.8–10.4)
CHOLEST SERPL-MCNC: 234 MG/DL
CREAT SERPL-MCNC: 0.64 MG/DL (ref 0.51–0.95)
EGFRCR SERPLBLD CKD-EPI 2021: >90 ML/MIN/1.73M2
FASTING STATUS PATIENT QL REPORTED: YES
FASTING STATUS PATIENT QL REPORTED: YES
GLUCOSE SERPL-MCNC: 101 MG/DL (ref 70–99)
HDLC SERPL-MCNC: 63 MG/DL
LDLC SERPL CALC-MCNC: 147 MG/DL
NONHDLC SERPL-MCNC: 171 MG/DL
PHOSPHATE SERPL-MCNC: 3.9 MG/DL (ref 2.5–4.5)
PTH-INTACT SERPL-MCNC: 38 PG/ML (ref 15–65)
TRIGL SERPL-MCNC: 121 MG/DL
TSH SERPL DL<=0.005 MIU/L-ACNC: 2.29 UIU/ML (ref 0.3–4.2)
VIT D+METAB SERPL-MCNC: 34 NG/ML (ref 20–50)

## 2025-04-07 DIAGNOSIS — G47.00 PERSISTENT INSOMNIA: ICD-10-CM

## 2025-04-09 RX ORDER — TRAZODONE HYDROCHLORIDE 50 MG/1
TABLET ORAL
Qty: 30 TABLET | Refills: 3 | Status: SHIPPED | OUTPATIENT
Start: 2025-04-09

## 2025-04-09 NOTE — TELEPHONE ENCOUNTER
Trazodone 50mg tab  Last prescribing provider: Dr. Eli     Last clinic visit date: 2/10/25    Recommendations for requested medication (if none, N/A): NA    Any other pertinent information (if none, N/A): NA    Refilled: Y/N, if NO, why?

## 2025-04-23 ENCOUNTER — MEDICAL CORRESPONDENCE (OUTPATIENT)
Dept: HEALTH INFORMATION MANAGEMENT | Facility: CLINIC | Age: 60
End: 2025-04-23
Payer: COMMERCIAL

## 2025-06-30 ENCOUNTER — ANCILLARY PROCEDURE (OUTPATIENT)
Dept: MAMMOGRAPHY | Facility: CLINIC | Age: 60
End: 2025-06-30
Payer: COMMERCIAL

## 2025-06-30 DIAGNOSIS — Z12.31 ENCOUNTER FOR SCREENING MAMMOGRAM FOR BREAST CANCER: ICD-10-CM

## 2025-06-30 PROCEDURE — 77063 BREAST TOMOSYNTHESIS BI: CPT | Performed by: STUDENT IN AN ORGANIZED HEALTH CARE EDUCATION/TRAINING PROGRAM

## 2025-06-30 PROCEDURE — 77067 SCR MAMMO BI INCL CAD: CPT | Performed by: STUDENT IN AN ORGANIZED HEALTH CARE EDUCATION/TRAINING PROGRAM

## 2025-08-14 ENCOUNTER — ONCOLOGY VISIT (OUTPATIENT)
Dept: ONCOLOGY | Facility: CLINIC | Age: 60
End: 2025-08-14
Attending: INTERNAL MEDICINE
Payer: COMMERCIAL

## 2025-08-14 VITALS
DIASTOLIC BLOOD PRESSURE: 76 MMHG | WEIGHT: 111.5 LBS | SYSTOLIC BLOOD PRESSURE: 129 MMHG | TEMPERATURE: 98.4 F | HEART RATE: 64 BPM | OXYGEN SATURATION: 97 % | RESPIRATION RATE: 16 BRPM | BODY MASS INDEX: 21.95 KG/M2

## 2025-08-14 DIAGNOSIS — Z85.3 PERSONAL HISTORY OF MALIGNANT NEOPLASM OF BREAST: ICD-10-CM

## 2025-08-14 DIAGNOSIS — Z79.810 LONG-TERM CURRENT USE OF TAMOXIFEN: ICD-10-CM

## 2025-08-14 DIAGNOSIS — Z12.39 BREAST CANCER SCREENING, HIGH RISK PATIENT: ICD-10-CM

## 2025-08-14 DIAGNOSIS — N95.2 VAGINAL ATROPHY: ICD-10-CM

## 2025-08-14 DIAGNOSIS — C50.511 MALIGNANT NEOPLASM OF LOWER-OUTER QUADRANT OF RIGHT BREAST OF FEMALE, ESTROGEN RECEPTOR POSITIVE (H): Primary | ICD-10-CM

## 2025-08-14 DIAGNOSIS — L65.9 ALOPECIA: ICD-10-CM

## 2025-08-14 DIAGNOSIS — Z17.0 MALIGNANT NEOPLASM OF LOWER-OUTER QUADRANT OF RIGHT BREAST OF FEMALE, ESTROGEN RECEPTOR POSITIVE (H): Primary | ICD-10-CM

## 2025-08-14 PROCEDURE — 99213 OFFICE O/P EST LOW 20 MIN: CPT | Performed by: INTERNAL MEDICINE

## 2025-08-14 RX ORDER — MINOXIDIL 2.5 MG/1
TABLET ORAL
Qty: 30 TABLET | Refills: 2 | Status: SHIPPED | OUTPATIENT
Start: 2025-08-14

## 2025-08-14 ASSESSMENT — PAIN SCALES - GENERAL: PAINLEVEL_OUTOF10: NO PAIN (0)

## 2025-08-20 ENCOUNTER — MYC REFILL (OUTPATIENT)
Dept: ONCOLOGY | Facility: CLINIC | Age: 60
End: 2025-08-20
Payer: COMMERCIAL

## 2025-08-20 DIAGNOSIS — G47.00 PERSISTENT INSOMNIA: ICD-10-CM

## 2025-08-20 DIAGNOSIS — Z17.0 MALIGNANT NEOPLASM OF LOWER-OUTER QUADRANT OF RIGHT BREAST OF FEMALE, ESTROGEN RECEPTOR POSITIVE (H): ICD-10-CM

## 2025-08-20 DIAGNOSIS — C50.511 MALIGNANT NEOPLASM OF LOWER-OUTER QUADRANT OF RIGHT BREAST OF FEMALE, ESTROGEN RECEPTOR POSITIVE (H): ICD-10-CM

## 2025-08-25 RX ORDER — TRAZODONE HYDROCHLORIDE 50 MG/1
25 TABLET ORAL AT BEDTIME
Qty: 30 TABLET | Refills: 3 | Status: SHIPPED | OUTPATIENT
Start: 2025-08-25

## 2025-08-25 RX ORDER — TAMOXIFEN CITRATE 20 MG/1
20 TABLET ORAL DAILY
Qty: 90 TABLET | Refills: 3 | Status: SHIPPED | OUTPATIENT
Start: 2025-08-25

## (undated) DEVICE — CLIP HORIZON MED BLUE 002200

## (undated) DEVICE — BASIN SET SINGLE STERILE 13752-624

## (undated) DEVICE — DRAPE IOBAN INCISE 23X17" 6650EZ

## (undated) DEVICE — SOL NACL 0.9% IRRIG 500ML BOTTLE 2F7123

## (undated) DEVICE — LINEN TOWEL PACK X5 5464

## (undated) DEVICE — PREP PAD ALCOHOL 6818

## (undated) DEVICE — PACK MINOR CUSTOM ASC

## (undated) DEVICE — ESU ELEC BLADE 2.75" COATED/INSULATED E1455

## (undated) DEVICE — CLIP HORIZON SM RED WIDE SLOT 001201

## (undated) DEVICE — ESU GROUND PAD ADULT W/CORD E7507

## (undated) DEVICE — NDL 25GA 2"  8881200441

## (undated) DEVICE — DRAPE SHEET MED 44X70" 9355

## (undated) DEVICE — BNDG COBAN 2"X5YDS CO-FLEX UNSTERILE ASSRTD CLRS LF 5200CP

## (undated) DEVICE — PAD CHUX UNDERPAD 30X30"

## (undated) DEVICE — MARKER MARGIN MARKER STD 6 COLOR SGL USE MMS6

## (undated) DEVICE — SU SILK 3-0 SH 30" K832H

## (undated) DEVICE — GLOVE PROTEXIS POWDER FREE SMT 8.0  2D72PT80X

## (undated) DEVICE — NDL BLUNT 18GA 1" W/O FILTER 305181

## (undated) DEVICE — PREP CHLORAPREP 26ML TINTED ORANGE  260815

## (undated) DEVICE — DRAPE LAP TRANSVERSE 29421

## (undated) DEVICE — SOL WATER IRRIG 500ML BOTTLE 2F7113

## (undated) DEVICE — SUCTION MANIFOLD NEPTUNE 2 SYS 1 PORT 702-025-000

## (undated) DEVICE — SU VICRYL 3-0 SH 27" J316H

## (undated) DEVICE — NDL 27GA 1.25" 305136

## (undated) DEVICE — SU PDS II 4-0 FS-2 27" Z422H

## (undated) DEVICE — ADH SKIN CLOSURE PREMIERPRO EXOFIN 1.0ML 3470

## (undated) DEVICE — SYR 05ML LL W/O NDL

## (undated) RX ORDER — CEFAZOLIN SODIUM 1 G/3ML
INJECTION, POWDER, FOR SOLUTION INTRAMUSCULAR; INTRAVENOUS
Status: DISPENSED
Start: 2020-09-29

## (undated) RX ORDER — TRIAMCINOLONE ACETONIDE 40 MG/ML
INJECTION, SUSPENSION INTRA-ARTICULAR; INTRAMUSCULAR
Status: DISPENSED
Start: 2021-07-15

## (undated) RX ORDER — FENTANYL CITRATE 50 UG/ML
INJECTION, SOLUTION INTRAMUSCULAR; INTRAVENOUS
Status: DISPENSED
Start: 2020-09-29

## (undated) RX ORDER — PROPOFOL 10 MG/ML
INJECTION, EMULSION INTRAVENOUS
Status: DISPENSED
Start: 2020-09-29

## (undated) RX ORDER — ACETAMINOPHEN 325 MG/1
TABLET ORAL
Status: DISPENSED
Start: 2020-09-29

## (undated) RX ORDER — ONDANSETRON 2 MG/ML
INJECTION INTRAMUSCULAR; INTRAVENOUS
Status: DISPENSED
Start: 2020-09-29

## (undated) RX ORDER — LIDOCAINE HYDROCHLORIDE 10 MG/ML
INJECTION, SOLUTION EPIDURAL; INFILTRATION; INTRACAUDAL; PERINEURAL
Status: DISPENSED
Start: 2021-07-15

## (undated) RX ORDER — GABAPENTIN 300 MG/1
CAPSULE ORAL
Status: DISPENSED
Start: 2020-09-29

## (undated) RX ORDER — OXYCODONE HYDROCHLORIDE 5 MG/1
TABLET ORAL
Status: DISPENSED
Start: 2020-09-29

## (undated) RX ORDER — DEXAMETHASONE SODIUM PHOSPHATE 4 MG/ML
INJECTION, SOLUTION INTRA-ARTICULAR; INTRALESIONAL; INTRAMUSCULAR; INTRAVENOUS; SOFT TISSUE
Status: DISPENSED
Start: 2020-09-29

## (undated) RX ORDER — LIDOCAINE HYDROCHLORIDE 20 MG/ML
INJECTION, SOLUTION EPIDURAL; INFILTRATION; INTRACAUDAL; PERINEURAL
Status: DISPENSED
Start: 2020-09-29

## (undated) RX ORDER — KETOROLAC TROMETHAMINE 30 MG/ML
INJECTION, SOLUTION INTRAMUSCULAR; INTRAVENOUS
Status: DISPENSED
Start: 2020-09-29